# Patient Record
Sex: FEMALE | Race: WHITE | HISPANIC OR LATINO | Employment: UNEMPLOYED | URBAN - METROPOLITAN AREA
[De-identification: names, ages, dates, MRNs, and addresses within clinical notes are randomized per-mention and may not be internally consistent; named-entity substitution may affect disease eponyms.]

---

## 2017-01-16 ENCOUNTER — HOSPITAL ENCOUNTER (EMERGENCY)
Facility: HOSPITAL | Age: 27
Discharge: HOME | End: 2017-01-16
Attending: EMERGENCY MEDICINE | Admitting: EMERGENCY MEDICINE
Payer: COMMERCIAL

## 2017-01-16 VITALS
DIASTOLIC BLOOD PRESSURE: 59 MMHG | OXYGEN SATURATION: 98 % | SYSTOLIC BLOOD PRESSURE: 119 MMHG | TEMPERATURE: 98.4 F | WEIGHT: 180 LBS | RESPIRATION RATE: 15 BRPM | HEART RATE: 82 BPM

## 2017-01-16 DIAGNOSIS — R51.9 HEADACHE: Primary | ICD-10-CM

## 2017-01-16 LAB — HCG UR QL: NEGATIVE

## 2017-01-16 PROCEDURE — 99283 EMERGENCY DEPT VISIT LOW MDM: CPT

## 2017-01-16 PROCEDURE — 96372 THER/PROPH/DIAG INJ SC/IM: CPT

## 2017-01-16 PROCEDURE — 81025 URINE PREGNANCY TEST: CPT | Performed by: EMERGENCY MEDICINE

## 2017-01-16 RX ORDER — KETOROLAC TROMETHAMINE 30 MG/ML
30 INJECTION, SOLUTION INTRAMUSCULAR; INTRAVENOUS ONCE
Status: COMPLETED | OUTPATIENT
Start: 2017-01-16 | End: 2017-01-16

## 2017-01-16 RX ORDER — NAPROXEN 500 MG/1
500 TABLET ORAL 2 TIMES DAILY WITH MEALS
Qty: 10 TABLET | Refills: 0 | Status: SHIPPED | OUTPATIENT
Start: 2017-01-16 | End: 2017-01-21 | Stop reason: ALTCHOICE

## 2017-01-16 RX ORDER — ONDANSETRON 4 MG/1
4 TABLET, ORALLY DISINTEGRATING ORAL ONCE
Status: COMPLETED | OUTPATIENT
Start: 2017-01-16 | End: 2017-01-16

## 2017-01-16 RX ORDER — ONDANSETRON 4 MG/1
4 TABLET, ORALLY DISINTEGRATING ORAL EVERY 8 HOURS PRN
Qty: 20 TABLET | Refills: 0 | Status: SHIPPED | OUTPATIENT
Start: 2017-01-16 | End: 2018-07-03

## 2017-01-16 RX ADMIN — ONDANSETRON 4 MG: 4 TABLET, ORALLY DISINTEGRATING ORAL at 21:47

## 2017-01-16 RX ADMIN — KETOROLAC TROMETHAMINE 30 MG: 30 INJECTION, SOLUTION INTRAMUSCULAR at 21:47

## 2017-04-07 ENCOUNTER — HOSPITAL ENCOUNTER (EMERGENCY)
Facility: HOSPITAL | Age: 27
Discharge: HOME | End: 2017-04-07
Attending: EMERGENCY MEDICINE | Admitting: EMERGENCY MEDICINE
Payer: COMMERCIAL

## 2017-04-07 VITALS
RESPIRATION RATE: 16 BRPM | TEMPERATURE: 98.2 F | WEIGHT: 206 LBS | OXYGEN SATURATION: 100 % | HEART RATE: 74 BPM | SYSTOLIC BLOOD PRESSURE: 133 MMHG | DIASTOLIC BLOOD PRESSURE: 65 MMHG

## 2017-04-07 DIAGNOSIS — M54.2 NECK PAIN ON LEFT SIDE: Primary | ICD-10-CM

## 2017-04-07 PROCEDURE — 99283 EMERGENCY DEPT VISIT LOW MDM: CPT

## 2017-04-07 RX ORDER — NAPROXEN 250 MG/1
500 TABLET ORAL ONCE
Status: DISCONTINUED | OUTPATIENT
Start: 2017-04-07 | End: 2017-04-07 | Stop reason: HOSPADM

## 2017-04-07 RX ORDER — NAPROXEN 500 MG/1
500 TABLET ORAL 2 TIMES DAILY WITH MEALS
Qty: 14 TABLET | Refills: 0 | Status: SHIPPED | OUTPATIENT
Start: 2017-04-07 | End: 2018-07-03

## 2018-01-13 NOTE — MISCELLANEOUS
Provider Comments  Provider Comments:   PATIENT NO SHOWED FOR APPOINTMENT TODAY, TRIED CALLING PATIENT, PATIENTS PHONE IS OUT OF SERVICE      Signatures   Electronically signed by : Angelica Richardson, ; Sep 27 2016 11:14AM EST                       (Author)

## 2018-01-16 NOTE — MISCELLANEOUS
Provider Comments  Provider Comments:   PT NO SHOW FOR U/S AND VISIT  TIRED TO CALL PT PHONE DISCONNECTED      Signatures   Electronically signed by :  [address] [place] [address], ; Jul 13 2016 11:59AM EST                       (Author)    Electronically signed by : VANDANA Roblero ; Jul 13 2016 12:06PM EST                       (Author)

## 2018-07-03 ENCOUNTER — HOSPITAL ENCOUNTER (EMERGENCY)
Facility: HOSPITAL | Age: 28
Discharge: HOME | End: 2018-07-03
Attending: EMERGENCY MEDICINE
Payer: COMMERCIAL

## 2018-07-03 ENCOUNTER — APPOINTMENT (EMERGENCY)
Dept: RADIOLOGY | Facility: HOSPITAL | Age: 28
End: 2018-07-03
Payer: COMMERCIAL

## 2018-07-03 VITALS
HEART RATE: 72 BPM | BODY MASS INDEX: 29.86 KG/M2 | RESPIRATION RATE: 14 BRPM | DIASTOLIC BLOOD PRESSURE: 73 MMHG | OXYGEN SATURATION: 97 % | WEIGHT: 185 LBS | TEMPERATURE: 96.9 F | SYSTOLIC BLOOD PRESSURE: 137 MMHG

## 2018-07-03 DIAGNOSIS — S93.409A ANKLE SPRAIN: Primary | ICD-10-CM

## 2018-07-03 DIAGNOSIS — S93.609A FOOT SPRAIN: ICD-10-CM

## 2018-07-03 PROCEDURE — 99283 EMERGENCY DEPT VISIT LOW MDM: CPT

## 2018-07-03 PROCEDURE — 73630 X-RAY EXAM OF FOOT: CPT

## 2018-07-03 PROCEDURE — 73610 X-RAY EXAM OF ANKLE: CPT

## 2018-07-03 RX ORDER — ACETAMINOPHEN 325 MG/1
650 TABLET ORAL ONCE
Status: COMPLETED | OUTPATIENT
Start: 2018-07-03 | End: 2018-07-03

## 2018-07-03 RX ORDER — IBUPROFEN 600 MG/1
600 TABLET ORAL EVERY 6 HOURS PRN
Qty: 30 TABLET | Refills: 0 | Status: SHIPPED | OUTPATIENT
Start: 2018-07-03 | End: 2018-12-27

## 2018-07-03 RX ORDER — ACETAMINOPHEN 325 MG/1
TABLET ORAL
Status: DISPENSED
Start: 2018-07-03 | End: 2018-07-03

## 2018-07-03 RX ORDER — FERROUS SULFATE 325(65) MG
325 TABLET ORAL 2 TIMES DAILY WITH MEALS
COMMUNITY
End: 2019-04-03

## 2018-07-03 RX ADMIN — ACETAMINOPHEN 650 MG: 325 TABLET ORAL at 10:25

## 2018-07-03 NOTE — DISCHARGE INSTRUCTIONS
Ankle Sprain   WHAT YOU NEED TO KNOW:   An ankle sprain happens when 1 or more ligaments in your ankle joint stretch or tear  Ligaments are tough tissues that connect bones  Ligaments support your joints and keep your bones in place  DISCHARGE INSTRUCTIONS:   Return to the emergency department if:   · You have severe pain in your ankle  · Your foot or toes are cold or numb  · Your ankle becomes more weak or unstable (wobbly)  · You are unable to put any weight on your ankle or foot  · Your swelling has increased or returned  Contact your healthcare provider if:   · Your pain does not go away, even after treatment  · You have questions or concerns about your condition or care  Medicines: You may need any of the following:  · NSAIDs , such as ibuprofen, help decrease swelling, pain, and fever  This medicine is available with or without a doctor's order  NSAIDs can cause stomach bleeding or kidney problems in certain people  If you take blood thinner medicine, always ask your healthcare provider if NSAIDs are safe for you  Always read the medicine label and follow directions  · Acetaminophen  decreases pain  It is available without a doctor's order  Ask how much to take and how often to take it  Follow directions  Acetaminophen can cause liver damage if not taken correctly  · Prescription pain medicine  may be given  Ask how to take this medicine safely  · Take your medicine as directed  Contact your healthcare provider if you think your medicine is not helping or if you have side effects  Tell him or her if you are allergic to any medicine  Keep a list of the medicines, vitamins, and herbs you take  Include the amounts, and when and why you take them  Bring the list or the pill bottles to follow-up visits  Carry your medicine list with you in case of an emergency    Self care:   · Use support devices,  such as a brace, cast, or splint, may be needed to limit your movement and protect your joint  You may need to use crutches to decrease your pain as you move around  · Go to physical therapy as directed  A physical therapist teaches you exercises to help improve movement and strength, and to decrease pain  · Rest  your ankle so that it can heal  Return to normal activities as directed  · Apply ice on your ankle for 15 to 20 minutes every hour or as directed  Use an ice pack, or put crushed ice in a plastic bag  Cover it with a towel  Ice helps prevent tissue damage and decreases swelling and pain  · Compress  your ankle  Ask if you should wrap an elastic bandage around your injured ligament  An elastic bandage provides support and helps decrease swelling and movement so your joint can heal  Wear as long as directed  · Elevate  your ankle above the level of your heart as often as you can  This will help decrease swelling and pain  Prop your ankle on pillows or blankets to keep it elevated comfortably  Prevent another ankle sprain:   · Let your ankle heal   Find out how long your ligament needs to heal  Do not do any physical activity until your healthcare provider says it is okay  If you start activity too soon, you may develop a more serious injury  · Always warm up and stretch  before you exercise or play sports  · Use the right equipment  Always wear shoes that fit well and are made for the activity that you are doing  You may also need ankle supports, elbow and knee pads, or braces  Follow up with your healthcare provider as directed:  Write down your questions so you remember to ask them during your visits  © 2017 [address] Information is for End User's use only and may not be sold, redistributed or otherwise used for commercial purposes  All illustrations and images included in CareNotes® are the copyrighted property of [employer]  or [employer]  The above information is an  only   It is not intended as medical advice for individual conditions or treatments  Talk to your doctor, nurse or pharmacist before following any medical regimen to see if it is safe and effective for you  Foot Sprain   WHAT YOU NEED TO KNOW:   A foot sprain is caused by a stretched or torn ligament in the foot or toe  Ligaments are tough tissues that connect bones  DISCHARGE INSTRUCTIONS:   Seek care immediately if:   · You have numbness or tingling below the injury, such as in your toes  · The skin on your injured foot is blue or pale  · You have increased pain, even after you take pain medicine  Contact your healthcare provider if:   · You have new weakness in your foot  · You have new or increased swelling in your foot  · You have new or increased stiffness when you move your injured foot  · You have questions or concerns about your condition or care  Medicines:   · NSAIDs , such as ibuprofen, help decrease swelling, pain, and fever  This medicine is available with or without a doctor's order  NSAIDs can cause stomach bleeding or kidney problems in certain people  If you take blood thinner medicine, always ask if NSAIDs are safe for you  Always read the medicine label and follow directions  Do not give these medicines to children under 10months of age without direction from your child's healthcare provider  · Take your medicine as directed  Contact your healthcare provider if you think your medicine is not helping or if you have side effects  Tell him of her if you are allergic to any medicine  Keep a list of the medicines, vitamins, and herbs you take  Include the amounts, and when and why you take them  Bring the list or the pill bottles to follow-up visits  Carry your medicine list with you in case of an emergency  Self-care:   · Rest your foot  Limit movement in your sprained foot for the first 2 to 3 days  You might need crutches to take weight off your injured foot as it heals  Use crutches as directed  · Apply ice  on your foot for 15 to 20 minutes every hour or as directed  Use an ice pack, or put crushed ice in a plastic bag  Cover it with a towel  Ice helps prevent tissue damage and decreases swelling and pain  · Compress your foot  You may need to use tape or an elastic bandage to support your foot if you have a mild sprain  You may need a splint on your foot for support if your sprain is severe  Wear your splint for as many days as directed  · Elevate your foot  above the level of your heart as often as you can  This will help decrease swelling and pain  Prop your foot on pillows or blankets to keep it elevated comfortably  Exercise your foot:  You may be given exercises to improve your strength and to help decrease stiffness  The exercises and physical therapy can help restore strength and increase the range of motion in your foot  Ask your healthcare provider when you can return to your normal activities or play sports  Prevent another foot sprain:   · Warm up and stretch before you exercise  · Do not exercise when you feel pain or are tired  · Wear equipment to protect yourself when you play sports  Follow up with your healthcare provider as directed:  Write down your questions so you remember to ask them during your visits  © 2017 [address] [employer] Information is for End User's use only and may not be sold, redistributed or otherwise used for commercial purposes  All illustrations and images included in CareNotes® are the copyrighted property of [employer]  or [employer] Miguel Scott  The above information is an  only  It is not intended as medical advice for individual conditions or treatments  Talk to your doctor, nurse or pharmacist before following any medical regimen to see if it is safe and effective for you  Splint Care   AMBULATORY CARE:   Splint care  is important to help protect your splint until it comes off   Some splints are made of fiberglass or plaster that will need to dry and harden  Splint care will help the splint dry and harden correctly  Even after your splint hardens, it can be damaged  Seek care immediately if:   · You have increased pain  · Your fingers or toes are numb or tingling  · You feel burning or stinging around your injury  · Your nails, fingers, or toes turn pale, blue, or gray, and feel cold  · You have new or increased trouble moving your fingers or toes  · Your swelling gets worse  · The skin under your splint is bleeding or leaking pus  Contact your healthcare provider if:   · Your hard splint gets wet or is damaged  · You have a fever  · Your splint feels tighter  · You have itchy, dry skin under your splint that is getting worse  · The skin under your splint is red, or you have a new sore  · You notice a bad smell coming from your splint  · You have questions or concerns about your condition or care  How to care for your splint:   · Wait for your hard splint to harden completely  You may have to wait up to 3 days before you can walk on a plaster splint  · Check your splint and the skin around it each day  Check your splint for damage, such as cracks and breaks  Check your skin for redness, increased swelling, and sores  Loosen the elastic bandage around your splint if it feels too tight  · Keep your splint clean and dry  Keep dirt out of your splint  Before you bathe, wrap your hard splint with 2 layers of plastic  Then put a plastic bag over it  Keep the plastic bag tightly sealed  You can also ask your healthcare provider about waterproof shields  Do not put your hard splint in the water , even with a plastic bag over it  A wet splint can make your skin itchy, and may lead to infection  · Do not put powders or deodorants inside your splint  These can dry your skin and increase itching       · Do not try to scratch the skin inside your hard splint with sharp objects  Sharp objects can break off inside your splint or hurt your skin  · Do not pull the padding out of your splint  The padding inside your splint protects your skin  You may develop a sore on your skin if you take out the padding  Follow up with your healthcare provider as directed within 1 to 2 weeks:  Write down your questions so you remember to ask them during your visits  © 2017 [address] [employer] Information is for End User's use only and may not be sold, redistributed or otherwise used for commercial purposes  All illustrations and images included in CareNotes® are the copyrighted property of [employer]  or [employer] Miguel Scott  The above information is an  only  It is not intended as medical advice for individual conditions or treatments  Talk to your doctor, nurse or pharmacist before following any medical regimen to see if it is safe and effective for you  Crutch Instructions   WHAT YOU NEED TO KNOW:   Crutches are tools that provide support and balance when you walk  You may need 1 or 2 crutches to help support your body weight  You may need crutches if you had surgery or an injury that affects your ability to walk  DISCHARGE INSTRUCTIONS:   How to use crutches safely:   · Support your weight with your arms and hands  Do not support your weight with your armpits  This could hurt the nerves that are in your armpits  Keep your elbow bent when the crutch is in place under your arm  · Walk slowly and carefully with crutches  Go up and down stairs and ramps slowly, and stop to rest when you feel tired  Get up slowly to a sitting or standing position  This will help prevent dizziness and fainting  Use your crutches only on firm ground  Use caution when you walk on ice or snow  Wet or waxed floors and smooth cement floors can be slippery  Watch out for small rugs or cords    How to walk with crutches:   · Place both crutches under your arms, and place your hands on the hand  of the crutches  Place your crutches slightly in front of you  · The top of the crutches should be about 2 fingers [id number] (about 1½ inches) below your armpits  Place your weight on your hands  The top of the crutches should not press into your armpits  · If you have one leg that is injured, keep it off the floor by bending your knee  · Lift the crutches and move them a step ahead of you  Put the rubber ends of the crutches firmly on the ground  Move the foot that is not injured between the crutches  Place that heel down first     · If you are using your crutches for balance, move your right foot and left crutch forward  Then move your left foot and right crutch forward  Keep walking this way  How to go up stairs with crutches:   · Face the stairs  Put the crutches close to the first step  · Push onto the crutches and put your uninjured leg on the first step  · Put your weight on your uninjured leg that is on the first step  Bring both crutches and the injured leg onto the step at the same time  · When you hold onto a railing with one arm, put both crutches under the other arm  Use the railing to help you go up stairs  How to go down stairs with crutches:   · Stand with the toes of your uninjured leg close to the edge of the step  · Bend the knee of your uninjured leg  Slowly lower both crutches along with the injured leg onto the next step  · Lean on your crutches  Slowly lower your uninjured leg onto the same step  · Place both crutches under one arm while you hold onto the railing with the other arm  How to sit in a chair with crutches:   · Turn and back up to the chair until you feel the edge of it against the back of your legs  Keep your injured leg forward  · Take your crutches out from under your arms  Sit while bending your uninjured knee  How to get up from a chair with crutches:   · Sit on the edge of your chair       · Push up with your hands using the crutches or arms of the chair  Put your weight on your uninjured foot as you get up  · Keep your injured leg bent at the knee and off the floor  Contact your healthcare provider if:   · Your crutches do not fit  · One crutch is longer than the other  · Your crutches break or get lost     · The rubber tips of your crutches are split or loose  · You get blisters or painful calluses on your hands or armpits  · Your armpit is red, sore, or has bumps or pimples  · Your arm muscles get weaker the longer you use the crutches  · You have questions or concerns about your condition or care  Seek care immediately or call 911 if:   · You have sudden numbness in a hand or arm  · Your fingers feel cold or have cramping pain  © 2017 [address] Information is for End User's use only and may not be sold, redistributed or otherwise used for commercial purposes  All illustrations and images included in CareNotes® are the copyrighted property of [employer]  or [employer] Miguel Scott  The above information is an  only  It is not intended as medical advice for individual conditions or treatments  Talk to your doctor, nurse or pharmacist before following any medical regimen to see if it is safe and effective for you

## 2018-07-03 NOTE — ED PROVIDER NOTES
History  Chief Complaint   Patient presents with    Foot Pain     "I woke up this morning with pressure and swelling to my left foot"  denies injury to same       History provided by:  Patient   used: No    Medical Problem   Location:  Left ankle and foot pain    Quality:  Problems for years since sprained ankle   Severity:  Moderate  Onset quality:  Gradual  Duration:  3 hours  Timing:  Intermittent  Chronicity:  Recurrent  Associated symptoms: no abdominal pain, no chest pain, no congestion, no cough, no diarrhea, no ear pain, no fatigue, no fever, no headaches, no loss of consciousness, no myalgias, no nausea, no rash, no rhinorrhea, no shortness of breath, no sore throat, no vomiting and no wheezing        Prior to Admission Medications   Prescriptions Last Dose Informant Patient Reported? Taking?   ferrous sulfate 325 (65 Fe) mg tablet   Yes Yes   Sig: Take 325 mg by mouth 2 (two) times a day with meals      Facility-Administered Medications: None       Past Medical History:   Diagnosis Date    Disease of thyroid gland        Past Surgical History:   Procedure Laterality Date     SECTION         Family History   Problem Relation Age of Onset    Heart murmur Mother     Hypertension Mother     Lupus Mother      I have reviewed and agree with the history as documented  Social History   Substance Use Topics    Smoking status: Current Some Day Smoker    Smokeless tobacco: Never Used    Alcohol use No        Review of Systems   Constitutional: Negative  Negative for fatigue and fever  HENT: Negative  Negative for congestion, ear pain, rhinorrhea and sore throat  Eyes: Negative  Respiratory: Negative  Negative for cough, shortness of breath and wheezing  Cardiovascular: Negative  Negative for chest pain  Gastrointestinal: Negative  Negative for abdominal pain, diarrhea, nausea and vomiting  Endocrine: Negative  Genitourinary: Negative  Musculoskeletal: Negative  Negative for myalgias  Skin: Negative  Negative for rash  Allergic/Immunologic: Negative  Neurological: Negative  Negative for loss of consciousness and headaches  Hematological: Negative  Psychiatric/Behavioral: Negative  All other systems reviewed and are negative  Physical Exam  Physical Exam   Constitutional: She appears well-developed and well-nourished  HENT:   Head: Normocephalic and atraumatic  Right Ear: External ear normal    Left Ear: External ear normal    Nose: Nose normal    Mouth/Throat: Oropharynx is clear and moist    Eyes: Conjunctivae and EOM are normal  Pupils are equal, round, and reactive to light  Neck: Normal range of motion  Neck supple  Cardiovascular: Normal rate, regular rhythm and normal heart sounds  Pulmonary/Chest: Effort normal and breath sounds normal    Abdominal: Soft  Bowel sounds are normal    Musculoskeletal:   Left heel pain  Left lateral malleolus tender  5th metatarsal tender no swelling ankle and toes from dp pulses strong bilat    Skin: Skin is warm  Psychiatric: She has a normal mood and affect  Her behavior is normal  Judgment and thought content normal    Nursing note and vitals reviewed        Vital Signs  ED Triage Vitals [07/03/18 0954]   Temperature Pulse Respirations Blood Pressure SpO2   (!) 96 9 °F (36 1 °C) 100 16 120/70 99 %      Temp Source Heart Rate Source Patient Position - Orthostatic VS BP Location FiO2 (%)   Temporal Monitor Sitting Left arm --      Pain Score       8           Vitals:    07/03/18 0954 07/03/18 1230   BP: 120/70 137/73   Pulse: 100 72   Patient Position - Orthostatic VS: Sitting Sitting       Visual Acuity      ED Medications  Medications   acetaminophen (TYLENOL) tablet 650 mg (650 mg Oral Given 7/3/18 1025)       Diagnostic Studies  Results Reviewed     None                 XR foot 3+ views LEFT   Final Result by Temo Proctor DO (07/03 1311)      No acute osseous abnormality  Workstation performed: [id number]         XR ankle 3+ views LEFT   Final Result by Emi Mullen DO (07/03 1048)      No acute osseous abnormality  Workstation performed: [id number]                    Procedures  Procedures       Phone Contacts  ED Phone Contact    ED Course                               MDM  CritCare Time    Disposition  Final diagnoses: Ankle sprain   Foot sprain     Time reflects when diagnosis was documented in both MDM as applicable and the Disposition within this note     Time User Action Codes Description Comment    7/3/2018 12:06  [address] [[id number]] Ankle sprain     7/3/2018 12:06 PM Cheng Boswell Add [[id number]] Foot sprain       ED Disposition     ED Disposition Condition Comment    Discharge  Chip Nissen discharge to home/self care  Condition at discharge: Good        Follow-up Information     Follow up With Specialties Details Why [facility] Schedule an appointment as soon as possible for a visit  [address] [address] [phone]          Discharge Medication List as of 7/3/2018 12:08 PM      CONTINUE these medications which have CHANGED    Details   ibuprofen (MOTRIN) 600 mg tablet Take 1 tablet (600 mg total) by mouth every 6 (six) hours as needed (pain), Starting Tue 7/3/2018, Print         CONTINUE these medications which have NOT CHANGED    Details   naproxen (NAPROSYN) 500 mg tablet Take 1 tablet by mouth 2 (two) times a day with meals for 7 days, Starting 4/7/2017, Until Fri 4/14/17, Print      ondansetron (ZOFRAN-ODT) 4 mg disintegrating tablet Take 1 tablet by mouth every 8 (eight) hours as needed for nausea or vomiting for up to 30 days, Starting 1/16/2017, Until Wed 2/15/17, Print           No discharge procedures on file      ED Provider  Electronically Signed by           Jeri Branch PA-C  07/03/18 [address]

## 2018-07-16 ENCOUNTER — TELEPHONE (OUTPATIENT)
Dept: FAMILY MEDICINE CLINIC | Facility: CLINIC | Age: 28
End: 2018-07-16

## 2018-07-16 ENCOUNTER — TRANSCRIBE ORDERS (OUTPATIENT)
Dept: ADMINISTRATIVE | Facility: HOSPITAL | Age: 28
End: 2018-07-16

## 2018-07-16 ENCOUNTER — HOSPITAL ENCOUNTER (EMERGENCY)
Facility: HOSPITAL | Age: 28
Discharge: HOME | End: 2018-07-16
Attending: EMERGENCY MEDICINE | Admitting: EMERGENCY MEDICINE
Payer: COMMERCIAL

## 2018-07-16 VITALS
TEMPERATURE: 97.1 F | DIASTOLIC BLOOD PRESSURE: 64 MMHG | RESPIRATION RATE: 18 BRPM | WEIGHT: 202.38 LBS | HEART RATE: 85 BPM | OXYGEN SATURATION: 100 % | BODY MASS INDEX: 32.53 KG/M2 | HEIGHT: 66 IN | SYSTOLIC BLOOD PRESSURE: 110 MMHG

## 2018-07-16 DIAGNOSIS — R42 DIZZY: Primary | ICD-10-CM

## 2018-07-16 DIAGNOSIS — N93.9 ABNORMAL VAGINAL BLEEDING: ICD-10-CM

## 2018-07-16 LAB
ALBUMIN SERPL BCP-MCNC: 4.2 G/DL (ref 3–5.2)
ALP SERPL-CCNC: 84 U/L (ref 43–122)
ALT SERPL W P-5'-P-CCNC: 32 U/L (ref 9–52)
ANION GAP SERPL CALCULATED.3IONS-SCNC: 12 MMOL/L (ref 5–14)
ANISOCYTOSIS BLD QL SMEAR: PRESENT
AST SERPL W P-5'-P-CCNC: 25 U/L (ref 14–36)
BASOPHILS # BLD AUTO: 0.1 THOUSANDS/ΜL (ref 0–0.1)
BASOPHILS NFR BLD AUTO: 1 % (ref 0–1)
BILIRUB SERPL-MCNC: 0.5 MG/DL
BILIRUB UR QL STRIP: NEGATIVE
BUN SERPL-MCNC: 13 MG/DL (ref 5–25)
CALCIUM SERPL-MCNC: 8.8 MG/DL (ref 8.4–10.2)
CHLORIDE SERPL-SCNC: 107 MMOL/L (ref 97–108)
CLARITY UR: CLEAR
CO2 SERPL-SCNC: 24 MMOL/L (ref 22–30)
COLOR UR: YELLOW
CREAT SERPL-MCNC: 0.71 MG/DL (ref 0.6–1.2)
EOSINOPHIL # BLD AUTO: 0.2 THOUSAND/ΜL (ref 0–0.4)
EOSINOPHIL NFR BLD AUTO: 2 % (ref 0–6)
ERYTHROCYTE [DISTWIDTH] IN BLOOD BY AUTOMATED COUNT: 18 %
EXT PREG TEST URINE: NEGATIVE
GFR SERPL CREATININE-BSD FRML MDRD: 115 ML/MIN/1.73SQ M
GLUCOSE SERPL-MCNC: 102 MG/DL (ref 70–99)
GLUCOSE UR STRIP-MCNC: NEGATIVE MG/DL
HCT VFR BLD AUTO: 35.6 % (ref 36–46)
HGB BLD-MCNC: 11.1 G/DL (ref 12–16)
HGB UR QL STRIP.AUTO: NEGATIVE
KETONES UR STRIP-MCNC: NEGATIVE MG/DL
LEUKOCYTE ESTERASE UR QL STRIP: NEGATIVE
LYMPHOCYTES # BLD AUTO: 3.5 THOUSANDS/ΜL (ref 0.5–4)
LYMPHOCYTES NFR BLD AUTO: 36 % (ref 20–50)
MCH RBC QN AUTO: 24.8 PG (ref 26–34)
MCHC RBC AUTO-ENTMCNC: 31.3 G/DL (ref 31–36)
MCV RBC AUTO: 80 FL (ref 80–100)
MICROCYTES BLD QL AUTO: PRESENT
MONOCYTES # BLD AUTO: 0.6 THOUSAND/ΜL (ref 0.2–0.9)
MONOCYTES NFR BLD AUTO: 6 % (ref 1–10)
NEUTROPHILS # BLD AUTO: 5.5 THOUSANDS/ΜL (ref 1.8–7.8)
NEUTS SEG NFR BLD AUTO: 56 % (ref 45–65)
NITRITE UR QL STRIP: NEGATIVE
PH UR STRIP.AUTO: 6 [PH] (ref 4.5–8)
PLATELET # BLD AUTO: 397 THOUSANDS/UL (ref 150–450)
PLATELET BLD QL SMEAR: ADEQUATE
PMV BLD AUTO: 8.7 FL (ref 8.9–12.7)
POTASSIUM SERPL-SCNC: 4 MMOL/L (ref 3.6–5)
PROT SERPL-MCNC: 8.1 G/DL (ref 5.9–8.4)
PROT UR STRIP-MCNC: NEGATIVE MG/DL
RBC # BLD AUTO: 4.48 MILLION/UL (ref 4–5.2)
RBC MORPH BLD: PRESENT
SODIUM SERPL-SCNC: 143 MMOL/L (ref 137–147)
SP GR UR STRIP.AUTO: 1.02 (ref 1–1.04)
UROBILINOGEN UA: NEGATIVE MG/DL
WBC # BLD AUTO: 9.8 THOUSAND/UL (ref 4.5–11)

## 2018-07-16 PROCEDURE — 99284 EMERGENCY DEPT VISIT MOD MDM: CPT

## 2018-07-16 PROCEDURE — 80053 COMPREHEN METABOLIC PANEL: CPT | Performed by: EMERGENCY MEDICINE

## 2018-07-16 PROCEDURE — 81003 URINALYSIS AUTO W/O SCOPE: CPT | Performed by: EMERGENCY MEDICINE

## 2018-07-16 PROCEDURE — 81025 URINE PREGNANCY TEST: CPT | Performed by: EMERGENCY MEDICINE

## 2018-07-16 PROCEDURE — 36415 COLL VENOUS BLD VENIPUNCTURE: CPT | Performed by: EMERGENCY MEDICINE

## 2018-07-16 PROCEDURE — 85025 COMPLETE CBC W/AUTO DIFF WBC: CPT | Performed by: EMERGENCY MEDICINE

## 2018-07-16 RX ORDER — ONDANSETRON 4 MG/1
4 TABLET, FILM COATED ORAL EVERY 8 HOURS PRN
Qty: 15 TABLET | Refills: 0 | Status: SHIPPED | OUTPATIENT
Start: 2018-07-16 | End: 2019-04-03

## 2018-07-16 RX ORDER — ONDANSETRON 4 MG/1
TABLET, ORALLY DISINTEGRATING ORAL
Status: COMPLETED
Start: 2018-07-16 | End: 2018-07-16

## 2018-07-16 RX ORDER — ONDANSETRON 4 MG/1
4 TABLET, ORALLY DISINTEGRATING ORAL ONCE
Status: COMPLETED | OUTPATIENT
Start: 2018-07-16 | End: 2018-07-16

## 2018-07-16 RX ADMIN — ONDANSETRON 4 MG: 4 TABLET, ORALLY DISINTEGRATING ORAL at 19:42

## 2018-07-16 NOTE — TELEPHONE ENCOUNTER
Would like lab work order entered in system, so she can get labs done this week then make appt with you    Call her once completed

## 2018-07-16 NOTE — ED PROVIDER NOTES
History  Chief Complaint   Patient presents with    Dizziness     "I've been having dizzy spells, I feel like I'm going to vomit but nothing comes out, and I've been feeling weak" x  1 week  30-year-old female comes in for dizziness nausea and dry dry heaving and generalized weakness  Patient states that she took a pregnancy test that was unclear  Patient also states that her period last month was shorter than usual and came late this month  And only lasted for 1 day  History provided by:  Patient   used: No    Dizziness   Quality:  Lightheadedness  Severity:  Moderate  Onset quality:  Sudden  Duration:  1 week  Timing:  Intermittent  Progression:  Waxing and waning  Chronicity:  New  Context: not when bending over, not with inactivity and not when urinating    Ineffective treatments:  None tried  Associated symptoms: nausea and weakness    Associated symptoms: no blood in stool, no chest pain, no diarrhea, no headaches and no shortness of breath    Nausea:     Severity:  Moderate    Onset quality:  Gradual    Duration:  1 week    Timing:  Intermittent    Progression:  Waxing and waning  Weakness:     Severity:  Moderate    Duration:  1 week    Onset quality:  Gradual    Timing:  Intermittent    Chronicity:  New  Risk factors: no anemia, no Meniere's disease and no new medications        Prior to Admission Medications   Prescriptions Last Dose Informant Patient Reported?  Taking?   ferrous sulfate 325 (65 Fe) mg tablet Past Week at Unknown time  Yes Yes   Sig: Take 325 mg by mouth 2 (two) times a day with meals   ibuprofen (MOTRIN) 600 mg tablet Past Month at Unknown time  No Yes   Sig: Take 1 tablet (600 mg total) by mouth every 6 (six) hours as needed (pain)      Facility-Administered Medications: None       Past Medical History:   Diagnosis Date    Disease of thyroid gland        Past Surgical History:   Procedure Laterality Date     SECTION         Family History Problem Relation Age of Onset    Heart murmur Mother     Hypertension Mother     Lupus Mother      I have reviewed and agree with the history as documented  Social History   Substance Use Topics    Smoking status: Never Smoker    Smokeless tobacco: Never Used    Alcohol use No        Review of Systems   Constitutional: Negative for fatigue and fever  HENT: Negative for congestion and ear pain  Eyes: Negative for discharge and redness  Respiratory: Negative for apnea, cough, shortness of breath and wheezing  Cardiovascular: Negative for chest pain  Gastrointestinal: Positive for nausea  Negative for abdominal pain, blood in stool and diarrhea  Endocrine: Negative for cold intolerance and polydipsia  Genitourinary: Negative for difficulty urinating and hematuria  Musculoskeletal: Negative for arthralgias and back pain  Skin: Negative for color change and rash  Allergic/Immunologic: Negative for environmental allergies and immunocompromised state  Neurological: Positive for dizziness and weakness  Negative for numbness and headaches  Hematological: Negative for adenopathy  Does not bruise/bleed easily  Psychiatric/Behavioral: Negative for agitation and behavioral problems  Physical Exam  Physical Exam   Constitutional: She is oriented to person, place, and time  Vital signs are normal  She appears well-developed and well-nourished  Non-toxic appearance  HENT:   Head: Normocephalic and atraumatic  Right Ear: Tympanic membrane and external ear normal    Left Ear: Tympanic membrane and external ear normal    Nose: Nose normal  No rhinorrhea, sinus tenderness or nasal deformity  Mouth/Throat: Uvula is midline and oropharynx is clear and moist  Normal dentition  Eyes: Conjunctivae, EOM and lids are normal  Pupils are equal, round, and reactive to light  Right eye exhibits no discharge  Left eye exhibits no discharge  Neck: Trachea normal and normal range of motion  Neck supple  No JVD present  Carotid bruit is not present  Cardiovascular: Normal rate, regular rhythm, intact distal pulses and normal pulses  No extrasystoles are present  PMI is not displaced  Pulmonary/Chest: Effort normal and breath sounds normal  No accessory muscle usage  No respiratory distress  She has no wheezes  She has no rhonchi  She has no rales  Abdominal: Soft  Normal appearance and bowel sounds are normal  She exhibits no mass  There is no tenderness  There is no rigidity, no rebound and no guarding  Musculoskeletal:        Right shoulder: She exhibits normal range of motion, no bony tenderness, no swelling and no deformity  Cervical back: Normal  She exhibits normal range of motion, no tenderness, no bony tenderness and no deformity  Lymphadenopathy:     She has no cervical adenopathy  She has no axillary adenopathy  Neurological: She is alert and oriented to person, place, and time  She has normal strength and normal reflexes  No cranial nerve deficit or sensory deficit  GCS eye subscore is 4  GCS verbal subscore is 5  GCS motor subscore is 6  Skin: Skin is warm and dry  No rash noted  Psychiatric: She has a normal mood and affect  Her speech is normal and behavior is normal    Nursing note and vitals reviewed        Vital Signs  ED Triage Vitals [07/16/18 1926]   Temperature Pulse Respirations Blood Pressure SpO2   (!) 97 1 °F (36 2 °C) 85 18 110/64 100 %      Temp Source Heart Rate Source Patient Position - Orthostatic VS BP Location FiO2 (%)   Temporal Monitor Sitting Left arm --      Pain Score       --           Vitals:    07/16/18 1926   BP: 110/64   Pulse: 85   Patient Position - Orthostatic VS: Sitting       Visual Acuity      ED Medications  Medications   ondansetron (ZOFRAN-ODT) dispersible tablet 4 mg (4 mg Oral Given 7/16/18 1942)       Diagnostic Studies  Results Reviewed     Procedure Component Value Units Date/Time    Comprehensive metabolic panel [[id number]]  (Abnormal) Collected:  07/16/18 1955    Lab Status:  Final result Specimen:  Blood from Arm, Right Updated:  07/16/18 2022     Sodium 143 mmol/L      Potassium 4 0 mmol/L      Chloride 107 mmol/L      CO2 24 mmol/L      Anion Gap 12 mmol/L      BUN 13 mg/dL      Creatinine 0 71 mg/dL      Glucose 102 (H) mg/dL      Calcium 8 8 mg/dL      AST 25 U/L      ALT 32 U/L      Alkaline Phosphatase 84 U/L      Total Protein 8 1 g/dL      Albumin 4 2 g/dL      Total Bilirubin 0 50 mg/dL      eGFR 115 ml/min/1 73sq m     Narrative:         National Kidney Disease Education Program recommendations are as follows:  GFR calculation is accurate only with a steady state creatinine  Chronic Kidney disease less than 60 ml/min/1 73 sq  meters  Kidney failure less than 15 ml/min/1 73 sq  meters      CBC and differential [[id number]]  (Abnormal) Collected:  07/16/18 1955    Lab Status:  Final result Specimen:  Blood from Arm, Right Updated:  07/16/18 2006     WBC 9 80 Thousand/uL      RBC 4 48 Million/uL      Hemoglobin 11 1 (L) g/dL      Hematocrit 35 6 (L) %      MCV 80 fL      MCH 24 8 (L) pg      MCHC 31 3 g/dL      RDW 18 0 (H) %      MPV 8 7 (L) fL      Platelets 229 Thousands/uL      Neutrophils Relative 56 %      Lymphocytes Relative 36 %      Monocytes Relative 6 %      Eosinophils Relative 2 %      Basophils Relative 1 %      Neutrophils Absolute 5 50 Thousands/µL      Lymphocytes Absolute 3 50 Thousands/µL      Monocytes Absolute 0 60 Thousand/µL      Eosinophils Absolute 0 20 Thousand/µL      Basophils Absolute 0 10 Thousands/µL     UA w Reflex to Microscopic w Reflex to Culture [[id number]]  (Normal) Collected:  07/16/18 1955    Lab Status:  Final result Specimen:  Urine from Urine, Clean Catch Updated:  07/16/18 2004     Color, UA Yellow     Clarity, UA Clear     Specific Gravity, UA 1 025     pH, UA 6 0     Leukocytes, UA Negative     Nitrite, UA Negative     Protein, UA Negative mg/dl      Glucose, UA Negative mg/dl      Ketones, UA Negative mg/dl      Bilirubin, UA Negative     Blood, UA Negative     UROBILINOGEN UA Negative mg/dL     POCT pregnancy, urine [[id number]]  (Normal) Resulted:  07/16/18 2000    Lab Status:  Final result Updated:  07/16/18 2001     EXT PREG TEST UR (Ref: Negative) negative                 No orders to display              Procedures  Procedures       Phone Contacts  ED Phone Contact    ED Course                               MDM  Number of Diagnoses or Management Options  Abnormal vaginal bleeding: new and requires workup  Dizzy: new and requires workup     Amount and/or Complexity of Data Reviewed  Clinical lab tests: ordered and reviewed  Tests in the medicine section of CPT®: ordered and reviewed    Patient Progress  Patient progress: stable    CritCare Time    Disposition  Final diagnoses:   Dizzy   Abnormal vaginal bleeding     Time reflects when diagnosis was documented in both MDM as applicable and the Disposition within this note     Time User Action Codes Description Comment    7/16/2018  8:32 PM Varsha BAIRD Add [R42] Dizzy     7/16/2018  8:32 PM Rosi Jacinto Add [N93 9] Abnormal vaginal bleeding       ED Disposition     ED Disposition Condition Comment    Discharge  Kathy Chaparro discharge to home/self care  Condition at discharge: Good        Follow-up Information     Follow up With Specialties Details Why Contact Info    Nola Damon MD Internal Medicine Schedule an appointment as soon as possible for a visit  Vladislav Medina  [phone]            Patient's Medications   Discharge Prescriptions    ONDANSETRON (ZOFRAN) 4 MG TABLET    Take 1 tablet (4 mg total) by mouth every 8 (eight) hours as needed for nausea or vomiting       Start Date: 7/16/2018 End Date: --       Order Dose: 4 mg       Quantity: 15 tablet    Refills: 0     No discharge procedures on file      ED Provider  Electronically Signed by           Vanessa Carty DO  07/16/18 2035

## 2018-07-17 NOTE — DISCHARGE INSTRUCTIONS
Dizziness, Ambulatory Care   GENERAL INFORMATION:   Dizziness  is a term used to describe a feeling of being off balance or unsteady  Common causes of dizziness are an inner ear fluid imbalance or a lack of oxygen in your blood  Your dizziness may be acute (lasts 3 days or less) or chronic (lasts longer than 3 days)  You may have dizzy spells that last from seconds to a few hours  Common symptoms related to dizziness include the following:   · A feeling that your surroundings are moving even though you are standing still    · Ringing in your ears or hearing loss     · Feeling faint or lightheaded     · Weakness or unsteadiness     · Double vision or eye movements you cannot control    · Nausea or vomiting     · Confusion  Seek immediate care for the following symptoms:   · You have a headache that does not go away with medicine  · You have shaking chills and a fever  · You vomit over and over with no relief  · Your vomit or bowel movements are red or black  · You have pain in your chest, back, or abdomen  · You have numbness, especially in your face, arms, or legs  · You have trouble moving your arms or legs  Treatment for dizziness  depends on the cause of your dizziness  You may need medicines to decrease your dizziness or nausea  You may need to be admitted to the hospital for treatment  Manage your symptoms:  Get up slowly from sitting or lying down  Do not drive or operate machinery when you are dizzy  Follow up with your healthcare provider as directed:  Write down your questions so you remember to ask them during your visits  CARE AGREEMENT:   You have the right to help plan your care  Learn about your health condition and how it may be treated  Discuss treatment options with your caregivers to decide what care you want to receive  You always have the right to refuse treatment  The above information is an  only   It is not intended as medical advice for individual conditions or treatments  Talk to your doctor, nurse or pharmacist before following any medical regimen to see if it is safe and effective for you  © 2014 [address] is for End User's use only and may not be sold, redistributed or otherwise used for commercial purposes  All illustrations and images included in CareNotes® are the copyrighted property of [employer]  or [employer]

## 2018-07-30 ENCOUNTER — APPOINTMENT (OUTPATIENT)
Dept: LAB | Facility: HOSPITAL | Age: 28
End: 2018-07-30
Attending: OBSTETRICS & GYNECOLOGY
Payer: COMMERCIAL

## 2018-07-30 ENCOUNTER — OFFICE VISIT (OUTPATIENT)
Dept: OBGYN CLINIC | Facility: CLINIC | Age: 28
End: 2018-07-30
Payer: COMMERCIAL

## 2018-07-30 VITALS — BODY MASS INDEX: 32.67 KG/M2 | WEIGHT: 202.4 LBS | SYSTOLIC BLOOD PRESSURE: 118 MMHG | DIASTOLIC BLOOD PRESSURE: 80 MMHG

## 2018-07-30 DIAGNOSIS — R10.2 PELVIC PAIN: Primary | ICD-10-CM

## 2018-07-30 DIAGNOSIS — R10.2 PELVIC PAIN: ICD-10-CM

## 2018-07-30 DIAGNOSIS — Z11.3 SCREENING EXAMINATION FOR STD (SEXUALLY TRANSMITTED DISEASE): ICD-10-CM

## 2018-07-30 DIAGNOSIS — R19.8: ICD-10-CM

## 2018-07-30 DIAGNOSIS — O26.899: ICD-10-CM

## 2018-07-30 LAB
B-HCG SERPL-ACNC: <2 MIU/ML
PROLACTIN SERPL-MCNC: 9.3 NG/ML
TSH SERPL DL<=0.05 MIU/L-ACNC: 1.21 UIU/ML (ref 0.36–3.74)

## 2018-07-30 PROCEDURE — 36415 COLL VENOUS BLD VENIPUNCTURE: CPT

## 2018-07-30 PROCEDURE — 99214 OFFICE O/P EST MOD 30 MIN: CPT | Performed by: OBSTETRICS & GYNECOLOGY

## 2018-07-30 PROCEDURE — 84146 ASSAY OF PROLACTIN: CPT

## 2018-07-30 PROCEDURE — 84702 CHORIONIC GONADOTROPIN TEST: CPT

## 2018-07-30 PROCEDURE — 84443 ASSAY THYROID STIM HORMONE: CPT

## 2018-07-30 PROCEDURE — 87491 CHLMYD TRACH DNA AMP PROBE: CPT | Performed by: OBSTETRICS & GYNECOLOGY

## 2018-07-30 PROCEDURE — 87591 N.GONORRHOEAE DNA AMP PROB: CPT | Performed by: OBSTETRICS & GYNECOLOGY

## 2018-07-30 RX ORDER — IBUPROFEN 600 MG/1
1 TABLET ORAL EVERY 6 HOURS PRN
COMMUNITY
Start: 2015-07-10 | End: 2018-12-27

## 2018-07-30 NOTE — PROGRESS NOTES
Assessment/Plan:     Diagnoses and all orders for this visit:    Pelvic pain  -     hCG, quantitative; Future  -     Prolactin; Future  -     TSH, 3rd generation with Free T4 reflex; Future  -     US pelvis complete w transvaginal; Future    Gastrointestinal symptoms related to pregnancy, antepartum  -     hCG, quantitative; Future  -     Prolactin; Future  -     TSH, 3rd generation with Free T4 reflex; Future  -     US pelvis complete w transvaginal; Future    Screening examination for STD (sexually transmitted disease)  -     Chlamydia/GC amplified DNA by PCR    Other orders  -     Ferrous Sulfate (FEROSUL PO); Every 12 hours  -     ibuprofen (MOTRIN) 600 mg tablet; Take 1 tablet by mouth every 6 (six) hours as needed          Patient with pelvic pain and history of ovarian cyst   Pelvic ultrasound was ordered to rule out ovarian cysts  Patient also with pregnancy related symptoms  Laboratory testing was ordered  She will continue to use ibuprofen as needed for pain  She was asked to follow up in 1-2 weeks to discuss results  Subjective   Patient ID: Killian Brooke is a 33 y o  female  Patient is here for an ER follow-up  She is currently not using contraception  She is sexually active with the same partner for several years  She is   She reports regular cycles occurring every 28 days about 3 days long  She reported a short period for 1 day  She also complains of abdominal pain, pelvic cramping, dizziness and nausea and vomiting  Her pregnancy test in the ER was negative  Patient with history of ovarian cyst   She describes pelvic pain  She states the pain is severe in could be a 10/10  She takes ibuprofen which she states does not help  She states that the pain is worse when she is lifting  She does have pain with intercourse  She denies pain with periods    Patient with previous history of laparoscopy before the did not show endometriosis but did show pelvic and abdominal adhesions  OB History      Para Term  AB Living    8 5 5 0 3 4    SAB TAB Ectopic Multiple Live Births    3 0 0 0 4            Review of Systems   Constitutional: Negative  HENT: Negative  Eyes: Negative  Respiratory: Negative  Cardiovascular: Negative  Gastrointestinal: Negative  Endocrine: Negative  Genitourinary:        As noted in HPI   Musculoskeletal: Negative  Skin: Negative  Allergic/Immunologic: Negative  Neurological: Negative  Hematological: Negative  Psychiatric/Behavioral: Negative  Vitals:    18 1359   BP: 118/80         Physical Exam   Constitutional: She is oriented to person, place, and time  She appears well-developed and well-nourished  No distress  Genitourinary: Uterus normal  Pelvic exam was performed with patient supine  There is no rash, tenderness, lesion or injury on the right labia  There is no rash, tenderness, lesion or injury on the left labia  No erythema, tenderness or bleeding in the vagina  No signs of injury around the vagina  No vaginal discharge found  Right adnexum does not display mass, does not display tenderness and does not display fullness  Left adnexum does not display mass, does not display tenderness and does not display fullness  Cervix does not exhibit motion tenderness, lesion, discharge or polyp  Uterus is anteverted  Uterus is not enlarged or tender  Abdominal: Soft  She exhibits no distension  There is no tenderness  Neurological: She is alert and oriented to person, place, and time  Skin: Skin is warm and dry  Psychiatric: She has a normal mood and affect  Her behavior is normal          Menstrual History:  OB History      Para Term  AB Living    8 5 5 0 3 4    SAB TAB Ectopic Multiple Live Births    3 0 0 0 4           Patient's last menstrual period was 2018 (approximate)    Period Cycle (Days): 28  Period Duration (Days): 2-3  Period Pattern: Regular  Menstrual Flow: Moderate    The following portions of the patient's history were reviewed and updated as appropriate: allergies, current medications, past family history, past medical history, past social history, past surgical history and problem list       Counseling / Coordination of Care  Total time spent today25  minutes  Greater than 50% of total time was spent with the patient and / or family counseling and / or coordination of care

## 2018-07-30 NOTE — PATIENT INSTRUCTIONS
Pelvic Pain   WHAT YOU NEED TO KNOW:   Pelvic pain may be caused by a number of conditions, such as irritable bowel syndrome, appendicitis, or constipation  A urinary tract infection, prostate inflammation, menstrual cramps, or kidney stones can also cause pelvic pain  You may have pain on one or both sides of your pelvis  Pelvic pain can develop if you have trauma to your pelvis or if you sit or stand for a long time  DISCHARGE INSTRUCTIONS:   Medicines: You may need any of the following:  · NSAIDs , such as ibuprofen, help decrease swelling, pain, and fever  This medicine is available with or without a doctor's order  NSAIDs can cause stomach bleeding or kidney problems in certain people  If you take blood thinner medicine, always ask your healthcare provider if NSAIDs are safe for you  Always read the medicine label and follow directions  · Prescription pain medicine  may be given  Ask how to take this medicine safely  · Birth control medicines  may help decrease pain in women  · Take your medicine as directed  Contact your healthcare provider if you think your medicine is not helping or if you have side effects  Tell him or her if you are allergic to any medicine  Keep a list of the medicines, vitamins, and herbs you take  Include the amounts, and when and why you take them  Bring the list or the pill bottles to follow-up visits  Carry your medicine list with you in case of an emergency  Call 911 for any of the following:   · You have severe chest pain and sudden trouble breathing  Contact your healthcare provider if:   · You have a fever  · You have nausea, vomiting, or diarrhea  · Your pain does not go away, or it gets worse, even after treatment  · You have numbness in your legs or toes  · You have heavy or unusual vaginal bleeding  · You have questions or concerns about your condition or care  Manage your pelvic pain:   · Keep a pain record    Write down when your pain happens and how severe it is  Include any other symptoms you have with your pain  A record will help you keep track of pain cycles  Bring the record with you to your follow-up visits  It may also help your healthcare provider find out what is causing your pain  · Learn ways to relax  Deep breathing, meditation, and relaxation techniques can help decrease your pain  When you are tense, your pain may increase  · Treat or prevent constipation  Drink liquids as directed  You may need to drink more liquid than usual  Ask your healthcare provider how much liquid to drink each day  Eat high-fiber foods  High-fiber foods include fruit, vegetables, whole-grain breads and cereals, and beans  You may need to change the foods you eat if you have irritable bowel syndrome  Follow up with your healthcare provider as directed: You may need physical therapy  You may need to see an orthopedic specialist  Write down your questions so you remember to ask them during your visits  © 2017 [address]  Information is for End User's use only and may not be sold, redistributed or otherwise used for commercial purposes  All illustrations and images included in CareNotes® are the copyrighted property of [employer]  or [employer]  The above information is an  only  It is not intended as medical advice for individual conditions or treatments  Talk to your doctor, nurse or pharmacist before following any medical regimen to see if it is safe and effective for you

## 2018-08-01 LAB
CHLAMYDIA DNA CVX QL NAA+PROBE: NORMAL
N GONORRHOEA DNA GENITAL QL NAA+PROBE: NORMAL

## 2018-08-02 ENCOUNTER — OFFICE VISIT (OUTPATIENT)
Dept: OBGYN CLINIC | Facility: CLINIC | Age: 28
End: 2018-08-02
Payer: COMMERCIAL

## 2018-08-02 ENCOUNTER — ULTRASOUND (OUTPATIENT)
Dept: OBGYN CLINIC | Facility: CLINIC | Age: 28
End: 2018-08-02
Payer: COMMERCIAL

## 2018-08-02 VITALS — DIASTOLIC BLOOD PRESSURE: 60 MMHG | SYSTOLIC BLOOD PRESSURE: 118 MMHG | BODY MASS INDEX: 33.09 KG/M2 | WEIGHT: 205 LBS

## 2018-08-02 DIAGNOSIS — R10.2 PELVIC PAIN: ICD-10-CM

## 2018-08-02 DIAGNOSIS — R42 DIZZINESS: ICD-10-CM

## 2018-08-02 DIAGNOSIS — R10.2 PELVIC PAIN: Primary | ICD-10-CM

## 2018-08-02 PROCEDURE — 76830 TRANSVAGINAL US NON-OB: CPT

## 2018-08-02 PROCEDURE — 99213 OFFICE O/P EST LOW 20 MIN: CPT | Performed by: OBSTETRICS & GYNECOLOGY

## 2018-08-02 NOTE — PROGRESS NOTES
AMB US Pelvic Non OB  Date/Time: 8/2/2018 8:09 AM  Performed by: Kemi Sage  Authorized by: Alleen Kussmaul     Procedure details:     Technique:  Transvaginal US, Non-OB    Position: lithotomy exam    Uterine findings:     Diameter (mm):  56    Length (mm):  90    Width (mm):  45    Endometrial stripe: identified      Endometrium thickness (mm):  10 9  Left ovary findings:     Left ovary:  Visualized    Diameter (mm):  21 9    Length (mm):  30 5    Width (mm):  26 2  Right ovary findings:     Right ovary:  Visualized    Diameter (mm):  19    Length (mm):  35 4    Width (mm):  26  Other findings:     Free pelvic fluid: not identified      Free peritoneal fluid: not identified    Post-Procedure Details:     Impression:  Anteflexed uterus appears within normal limits  The bilateral ovaries meet the sonographic criteria for PCOS with no additional cysts or masses  No free fluid  Tolerance: Tolerated with difficulty  Additional Procedure Comments:      Today's ultrasound was compared to 6/1/2016  Siemens Acuson X150 [id number] transvaginal transducer Serial # [id number] was used to perform the examination today and subsequently followed with high level disinfection utilizing Trophon EPR procedure

## 2018-08-02 NOTE — PROGRESS NOTES
Assessment/Plan:     Diagnoses and all orders for this visit:    Pelvic pain    Dizziness  -     Ambulatory referral to Cardiology; Future  -     ECG with rhythm strip; Future          Discussed with patient that pregnancy is ruled out  Discussed with pt US results in detail  There are no ovarian cysts noted although there are small polycystic ovaries noted on US exam   I asked patient to follow up with PCP regarding further testing for dizziness and near-syncope  Also recommended possibly cardiology evaluation  Patient declines contraception  Her last Pap was done in May 2016 and she should return in May 2019 for repeat cervical cytology an annual gyn exam     Subjective   Patient ID: Ashish Lopez is a 33 y o  female  Patient is here for a follow-up  Patient was seen several days ago complaining of pelvic pain  She also complained of dizziness and generalized weakness and feeling like she has to pass out  She did have pregnancy like symptoms  HCG is negative and she is not pregnant  Pelvic ultrasound was ordered and there is no dominant ovarian cyst noted  Patient with a history of ovarian cysts in the past   TSH, prolactin and HCG were negative  CBC shows a hemoglobin of 11 1     OB History      Para Term  AB Living    8 5 5 0 3 4    SAB TAB Ectopic Multiple Live Births    3 0 0 0 4        Last PAP 2016      Review of Systems   Constitutional: Negative  HENT: Negative  Eyes: Negative  Respiratory: Negative  Cardiovascular: Negative  Gastrointestinal: Negative  Endocrine: Negative  Genitourinary:        As noted in HPI   Musculoskeletal: Negative  Skin: Negative  Allergic/Immunologic: Negative  Neurological: Negative  Hematological: Negative  Psychiatric/Behavioral: Negative            OBGyn Exam      Awake, alert, oriented and not in acute distress  The remainder of her physical examination was deferred as she was here today for consultation and discussion  Menstrual History:  OB History      Para Term  AB Living    8 5 5 0 3 4    SAB TAB Ectopic Multiple Live Births    3 0 0 0 4           Patient's last menstrual period was 2018 (approximate)  The following portions of the patient's history were reviewed and updated as appropriate: allergies, current medications, past family history, past medical history, past social history, past surgical history and problem list       Counseling / Coordination of Care  Total time spent today15 minutes  minutes  Greater than 50% of total time was spent with the patient and / or family counseling and / or coordination of care

## 2018-08-02 NOTE — LETTER
2018     Cristian Oliveira, [address] [address] [place] [address]    Patient: Ki Morales   YOB: 1990   Date of Visit: 2018       Dear Dr BERNARD [facility]: Thank you for referring Ki Morales to me for evaluation  Below are my notes for this consultation  If you have questions, please do not hesitate to call me  I look forward to following your patient along with you  Sincerely,        Morro Delarosa MD        CC: No Recipients  Morro Delarosa MD  2018  9:20 AM  Sign at close encounter       Assessment/Plan:     Diagnoses and all orders for this visit:    Pelvic pain    Dizziness  -     Ambulatory referral to Cardiology; Future  -     ECG with rhythm strip; Future          Discussed with patient that pregnancy is ruled out  Discussed with pt US results in detail  There are no ovarian cysts noted although there are small polycystic ovaries noted on US exam   I asked patient to follow up with PCP regarding further testing for dizziness and near-syncope  Also recommended possibly cardiology evaluation  Patient declines contraception  Her last Pap was done in May 2016 and she should return in May 2019 for repeat cervical cytology an annual gyn exam     Subjective   Patient ID: Ki Morales is a 33 y o  female  Patient is here for a follow-up  Patient was seen several days ago complaining of pelvic pain  She also complained of dizziness and generalized weakness and feeling like she has to pass out  She did have pregnancy like symptoms  HCG is negative and she is not pregnant  Pelvic ultrasound was ordered and there is no dominant ovarian cyst noted  Patient with a history of ovarian cysts in the past   TSH, prolactin and HCG were negative    CBC shows a hemoglobin of 11 1     OB History      Para Term  AB Living    8 5 5 0 3 4    SAB TAB Ectopic Multiple Live Births    3 0 0 0 4        Last PAP 2016      Review of Systems   Constitutional: Negative  HENT: Negative  Eyes: Negative  Respiratory: Negative  Cardiovascular: Negative  Gastrointestinal: Negative  Endocrine: Negative  Genitourinary:        As noted in HPI   Musculoskeletal: Negative  Skin: Negative  Allergic/Immunologic: Negative  Neurological: Negative  Hematological: Negative  Psychiatric/Behavioral: Negative  OBGyn Exam      Awake, alert, oriented and not in acute distress  The remainder of her physical examination was deferred as she was here today for consultation and discussion  Menstrual History:  OB History      Para Term  AB Living    8 5 5 0 3 4    SAB TAB Ectopic Multiple Live Births    3 0 0 0 4           Patient's last menstrual period was 2018 (approximate)  The following portions of the patient's history were reviewed and updated as appropriate: allergies, current medications, past family history, past medical history, past social history, past surgical history and problem list       Counseling / Coordination of Care  Total time spent today15 minutes  minutes  Greater than 50% of total time was spent with the patient and / or family counseling and / or coordination of care

## 2018-08-17 ENCOUNTER — HOSPITAL ENCOUNTER (EMERGENCY)
Facility: HOSPITAL | Age: 28
Discharge: HOME | End: 2018-08-17
Attending: EMERGENCY MEDICINE | Admitting: EMERGENCY MEDICINE
Payer: COMMERCIAL

## 2018-08-17 VITALS
BODY MASS INDEX: 32.91 KG/M2 | HEART RATE: 68 BPM | WEIGHT: 203.93 LBS | TEMPERATURE: 97.1 F | OXYGEN SATURATION: 100 % | RESPIRATION RATE: 18 BRPM | SYSTOLIC BLOOD PRESSURE: 147 MMHG | DIASTOLIC BLOOD PRESSURE: 82 MMHG

## 2018-08-17 DIAGNOSIS — R11.0 NAUSEA: Primary | ICD-10-CM

## 2018-08-17 DIAGNOSIS — R10.9 ABDOMINAL CRAMPS: ICD-10-CM

## 2018-08-17 LAB
ALBUMIN SERPL BCP-MCNC: 3.8 G/DL (ref 3–5.2)
ALP SERPL-CCNC: 89 U/L (ref 43–122)
ALT SERPL W P-5'-P-CCNC: 23 U/L (ref 9–52)
ANION GAP SERPL CALCULATED.3IONS-SCNC: 7 MMOL/L (ref 5–14)
ANISOCYTOSIS BLD QL SMEAR: PRESENT
AST SERPL W P-5'-P-CCNC: 19 U/L (ref 14–36)
BACTERIA UR QL AUTO: ABNORMAL /HPF
BASOPHILS # BLD AUTO: 0.1 THOUSANDS/ΜL (ref 0–0.1)
BASOPHILS NFR BLD AUTO: 1 % (ref 0–1)
BILIRUB SERPL-MCNC: 0.7 MG/DL
BILIRUB UR QL STRIP: NEGATIVE
BUN SERPL-MCNC: 13 MG/DL (ref 5–25)
CALCIUM SERPL-MCNC: 8.8 MG/DL (ref 8.4–10.2)
CHLORIDE SERPL-SCNC: 106 MMOL/L (ref 97–108)
CLARITY UR: CLEAR
CO2 SERPL-SCNC: 26 MMOL/L (ref 22–30)
COLOR UR: YELLOW
CREAT SERPL-MCNC: 0.66 MG/DL (ref 0.6–1.2)
EOSINOPHIL # BLD AUTO: 0.1 THOUSAND/ΜL (ref 0–0.4)
EOSINOPHIL NFR BLD AUTO: 1 % (ref 0–6)
ERYTHROCYTE [DISTWIDTH] IN BLOOD BY AUTOMATED COUNT: 17.3 %
EXT PREG TEST URINE: NEGATIVE
GFR SERPL CREATININE-BSD FRML MDRD: 120 ML/MIN/1.73SQ M
GLUCOSE SERPL-MCNC: 102 MG/DL (ref 70–99)
GLUCOSE UR STRIP-MCNC: NEGATIVE MG/DL
HCT VFR BLD AUTO: 32.9 % (ref 36–46)
HGB BLD-MCNC: 10.3 G/DL (ref 12–16)
HGB UR QL STRIP.AUTO: 150
KETONES UR STRIP-MCNC: NEGATIVE MG/DL
LEUKOCYTE ESTERASE UR QL STRIP: NEGATIVE
LYMPHOCYTES # BLD AUTO: 3.4 THOUSANDS/ΜL (ref 0.5–4)
LYMPHOCYTES NFR BLD AUTO: 33 % (ref 20–50)
MCH RBC QN AUTO: 24.7 PG (ref 26–34)
MCHC RBC AUTO-ENTMCNC: 31.3 G/DL (ref 31–36)
MCV RBC AUTO: 79 FL (ref 80–100)
MICROCYTES BLD QL AUTO: PRESENT
MONOCYTES # BLD AUTO: 0.6 THOUSAND/ΜL (ref 0.2–0.9)
MONOCYTES NFR BLD AUTO: 6 % (ref 1–10)
NEUTROPHILS # BLD AUTO: 6 THOUSANDS/ΜL (ref 1.8–7.8)
NEUTS SEG NFR BLD AUTO: 59 % (ref 45–65)
NITRITE UR QL STRIP: NEGATIVE
NON-SQ EPI CELLS URNS QL MICRO: ABNORMAL /HPF
PH UR STRIP.AUTO: 6.5 [PH] (ref 4.5–8)
PLATELET # BLD AUTO: 402 THOUSANDS/UL (ref 150–450)
PLATELET BLD QL SMEAR: ABNORMAL
PMV BLD AUTO: 8.5 FL (ref 8.9–12.7)
POIKILOCYTOSIS BLD QL SMEAR: PRESENT
POTASSIUM SERPL-SCNC: 3.8 MMOL/L (ref 3.6–5)
PROT SERPL-MCNC: 7.7 G/DL (ref 5.9–8.4)
PROT UR STRIP-MCNC: NEGATIVE MG/DL
RBC # BLD AUTO: 4.18 MILLION/UL (ref 4–5.2)
RBC #/AREA URNS AUTO: ABNORMAL /HPF
RBC MORPH BLD: ABNORMAL
SODIUM SERPL-SCNC: 139 MMOL/L (ref 137–147)
SP GR UR STRIP.AUTO: 1.01 (ref 1–1.04)
UROBILINOGEN UA: NEGATIVE MG/DL
WBC # BLD AUTO: 10.2 THOUSAND/UL (ref 4.5–11)
WBC #/AREA URNS AUTO: ABNORMAL /HPF

## 2018-08-17 PROCEDURE — 85025 COMPLETE CBC W/AUTO DIFF WBC: CPT | Performed by: EMERGENCY MEDICINE

## 2018-08-17 PROCEDURE — 80053 COMPREHEN METABOLIC PANEL: CPT | Performed by: EMERGENCY MEDICINE

## 2018-08-17 PROCEDURE — 99284 EMERGENCY DEPT VISIT MOD MDM: CPT

## 2018-08-17 PROCEDURE — 36415 COLL VENOUS BLD VENIPUNCTURE: CPT | Performed by: EMERGENCY MEDICINE

## 2018-08-17 PROCEDURE — 81025 URINE PREGNANCY TEST: CPT | Performed by: EMERGENCY MEDICINE

## 2018-08-17 PROCEDURE — 81001 URINALYSIS AUTO W/SCOPE: CPT | Performed by: EMERGENCY MEDICINE

## 2018-08-17 RX ORDER — ONDANSETRON 2 MG/ML
4 INJECTION INTRAMUSCULAR; INTRAVENOUS ONCE
Status: DISCONTINUED | OUTPATIENT
Start: 2018-08-17 | End: 2018-08-17 | Stop reason: HOSPADM

## 2018-08-17 RX ORDER — DICYCLOMINE HYDROCHLORIDE 10 MG/1
20 CAPSULE ORAL ONCE
Status: COMPLETED | OUTPATIENT
Start: 2018-08-17 | End: 2018-08-17

## 2018-08-17 RX ORDER — DICYCLOMINE HYDROCHLORIDE 10 MG/1
CAPSULE ORAL
Status: COMPLETED
Start: 2018-08-17 | End: 2018-08-17

## 2018-08-17 RX ADMIN — DICYCLOMINE HYDROCHLORIDE 20 MG: 10 CAPSULE ORAL at 20:21

## 2018-08-18 NOTE — ED PROVIDER NOTES
History  Chief Complaint   Patient presents with    Abdominal Cramping     Started today     58-year-old female presents with complaint abdominal cramping  She states that she is currently on her menses and that she did not gush like she usually does  She states that she began having epigastric discomfort and nausea throughout the day  She reports that she had this significant amount of cramping with her menses last month and had a full workup including pelvic ultrasound and labs all of which were unremarkable  She denies any vomiting  States that she did have some diarrhea recently but not currently  She denies any fever or other acute issues at this time  Prior to Admission Medications   Prescriptions Last Dose Informant Patient Reported? Taking? Ferrous Sulfate (FEROSUL PO) 2018 at Unknown time  Yes Yes   Sig: Every 12 hours   ferrous sulfate 325 (65 Fe) mg tablet 2018 at Unknown time  Yes Yes   Sig: Take 325 mg by mouth 2 (two) times a day with meals   ibuprofen (MOTRIN) 600 mg tablet Unknown at Unknown time  No No   Sig: Take 1 tablet (600 mg total) by mouth every 6 (six) hours as needed (pain)   ibuprofen (MOTRIN) 600 mg tablet Unknown at Unknown time  Yes No   Sig: Take 1 tablet by mouth every 6 (six) hours as needed   ondansetron (ZOFRAN) 4 mg tablet Unknown at Unknown time  No No   Sig: Take 1 tablet (4 mg total) by mouth every 8 (eight) hours as needed for nausea or vomiting      Facility-Administered Medications: None       Past Medical History:   Diagnosis Date    Disease of thyroid gland        Past Surgical History:   Procedure Laterality Date     SECTION         Family History   Problem Relation Age of Onset    Heart murmur Mother     Hypertension Mother    Chintan Laura Lupus Mother     Pulmonary embolism Mother      I have reviewed and agree with the history as documented      Social History   Substance Use Topics    Smoking status: Never Smoker    Smokeless tobacco: Never Used    Alcohol use No        Review of Systems   Constitutional: Negative for appetite change, chills, fatigue and fever  HENT: Negative for postnasal drip, sinus pain and trouble swallowing  Eyes: Negative for redness and itching  Respiratory: Negative for chest tightness, shortness of breath and wheezing  Cardiovascular: Negative for chest pain and leg swelling  Gastrointestinal: Positive for nausea  Negative for abdominal pain (epigastric), constipation, diarrhea and vomiting  Endocrine: Negative  Genitourinary: Positive for vaginal bleeding  Negative for difficulty urinating, dysuria, flank pain, pelvic pain, vaginal discharge and vaginal pain  Musculoskeletal: Negative for back pain and myalgias  Skin: Negative for rash  Allergic/Immunologic: Negative  Neurological: Negative for dizziness, weakness, numbness and headaches  Hematological: Negative  Does not bruise/bleed easily  Psychiatric/Behavioral: Negative  Physical Exam  Physical Exam   Constitutional: She is oriented to person, place, and time  She appears well-developed and well-nourished  HENT:   Head: Normocephalic and atraumatic  Nose: Nose normal    Mouth/Throat: Oropharynx is clear and moist    Eyes: Conjunctivae and EOM are normal  Pupils are equal, round, and reactive to light  Neck: Normal range of motion  Neck supple  Cardiovascular: Normal rate, regular rhythm and normal heart sounds  Pulmonary/Chest: Effort normal and breath sounds normal  No respiratory distress  She has no wheezes  Abdominal: Soft  Bowel sounds are normal  There is tenderness (mild epigastric)  There is no guarding  Musculoskeletal: She exhibits no edema, tenderness or deformity  Neurological: She is alert and oriented to person, place, and time  Skin: Skin is warm and dry  Capillary refill takes less than 2 seconds  No rash noted  Psychiatric: She has a normal mood and affect   Her behavior is normal  Nursing note and vitals reviewed  Vital Signs  ED Triage Vitals [08/17/18 1951]   Temperature Pulse Respirations Blood Pressure SpO2   (!) 97 1 °F (36 2 °C) 73 18 124/81 100 %      Temp Source Heart Rate Source Patient Position - Orthostatic VS BP Location FiO2 (%)   Temporal Monitor Sitting Left arm --      Pain Score       Worst Possible Pain           Vitals:    08/17/18 1951   BP: 124/81   Pulse: 73   Patient Position - Orthostatic VS: Sitting       Visual Acuity      ED Medications  Medications   sodium chloride 0 9 % bolus 1,000 mL (1,000 mL Intravenous Not Given 8/17/18 2014)   ondansetron (ZOFRAN) injection 4 mg (4 mg Intravenous Not Given 8/17/18 2015)   dicyclomine (BENTYL) capsule 20 mg (20 mg Oral Given 8/17/18 2021)       Diagnostic Studies  Results Reviewed     Procedure Component Value Units Date/Time    Urine Microscopic [[id number]]  (Abnormal) Collected:  08/17/18 2019    Lab Status:  Final result Specimen:  Urine from Urine, Clean Catch Updated:  08/17/18 2045     RBC, UA 10-20 (A) /hpf      WBC, UA 0-1 (A) /hpf      Epithelial Cells Occasional /hpf      Bacteria, UA None Seen /hpf     Comprehensive metabolic panel [[id number]]  (Abnormal) Collected:  08/17/18 2019    Lab Status:  Final result Specimen:  Blood from Arm, Left Updated:  08/17/18 2039     Sodium 139 mmol/L      Potassium 3 8 mmol/L      Chloride 106 mmol/L      CO2 26 mmol/L      Anion Gap 7 mmol/L      BUN 13 mg/dL      Creatinine 0 66 mg/dL      Glucose 102 (H) mg/dL      Calcium 8 8 mg/dL      AST 19 U/L      ALT 23 U/L      Alkaline Phosphatase 89 U/L      Total Protein 7 7 g/dL      Albumin 3 8 g/dL      Total Bilirubin 0 70 mg/dL      eGFR 120 ml/min/1 73sq m     Narrative:         National Kidney Disease Education Program recommendations are as follows:  GFR calculation is accurate only with a steady state creatinine  Chronic Kidney disease less than 60 ml/min/1 73 sq  meters  Kidney failure less than 15 ml/min/1 73 sq  meters  CBC and differential [[id number]]  (Abnormal) Collected:  08/17/18 2019    Lab Status:  Final result Specimen:  Blood from Arm, Left Updated:  08/17/18 2036     WBC 10 20 Thousand/uL      RBC 4 18 Million/uL      Hemoglobin 10 3 (L) g/dL      Hematocrit 32 9 (L) %      MCV 79 (L) fL      MCH 24 7 (L) pg      MCHC 31 3 g/dL      RDW 17 3 (H) %      MPV 8 5 (L) fL      Platelets 970 Thousands/uL      Neutrophils Relative 59 %      Lymphocytes Relative 33 %      Monocytes Relative 6 %      Eosinophils Relative 1 %      Basophils Relative 1 %      Neutrophils Absolute 6 00 Thousands/µL      Lymphocytes Absolute 3 40 Thousands/µL      Monocytes Absolute 0 60 Thousand/µL      Eosinophils Absolute 0 10 Thousand/µL      Basophils Absolute 0 10 Thousands/µL     UA w Reflex to Microscopic [[id number]]  (Abnormal) Collected:  08/17/18 2019    Lab Status:  Final result Specimen:  Urine from Urine, Clean Catch Updated:  08/17/18 2034     Color, UA Yellow     Clarity, UA Clear     Specific Gravity, UA 1 015     pH, UA 6 5     Leukocytes, UA Negative     Nitrite, UA Negative     Protein, UA Negative mg/dl      Glucose, UA Negative mg/dl      Ketones, UA Negative mg/dl      Bilirubin, UA Negative     Blood,  0 (A)     UROBILINOGEN UA Negative mg/dL     POCT pregnancy, urine [[id number]]  (Normal) Resulted:  08/17/18 2014    Lab Status:  Final result Updated:  08/17/18 2014     EXT PREG TEST UR (Ref: Negative) Negative                 No orders to display              Procedures  Procedures       Phone Contacts  ED Phone Contact    ED Course  ED Course as of Aug 17 2138   Fri Aug 17, 2018   2034 Patient initially requesting IV fluids  Soon after I left the room she decided she would rather just have labs checked without any IV being placed  2056 Patient is reporting improvement in her symptoms at this time    She reports that she does have a prescription for Zofran at home still so she will continue taking this as needed  She will follow up with her family physician is aware of reasons to return to the emergency department  MDM  CritCare Time    Disposition  Final diagnoses:   None     ED Disposition     None      Follow-up Information    None         Patient's Medications   Discharge Prescriptions    No medications on file     No discharge procedures on file      ED Provider  Electronically Signed by           Ousmane Bear DO  08/17/18 14535 [facility]  08/17/18 2312

## 2018-08-18 NOTE — DISCHARGE INSTRUCTIONS
Abdominal Pain, Ambulatory Care   GENERAL INFORMATION:   Abdominal pain  can be dull, achy, or sharp  You may have pain in one area of your abdomen, or in your entire abdomen  Your pain may be caused by constipation, food sensitivity or poisoning, infection, or a blockage  Abdominal pain can also be caused by a hernia, appendicitis, or an ulcer  The cause of your abdominal pain may be unknown  Seek immediate care for the following symptoms:   · New chest pain or shortness of breath    · Pulsing pain in your upper abdomen or lower back that suddenly becomes constant    · Pain in the right lower abdominal area that worsens with movement    · Fever over 100 4°F (38°C) or shaking chills    · Vomiting and you cannot keep food or fluids down    · Pain does not improve or gets worse over the next 8 to 12 hours    · Blood in your vomit or bowel movements, or they look black and tarry    · Skin or the whites of your eyes turn yellow    · Large amount of vaginal bleeding that is not your monthly period  Treatment for abdominal pain  may include medicine to calm your stomach, prevent vomiting, or decrease pain  Follow up with your healthcare provider as directed:  Write down your questions so you remember to ask them during your visits  CARE AGREEMENT:   You have the right to help plan your care  Learn about your health condition and how it may be treated  Discuss treatment options with your caregivers to decide what care you want to receive  You always have the right to refuse treatment  The above information is an  only  It is not intended as medical advice for individual conditions or treatments  Talk to your doctor, nurse or pharmacist before following any medical regimen to see if it is safe and effective for you  © 2014 [address] is for End User's use only and may not be sold, redistributed or otherwise used for commercial purposes   All illustrations and images included in [facility] are the copyrighted property of [employer]  or [employer] Scott  Acute Nausea and Vomiting, Ambulatory Care   GENERAL INFORMATION:   Acute nausea and vomiting  starts suddenly, gets worse quickly, and lasts a short time  Nausea and vomiting may be caused by pregnancy, alcohol, infection, or medicines  Common related symptoms include the following:   · Fever    · Abdominal swelling    · Pain, tenderness, or a lump in the abdomen    · Splashing sounds heard in your stomach when you move  Seek immediate care for the following symptoms:   · Blood in your vomit or bowel movements    · Sudden, severe pain in your chest and upper abdomen after hard vomiting    · Dizziness, dry mouth, and thirst    · Urinating very little or not at all    · Muscle weakness, leg cramps, and trouble breathing    · A heart beat that is faster than normal    · Vomiting for more than 48 hours  Treatment for acute nausea and vomiting  may include medicines to calm your stomach and stop the vomiting  You may need IV fluids if you are dehydrated  Manage your nausea and vomiting:   · Drink liquids as directed to prevent dehydration  Ask how much liquid to drink each day and which liquids are best for you  You may need to drink an oral rehydration solution (ORS)  ORS contains water, salts, and sugar that are needed to replace the lost body fluids  Ask what kind of ORS to use, how much to drink, and where to get it  · Eat smaller meals, more often  Eat small amounts of food every 2 to 3 hours, even if you are not hungry  Food in your stomach may help decrease your nausea  · Avoid stress  Find ways to relax and manage your stress  Headaches due to stress may cause nausea and vomiting  Get more rest and sleep  Follow up with your healthcare provider as directed:  Write down your questions so you remember to ask them during your visits  CARE AGREEMENT:   You have the right to help plan your care   Learn about your health condition and how it may be treated  Discuss treatment options with your caregivers to decide what care you want to receive  You always have the right to refuse treatment  The above information is an  only  It is not intended as medical advice for individual conditions or treatments  Talk to your doctor, nurse or pharmacist before following any medical regimen to see if it is safe and effective for you  © 2014 [address] is for End User's use only and may not be sold, redistributed or otherwise used for commercial purposes  All illustrations and images included in [employer]® are the copyrighted property of [employer] M [employer]  or [employer] Miguel Scott

## 2018-08-30 ENCOUNTER — APPOINTMENT (EMERGENCY)
Dept: RADIOLOGY | Facility: HOSPITAL | Age: 28
End: 2018-08-30
Payer: COMMERCIAL

## 2018-08-30 ENCOUNTER — HOSPITAL ENCOUNTER (EMERGENCY)
Facility: HOSPITAL | Age: 28
Discharge: HOME | End: 2018-08-30
Attending: EMERGENCY MEDICINE
Payer: COMMERCIAL

## 2018-08-30 VITALS
RESPIRATION RATE: 16 BRPM | DIASTOLIC BLOOD PRESSURE: 66 MMHG | OXYGEN SATURATION: 100 % | BODY MASS INDEX: 32.7 KG/M2 | SYSTOLIC BLOOD PRESSURE: 105 MMHG | TEMPERATURE: 97.4 F | WEIGHT: 202.6 LBS | HEART RATE: 78 BPM

## 2018-08-30 DIAGNOSIS — S90.32XA CONTUSION OF LEFT FOOT, INITIAL ENCOUNTER: Primary | ICD-10-CM

## 2018-08-30 PROCEDURE — 96372 THER/PROPH/DIAG INJ SC/IM: CPT

## 2018-08-30 PROCEDURE — 99283 EMERGENCY DEPT VISIT LOW MDM: CPT

## 2018-08-30 PROCEDURE — 73630 X-RAY EXAM OF FOOT: CPT

## 2018-08-30 RX ORDER — KETOROLAC TROMETHAMINE 30 MG/ML
INJECTION, SOLUTION INTRAMUSCULAR; INTRAVENOUS
Status: COMPLETED
Start: 2018-08-30 | End: 2018-08-30

## 2018-08-30 RX ORDER — KETOROLAC TROMETHAMINE 30 MG/ML
15 INJECTION, SOLUTION INTRAMUSCULAR; INTRAVENOUS ONCE
Status: COMPLETED | OUTPATIENT
Start: 2018-08-30 | End: 2018-08-30

## 2018-08-30 RX ORDER — NAPROXEN 500 MG/1
500 TABLET ORAL 2 TIMES DAILY WITH MEALS
Qty: 14 TABLET | Refills: 0 | Status: SHIPPED | OUTPATIENT
Start: 2018-08-30 | End: 2018-12-27

## 2018-08-30 RX ADMIN — KETOROLAC TROMETHAMINE 15 MG: 30 INJECTION, SOLUTION INTRAMUSCULAR; INTRAVENOUS at 09:44

## 2018-08-30 NOTE — ED PROVIDER NOTES
History  Chief Complaint   Patient presents with    Foot Pain     left foot pain for about 2 weeks       Leg Pain   Location:  Foot  Time since incident:  2 weeks  Injury: no    Foot location:  Dorsum of L foot  Pain details:     Quality:  Aching    Radiates to:  Does not radiate    Severity:  Mild    Onset quality:  Gradual    Duration:  2 weeks    Timing:  Constant    Progression:  Unchanged  Chronicity:  New  Dislocation: no    Associated symptoms: no fatigue, no fever and no neck pain        Prior to Admission Medications   Prescriptions Last Dose Informant Patient Reported? Taking? Ferrous Sulfate (FEROSUL PO)   Yes No   Sig: Every 12 hours   ferrous sulfate 325 (65 Fe) mg tablet   Yes No   Sig: Take 325 mg by mouth 2 (two) times a day with meals   ibuprofen (MOTRIN) 600 mg tablet   No No   Sig: Take 1 tablet (600 mg total) by mouth every 6 (six) hours as needed (pain)   ibuprofen (MOTRIN) 600 mg tablet   Yes No   Sig: Take 1 tablet by mouth every 6 (six) hours as needed   ondansetron (ZOFRAN) 4 mg tablet   No No   Sig: Take 1 tablet (4 mg total) by mouth every 8 (eight) hours as needed for nausea or vomiting      Facility-Administered Medications: None       Past Medical History:   Diagnosis Date    Disease of thyroid gland        Past Surgical History:   Procedure Laterality Date     SECTION         Family History   Problem Relation Age of Onset    Heart murmur Mother     Hypertension Mother    Chintan Laura Lupus Mother     Pulmonary embolism Mother      I have reviewed and agree with the history as documented  Social History   Substance Use Topics    Smoking status: Never Smoker    Smokeless tobacco: Never Used    Alcohol use No        Review of Systems   Constitutional: Negative for activity change, chills, fatigue and fever  HENT: Negative for congestion, ear pain, mouth sores, sore throat and trouble swallowing  Respiratory: Negative for chest tightness, shortness of breath and wheezing  Cardiovascular: Negative for chest pain and palpitations  Musculoskeletal: Negative for arthralgias, myalgias, neck pain and neck stiffness  Skin: Negative for rash and wound  Neurological: Negative for dizziness, syncope, weakness, light-headedness, numbness and headaches  Hematological: Negative for adenopathy  All other systems reviewed and are negative  Physical Exam  Physical Exam   Constitutional: She is oriented to person, place, and time  She appears well-developed and well-nourished  No distress  HENT:   Head: Normocephalic and atraumatic  Right Ear: External ear normal    Left Ear: External ear normal    Nose: Nose normal    Eyes: Conjunctivae and EOM are normal  Pupils are equal, round, and reactive to light  No scleral icterus  Neck: Normal range of motion  Neck supple  Cardiovascular: Normal rate, regular rhythm and intact distal pulses  Pulmonary/Chest: Effort normal    Abdominal: Soft  Musculoskeletal: Normal range of motion  She exhibits tenderness  She exhibits no edema or deformity  Left knee: Normal         Left ankle: Normal         Left lower leg: Normal         Left foot: There is bony tenderness  There is normal range of motion, no tenderness, no swelling, normal capillary refill, no crepitus, no deformity and no laceration  Feet:    Neurological: She is alert and oriented to person, place, and time  No sensory deficit  Skin: Skin is warm and dry  Capillary refill takes less than 2 seconds  No rash noted  She is not diaphoretic  No erythema  Nursing note and vitals reviewed        Vital Signs  ED Triage Vitals   Temperature Pulse Respirations Blood Pressure SpO2   08/30/18 0917 08/30/18 0917 08/30/18 0917 08/30/18 0917 08/30/18 0917   (!) 97 4 °F (36 3 °C) 78 16 105/66 100 %      Temp Source Heart Rate Source Patient Position - Orthostatic VS BP Location FiO2 (%)   08/30/18 0917 08/30/18 0917 08/30/18 0917 08/30/18 0917 --   Temporal Monitor Sitting Left arm       Pain Score       08/30/18 0944       7           Vitals:    08/30/18 0917   BP: 105/66   Pulse: 78   Patient Position - Orthostatic VS: Sitting       Visual Acuity      ED Medications  Medications   ketorolac (TORADOL) injection 15 mg (15 mg Intramuscular Given 8/30/18 0944)       Diagnostic Studies  Results Reviewed     None                 XR foot 3+ views LEFT   ED Interpretation by Sanjuana Boogie PA-C (08/30 1021)   No acute osseous abnormality      Final Result by Michelle Amezcua MD (08/30 1033)      No acute osseous abnormality  Workstation performed: [id number]                    Procedures  Procedures       Phone Contacts  ED Phone Contact    ED Course  ED Course as of Aug 30 1528   Thu Aug 30, 2018   1039 No fracture seen, likely contusion  Surgical shoe, ACE wrap, NSAIDs, podiatry follow up                                 MDM  Number of Diagnoses or Management Options  Contusion of left foot, initial encounter: new and requires workup  Diagnosis management comments: Patient is a 54-year-old female presents emergency department for evaluation of left foot pain  Patient states she has been having left foot pain for last 2 weeks  States that her pain is worse after she is on her feet all that night shift  She states the pain is mostly in the left lateral foot  She has not tried any pain medication to help with this  She states she is still able to walk but does have pain with ambulation  No numbness or tingling distal   No injury to left ankle or knee  Full range of motion of left ankle knee  On exam patient has no acute deformities noted of left foot  Tenderness to palpation over left fifth metatarsal   No tenderness to ankle or knee palpation  Will get x-ray rule out fracture  Otherwise likely contusion  Symptomatic support with Ace wrap and surgical shoe until Podiatry follow-up         Amount and/or Complexity of Data Reviewed  Tests in the radiology section of CPT®: ordered and reviewed    Risk of Complications, Morbidity, and/or Mortality  Presenting problems: low  Diagnostic procedures: minimal  Management options: low    Patient Progress  Patient progress: stable    CritCare Time    Disposition  Final diagnoses:   Contusion of left foot, initial encounter     Time reflects when diagnosis was documented in both MDM as applicable and the Disposition within this note     Time User Action Codes Description Comment    8/30/2018 11:02 AM Kristie Saldana Add [S90 [id number]] Contusion of left foot, initial encounter       ED Disposition     ED Disposition Condition Comment    Discharge  Radha Paul discharge to home/self care      Condition at discharge: Stable        Follow-up Information     Follow up With Specialties Details Why Contact Info    Javier Page DPM Podiatry Schedule an appointment as soon as possible for a visit follow up foot pain Michele Muniz [address] [facility] Emergency Department Emergency Medicine  If symptoms worsen [address]  [facility]  [address]  [phone]          Discharge Medication List as of 8/30/2018 11:03 AM      START taking these medications    Details   naproxen (NAPROSYN) 500 mg tablet Take 1 tablet (500 mg total) by mouth 2 (two) times a day with meals, Starting Thu 8/30/2018, Print         CONTINUE these medications which have NOT CHANGED    Details   Ferrous Sulfate (FEROSUL PO) Every 12 hours, Starting Thu 1/25/2018, Historical Med      ferrous sulfate 325 (65 Fe) mg tablet Take 325 mg by mouth 2 (two) times a day with meals, Historical Med      !! ibuprofen (MOTRIN) 600 mg tablet Take 1 tablet (600 mg total) by mouth every 6 (six) hours as needed (pain), Starting Tue 7/3/2018, Print      !! ibuprofen (MOTRIN) 600 mg tablet Take 1 tablet by mouth every 6 (six) hours as needed, Starting Fri 7/10/2015, Historical Med      ondansetron (ZOFRAN) 4 mg tablet Take 1 tablet (4 mg total) by mouth every 8 (eight) hours as needed for nausea or vomiting, Starting Mon 7/16/2018, Print       !! - Potential duplicate medications found  Please discuss with provider  No discharge procedures on file      ED Provider  Electronically Signed by           Nilda Griggs PA-C  08/30/18 0062

## 2018-08-30 NOTE — DISCHARGE INSTRUCTIONS
Foot Contusion   WHAT YOU NEED TO KNOW:   A foot contusion is a bruise to the foot  DISCHARGE INSTRUCTIONS:   Medicines:   · NSAIDs:  These medicines decrease swelling and pain  NSAIDs are available without a doctor's order  Ask your healthcare provider which medicine is right for you  Ask how much to take and when to take it  Take as directed  NSAIDs can cause stomach bleeding and kidney problems if not taken correctly  · Take your medicine as directed  Contact your healthcare provider if you think your medicine is not helping or if you have side effects  Tell him of her if you are allergic to any medicine  Keep a list of the medicines, vitamins, and herbs you take  Include the amounts, and when and why you take them  Bring the list or the pill bottles to follow-up visits  Carry your medicine list with you in case of an emergency  Follow up with your healthcare provider as directed:  Write down your questions so you remember to ask them during your visits  Care for your foot: Follow your treatment plan to help decrease your pain and improve your muscle movement  · Rest:  You will need to rest your foot for 1 to 2 days after your injury  This will help decrease the risk of more damage  · Ice:  Ice helps decrease swelling and pain  Ice may also help prevent tissue damage  Use an ice pack, or put crushed ice in a plastic bag  Cover it with a towel and place it on your foot for 15 to 20 minutes every hour or as directed  · Compression:  Compression (tight hold) provides support and helps decrease swelling and movement so your foot can heal  You may be told to keep your foot wrapped with a tight elastic bandage  Follow instructions about how to apply your bandage  Do not massage your foot  You could cause more damage or pain  · Elevation:  Keep your foot raised above the level of your heart while you are sitting or lying down  This will help decrease or limit swelling   Use pillows, blankets, or rolled towels to elevate your foot comfortably  Exercise your foot:  You may be given gentle exercises to improve your foot movement and help decrease stiffness  Ask when you can return to your normal activities or sports  Prevent another injury:   · Wear equipment to protect yourself when you play sports  · Make sure your shoes fit properly  · Always wear shoes on streets or sidewalks  · Clean spills off the floor right away to avoid slipping or hitting your foot  · Make sure your home is well lit when you get up during the night  This will help you avoid hurting your foot in the dark  Contact your healthcare provider if:   · You have increased swelling on your foot  · You have severe foot pain  · You are not able to move your foot  · You have questions or concerns about your injury or treatment  © 2017 [address] [employer] Information is for End User's use only and may not be sold, redistributed or otherwise used for commercial purposes  All illustrations and images included in CareNotes® are the copyrighted property of [employer]  or José Miguel Scott  The above information is an  only  It is not intended as medical advice for individual conditions or treatments  Talk to your doctor, nurse or pharmacist before following any medical regimen to see if it is safe and effective for you

## 2018-10-31 ENCOUNTER — HOSPITAL ENCOUNTER (EMERGENCY)
Facility: HOSPITAL | Age: 28
Discharge: HOME | End: 2018-10-31
Attending: EMERGENCY MEDICINE | Admitting: EMERGENCY MEDICINE
Payer: COMMERCIAL

## 2018-10-31 ENCOUNTER — APPOINTMENT (EMERGENCY)
Dept: RADIOLOGY | Facility: HOSPITAL | Age: 28
End: 2018-10-31
Payer: COMMERCIAL

## 2018-10-31 VITALS
BODY MASS INDEX: 33.3 KG/M2 | WEIGHT: 207.23 LBS | RESPIRATION RATE: 18 BRPM | OXYGEN SATURATION: 100 % | HEIGHT: 66 IN | TEMPERATURE: 97.8 F | SYSTOLIC BLOOD PRESSURE: 121 MMHG | HEART RATE: 80 BPM | DIASTOLIC BLOOD PRESSURE: 72 MMHG

## 2018-10-31 DIAGNOSIS — R07.9 CHEST PAIN: Primary | ICD-10-CM

## 2018-10-31 LAB
BACTERIA UR QL AUTO: ABNORMAL /HPF
BILIRUB UR QL STRIP: NEGATIVE
CLARITY UR: ABNORMAL
COLOR UR: ABNORMAL
EXT PREG TEST URINE: NEGATIVE
GLUCOSE SERPL-MCNC: 105 MG/DL (ref 70–99)
GLUCOSE UR STRIP-MCNC: NEGATIVE MG/DL
HGB UR QL STRIP.AUTO: NEGATIVE
KETONES UR STRIP-MCNC: ABNORMAL MG/DL
LEUKOCYTE ESTERASE UR QL STRIP: 25
NITRITE UR QL STRIP: NEGATIVE
NON-SQ EPI CELLS URNS QL MICRO: ABNORMAL /HPF
PH UR STRIP.AUTO: 5 [PH] (ref 4.5–8)
PROT UR STRIP-MCNC: ABNORMAL MG/DL
RBC #/AREA URNS AUTO: ABNORMAL /HPF
SP GR UR STRIP.AUTO: 1.02 (ref 1–1.04)
TROPONIN I SERPL-MCNC: <0.01 NG/ML (ref 0–0.03)
UROBILINOGEN UA: 1 MG/DL
WBC #/AREA URNS AUTO: ABNORMAL /HPF

## 2018-10-31 PROCEDURE — 99285 EMERGENCY DEPT VISIT HI MDM: CPT

## 2018-10-31 PROCEDURE — 36415 COLL VENOUS BLD VENIPUNCTURE: CPT | Performed by: EMERGENCY MEDICINE

## 2018-10-31 PROCEDURE — 96372 THER/PROPH/DIAG INJ SC/IM: CPT

## 2018-10-31 PROCEDURE — 71046 X-RAY EXAM CHEST 2 VIEWS: CPT

## 2018-10-31 PROCEDURE — 93005 ELECTROCARDIOGRAM TRACING: CPT

## 2018-10-31 PROCEDURE — 81001 URINALYSIS AUTO W/SCOPE: CPT | Performed by: EMERGENCY MEDICINE

## 2018-10-31 PROCEDURE — 82948 REAGENT STRIP/BLOOD GLUCOSE: CPT

## 2018-10-31 PROCEDURE — 84484 ASSAY OF TROPONIN QUANT: CPT | Performed by: EMERGENCY MEDICINE

## 2018-10-31 PROCEDURE — 81025 URINE PREGNANCY TEST: CPT | Performed by: EMERGENCY MEDICINE

## 2018-10-31 RX ORDER — KETOROLAC TROMETHAMINE 30 MG/ML
30 INJECTION, SOLUTION INTRAMUSCULAR; INTRAVENOUS ONCE
Status: COMPLETED | OUTPATIENT
Start: 2018-10-31 | End: 2018-10-31

## 2018-10-31 RX ORDER — KETOROLAC TROMETHAMINE 30 MG/ML
INJECTION, SOLUTION INTRAMUSCULAR; INTRAVENOUS
Status: COMPLETED
Start: 2018-10-31 | End: 2018-10-31

## 2018-10-31 RX ORDER — MAGNESIUM HYDROXIDE/ALUMINUM HYDROXICE/SIMETHICONE 120; 1200; 1200 MG/30ML; MG/30ML; MG/30ML
30 SUSPENSION ORAL ONCE
Status: COMPLETED | OUTPATIENT
Start: 2018-10-31 | End: 2018-10-31

## 2018-10-31 RX ORDER — MAGNESIUM HYDROXIDE/ALUMINUM HYDROXICE/SIMETHICONE 120; 1200; 1200 MG/30ML; MG/30ML; MG/30ML
SUSPENSION ORAL
Status: COMPLETED
Start: 2018-10-31 | End: 2018-10-31

## 2018-10-31 RX ADMIN — ALUMINUM HYDROXIDE, MAGNESIUM HYDROXIDE, AND SIMETHICONE 30 ML: 200; 200; 20 SUSPENSION ORAL at 21:30

## 2018-10-31 RX ADMIN — KETOROLAC TROMETHAMINE 30 MG: 30 INJECTION, SOLUTION INTRAMUSCULAR; INTRAVENOUS at 21:30

## 2018-10-31 RX ADMIN — MAGNESIUM HYDROXIDE/ALUMINUM HYDROXICE/SIMETHICONE 30 ML: 120; 1200; 1200 SUSPENSION ORAL at 21:30

## 2018-11-01 NOTE — DISCHARGE INSTRUCTIONS
Chest Pain   WHAT YOU NEED TO KNOW:   Chest pain can be caused by a range of conditions, from not serious to life-threatening  Chest pain can be a symptom of a digestive problem, such as acid reflux or a stomach ulcer  An anxiety attack or a strong emotion, such as anger, can also cause chest pain  Infection, inflammation, or a fracture in the bones or cartilage in your chest can cause pain or discomfort  Sometimes chest pain or pressure is caused by poor blood flow to your heart (angina)  Chest pain may also be caused by life-threatening conditions such as a heart attack or blood clot in your lungs  DISCHARGE INSTRUCTIONS:   Call 911 if:   · You have any of the following signs of a heart attack:      ¨ Squeezing, pressure, or pain in your chest that lasts longer than 5 minutes or returns    ¨ Discomfort or pain in your back, neck, jaw, stomach, or arm     ¨ Trouble breathing    ¨ Nausea or vomiting    ¨ Lightheadedness or a sudden cold sweat, especially with chest pain or trouble breathing    Seek care immediately if:   · You have chest discomfort that gets worse, even with medicine  · You cough or vomit blood  · Your bowel movements are black or bloody  · You cannot stop vomiting, or it hurts to swallow  Contact your healthcare provider if:   · You have questions or concerns about your condition or care  Medicines:   · Medicines  may be given to treat the cause of your chest pain  Examples include pain medicine, anxiety medicine, or medicines to increase blood flow to your heart  · Do not take certain medicines without asking your healthcare provider first   These include NSAIDs, herbal or vitamin supplements, or hormones (estrogen or progestin)  · Take your medicine as directed  Contact your healthcare provider if you think your medicine is not helping or if you have side effects  Tell him or her if you are allergic to any medicine   Keep a list of the medicines, vitamins, and herbs you take  Include the amounts, and when and why you take them  Bring the list or the pill bottles to follow-up visits  Carry your medicine list with you in case of an emergency  Follow up with your healthcare provider within 72 hours, or as directed: You may need to return for more tests to find the cause of your chest pain  You may be referred to a specialist, such as a cardiologist or gastroenterologist  Write down your questions so you remember to ask them during your visits  Healthy living tips: The following are general healthy guidelines  If your chest pain is caused by a heart problem, your healthcare provider will give you specific guidelines to follow  · Do not smoke  Nicotine and other chemicals in cigarettes and cigars can cause lung and heart damage  Ask your healthcare provider for information if you currently smoke and need help to quit  E-cigarettes or smokeless tobacco still contain nicotine  Talk to your healthcare provider before you use these products  · Eat a variety of healthy, low-fat foods  Healthy foods include fruits, vegetables, whole-grain breads, low-fat dairy products, beans, lean meats, and fish  Ask for more information about a heart healthy diet  · Ask about activity  Your healthcare provider will tell you which activities to limit or avoid  Ask when you can drive, return to work, and have sex  Ask about the best exercise plan for you  · Maintain a healthy weight  Ask your healthcare provider how much you should weigh  Ask him or her to help you create a weight loss plan if you are overweight  © 2017 [address] [employer] Information is for End User's use only and may not be sold, redistributed or otherwise used for commercial purposes  All illustrations and images included in CareNotes® are the copyrighted property of [employer]  or José Miguel Scott  The above information is an  only   It is not intended as medical advice for individual conditions or treatments  Talk to your doctor, nurse or pharmacist before following any medical regimen to see if it is safe and effective for you

## 2018-11-01 NOTE — ED PROVIDER NOTES
History  Chief Complaint   Patient presents with    Chest Pain     left side     Patient is a 39-year-old female with a history of hypothyroidism coming in today complaints of chest pain for the past 4 days  Is intermittent in nature  She states she has been taken Motrin intermittently for this  He has no associated diaphoresis, shortness of breath, dyspnea on exertion, pain into the neck/jaw, or back  She has no cough or hemoptysis, she has no pain into her upper extremity  She has never had this before  She has no trauma to the chest and has had no viral URI  Patient is not on any birth control, no recent surgeries, no travel  No history of clotting disorders and no history of significant cardiac disease  History provided by:  Patient   used: No    Chest Pain   Pain location:  L chest  Pain quality: dull and pressure    Pain radiates to:  Does not radiate  Pain radiates to the back: no    Pain severity:  Mild  Onset quality:  Gradual  Duration:  4 days  Timing:  Intermittent  Progression:  Waxing and waning  Chronicity:  New  Context: at rest    Relieved by:  None tried  Worsened by:  Nothing tried  Ineffective treatments: nsaid    Associated symptoms: no abdominal pain, no AICD problem, no altered mental status, no anorexia, no anxiety, no back pain, no claudication, no cough, no diaphoresis, no dizziness, no dysphagia, no fatigue, no fever, no headache, no heartburn, no lower extremity edema, no nausea, no near-syncope, no numbness, no orthopnea, no palpitations, no PND, no shortness of breath, no syncope, not vomiting and no weakness    Risk factors: no aortic disease, no birth control, no coronary artery disease, no diabetes mellitus, no Julio-Danlos syndrome, no high cholesterol, no hypertension, no immobilization, no Marfan's syndrome, not obese, not pregnant, no prior DVT/PE, no smoking and no surgery        Prior to Admission Medications   Prescriptions Last Dose Informant Patient Reported? Taking? Ferrous Sulfate (FEROSUL PO)   Yes No   Sig: Every 12 hours   ferrous sulfate 325 (65 Fe) mg tablet   Yes No   Sig: Take 325 mg by mouth 2 (two) times a day with meals   ibuprofen (MOTRIN) 600 mg tablet   No No   Sig: Take 1 tablet (600 mg total) by mouth every 6 (six) hours as needed (pain)   ibuprofen (MOTRIN) 600 mg tablet   Yes No   Sig: Take 1 tablet by mouth every 6 (six) hours as needed   naproxen (NAPROSYN) 500 mg tablet   No No   Sig: Take 1 tablet (500 mg total) by mouth 2 (two) times a day with meals   ondansetron (ZOFRAN) 4 mg tablet   No No   Sig: Take 1 tablet (4 mg total) by mouth every 8 (eight) hours as needed for nausea or vomiting      Facility-Administered Medications: None       Past Medical History:   Diagnosis Date    Disease of thyroid gland        Past Surgical History:   Procedure Laterality Date     SECTION         Family History   Problem Relation Age of Onset    Heart murmur Mother     Hypertension Mother    [employer] Lupus Mother     Pulmonary embolism Mother      I have reviewed and agree with the history as documented  Social History   Substance Use Topics    Smoking status: Never Smoker    Smokeless tobacco: Never Used    Alcohol use No        Review of Systems   Constitutional: Negative for diaphoresis, fatigue and fever  HENT: Negative for trouble swallowing  Respiratory: Negative for cough and shortness of breath  Cardiovascular: Positive for chest pain  Negative for palpitations, orthopnea, claudication, syncope, PND and near-syncope  Gastrointestinal: Negative for abdominal pain, anorexia, heartburn, nausea and vomiting  Musculoskeletal: Negative for back pain  Neurological: Negative for dizziness, weakness, numbness and headaches         Physical Exam  Physical Exam    Vital Signs  ED Triage Vitals [10/31/18 2107]   Temperature Pulse Respirations Blood Pressure SpO2   97 8 °F (36 6 °C) 87 16 112/70 99 %      Temp Source Heart Rate Source Patient Position - Orthostatic VS BP Location FiO2 (%)   Tympanic Monitor Sitting Left arm --      Pain Score       4           Vitals:    10/31/18 2107   BP: 112/70   Pulse: 87   Patient Position - Orthostatic VS: Sitting       Visual Acuity      ED Medications  Medications   ketorolac (TORADOL) injection 30 mg (30 mg Intramuscular Given 10/31/18 2130)   aluminum-magnesium hydroxide-simethicone (MYLANTA) 200-200-20 mg/5 mL oral suspension 30 mL (30 mL Oral Given 10/31/18 2130)       Diagnostic Studies  Results Reviewed     Procedure Component Value Units Date/Time    Troponin I [[id number]]  (Normal) Collected:  10/31/18 2127    Lab Status:  Final result Specimen:  Blood from Arm, Right Updated:  10/31/18 2200     Troponin I <0 01 ng/mL     Urine Microscopic [[id number]]  (Abnormal) Collected:  10/31/18 2121    Lab Status:  Final result Specimen:  Urine from Urine, Clean Catch Updated:  10/31/18 2143     RBC, UA 2-4 (A) /hpf      WBC, UA 4-10 (A) /hpf      Epithelial Cells Occasional /hpf      Bacteria, UA None Seen /hpf     UA w Reflex to Microscopic [[id number]]  (Abnormal) Collected:  10/31/18 2121    Lab Status:  Final result Specimen:  Urine from Urine, Clean Catch Updated:  10/31/18 2142     Color, UA Kylee (A)     Clarity, UA Slightly Cloudy (A)     Specific Gravity, UA 1 025     pH, UA 5 0     Leukocytes, UA 25 0 (A)     Nitrite, UA Negative     Protein, UA 15 (Trace) (A) mg/dl      Glucose, UA Negative mg/dl      Ketones, UA 5 (Trace) (A) mg/dl      Bilirubin, UA Negative     Blood, UA Negative     UROBILINOGEN UA 1 0 mg/dL     Fingerstick Glucose (POCT) [[id number]]  (Abnormal) Collected:  10/31/18 2126    Lab Status:  Final result Updated:  10/31/18 2136     POC Glucose 105 (H) mg/dl     POCT pregnancy, urine [[id number]]  (Normal) Resulted:  10/31/18 2130    Lab Status:  Final result Updated:  10/31/18 2130     EXT PREG TEST UR (Ref: Negative) Negative                 XR chest 2 views (Results Pending)              Procedures  Procedures       Phone Contacts  ED Phone Contact    ED Course         HEART Risk Score      Most Recent Value   History  0 Filed at: 10/31/2018 2203   ECG  1 Filed at: 10/31/2018 2203   Age  0 Filed at: 10/31/2018 2203   Risk Factors  0 Filed at: 10/31/2018 2203   Troponin  0 Filed at: 10/31/2018 2203   Heart Score Risk Calculator   History  0 Filed at: 10/31/2018 2203   ECG  1 Filed at: 10/31/2018 2203   Age  0 Filed at: 10/31/2018 2203   Risk Factors  0 Filed at: 10/31/2018 2203   Troponin  0 Filed at: 10/31/2018 2203   HEART Score  1 Filed at: 10/31/2018 2203   HEART Score  1 Filed at: 10/31/2018 2203            PERC Rule for PE      Most Recent Value   PERC Rule for PE   Age >=50  0 Filed at: 10/31/2018 2115   HR >=100  0 Filed at: 10/31/2018 2115   O2 Sat on room air < 95%  0 Filed at: 10/31/2018 2115   History of PE or DVT  0 Filed at: 10/31/2018 2115   Recent trauma or surgery  0 Filed at: 10/31/2018 2115   Hemoptysis  0 Filed at: 10/31/2018 2115   Exogenous estrogen  0 Filed at: 10/31/2018 2115   Unilateral leg swelling  0 Filed at: 10/31/2018 2115   PERC Rule for PE Results  0 Filed at: 10/31/2018 2115                Wells' Criteria for PE      Most Recent Value   Wells' Criteria for PE   Clinical signs and symptoms of DVT  0 Filed at: 10/31/2018 2115   PE is primary diagnosis or equally likely  0 Filed at: 10/31/2018 2115   HR >100  0 Filed at: 10/31/2018 2115   Immobilization at least 3 days or Surgery in the previous 4 weeks  0 Filed at: 10/31/2018 2115   Previous, objectively diagnosed PE or DVT  0 Filed at: 10/31/2018 2115   Hemoptysis  0 Filed at: 10/31/2018 2115   Malignancy with treatment within 6 months or palliative  0 Filed at: 10/31/2018 2115   Beau Foot' Criteria Total  0 Filed at: 10/31/2018 2115            MDM  Number of Diagnoses or Management Options  Chest pain:   Diagnosis management comments: Patient is a 78-year-old female coming in today with chest discomfort that started approximately 4 days ago  On exam she is nontoxic appearing in no apparent distress  Will obtain EKG, chest x-ray, urine as well as glucose  Will give Toradol and Maalox  EKG INTERPRETATION at 9:13 p m  RHYTHM:  Normal sinus rhythm at 89 beats per minute  AXIS:   Normal axis  INTERVALS:   Pr interval measured at 132 milliseconds  QRS COMPLEX:   QRS measured at 82 milliseconds  ST SEGMENT:   Artifact present  Nonspecific ST segment changes  QT INTERVAL:   QTC measured at 452 milliseconds  COMPARED WITH PRIOR   Tom Meyers Interpretation by Angelito Jiménez DO    9:47 PM  Chest x-ray stable  Urine without any bacteria seen  Perc negative and low wells therefore no D-dimer  Pending troponin result  10:06 PM  Patient updated on troponin as well as heart score  Discussed with her to follow up with PCP alternate Tylenol Motrin  Patient resting in bed in no apparent distress      Portions of the record may have been created with voice recognition software  Occasional wrong word or "sound a like" substitutions may have occurred due to the inherent limitations of voice recognition software  Read the chart carefully and recognize, using context, where substitutions have occurred  Amount and/or Complexity of Data Reviewed  Clinical lab tests: ordered and reviewed  Tests in the radiology section of CPT®: ordered and reviewed  Tests in the medicine section of CPT®: ordered and reviewed  Independent visualization of images, tracings, or specimens: yes      CritCare Time    Disposition  Final diagnoses:   Chest pain     Time reflects when diagnosis was documented in both MDM as applicable and the Disposition within this note     Time User Action Codes Description Comment    10/31/2018 10:03 PM Raj Camargo Add [R07 9] Chest pain       ED Disposition     ED Disposition Condition Comment    Discharge  Kitty Granados discharge to home/self care      Condition at discharge: Stable Follow-up Information     Follow up With Specialties Details Why Contact Info    Radha Negro MD Internal Medicine Schedule an appointment as soon as possible for a visit in 2 days  [address] [address] [place] [address] [address]  [phone]            Patient's Medications   Discharge Prescriptions    No medications on file     No discharge procedures on file      ED Provider  Electronically Signed by           Fawad Barragan DO  10/31/18 7941

## 2018-11-02 LAB
ATRIAL RATE: 89 BPM
P AXIS: 49 DEGREES
PR INTERVAL: 132 MS
QRS AXIS: 49 DEGREES
QRSD INTERVAL: 82 MS
QT INTERVAL: 372 MS
QTC INTERVAL: 452 MS
T WAVE AXIS: 55 DEGREES
VENTRICULAR RATE: 89 BPM

## 2018-11-02 PROCEDURE — 93010 ELECTROCARDIOGRAM REPORT: CPT | Performed by: INTERNAL MEDICINE

## 2018-12-27 ENCOUNTER — HOSPITAL ENCOUNTER (EMERGENCY)
Facility: HOSPITAL | Age: 28
Discharge: HOME | End: 2018-12-27
Attending: EMERGENCY MEDICINE | Admitting: EMERGENCY MEDICINE
Payer: COMMERCIAL

## 2018-12-27 VITALS
HEART RATE: 104 BPM | DIASTOLIC BLOOD PRESSURE: 69 MMHG | OXYGEN SATURATION: 98 % | WEIGHT: 199.96 LBS | BODY MASS INDEX: 32.27 KG/M2 | SYSTOLIC BLOOD PRESSURE: 103 MMHG | TEMPERATURE: 98.3 F | RESPIRATION RATE: 18 BRPM

## 2018-12-27 DIAGNOSIS — S39.012A STRAIN OF LUMBAR REGION, INITIAL ENCOUNTER: Primary | ICD-10-CM

## 2018-12-27 PROCEDURE — 96372 THER/PROPH/DIAG INJ SC/IM: CPT

## 2018-12-27 PROCEDURE — 99283 EMERGENCY DEPT VISIT LOW MDM: CPT

## 2018-12-27 RX ORDER — KETOROLAC TROMETHAMINE 30 MG/ML
15 INJECTION, SOLUTION INTRAMUSCULAR; INTRAVENOUS ONCE
Status: COMPLETED | OUTPATIENT
Start: 2018-12-27 | End: 2018-12-27

## 2018-12-27 RX ORDER — DIAZEPAM 5 MG/ML
5 INJECTION, SOLUTION INTRAMUSCULAR; INTRAVENOUS ONCE
Status: COMPLETED | OUTPATIENT
Start: 2018-12-27 | End: 2018-12-27

## 2018-12-27 RX ORDER — BACLOFEN 10 MG/1
10 TABLET ORAL 3 TIMES DAILY
Qty: 12 TABLET | Refills: 0 | Status: SHIPPED | OUTPATIENT
Start: 2018-12-27 | End: 2019-04-03

## 2018-12-27 RX ADMIN — Medication 5 MG: at 13:39

## 2018-12-27 RX ADMIN — KETOROLAC TROMETHAMINE 15 MG: 30 INJECTION, SOLUTION INTRAMUSCULAR at 13:39

## 2018-12-27 NOTE — DISCHARGE INSTRUCTIONS
Low Back Strain   WHAT YOU NEED TO KNOW:   Low back strain is an injury to your lower back muscles or tendons  Tendons are strong tissues that connect muscles to bones  The lower back supports most of your body weight and helps you move, twist, and bend  DISCHARGE INSTRUCTIONS:   Return to the emergency department if:   · You hear or feel a pop in your lower back  · You have increased swelling or pain in your lower back  · You have trouble moving your legs  · Your legs are numb  Contact your healthcare provider if:   · You have a fever  · Your pain does not go away, even after treatment  · You have questions or concerns about your condition or care  Medicines: The following medicines may be ordered by your healthcare provider:  · Acetaminophen decreases pain and fever  It is available without a doctor's order  Ask how much to take and how often to take it  Follow directions  Acetaminophen can cause liver damage if not taken correctly  · NSAIDs , such as ibuprofen, help decrease swelling, pain, and fever  This medicine is available with or without a doctor's order  NSAIDs can cause stomach bleeding or kidney problems in certain people  If you take blood thinner medicine, always ask your healthcare provider if NSAIDs are safe for you  Always read the medicine label and follow directions  · Muscle relaxers  help decrease pain and muscle spasms  · Prescription pain medicine  may be given  Ask how to take this medicine safely  · Take your medicine as directed  Contact your healthcare provider if you think your medicine is not helping or if you have side effects  Tell him or her if you are allergic to any medicine  Keep a list of the medicines, vitamins, and herbs you take  Include the amounts, and when and why you take them  Bring the list or the pill bottles to follow-up visits  Carry your medicine list with you in case of an emergency  Self-care:   · Rest  as directed   You may need to rest in bed for a period of time after your injury  Do not lift heavy objects  · Apply ice  on your back for 15 to 20 minutes every hour or as directed  Use an ice pack, or put crushed ice in a plastic bag  Cover it with a towel  Ice helps prevent tissue damage and decreases swelling and pain  · Apply heat  on your lower back for 20 to 30 minutes every 2 hours for as many days as directed  Heat helps decrease pain and muscle spasms  · Slowly start to increase your activity  as the pain decreases, or as directed  Prevent another low back strain:   · Use correct body movements  ¨ Bend at the hips and knees when you  objects  Do not bend from the waist  Use your leg muscles as you lift the load  Do not use your back  Keep the object close to your chest as you lift it  Try not to twist or lift anything above your waist     ¨ Change your position often when you stand for long periods of time  Rest one foot on a small box or footrest, and then switch to the other foot often  ¨ Try not to sit for long periods of time  When you do, sit in a straight-backed chair with your feet flat on the floor  ¨ Never reach, pull, or push while you are sitting  · Warm up before you exercise  Do exercises that strengthen your back muscles  Ask your healthcare provider about the best exercise plan for you  · Maintain a healthy weight  Ask your healthcare provider how much you should weigh  Ask him to help you create a weight loss plan if you are overweight  © 2017 [address] [employer] Information is for End User's use only and may not be sold, redistributed or otherwise used for commercial purposes  All illustrations and images included in CareNotes® are the copyrighted property of [employer]  or [employer]  The above information is an  only  It is not intended as medical advice for individual conditions or treatments   Talk to your doctor, nurse or pharmacist before following any medical regimen to see if it is safe and effective for you

## 2018-12-27 NOTE — ED PROVIDER NOTES
History  Chief Complaint   Patient presents with    Back Pain     Pt c/o lower back pain after reaching upwards immediately pta  Pt denies any prior injury to back  This is a 30-year-old female patient who was reaching upwards and felt pain in her left low back  It does not radiate no change in bowel bladder no saddle anesthesia no numbness or tingling no weakness of the leg  Hurts to sit or to walk but she feels better when she standing and leaning forward  No fever no chills no IV drug use  No headache no blurred vision double vision no cough congestion sore throat no nausea vomiting diarrhea abdominal pain no urgency frequency or dysuria  Prior to Admission Medications   Prescriptions Last Dose Informant Patient Reported? Taking? Ferrous Sulfate (FEROSUL PO)   Yes No   Sig: Every 12 hours   ferrous sulfate 325 (65 Fe) mg tablet   Yes No   Sig: Take 325 mg by mouth 2 (two) times a day with meals   ibuprofen (MOTRIN) 600 mg tablet   No No   Sig: Take 1 tablet (600 mg total) by mouth every 6 (six) hours as needed (pain)   ibuprofen (MOTRIN) 600 mg tablet   Yes No   Sig: Take 1 tablet by mouth every 6 (six) hours as needed   naproxen (NAPROSYN) 500 mg tablet   No No   Sig: Take 1 tablet (500 mg total) by mouth 2 (two) times a day with meals   ondansetron (ZOFRAN) 4 mg tablet   No No   Sig: Take 1 tablet (4 mg total) by mouth every 8 (eight) hours as needed for nausea or vomiting      Facility-Administered Medications: None       Past Medical History:   Diagnosis Date    Disease of thyroid gland        Past Surgical History:   Procedure Laterality Date     SECTION         Family History   Problem Relation Age of Onset    Heart murmur Mother     Hypertension Mother    Bertin Duque Lupus Mother     Pulmonary embolism Mother      I have reviewed and agree with the history as documented      Social History   Substance Use Topics    Smoking status: Never Smoker    Smokeless tobacco: Never Used   Bertin Duque Alcohol use No        Review of Systems   All other systems reviewed and are negative  Physical Exam  Physical Exam   Constitutional: She appears well-developed and well-nourished  HENT:   Head: Normocephalic and atraumatic  Right Ear: External ear normal    Left Ear: External ear normal    Nose: Nose normal    Mouth/Throat: Oropharynx is clear and moist    Eyes: Pupils are equal, round, and reactive to light  Conjunctivae are normal    Neck: Normal range of motion  Neck supple  Cardiovascular: Normal rate and regular rhythm  Pulmonary/Chest: Effort normal and breath sounds normal    Abdominal: Soft  Bowel sounds are normal  There is no tenderness  Musculoskeletal:        Back:    Neurological: She is alert  Skin: Skin is warm  Psychiatric: She has a normal mood and affect  Her behavior is normal    Nursing note and vitals reviewed        Vital Signs  ED Triage Vitals [12/27/18 1312]   Temperature Pulse Respirations Blood Pressure SpO2   98 3 °F (36 8 °C) 104 18 103/69 98 %      Temp Source Heart Rate Source Patient Position - Orthostatic VS BP Location FiO2 (%)   Tympanic Monitor Standing Left arm --      Pain Score       8           Vitals:    12/27/18 1312   BP: 103/69   Pulse: 104   Patient Position - Orthostatic VS: Standing       Visual Acuity      ED Medications  Medications   ketorolac (TORADOL) injection 15 mg (not administered)   diazepam (VALIUM) injection 5 mg (not administered)       Diagnostic Studies  Results Reviewed     None                 No orders to display              Procedures  Procedures       Phone Contacts  ED Phone Contact    ED Course                               MDM  CritCare Time    Disposition  Final diagnoses:   Strain of lumbar region, initial encounter     Time reflects when diagnosis was documented in both MDM as applicable and the Disposition within this note     Time User Action Codes Description Comment    12/27/2018  1:22 PM [place] [[id number]] Strain of lumbar region, initial encounter       ED Disposition     ED Disposition Condition Comment    Discharge  Neri Riddle discharge to home/self care  Condition at discharge: Good        Follow-up Information     Follow up With Specialties Details Why Contact Info    Giuseppe Jose MD Internal Medicine Schedule an appointment as soon as possible for a visit  [address] [address]  [phone]            Patient's Medications   Discharge Prescriptions    BACLOFEN 10 MG TABLET    Take 1 tablet (10 mg total) by mouth 3 (three) times a day for 4 days       Start Date: 12/27/2018End Date: 12/31/2018       Order Dose: 10 mg       Quantity: 12 tablet    Refills: 0    DICLOFENAC SODIUM (VOLTAREN) 50 MG EC TABLET    Take 1 tablet (50 mg total) by mouth 2 (two) times a day       Start Date: 12/27/2018End Date: --       Order Dose: 50 mg       Quantity: 10 tablet    Refills: 0     No discharge procedures on file      ED Provider  Electronically Signed by           Elzbieta Sandoval PA-C  12/27/18 5526 [facility]  12/27/18 2646

## 2018-12-27 NOTE — ED NOTES
No adverse reaction to injections  Pt awake and alert, ambulated well without assistnace needed, walked to [address] [place] [address] to wait for ride home    Before giving injecitons, pt confirmed she would not be driving home     Jamari Sim RN  12/27/18 0054

## 2019-01-16 ENCOUNTER — OFFICE VISIT (OUTPATIENT)
Dept: OBGYN CLINIC | Facility: CLINIC | Age: 29
End: 2019-01-16
Payer: COMMERCIAL

## 2019-01-16 VITALS — WEIGHT: 201.4 LBS | BODY MASS INDEX: 32.51 KG/M2 | DIASTOLIC BLOOD PRESSURE: 72 MMHG | SYSTOLIC BLOOD PRESSURE: 110 MMHG

## 2019-01-16 DIAGNOSIS — R10.2 PELVIC PAIN: Primary | ICD-10-CM

## 2019-01-16 DIAGNOSIS — N92.6 IRREGULAR MENSES: ICD-10-CM

## 2019-01-16 DIAGNOSIS — N94.10 DYSPAREUNIA IN FEMALE: ICD-10-CM

## 2019-01-16 PROCEDURE — 99214 OFFICE O/P EST MOD 30 MIN: CPT | Performed by: OBSTETRICS & GYNECOLOGY

## 2019-01-16 PROCEDURE — 87591 N.GONORRHOEAE DNA AMP PROB: CPT | Performed by: OBSTETRICS & GYNECOLOGY

## 2019-01-16 PROCEDURE — 87491 CHLMYD TRACH DNA AMP PROBE: CPT | Performed by: OBSTETRICS & GYNECOLOGY

## 2019-01-16 RX ORDER — IBUPROFEN 800 MG/1
800 TABLET ORAL EVERY 8 HOURS PRN
Qty: 40 TABLET | Refills: 3 | Status: SHIPPED | OUTPATIENT
Start: 2019-01-16 | End: 2019-04-03

## 2019-01-16 NOTE — PROGRESS NOTES
Assessment/Plan:     Diagnoses and all orders for this visit:    Pelvic pain  -     US pelvis complete w transvaginal; Future  -     Chlamydia/GC amplified DNA by PCR    Dyspareunia in female  -     US pelvis complete w transvaginal; Future  -     Chlamydia/GC amplified DNA by PCR    Irregular menses  -     hCG, quantitative; Future  -     Prolactin; Future  -     TSH, 3rd generation with Free T4 reflex; Future        Patient had a pelvic ultrasound in August that was normal but did show bilateral polycystic ovaries  Laboratory testing were again ordered today due to complains of irregular periods  STD screening for gonorrhea and Chlamydia was performed  She will have a pelvic ultrasound to rule out ovarian cysts and/or uterine fibroids  Discussed with pt differentials including ovarian cysts, endometriosis/uterine fibroids  Pt has had unprotected intercourse x 2 years without pregnancy  Patient with a history of 4 prior  sections  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  Of note, patient also had 1 prior vaginal delivery of a term stillborn  Patient also with a history of molar pregnancy  Return in 1 week to discuss results  Ibuprofen given for pain  Subjective   Patient ID: Neri Riddle is a 33 y o  female  Patient is here for bilateral pelvic pain that has been occurring for about 1 month  She also has pain associated with intercourse  She is sexually active with the same partner for 7 years  She requested STD testing  She is also complaining of irregular menses  OB History      Para Term  AB Living    8 5 5 0 3 4    SAB TAB Ectopic Multiple Live Births    3 0 0 0 4            Review of Systems   Constitutional: Negative  HENT: Negative  Eyes: Negative  Respiratory: Negative  Cardiovascular: Negative  Gastrointestinal: Negative  Endocrine: Negative  Genitourinary:        As noted in HPI   Musculoskeletal: Negative  Skin: Negative  Allergic/Immunologic: Negative  Neurological: Negative  Hematological: Negative  Psychiatric/Behavioral: Negative  Past Medical History:   Diagnosis Date    Disease of thyroid gland        Past Surgical History:   Procedure Laterality Date     SECTION         Social History     Social History    Marital status: /Civil Union     Spouse name: N/A    Number of children: 4    Years of education: N/A     Occupational History    Not on file  Social History Main Topics    Smoking status: Never Smoker    Smokeless tobacco: Never Used    Alcohol use No    Drug use: No    Sexual activity: Yes     Partners: Male     Birth control/ protection: None     Other Topics Concern    Not on file     Social History Narrative    Always wears seat belts ; caffeine use       Allergies   Allergen Reactions    Penicillins Anaphylaxis     Anaphylaxis    Shrimp (Diagnostic)          Current Outpatient Prescriptions:     baclofen 10 mg tablet, Take 1 tablet (10 mg total) by mouth 3 (three) times a day for 4 days, Disp: 12 tablet, Rfl: 0    diclofenac sodium (VOLTAREN) 50 mg EC tablet, Take 1 tablet (50 mg total) by mouth 2 (two) times a day (Patient not taking: Reported on 2019 ), Disp: 10 tablet, Rfl: 0    Ferrous Sulfate (FEROSUL PO), Every 12 hours, Disp: , Rfl:     ferrous sulfate 325 (65 Fe) mg tablet, Take 325 mg by mouth 2 (two) times a day with meals, Disp: , Rfl:     ondansetron (ZOFRAN) 4 mg tablet, Take 1 tablet (4 mg total) by mouth every 8 (eight) hours as needed for nausea or vomiting (Patient not taking: Reported on 2019 ), Disp: 15 tablet, Rfl: 0      Vitals:    19 1120   BP: 110/72       Body mass index is 32 51 kg/m²  Physical Exam   Constitutional: She is oriented to person, place, and time  She appears well-developed and well-nourished  No distress  Genitourinary: Uterus normal  Pelvic exam was performed with patient supine  There is no rash, tenderness, lesion or injury on the right labia  There is no rash, tenderness, lesion or injury on the left labia  No erythema, tenderness or bleeding in the vagina  No signs of injury around the vagina  No vaginal discharge found  Right adnexum does not display mass, does not display tenderness and does not display fullness  Left adnexum does not display mass, does not display tenderness and does not display fullness  Cervix does not exhibit motion tenderness, lesion, discharge or polyp  Uterus is not enlarged or tender  Abdominal: Soft  She exhibits no distension  There is no tenderness  Neurological: She is alert and oriented to person, place, and time  Skin: Skin is warm and dry  Psychiatric: She has a normal mood and affect  Her behavior is normal          Menstrual History:  OB History      Para Term  AB Living    8 5 5 0 3 4    SAB TAB Ectopic Multiple Live Births    3 0 0 0 4           Patient's last menstrual period was 2018 (approximate)  The following portions of the patient's history were reviewed and updated as appropriate: allergies, current medications, past family history, past medical history, past social history, past surgical history and problem list       Counseling / Coordination of Care  Total time spent today25  minutes  Greater than 50% of total time was spent with the patient and / or family counseling and / or coordination of care

## 2019-01-17 LAB
C TRACH DNA SPEC QL NAA+PROBE: NEGATIVE
N GONORRHOEA DNA SPEC QL NAA+PROBE: NEGATIVE

## 2019-01-18 ENCOUNTER — APPOINTMENT (OUTPATIENT)
Dept: LAB | Facility: HOSPITAL | Age: 29
End: 2019-01-18
Attending: OBSTETRICS & GYNECOLOGY
Payer: COMMERCIAL

## 2019-01-18 ENCOUNTER — HOSPITAL ENCOUNTER (OUTPATIENT)
Dept: ULTRASOUND IMAGING | Facility: HOSPITAL | Age: 29
Discharge: HOME | End: 2019-01-18
Attending: OBSTETRICS & GYNECOLOGY
Payer: COMMERCIAL

## 2019-01-18 DIAGNOSIS — R10.2 PELVIC PAIN: ICD-10-CM

## 2019-01-18 DIAGNOSIS — N92.6 IRREGULAR MENSES: ICD-10-CM

## 2019-01-18 DIAGNOSIS — R19.8: ICD-10-CM

## 2019-01-18 DIAGNOSIS — O26.899: ICD-10-CM

## 2019-01-18 LAB
B-HCG SERPL-ACNC: <3 MIU/ML
PROLACTIN SERPL-MCNC: 10.9 NG/ML
TSH SERPL DL<=0.05 MIU/L-ACNC: 1.44 UIU/ML (ref 0.47–4.68)

## 2019-01-18 PROCEDURE — 84146 ASSAY OF PROLACTIN: CPT

## 2019-01-18 PROCEDURE — 76830 TRANSVAGINAL US NON-OB: CPT

## 2019-01-18 PROCEDURE — 84702 CHORIONIC GONADOTROPIN TEST: CPT

## 2019-01-18 PROCEDURE — 76856 US EXAM PELVIC COMPLETE: CPT

## 2019-01-18 PROCEDURE — 36415 COLL VENOUS BLD VENIPUNCTURE: CPT

## 2019-01-18 PROCEDURE — 84443 ASSAY THYROID STIM HORMONE: CPT

## 2019-01-22 ENCOUNTER — TELEPHONE (OUTPATIENT)
Dept: OBGYN CLINIC | Facility: CLINIC | Age: 29
End: 2019-01-22

## 2019-01-22 NOTE — TELEPHONE ENCOUNTER
Patient has a follow up appointment with you on 1/23 and she has a job interview and would like if you would be able to go over her results by phone

## 2019-01-23 NOTE — TELEPHONE ENCOUNTER
Notify patient that her lab results are normal  US shows no fibroids or cysts in her ovaries  It may suggest a condition called adenomyosis that causes severe painful periods and heavy periods, which we normally treat with NSAIDS like Ibuprofen, and or oral contraceptive pills  Definitive treatment would be a hysterectomy, return to office to discuss OCPs or surgery is she would like, otherwise, take Ibuprofen as needed

## 2019-01-23 NOTE — TELEPHONE ENCOUNTER
Pt informed    For now she will take Ibuprofen and will call back if she needs to set up a consult with Dr Gabriel Lagunas

## 2019-04-03 ENCOUNTER — APPOINTMENT (OUTPATIENT)
Dept: LAB | Facility: HOSPITAL | Age: 29
End: 2019-04-03
Attending: OBSTETRICS & GYNECOLOGY
Payer: COMMERCIAL

## 2019-04-03 ENCOUNTER — OFFICE VISIT (OUTPATIENT)
Dept: OBGYN CLINIC | Facility: CLINIC | Age: 29
End: 2019-04-03
Payer: COMMERCIAL

## 2019-04-03 VITALS
WEIGHT: 211.6 LBS | HEIGHT: 66 IN | DIASTOLIC BLOOD PRESSURE: 76 MMHG | BODY MASS INDEX: 34.01 KG/M2 | SYSTOLIC BLOOD PRESSURE: 112 MMHG

## 2019-04-03 DIAGNOSIS — Z3A.01 5 WEEKS GESTATION OF PREGNANCY: ICD-10-CM

## 2019-04-03 DIAGNOSIS — N91.2 AMENORRHEA: ICD-10-CM

## 2019-04-03 DIAGNOSIS — Z3A.01 5 WEEKS GESTATION OF PREGNANCY: Primary | ICD-10-CM

## 2019-04-03 DIAGNOSIS — O21.9 NAUSEA/VOMITING IN PREGNANCY: ICD-10-CM

## 2019-04-03 LAB
B-HCG SERPL-ACNC: 3168.2 MIU/ML
PROGEST SERPL-MCNC: 8.5 NG/ML
SL AMB POCT URINE HCG: POSITIVE
TSH SERPL DL<=0.05 MIU/L-ACNC: 1.66 UIU/ML (ref 0.47–4.68)

## 2019-04-03 PROCEDURE — 36415 COLL VENOUS BLD VENIPUNCTURE: CPT

## 2019-04-03 PROCEDURE — 84144 ASSAY OF PROGESTERONE: CPT

## 2019-04-03 PROCEDURE — 84443 ASSAY THYROID STIM HORMONE: CPT

## 2019-04-03 PROCEDURE — 84702 CHORIONIC GONADOTROPIN TEST: CPT

## 2019-04-03 PROCEDURE — 99213 OFFICE O/P EST LOW 20 MIN: CPT | Performed by: OBSTETRICS & GYNECOLOGY

## 2019-04-03 PROCEDURE — 81025 URINE PREGNANCY TEST: CPT | Performed by: OBSTETRICS & GYNECOLOGY

## 2019-04-03 RX ORDER — METOCLOPRAMIDE 10 MG/1
10 TABLET ORAL 3 TIMES DAILY PRN
Qty: 30 TABLET | Refills: 2 | Status: ON HOLD | OUTPATIENT
Start: 2019-04-03 | End: 2019-10-19 | Stop reason: SDUPTHER

## 2019-04-23 ENCOUNTER — ULTRASOUND (OUTPATIENT)
Dept: OBGYN CLINIC | Facility: CLINIC | Age: 29
End: 2019-04-23
Payer: COMMERCIAL

## 2019-04-23 DIAGNOSIS — O36.80X1 EXAMINATION TO DETERMINE FETAL VIABILITY OF PREGNANCY, FETUS 1: Primary | ICD-10-CM

## 2019-04-23 PROCEDURE — 76801 OB US < 14 WKS SINGLE FETUS: CPT | Performed by: OBSTETRICS & GYNECOLOGY

## 2019-04-29 ENCOUNTER — APPOINTMENT (OUTPATIENT)
Dept: LAB | Facility: HOSPITAL | Age: 29
End: 2019-04-29
Payer: COMMERCIAL

## 2019-04-29 ENCOUNTER — TRANSCRIBE ORDERS (OUTPATIENT)
Dept: ADMINISTRATIVE | Facility: HOSPITAL | Age: 29
End: 2019-04-29

## 2019-04-29 ENCOUNTER — OFFICE VISIT (OUTPATIENT)
Dept: OBGYN CLINIC | Facility: CLINIC | Age: 29
End: 2019-04-29
Payer: COMMERCIAL

## 2019-04-29 ENCOUNTER — OB ABSTRACT (OUTPATIENT)
Dept: OBGYN CLINIC | Facility: CLINIC | Age: 29
End: 2019-04-29

## 2019-04-29 VITALS
BODY MASS INDEX: 34.3 KG/M2 | DIASTOLIC BLOOD PRESSURE: 74 MMHG | WEIGHT: 213.4 LBS | HEIGHT: 66 IN | SYSTOLIC BLOOD PRESSURE: 120 MMHG

## 2019-04-29 DIAGNOSIS — Z3A.09 9 WEEKS GESTATION OF PREGNANCY: Primary | ICD-10-CM

## 2019-04-29 DIAGNOSIS — O21.9 NAUSEA/VOMITING IN PREGNANCY: ICD-10-CM

## 2019-04-29 LAB
ABO GROUP BLD: NORMAL
ANISOCYTOSIS BLD QL SMEAR: PRESENT
BASOPHILS # BLD AUTO: 0 THOUSANDS/ΜL (ref 0–0.1)
BASOPHILS NFR BLD AUTO: 0 % (ref 0–1)
BILIRUB UR QL STRIP: NEGATIVE
BLD GP AB SCN SERPL QL: NEGATIVE
CLARITY UR: CLEAR
COLOR UR: YELLOW
EOSINOPHIL # BLD AUTO: 0.2 THOUSAND/ΜL (ref 0–0.4)
EOSINOPHIL NFR BLD AUTO: 2 % (ref 0–6)
ERYTHROCYTE [DISTWIDTH] IN BLOOD BY AUTOMATED COUNT: 16.8 %
GLUCOSE UR STRIP-MCNC: NEGATIVE MG/DL
HCT VFR BLD AUTO: 32.4 % (ref 36–46)
HGB BLD-MCNC: 10.5 G/DL (ref 12–16)
HGB UR QL STRIP.AUTO: NEGATIVE
HYPERCHROMIA BLD QL SMEAR: PRESENT
KETONES UR STRIP-MCNC: NEGATIVE MG/DL
LEUKOCYTE ESTERASE UR QL STRIP: NEGATIVE
LYMPHOCYTES # BLD AUTO: 2.8 THOUSANDS/ΜL (ref 0.5–4)
LYMPHOCYTES NFR BLD AUTO: 26 % (ref 25–45)
MCH RBC QN AUTO: 24.9 PG (ref 26–34)
MCHC RBC AUTO-ENTMCNC: 32.4 G/DL (ref 31–36)
MCV RBC AUTO: 77 FL (ref 80–100)
MICROCYTES BLD QL AUTO: PRESENT
MONOCYTES # BLD AUTO: 0.8 THOUSAND/ΜL (ref 0.2–0.9)
MONOCYTES NFR BLD AUTO: 8 % (ref 1–10)
NEUTROPHILS # BLD AUTO: 6.9 THOUSANDS/ΜL (ref 1.8–7.8)
NEUTS SEG NFR BLD AUTO: 64 % (ref 45–65)
NITRITE UR QL STRIP: NEGATIVE
PH UR STRIP.AUTO: 6 [PH]
PLATELET # BLD AUTO: 379 THOUSANDS/UL (ref 150–450)
PLATELET BLD QL SMEAR: ADEQUATE
PMV BLD AUTO: 8.9 FL (ref 8.9–12.7)
PROT UR STRIP-MCNC: NEGATIVE MG/DL
RBC # BLD AUTO: 4.21 MILLION/UL (ref 4–5.2)
RBC MORPH BLD: NORMAL
RH BLD: POSITIVE
RUBV IGG SERPL IA-ACNC: 15.8 IU/ML
SP GR UR STRIP.AUTO: 1.02 (ref 1–1.04)
SPECIMEN EXPIRATION DATE: NORMAL
UROBILINOGEN UA: NEGATIVE MG/DL
WBC # BLD AUTO: 10.7 THOUSAND/UL (ref 4.5–11)

## 2019-04-29 PROCEDURE — 80081 OBSTETRIC PANEL INC HIV TSTG: CPT | Performed by: OBSTETRICS & GYNECOLOGY

## 2019-04-29 PROCEDURE — 99213 OFFICE O/P EST LOW 20 MIN: CPT | Performed by: OBSTETRICS & GYNECOLOGY

## 2019-04-29 PROCEDURE — 87086 URINE CULTURE/COLONY COUNT: CPT | Performed by: OBSTETRICS & GYNECOLOGY

## 2019-04-29 PROCEDURE — 36415 COLL VENOUS BLD VENIPUNCTURE: CPT | Performed by: OBSTETRICS & GYNECOLOGY

## 2019-04-29 RX ORDER — MULTIVITAMIN
1 TABLET ORAL DAILY
Refills: 6 | COMMUNITY
Start: 2019-04-03 | End: 2019-06-13 | Stop reason: SDUPTHER

## 2019-04-30 ENCOUNTER — TELEPHONE (OUTPATIENT)
Dept: OBGYN CLINIC | Facility: CLINIC | Age: 29
End: 2019-04-30

## 2019-04-30 ENCOUNTER — INITIAL PRENATAL (OUTPATIENT)
Dept: OBGYN CLINIC | Facility: CLINIC | Age: 29
End: 2019-04-30
Payer: COMMERCIAL

## 2019-04-30 DIAGNOSIS — O99.019 ANEMIA DURING PREGNANCY: Primary | ICD-10-CM

## 2019-04-30 DIAGNOSIS — Z34.81 PRENATAL CARE, SUBSEQUENT PREGNANCY, FIRST TRIMESTER: Primary | ICD-10-CM

## 2019-04-30 DIAGNOSIS — Z82.49 FAMILY HISTORY OF THROMBOSIS: ICD-10-CM

## 2019-04-30 LAB
BACTERIA UR CULT: NORMAL
EXTERNAL HIV-1 ANTIBODY: NEGATIVE
HBV SURFACE AG SER QL: NORMAL
HIV 1+2 AB+HIV1 P24 AG SERPL QL IA: NORMAL
RPR SER QL: NORMAL

## 2019-04-30 PROCEDURE — T1001 NURSING ASSESSMENT/EVALUATN: HCPCS

## 2019-04-30 RX ORDER — FERROUS SULFATE TAB EC 324 MG (65 MG FE EQUIVALENT) 324 (65 FE) MG
325 TABLET DELAYED RESPONSE ORAL
Qty: 60 TABLET | Refills: 6 | Status: SHIPPED | OUTPATIENT
Start: 2019-04-30 | End: 2019-05-28 | Stop reason: SDUPTHER

## 2019-04-30 RX ORDER — DOCUSATE SODIUM 100 MG/1
100 CAPSULE, LIQUID FILLED ORAL 2 TIMES DAILY
Qty: 10 CAPSULE | Refills: 0 | Status: SHIPPED | OUTPATIENT
Start: 2019-04-30 | End: 2019-05-28 | Stop reason: SDUPTHER

## 2019-05-09 ENCOUNTER — APPOINTMENT (OUTPATIENT)
Dept: LAB | Facility: HOSPITAL | Age: 29
End: 2019-05-09
Payer: COMMERCIAL

## 2019-05-09 DIAGNOSIS — Z34.81 PRENATAL CARE, SUBSEQUENT PREGNANCY, FIRST TRIMESTER: ICD-10-CM

## 2019-05-09 DIAGNOSIS — Z82.49 FAMILY HISTORY OF THROMBOSIS: ICD-10-CM

## 2019-05-09 LAB — DEPRECATED AT III PPP: 85 % OF NORMAL (ref 92–136)

## 2019-05-09 PROCEDURE — 85705 THROMBOPLASTIN INHIBITION: CPT

## 2019-05-09 PROCEDURE — 86147 CARDIOLIPIN ANTIBODY EA IG: CPT

## 2019-05-09 PROCEDURE — 86146 BETA-2 GLYCOPROTEIN ANTIBODY: CPT

## 2019-05-09 PROCEDURE — 36415 COLL VENOUS BLD VENIPUNCTURE: CPT

## 2019-05-09 PROCEDURE — 85613 RUSSELL VIPER VENOM DILUTED: CPT

## 2019-05-09 PROCEDURE — 85303 CLOT INHIBIT PROT C ACTIVITY: CPT

## 2019-05-09 PROCEDURE — 81240 F2 GENE: CPT

## 2019-05-09 PROCEDURE — 85670 THROMBIN TIME PLASMA: CPT

## 2019-05-09 PROCEDURE — 85305 CLOT INHIBIT PROT S TOTAL: CPT

## 2019-05-09 PROCEDURE — 85306 CLOT INHIBIT PROT S FREE: CPT

## 2019-05-09 PROCEDURE — 81241 F5 GENE: CPT

## 2019-05-09 PROCEDURE — 85732 THROMBOPLASTIN TIME PARTIAL: CPT

## 2019-05-09 PROCEDURE — 85300 ANTITHROMBIN III ACTIVITY: CPT

## 2019-05-10 LAB
CARDIOLIPIN IGA SER IA-ACNC: <9 APL U/ML (ref 0–11)
CARDIOLIPIN IGG SER IA-ACNC: <9 GPL U/ML (ref 0–14)
CARDIOLIPIN IGM SER IA-ACNC: 13 MPL U/ML (ref 0–12)

## 2019-05-11 ENCOUNTER — TELEPHONE (OUTPATIENT)
Dept: LABOR AND DELIVERY | Facility: HOSPITAL | Age: 29
End: 2019-05-11

## 2019-05-11 LAB
APTT SCREEN TO CONFIRM RATIO: 1.14 RATIO (ref 0–1.4)
CONFIRM APTT/NORMAL: 37 SEC (ref 0–55)
LA PPP-IMP: NORMAL
PROT S ACT/NOR PPP: 37 % (ref 57–157)
PROT S ACT/NOR PPP: 37 % (ref 63–140)
PROT S PPP-ACNC: 51 % (ref 60–150)
SCREEN APTT: 34.5 SEC (ref 0–51.9)
SCREEN DRVVT: 38.9 SEC (ref 0–47)
THROMBIN TIME: 15.9 SEC (ref 0–23)

## 2019-05-12 LAB
B2 GLYCOPROT1 IGA SER-ACNC: <9 GPI IGA UNITS (ref 0–25)
B2 GLYCOPROT1 IGG SER-ACNC: <9 GPI IGG UNITS (ref 0–20)
B2 GLYCOPROT1 IGM SER-ACNC: <9 GPI IGM UNITS (ref 0–32)

## 2019-05-13 LAB
F5 GENE MUT ANL BLD/T: NORMAL
PROT C AG ACT/NOR PPP IA: 100 % OF NORMAL (ref 60–150)

## 2019-05-14 ENCOUNTER — TELEPHONE (OUTPATIENT)
Dept: OBGYN CLINIC | Facility: CLINIC | Age: 29
End: 2019-05-14

## 2019-05-15 LAB — F2 GENE MUT ANL BLD/T: NORMAL

## 2019-05-28 ENCOUNTER — INITIAL PRENATAL (OUTPATIENT)
Dept: OBGYN CLINIC | Facility: CLINIC | Age: 29
End: 2019-05-28
Payer: COMMERCIAL

## 2019-05-28 ENCOUNTER — ROUTINE PRENATAL (OUTPATIENT)
Dept: PERINATAL CARE | Facility: OTHER | Age: 29
End: 2019-05-28
Payer: COMMERCIAL

## 2019-05-28 VITALS
DIASTOLIC BLOOD PRESSURE: 81 MMHG | BODY MASS INDEX: 34.39 KG/M2 | WEIGHT: 214 LBS | HEIGHT: 66 IN | HEART RATE: 102 BPM | SYSTOLIC BLOOD PRESSURE: 122 MMHG

## 2019-05-28 VITALS
DIASTOLIC BLOOD PRESSURE: 76 MMHG | HEIGHT: 66 IN | WEIGHT: 214 LBS | SYSTOLIC BLOOD PRESSURE: 116 MMHG | BODY MASS INDEX: 34.39 KG/M2

## 2019-05-28 DIAGNOSIS — Z98.891 H/O CESAREAN SECTION: ICD-10-CM

## 2019-05-28 DIAGNOSIS — Z12.4 SCREENING FOR CERVICAL CANCER: ICD-10-CM

## 2019-05-28 DIAGNOSIS — Z3A.13 13 WEEKS GESTATION OF PREGNANCY: Primary | ICD-10-CM

## 2019-05-28 DIAGNOSIS — Z3A.09 9 WEEKS GESTATION OF PREGNANCY: ICD-10-CM

## 2019-05-28 DIAGNOSIS — Z87.59 HISTORY OF IUFD: ICD-10-CM

## 2019-05-28 DIAGNOSIS — O99.019 ANEMIA DURING PREGNANCY: ICD-10-CM

## 2019-05-28 DIAGNOSIS — Z36.82 ENCOUNTER FOR NUCHAL TRANSLUCENCY TESTING: ICD-10-CM

## 2019-05-28 DIAGNOSIS — Z11.51 SCREENING FOR HPV (HUMAN PAPILLOMAVIRUS): ICD-10-CM

## 2019-05-28 DIAGNOSIS — O34.219 MATERNAL CARE FOR SCAR FROM PREVIOUS CESAREAN DELIVERY, UNSPECIFIED PRIOR CESAREAN DELIVERY TYPE: Primary | ICD-10-CM

## 2019-05-28 DIAGNOSIS — O99.210 OBESITY AFFECTING PREGNANCY, ANTEPARTUM: ICD-10-CM

## 2019-05-28 DIAGNOSIS — O09.291 HISTORY OF STILLBIRTH IN CURRENTLY PREGNANT PATIENT, FIRST TRIMESTER: ICD-10-CM

## 2019-05-28 DIAGNOSIS — O21.9 NAUSEA/VOMITING IN PREGNANCY: ICD-10-CM

## 2019-05-28 DIAGNOSIS — Z3A.13 13 WEEKS GESTATION OF PREGNANCY: ICD-10-CM

## 2019-05-28 PROBLEM — IMO0002 OTHER ABNORMAL PAPANICOLAOU SMEAR OF CERVIX AND CERVICAL HPV(795.09): Status: ACTIVE | Noted: 2019-05-28

## 2019-05-28 PROCEDURE — G0145 SCR C/V CYTO,THINLAYER,RESCR: HCPCS | Performed by: OBSTETRICS & GYNECOLOGY

## 2019-05-28 PROCEDURE — 76813 OB US NUCHAL MEAS 1 GEST: CPT | Performed by: OBSTETRICS & GYNECOLOGY

## 2019-05-28 PROCEDURE — 99214 OFFICE O/P EST MOD 30 MIN: CPT | Performed by: OBSTETRICS & GYNECOLOGY

## 2019-05-28 PROCEDURE — 76801 OB US < 14 WKS SINGLE FETUS: CPT | Performed by: OBSTETRICS & GYNECOLOGY

## 2019-05-28 PROCEDURE — 99241 PR OFFICE CONSULTATION NEW/ESTAB PATIENT 15 MIN: CPT | Performed by: OBSTETRICS & GYNECOLOGY

## 2019-05-28 PROCEDURE — 87491 CHLMYD TRACH DNA AMP PROBE: CPT | Performed by: OBSTETRICS & GYNECOLOGY

## 2019-05-28 PROCEDURE — 87591 N.GONORRHOEAE DNA AMP PROB: CPT | Performed by: OBSTETRICS & GYNECOLOGY

## 2019-05-28 RX ORDER — DOCUSATE SODIUM 100 MG/1
100 CAPSULE, LIQUID FILLED ORAL 2 TIMES DAILY
Qty: 10 CAPSULE | Refills: 0 | Status: SHIPPED | OUTPATIENT
Start: 2019-05-28 | End: 2019-11-11 | Stop reason: ALTCHOICE

## 2019-05-28 RX ORDER — FERROUS SULFATE TAB EC 324 MG (65 MG FE EQUIVALENT) 324 (65 FE) MG
325 TABLET DELAYED RESPONSE ORAL
Qty: 60 TABLET | Refills: 6 | Status: SHIPPED | OUTPATIENT
Start: 2019-05-28 | End: 2019-07-17

## 2019-05-29 LAB — EXTERNAL CHLAMYDIA SCREEN: NEGATIVE

## 2019-05-30 LAB
C TRACH DNA SPEC QL NAA+PROBE: NEGATIVE
N GONORRHOEA DNA SPEC QL NAA+PROBE: NEGATIVE

## 2019-06-03 LAB
LAB AP GYN PRIMARY INTERPRETATION: NORMAL
Lab: NORMAL

## 2019-06-12 ENCOUNTER — TELEPHONE (OUTPATIENT)
Dept: PERINATAL CARE | Facility: CLINIC | Age: 29
End: 2019-06-12

## 2019-06-13 DIAGNOSIS — Z34.90 PREGNANCY, UNSPECIFIED GESTATIONAL AGE: Primary | ICD-10-CM

## 2019-06-13 RX ORDER — MULTIVITAMIN
1 TABLET ORAL DAILY
Qty: 30 EACH | Refills: 6 | Status: SHIPPED | OUTPATIENT
Start: 2019-06-13 | End: 2020-09-09

## 2019-06-24 ENCOUNTER — APPOINTMENT (OUTPATIENT)
Dept: LAB | Facility: HOSPITAL | Age: 29
End: 2019-06-24
Payer: COMMERCIAL

## 2019-06-24 ENCOUNTER — TRANSCRIBE ORDERS (OUTPATIENT)
Dept: ADMINISTRATIVE | Facility: HOSPITAL | Age: 29
End: 2019-06-24

## 2019-06-24 DIAGNOSIS — Z33.1 PREGNANT STATE, INCIDENTAL: ICD-10-CM

## 2019-06-24 DIAGNOSIS — Z33.1 PREGNANT STATE, INCIDENTAL: Primary | ICD-10-CM

## 2019-06-24 DIAGNOSIS — Z36.9 UNSPECIFIED ANTENATAL SCREENING: ICD-10-CM

## 2019-06-24 PROCEDURE — 36415 COLL VENOUS BLD VENIPUNCTURE: CPT

## 2019-06-25 LAB — SCAN RESULT: NORMAL

## 2019-06-28 ENCOUNTER — TELEPHONE (OUTPATIENT)
Dept: PERINATAL CARE | Facility: CLINIC | Age: 29
End: 2019-06-28

## 2019-07-17 ENCOUNTER — ROUTINE PRENATAL (OUTPATIENT)
Dept: OBGYN CLINIC | Facility: CLINIC | Age: 29
End: 2019-07-17
Payer: COMMERCIAL

## 2019-07-17 ENCOUNTER — ROUTINE PRENATAL (OUTPATIENT)
Dept: PERINATAL CARE | Facility: OTHER | Age: 29
End: 2019-07-17
Payer: COMMERCIAL

## 2019-07-17 VITALS
HEART RATE: 92 BPM | HEIGHT: 66 IN | DIASTOLIC BLOOD PRESSURE: 70 MMHG | BODY MASS INDEX: 35.2 KG/M2 | SYSTOLIC BLOOD PRESSURE: 105 MMHG | WEIGHT: 219 LBS

## 2019-07-17 VITALS
SYSTOLIC BLOOD PRESSURE: 112 MMHG | WEIGHT: 218.8 LBS | HEIGHT: 66 IN | DIASTOLIC BLOOD PRESSURE: 66 MMHG | HEART RATE: 88 BPM | BODY MASS INDEX: 35.17 KG/M2

## 2019-07-17 DIAGNOSIS — Z3A.20 20 WEEKS GESTATION OF PREGNANCY: Primary | ICD-10-CM

## 2019-07-17 DIAGNOSIS — Z98.891 H/O CESAREAN SECTION: ICD-10-CM

## 2019-07-17 DIAGNOSIS — O09.292 HISTORY OF STILLBIRTH IN PREGNANT PATIENT IN SECOND TRIMESTER, ANTEPARTUM: Primary | ICD-10-CM

## 2019-07-17 DIAGNOSIS — Z36.86 ENCOUNTER FOR ANTENATAL SCREENING FOR CERVICAL LENGTH: ICD-10-CM

## 2019-07-17 DIAGNOSIS — Z3A.20 20 WEEKS GESTATION OF PREGNANCY: ICD-10-CM

## 2019-07-17 DIAGNOSIS — O44.02 PLACENTA PREVIA, SECOND TRIMESTER: ICD-10-CM

## 2019-07-17 DIAGNOSIS — O99.212 MATERNAL OBESITY, ANTEPARTUM, SECOND TRIMESTER: ICD-10-CM

## 2019-07-17 DIAGNOSIS — O99.019 ANEMIA DURING PREGNANCY: ICD-10-CM

## 2019-07-17 PROBLEM — IMO0002 OTHER ABNORMAL PAPANICOLAOU SMEAR OF CERVIX AND CERVICAL HPV(795.09): Status: RESOLVED | Noted: 2019-05-28 | Resolved: 2019-07-17

## 2019-07-17 PROBLEM — R10.2 PELVIC PAIN: Status: RESOLVED | Noted: 2018-07-30 | Resolved: 2019-07-17

## 2019-07-17 PROBLEM — O21.9 NAUSEA/VOMITING IN PREGNANCY: Status: RESOLVED | Noted: 2019-04-03 | Resolved: 2019-07-17

## 2019-07-17 PROCEDURE — 99212 OFFICE O/P EST SF 10 MIN: CPT | Performed by: OBSTETRICS & GYNECOLOGY

## 2019-07-17 PROCEDURE — 76811 OB US DETAILED SNGL FETUS: CPT | Performed by: OBSTETRICS & GYNECOLOGY

## 2019-07-17 PROCEDURE — 76817 TRANSVAGINAL US OBSTETRIC: CPT | Performed by: OBSTETRICS & GYNECOLOGY

## 2019-07-17 PROCEDURE — 99213 OFFICE O/P EST LOW 20 MIN: CPT | Performed by: OBSTETRICS & GYNECOLOGY

## 2019-07-17 NOTE — PATIENT INSTRUCTIONS
Pregnancy at 23 to 22 Weeks   AMBULATORY CARE:   What changes are happening to your body:  Now that you are in your second trimester, you have more energy  You may also be feeling hungrier than usual  You may be gaining about ½ to 1 pound a week, and your pregnancy is beginning to show  You may need to start wearing maternity clothes  As your baby gets larger, you may have other symptoms  These may include body aches or stretch marks on your abdomen, breasts, thighs, or buttocks  Seek care immediately if:   · You develop a severe headache that does not go away  · You have new or increased vision changes, such as blurred or spotted vision  · You have new or increased swelling in your face or hands  · You have vaginal spotting or bleeding  · Your water broke or you feel warm water gushing or trickling from your vagina  Contact your healthcare provider if:   · You have abdominal cramps, pressure, or tightening  · You have a change in vaginal discharge  · You cannot keep food or drinks down, and you are losing weight  · You have chills or a fever  · You have vaginal itching, burning, or pain  · You have yellow, green, white, or foul-smelling vaginal discharge  · You have pain or burning when you urinate, less urine than usual, or pink or bloody urine  · You have questions or concerns about your condition or care  How to care for yourself at this stage of your pregnancy:   · Eat a variety of healthy foods  Healthy foods include fruits, vegetables, whole-grain breads, low-fat dairy foods, beans, lean meats, and fish  Drink liquids as directed  Ask how much liquid to drink each day and which liquids are best for you  Limit caffeine to less than 200 milligrams each day  Limit your intake of fish to 2 servings each week  Choose fish low in mercury such as canned light tuna, shrimp, salmon, cod, or tilapia   Do not  eat fish high in mercury such as swordfish, tilefish, trista mackerel, and shark      · Take prenatal vitamins as directed  Your need for certain vitamins and minerals, such as folic acid, increases during pregnancy  Prenatal vitamins provide some of the extra vitamins and minerals you need  Prenatal vitamins may also help to decrease the risk of certain birth defects  · Talk to your healthcare provider about exercise  Moderate exercise can help you stay fit  Your healthcare provider will help you plan an exercise program that is safe for you during pregnancy  · Do not smoke  If you smoke, it is never too late to quit  Smoking increases your risk of a miscarriage and other health problems during your pregnancy  Smoking can cause your baby to be born too early or weigh less at birth  Ask your healthcare provider for information if you need help quitting  · Do not drink alcohol  Alcohol passes from your body to your baby through the placenta  It can affect your baby's brain development and cause fetal alcohol syndrome (FAS)  FAS is a group of conditions that causes mental, behavior, and growth problems  · Talk to your healthcare provider before you take any medicines  Many medicines may harm your baby if you take them when you are pregnant  Do not take any medicines, vitamins, herbs, or supplements without first talking to your healthcare provider  Never use illegal or street drugs (such as marijuana or cocaine) while you are pregnant  Safety tips during pregnancy:   · Avoid hot tubs and saunas  Do not use a hot tub or sauna while you are pregnant, especially during your first trimester  Hot tubs and saunas may raise your baby's temperature and increase the risk of birth defects  · Avoid toxoplasmosis  This is an infection caused by eating raw meat or being around infected cat feces  It can cause birth defects, miscarriages, and other problems  Wash your hands after you touch raw meat  Make sure any meat is well-cooked before you eat it   Avoid raw eggs and unpasteurized milk  Use gloves or ask someone else to clean your cat's litter box while you are pregnant  Changes that are happening with your baby:  By 22 weeks, your baby is about 8 inches long from the top of the head to the rump (baby's bottom)  Your baby also weighs about 1 pound  Your baby is becoming much more active  You may be able to feel the baby move inside you now  The first movements may not be that noticeable  They may feel like a fluttering sensation  As time goes on, your baby's movements will become stronger and more noticeable  What you need to know about prenatal care:  During the first 28 weeks of your pregnancy, you will see your healthcare provider once a month  Your healthcare provider will check your blood pressure and weight  You may also need the following:  · A urine test  may also be done to check for sugar and protein  These can be signs of gestational diabetes or infection  Protein in your urine may also be a sign of preeclampsia  Preeclampsia is a condition that can develop during week 20 or later of your pregnancy  It causes high blood pressure, and it can cause problems with your kidneys and other organs  · Fundal height  is a measurement of your uterus to check your baby's growth  This number is usually the same as the number of weeks that you have been pregnant  · A fetal ultrasound  shows pictures of your baby inside your uterus  It shows your baby's development  The movement and position of your baby can also be seen  Your healthcare provider may be able to tell you what your baby's gender is during the ultrasound  · Your baby's heart rate  will be checked  © 2017 [address] Information is for End User's use only and may not be sold, redistributed or otherwise used for commercial purposes  All illustrations and images included in CareNotes® are the copyrighted property of A D A M [employer] Inc  or [employer]    The above information is an  only  It is not intended as medical advice for individual conditions or treatments  Talk to your doctor, nurse or pharmacist before following any medical regimen to see if it is safe and effective for you

## 2019-07-17 NOTE — PROGRESS NOTES
OB/GYN  PN Visit  Ally Ang  [id number]  2019  11:40 AM  Dr Arina Butler MD    S: 26 y o  [id number] 20w2d here for PN visit  OB complaints:  Contractions: no  Leakage: no  Bleeding: no  Fetal movement: no      O:  Vitals:    19 1100   BP: 112/66   Pulse: 88       Gen: no acute distress, nonlabored breathing  Fundal Height (cm): 20 cm  Fetal Heart Rate: 140      A/P:    Problem List Items Addressed This Visit        Other    Anemia during pregnancy    H/O  section      Other Visit Diagnoses     20 weeks gestation of pregnancy    -  Primary          Patient is here for routine OB visit  Patient had a level 2 ultrasound at  Center today  Her final report is not yet available  She reports a low lying placenta and she was told to avoid any intercourse  She is currently not having any bleeding  She  Is having some discomfort in her vagina as well as lower pelvic area  She had a history of  section x4 and will be scheduled for repeat  at 39 weeks  She is also requesting permanent sterilization  MA form to be signed at 28 weeks  Patient with history of anemia  He CBC, ferritin was ordered  Early Glucola was ordered  Follow-up in 4 weeks      Growth scan scheduled for September        Arina Butler MD  2019  11:40 AM

## 2019-07-17 NOTE — PROGRESS NOTES
A transvaginal ultrasound was performed  Sonographer note on use of High Level Disinfection Process (Trophon) for transvaginal probe# 2 used, serial [id number]    Keshia Esquivel RDMS

## 2019-07-17 NOTE — PROGRESS NOTES
Please refer to the [facility] ultrasound report in Ob Procedures for additional information regarding the visit to the [facility]  today

## 2019-08-05 ENCOUNTER — TELEPHONE (OUTPATIENT)
Dept: OBGYN CLINIC | Facility: CLINIC | Age: 29
End: 2019-08-05

## 2019-08-05 NOTE — TELEPHONE ENCOUNTER
Patient is having pain, on lower back, right  She is having pelvic pressure  She does report pain when she is standing for prolonged period of time  Advised use of heat, tylenol, heating pad  She was advised to proceed to [facility] if symptoms worsens  Pt advised to come in tomorrow in office for follow-up/ She  Was offered an appt today but she declines

## 2019-08-06 ENCOUNTER — ROUTINE PRENATAL (OUTPATIENT)
Dept: OBGYN CLINIC | Facility: CLINIC | Age: 29
End: 2019-08-06
Payer: COMMERCIAL

## 2019-08-06 VITALS
SYSTOLIC BLOOD PRESSURE: 112 MMHG | HEART RATE: 94 BPM | BODY MASS INDEX: 35.32 KG/M2 | WEIGHT: 219.8 LBS | HEIGHT: 66 IN | DIASTOLIC BLOOD PRESSURE: 72 MMHG

## 2019-08-06 DIAGNOSIS — O44.02 PLACENTA PREVIA IN SECOND TRIMESTER: ICD-10-CM

## 2019-08-06 DIAGNOSIS — O26.892 PELVIC PAIN AFFECTING PREGNANCY IN SECOND TRIMESTER, ANTEPARTUM: ICD-10-CM

## 2019-08-06 DIAGNOSIS — Z87.59 HISTORY OF IUFD: ICD-10-CM

## 2019-08-06 DIAGNOSIS — Z3A.23 23 WEEKS GESTATION OF PREGNANCY: Primary | ICD-10-CM

## 2019-08-06 DIAGNOSIS — Z98.891 H/O CESAREAN SECTION: ICD-10-CM

## 2019-08-06 DIAGNOSIS — R10.2 PELVIC PAIN AFFECTING PREGNANCY IN SECOND TRIMESTER, ANTEPARTUM: ICD-10-CM

## 2019-08-06 DIAGNOSIS — O99.212 OBESITY AFFECTING PREGNANCY, ANTEPARTUM, SECOND TRIMESTER: ICD-10-CM

## 2019-08-06 DIAGNOSIS — R10.2 PELVIC PAIN: ICD-10-CM

## 2019-08-06 PROBLEM — R42 DIZZINESS: Status: RESOLVED | Noted: 2018-08-02 | Resolved: 2019-08-06

## 2019-08-06 PROCEDURE — 76817 TRANSVAGINAL US OBSTETRIC: CPT | Performed by: OBSTETRICS & GYNECOLOGY

## 2019-08-06 PROCEDURE — 99214 OFFICE O/P EST MOD 30 MIN: CPT | Performed by: OBSTETRICS & GYNECOLOGY

## 2019-08-06 PROCEDURE — 87086 URINE CULTURE/COLONY COUNT: CPT | Performed by: OBSTETRICS & GYNECOLOGY

## 2019-08-06 NOTE — PATIENT INSTRUCTIONS
Pregnancy at 23 to 26 [address]:   You are now close to or at the beginning of the third trimester  The third trimester starts at 24 weeks and ends with delivery  As your baby gets larger, you may develop certain symptoms  These may include pain in your back or down the sides of your abdomen  You may also have stretch marks on your abdomen, breasts, thighs, or buttocks  You may also have constipation  DISCHARGE INSTRUCTIONS:   Return to the emergency department if:   · You develop a severe headache that does not go away  · You have new or increased vision changes, such as blurred or spotted vision  · You have new or increased swelling in your face or hands  · You have vaginal spotting or bleeding  · Your water broke or you feel warm water gushing or trickling from your vagina  Contact your healthcare provider if:   · You have abdominal cramps, pressure, or tightening  · You have a change in vaginal discharge  · You have light bleeding  · You have chills or a fever  · You have vaginal itching, burning, or pain  · You have yellow, green, white, or foul-smelling vaginal discharge  · You have pain or burning when you urinate, less urine than usual, or pink or bloody urine  · You have questions or concerns about your condition or care  How to care for yourself at this stage of your pregnancy:   · Eat a variety of healthy foods  Healthy foods include fruits, vegetables, whole-grain breads, low-fat dairy foods, beans, lean meats, and fish  Drink liquids as directed  Ask how much liquid to drink each day and which liquids are best for you  Limit caffeine to less than 200 milligrams each day  Limit your intake of fish to 2 servings each week  Choose fish low in mercury such as canned light tuna, shrimp, salmon, cod, or tilapia  Do not  eat fish high in mercury such as swordfish, tilefish, trista mackerel, and shark  · Manage back pain    Do not stand for long periods of time or lift heavy items  Use good posture while you stand, squat, or bend  Wear low-heeled shoes with good support  Rest may also help to relieve back pain  Ask your healthcare provider about exercises you can do to strengthen your back muscles  · Take prenatal vitamins as directed  Your need for certain vitamins and minerals, such as folic acid, increases during pregnancy  Prenatal vitamins provide some of the extra vitamins and minerals you need  Prenatal vitamins may also help to decrease the risk of certain birth defects  · Talk to your healthcare provider about exercise  Moderate exercise can help you stay fit  Your healthcare provider will help you plan an exercise program that is safe for you during pregnancy  · Do not smoke  If you smoke, it is never too late to quit  Smoking increases your risk of a miscarriage and other health problems during your pregnancy  Smoking can cause your baby to be born too early or weigh less at birth  Ask your healthcare provider for information if you need help quitting  · Do not drink alcohol  Alcohol passes from your body to your baby through the placenta  It can affect your baby's brain development and cause fetal alcohol syndrome (FAS)  FAS is a group of conditions that causes mental, behavior, and growth problems  · Talk to your healthcare provider before you take any medicines  Many medicines may harm your baby if you take them when you are pregnant  Do not take any medicines, vitamins, herbs, or supplements without first talking to your healthcare provider  Never use illegal or street drugs (such as marijuana or cocaine) while you are pregnant  Safety tips:   · Avoid hot tubs and saunas  Do not use a hot tub or sauna while you are pregnant, especially during your first trimester  Hot tubs and saunas may raise your baby's temperature and increase the risk of birth defects  · Avoid toxoplasmosis    This is an infection caused by eating raw meat or being around infected cat feces  It can cause birth defects, miscarriages, and other problems  Wash your hands after you touch raw meat  Make sure any meat is well-cooked before you eat it  Avoid raw eggs and unpasteurized milk  Use gloves or ask someone else to clean your cat's litter box while you are pregnant  Changes that are happening with your baby:  By 26 weeks, your baby will weigh about 2 pounds  Your baby will be about 10 inches long from the top of the head to the rump (baby's bottom)  Your baby's movements are much stronger now  Your baby's eyes are almost completely formed and can partially open  Your baby also sleeps and wakes up  What you need to know about prenatal care: Your healthcare provider will check your blood pressure and weight  You may also need the following:  · A urine test  may also be done to check for sugar and protein  These can be signs of gestational diabetes or infection  Protein in your urine may also be a sign of preeclampsia  Preeclampsia is a condition that can develop during week 20 or later of your pregnancy  It causes high blood pressure, and it can cause problems with your kidneys and other organs  · Fundal height  is a measurement of your uterus to check your baby's growth  This number is usually the same as the number of weeks that you have been pregnant  · Your baby's heart rate  will be checked  © 2017 [address] Information is for End User's use only and may not be sold, redistributed or otherwise used for commercial purposes  All illustrations and images included in CareNotes® are the copyrighted property of A D [employer] M [employer]  or [employer]  The above information is an  only  It is not intended as medical advice for individual conditions or treatments  Talk to your doctor, nurse or pharmacist before following any medical regimen to see if it is safe and effective for you

## 2019-08-06 NOTE — PROGRESS NOTES
OB/GYN  PN Visit  Blade Larios  [id number]  2019  11:24 AM  Dr Sarika Martinez MD    S: 26 y o  [id number] 23w1d here for PN visit  OB complaints:  Contractions: no   Leakage: no  Bleeding: no  Fetal movement: yes      O:  Vitals:    19 1000   BP: 112/72   Pulse: 94       Gen: no acute distress, nonlabored breathing  Fundal Height (cm): 23 cm  Fetal Heart Rate: 154      A/P:    Problem List Items Addressed This Visit        Other    H/O  section      Repeat  at term         Placenta previa in second trimester      Heat, Tylenol, maternity belt         23 weeks gestation of pregnancy - Primary    Relevant Orders    Urine culture    CBC and differential    Glucose, 1H PG    RPR    Pelvic pain    Relevant Orders    Urine culture    History of IUFD      Antepartum fetal surveillance at 32 weeks         Obesity affecting pregnancy, antepartum, second trimester     Early Glucola         Relevant Orders    Glucose, 1H PG             patient is here due to lower back pain as well as pelvic pressure  She states that the pelvic pressure as constant  The back pain is on the lower back on the right and is intermittent  She denies any vaginal bleeding  There is no CVA tenderness  Cervical length was performed and was noted to be 3 63 cm  There was no funneling  There was note of a placenta previa  Patient was advised pelvic rest, she was advised Tylenol, use of heating pad or warm showers or baths as well as maternity belt  Discussed with patient possible round ligament pain  Patient with history of IUFD, we will start antepartum fetal surveillance at 32 weeks  Early Glucola was ordered due to elevated BMI of 35  Urine culture was also ordered due to pelvic pain  Patient with history of 4 prior C-sections and will be scheduled for repeat  at term  Patient also is interested in permanent sterilization and will consent her for bilateral salpingectomy    Follow-up in 2 weeks for routine OB visit            Mariam uGido MD  8/6/2019  11:24 AM

## 2019-08-07 LAB — BACTERIA UR CULT: NORMAL

## 2019-08-09 ENCOUNTER — APPOINTMENT (OUTPATIENT)
Dept: LAB | Facility: HOSPITAL | Age: 29
End: 2019-08-09
Payer: COMMERCIAL

## 2019-08-09 ENCOUNTER — TELEPHONE (OUTPATIENT)
Dept: OBGYN CLINIC | Facility: CLINIC | Age: 29
End: 2019-08-09

## 2019-08-09 DIAGNOSIS — Z3A.20 20 WEEKS GESTATION OF PREGNANCY: ICD-10-CM

## 2019-08-09 DIAGNOSIS — O99.019 ANEMIA DURING PREGNANCY: Primary | ICD-10-CM

## 2019-08-09 DIAGNOSIS — O99.019 ANEMIA DURING PREGNANCY: ICD-10-CM

## 2019-08-09 DIAGNOSIS — Z3A.23 23 WEEKS GESTATION OF PREGNANCY: ICD-10-CM

## 2019-08-09 DIAGNOSIS — O99.810 ABNORMAL GLUCOSE TOLERANCE IN PREGNANCY: ICD-10-CM

## 2019-08-09 DIAGNOSIS — O99.212 OBESITY AFFECTING PREGNANCY, ANTEPARTUM, SECOND TRIMESTER: ICD-10-CM

## 2019-08-09 LAB
ANISOCYTOSIS BLD QL SMEAR: PRESENT
BASOPHILS # BLD AUTO: 0 THOUSANDS/ΜL (ref 0–0.1)
BASOPHILS NFR BLD AUTO: 0 % (ref 0–1)
EOSINOPHIL # BLD AUTO: 0.2 THOUSAND/ΜL (ref 0–0.4)
EOSINOPHIL NFR BLD AUTO: 1 % (ref 0–6)
ERYTHROCYTE [DISTWIDTH] IN BLOOD BY AUTOMATED COUNT: 19.2 %
FERRITIN SERPL-MCNC: 8 NG/ML (ref 8–388)
GLUCOSE 1H P 50 G GLC PO SERPL-MCNC: 143 MG/DL
HCT VFR BLD AUTO: 33.6 % (ref 36–46)
HGB BLD-MCNC: 10.9 G/DL (ref 12–16)
LYMPHOCYTES # BLD AUTO: 2.3 THOUSANDS/ΜL (ref 0.5–4)
LYMPHOCYTES NFR BLD AUTO: 20 % (ref 25–45)
MCH RBC QN AUTO: 25.8 PG (ref 26–34)
MCHC RBC AUTO-ENTMCNC: 32.5 G/DL (ref 31–36)
MCV RBC AUTO: 79 FL (ref 80–100)
MONOCYTES # BLD AUTO: 0.7 THOUSAND/ΜL (ref 0.2–0.9)
MONOCYTES NFR BLD AUTO: 6 % (ref 1–10)
NEUTROPHILS # BLD AUTO: 8.3 THOUSANDS/ΜL (ref 1.8–7.8)
NEUTS SEG NFR BLD AUTO: 72 % (ref 45–65)
OVALOCYTES BLD QL SMEAR: PRESENT
PLATELET # BLD AUTO: 282 THOUSANDS/UL (ref 150–450)
PLATELET BLD QL SMEAR: ADEQUATE
PMV BLD AUTO: 8.7 FL (ref 8.9–12.7)
RBC # BLD AUTO: 4.23 MILLION/UL (ref 4–5.2)
RBC MORPH BLD: NORMAL
WBC # BLD AUTO: 11.6 THOUSAND/UL (ref 4.5–11)

## 2019-08-09 PROCEDURE — 36415 COLL VENOUS BLD VENIPUNCTURE: CPT

## 2019-08-09 PROCEDURE — 85025 COMPLETE CBC W/AUTO DIFF WBC: CPT

## 2019-08-09 PROCEDURE — 82950 GLUCOSE TEST: CPT

## 2019-08-09 PROCEDURE — 86592 SYPHILIS TEST NON-TREP QUAL: CPT

## 2019-08-09 PROCEDURE — 82728 ASSAY OF FERRITIN: CPT

## 2019-08-09 NOTE — TELEPHONE ENCOUNTER
Notified pt, abnormal glucola 143, 3 hour GTT ordered  Hgb 10 9 from 10 5, continue iron twice daily

## 2019-08-11 LAB — RPR SER QL: NORMAL

## 2019-08-14 ENCOUNTER — HOSPITAL ENCOUNTER (OUTPATIENT)
Facility: HOSPITAL | Age: 29
Setting detail: OBSERVATION
End: 2019-08-14
Attending: OBSTETRICS & GYNECOLOGY | Admitting: OBSTETRICS & GYNECOLOGY
Payer: COMMERCIAL

## 2019-08-14 ENCOUNTER — HOSPITAL ENCOUNTER (OUTPATIENT)
Facility: HOSPITAL | Age: 29
Setting detail: OBSERVATION
Discharge: HOME | End: 2019-08-15
Attending: OBSTETRICS & GYNECOLOGY | Admitting: OBSTETRICS & GYNECOLOGY
Payer: COMMERCIAL

## 2019-08-14 VITALS
OXYGEN SATURATION: 95 % | HEART RATE: 123 BPM | TEMPERATURE: 98.5 F | SYSTOLIC BLOOD PRESSURE: 113 MMHG | BODY MASS INDEX: 35.36 KG/M2 | WEIGHT: 220 LBS | RESPIRATION RATE: 22 BRPM | HEIGHT: 66 IN | DIASTOLIC BLOOD PRESSURE: 55 MMHG

## 2019-08-14 DIAGNOSIS — O44.02 PLACENTA PREVIA IN SECOND TRIMESTER: Primary | ICD-10-CM

## 2019-08-14 PROBLEM — Z3A.24 24 WEEKS GESTATION OF PREGNANCY: Status: ACTIVE | Noted: 2019-08-06

## 2019-08-14 LAB
ALBUMIN SERPL BCP-MCNC: 2.7 G/DL (ref 3.5–5)
ALP SERPL-CCNC: 76 U/L (ref 46–116)
ALT SERPL W P-5'-P-CCNC: 14 U/L (ref 12–78)
ANION GAP SERPL CALCULATED.3IONS-SCNC: 11 MMOL/L (ref 4–13)
AST SERPL W P-5'-P-CCNC: 16 U/L (ref 5–45)
BASOPHILS # BLD AUTO: 0.01 THOUSANDS/ΜL (ref 0–0.1)
BASOPHILS NFR BLD AUTO: 0 % (ref 0–1)
BILIRUB SERPL-MCNC: 0.71 MG/DL (ref 0.2–1)
BILIRUB UR QL STRIP: NEGATIVE
BUN SERPL-MCNC: 3 MG/DL (ref 5–25)
CALCIUM SERPL-MCNC: 8.4 MG/DL (ref 8.3–10.1)
CHLORIDE SERPL-SCNC: 107 MMOL/L (ref 100–108)
CLARITY UR: ABNORMAL
CO2 SERPL-SCNC: 23 MMOL/L (ref 21–32)
COLOR UR: YELLOW
CREAT SERPL-MCNC: 0.41 MG/DL (ref 0.6–1.3)
EOSINOPHIL # BLD AUTO: 0.03 THOUSAND/ΜL (ref 0–0.61)
EOSINOPHIL NFR BLD AUTO: 0 % (ref 0–6)
ERYTHROCYTE [DISTWIDTH] IN BLOOD BY AUTOMATED COUNT: 16.9 % (ref 11.6–15.1)
GFR SERPL CREATININE-BSD FRML MDRD: 139 ML/MIN/1.73SQ M
GLUCOSE SERPL-MCNC: 88 MG/DL (ref 65–140)
GLUCOSE UR STRIP-MCNC: NEGATIVE MG/DL
HCT VFR BLD AUTO: 34.8 % (ref 34.8–46.1)
HGB BLD-MCNC: 10.9 G/DL (ref 11.5–15.4)
HGB UR QL STRIP.AUTO: NEGATIVE
IMM GRANULOCYTES # BLD AUTO: 0.07 THOUSAND/UL (ref 0–0.2)
IMM GRANULOCYTES NFR BLD AUTO: 1 % (ref 0–2)
KETONES UR STRIP-MCNC: ABNORMAL MG/DL
LACTATE SERPL-SCNC: 1.3 MMOL/L (ref 0.5–2)
LEUKOCYTE ESTERASE UR QL STRIP: NEGATIVE
LYMPHOCYTES # BLD AUTO: 1.43 THOUSANDS/ΜL (ref 0.6–4.47)
LYMPHOCYTES NFR BLD AUTO: 11 % (ref 14–44)
MCH RBC QN AUTO: 25.8 PG (ref 26.8–34.3)
MCHC RBC AUTO-ENTMCNC: 31.3 G/DL (ref 31.4–37.4)
MCV RBC AUTO: 82 FL (ref 82–98)
MONOCYTES # BLD AUTO: 0.84 THOUSAND/ΜL (ref 0.17–1.22)
MONOCYTES NFR BLD AUTO: 7 % (ref 4–12)
NEUTROPHILS # BLD AUTO: 10.34 THOUSANDS/ΜL (ref 1.85–7.62)
NEUTS SEG NFR BLD AUTO: 81 % (ref 43–75)
NITRITE UR QL STRIP: NEGATIVE
NRBC BLD AUTO-RTO: 0 /100 WBCS
PH UR STRIP.AUTO: 6.5 [PH]
PLATELET # BLD AUTO: 300 THOUSANDS/UL (ref 149–390)
PMV BLD AUTO: 10.7 FL (ref 8.9–12.7)
POTASSIUM SERPL-SCNC: 3.4 MMOL/L (ref 3.5–5.3)
PROT SERPL-MCNC: 6.5 G/DL (ref 6.4–8.2)
PROT UR STRIP-MCNC: NEGATIVE MG/DL
RBC # BLD AUTO: 4.23 MILLION/UL (ref 3.81–5.12)
SODIUM SERPL-SCNC: 141 MMOL/L (ref 136–145)
SP GR UR STRIP.AUTO: 1.02 (ref 1–1.03)
UROBILINOGEN UR QL STRIP.AUTO: 0.2 E.U./DL
WBC # BLD AUTO: 12.72 THOUSAND/UL (ref 4.31–10.16)

## 2019-08-14 PROCEDURE — 99212 OFFICE O/P EST SF 10 MIN: CPT | Performed by: OBSTETRICS & GYNECOLOGY

## 2019-08-14 PROCEDURE — 86900 BLOOD TYPING SEROLOGIC ABO: CPT | Performed by: STUDENT IN AN ORGANIZED HEALTH CARE EDUCATION/TRAINING PROGRAM

## 2019-08-14 PROCEDURE — 99213 OFFICE O/P EST LOW 20 MIN: CPT

## 2019-08-14 PROCEDURE — 87086 URINE CULTURE/COLONY COUNT: CPT | Performed by: STUDENT IN AN ORGANIZED HEALTH CARE EDUCATION/TRAINING PROGRAM

## 2019-08-14 PROCEDURE — 87040 BLOOD CULTURE FOR BACTERIA: CPT | Performed by: STUDENT IN AN ORGANIZED HEALTH CARE EDUCATION/TRAINING PROGRAM

## 2019-08-14 PROCEDURE — 86592 SYPHILIS TEST NON-TREP QUAL: CPT | Performed by: STUDENT IN AN ORGANIZED HEALTH CARE EDUCATION/TRAINING PROGRAM

## 2019-08-14 PROCEDURE — 85025 COMPLETE CBC W/AUTO DIFF WBC: CPT | Performed by: STUDENT IN AN ORGANIZED HEALTH CARE EDUCATION/TRAINING PROGRAM

## 2019-08-14 PROCEDURE — 83605 ASSAY OF LACTIC ACID: CPT | Performed by: STUDENT IN AN ORGANIZED HEALTH CARE EDUCATION/TRAINING PROGRAM

## 2019-08-14 PROCEDURE — 80053 COMPREHEN METABOLIC PANEL: CPT | Performed by: STUDENT IN AN ORGANIZED HEALTH CARE EDUCATION/TRAINING PROGRAM

## 2019-08-14 PROCEDURE — 86850 RBC ANTIBODY SCREEN: CPT | Performed by: STUDENT IN AN ORGANIZED HEALTH CARE EDUCATION/TRAINING PROGRAM

## 2019-08-14 PROCEDURE — 86901 BLOOD TYPING SEROLOGIC RH(D): CPT | Performed by: STUDENT IN AN ORGANIZED HEALTH CARE EDUCATION/TRAINING PROGRAM

## 2019-08-14 PROCEDURE — 81003 URINALYSIS AUTO W/O SCOPE: CPT | Performed by: STUDENT IN AN ORGANIZED HEALTH CARE EDUCATION/TRAINING PROGRAM

## 2019-08-14 RX ORDER — CYCLOBENZAPRINE HCL 10 MG
10 TABLET ORAL 3 TIMES DAILY PRN
Status: DISCONTINUED | OUTPATIENT
Start: 2019-08-14 | End: 2019-08-15 | Stop reason: HOSPADM

## 2019-08-14 RX ORDER — SODIUM CHLORIDE, SODIUM LACTATE, POTASSIUM CHLORIDE, CALCIUM CHLORIDE 600; 310; 30; 20 MG/100ML; MG/100ML; MG/100ML; MG/100ML
125 INJECTION, SOLUTION INTRAVENOUS CONTINUOUS
Status: DISCONTINUED | OUTPATIENT
Start: 2019-08-14 | End: 2019-08-15

## 2019-08-14 RX ORDER — ACETAMINOPHEN 325 MG/1
650 TABLET ORAL EVERY 4 HOURS PRN
Status: CANCELLED | OUTPATIENT
Start: 2019-08-14

## 2019-08-14 RX ORDER — ONDANSETRON 2 MG/ML
4 INJECTION INTRAMUSCULAR; INTRAVENOUS EVERY 8 HOURS PRN
Status: DISCONTINUED | OUTPATIENT
Start: 2019-08-14 | End: 2019-08-15 | Stop reason: HOSPADM

## 2019-08-14 RX ORDER — CALCIUM CARBONATE 200(500)MG
1000 TABLET,CHEWABLE ORAL DAILY PRN
Status: DISCONTINUED | OUTPATIENT
Start: 2019-08-14 | End: 2019-08-14 | Stop reason: HOSPADM

## 2019-08-14 RX ORDER — DOCUSATE SODIUM 100 MG/1
100 CAPSULE, LIQUID FILLED ORAL 2 TIMES DAILY
Status: DISCONTINUED | OUTPATIENT
Start: 2019-08-14 | End: 2019-08-14 | Stop reason: HOSPADM

## 2019-08-14 RX ORDER — MAGNESIUM SULFATE HEPTAHYDRATE 40 MG/ML
4 INJECTION, SOLUTION INTRAVENOUS ONCE
Status: COMPLETED | OUTPATIENT
Start: 2019-08-14 | End: 2019-08-14

## 2019-08-14 RX ORDER — SODIUM CHLORIDE, SODIUM LACTATE, POTASSIUM CHLORIDE, CALCIUM CHLORIDE 600; 310; 30; 20 MG/100ML; MG/100ML; MG/100ML; MG/100ML
125 INJECTION, SOLUTION INTRAVENOUS CONTINUOUS
Status: DISCONTINUED | OUTPATIENT
Start: 2019-08-14 | End: 2019-08-14 | Stop reason: HOSPADM

## 2019-08-14 RX ORDER — MAGNESIUM SULFATE HEPTAHYDRATE 40 MG/ML
2 INJECTION, SOLUTION INTRAVENOUS CONTINUOUS
Status: DISCONTINUED | OUTPATIENT
Start: 2019-08-14 | End: 2019-08-15

## 2019-08-14 RX ORDER — BETAMETHASONE SODIUM PHOSPHATE AND BETAMETHASONE ACETATE 3; 3 MG/ML; MG/ML
12 INJECTION, SUSPENSION INTRA-ARTICULAR; INTRALESIONAL; INTRAMUSCULAR; SOFT TISSUE EVERY 24 HOURS
Status: DISCONTINUED | OUTPATIENT
Start: 2019-08-14 | End: 2019-08-15 | Stop reason: HOSPADM

## 2019-08-14 RX ORDER — ACETAMINOPHEN 325 MG/1
650 TABLET ORAL EVERY 4 HOURS PRN
Status: DISCONTINUED | OUTPATIENT
Start: 2019-08-14 | End: 2019-08-15 | Stop reason: HOSPADM

## 2019-08-14 RX ORDER — HYDROMORPHONE HCL/PF 1 MG/ML
0.5 SYRINGE (ML) INJECTION ONCE
Status: DISCONTINUED | OUTPATIENT
Start: 2019-08-14 | End: 2019-08-15 | Stop reason: HOSPADM

## 2019-08-14 RX ORDER — SODIUM CHLORIDE 9 MG/ML
125 INJECTION, SOLUTION INTRAVENOUS CONTINUOUS
Status: DISCONTINUED | OUTPATIENT
Start: 2019-08-14 | End: 2019-08-14

## 2019-08-14 RX ORDER — MAGNESIUM SULFATE HEPTAHYDRATE 40 MG/ML
2 INJECTION, SOLUTION INTRAVENOUS ONCE
Status: COMPLETED | OUTPATIENT
Start: 2019-08-14 | End: 2019-08-14

## 2019-08-14 RX ORDER — CALCIUM CARBONATE 200(500)MG
1000 TABLET,CHEWABLE ORAL DAILY PRN
Status: CANCELLED | OUTPATIENT
Start: 2019-08-14

## 2019-08-14 RX ORDER — ACETAMINOPHEN 325 MG/1
650 TABLET ORAL EVERY 4 HOURS PRN
Status: DISCONTINUED | OUTPATIENT
Start: 2019-08-14 | End: 2019-08-14 | Stop reason: HOSPADM

## 2019-08-14 RX ORDER — DOCUSATE SODIUM 100 MG/1
100 CAPSULE, LIQUID FILLED ORAL 2 TIMES DAILY
Status: CANCELLED | OUTPATIENT
Start: 2019-08-14

## 2019-08-14 RX ORDER — SODIUM CHLORIDE, SODIUM LACTATE, POTASSIUM CHLORIDE, CALCIUM CHLORIDE 600; 310; 30; 20 MG/100ML; MG/100ML; MG/100ML; MG/100ML
125 INJECTION, SOLUTION INTRAVENOUS CONTINUOUS
Status: CANCELLED | OUTPATIENT
Start: 2019-08-14

## 2019-08-14 RX ADMIN — MAGNESIUM SULFATE HEPTAHYDRATE 2 G: 40 INJECTION, SOLUTION INTRAVENOUS at 22:08

## 2019-08-14 RX ADMIN — MAGNESIUM SULFATE HEPTAHYDRATE 4 G: 40 INJECTION, SOLUTION INTRAVENOUS at 21:52

## 2019-08-14 RX ADMIN — BETAMETHASONE SODIUM PHOSPHATE AND BETAMETHASONE ACETATE 12 MG: 3; 3 INJECTION, SUSPENSION INTRA-ARTICULAR; INTRALESIONAL; INTRAMUSCULAR at 21:50

## 2019-08-14 RX ADMIN — SODIUM CHLORIDE, SODIUM LACTATE, POTASSIUM CHLORIDE, AND CALCIUM CHLORIDE 1000 ML: .6; .31; .03; .02 INJECTION, SOLUTION INTRAVENOUS at 21:20

## 2019-08-14 RX ADMIN — SODIUM CHLORIDE, SODIUM LACTATE, POTASSIUM CHLORIDE, AND CALCIUM CHLORIDE 125 ML/HR: .6; .31; .03; .02 INJECTION, SOLUTION INTRAVENOUS at 22:28

## 2019-08-14 RX ADMIN — SODIUM CHLORIDE, SODIUM LACTATE, POTASSIUM CHLORIDE, AND CALCIUM CHLORIDE 1000 ML: .6; .31; .03; .02 INJECTION, SOLUTION INTRAVENOUS at 19:45

## 2019-08-14 RX ADMIN — MAGNESIUM SULFATE HEPTAHYDRATE 2 G/HR: 40 INJECTION, SOLUTION INTRAVENOUS at 22:21

## 2019-08-14 NOTE — EMTALA/ACUTE CARE TRANSFER
[address] [place] [address] LABOR AND DELIVERY  [address] [address]  Dept: [phone]      EMTALA TRANSFER CONSENT    NAME Negar Méndez                                         1990                              MRN [id number]    I have been informed of my rights regarding examination, treatment, and transfer   by Dr Avi Hall MD  Benefits:      Risks:        Transfer Request   I acknowledge that my medical condition has been evaluated and explained to me by the emergency department physician or other qualified medical person and/or my attending physician who has recommended and offered to me further medical examination and treatment  I understand the Hospital's obligation with respect to the treatment and stabilization of my emergency medical condition  I nevertheless request to be transferred  I release the Hospital, the doctor, and any other persons caring for me from all responsibility or liability for any injury or ill effects that may result from my transfer and agree to accept all responsibility for the consequences of my choice to transfer, rather than receive stabilizing treatment at the Hospital  I understand that because the transfer is my request, my insurance may not provide reimbursement for the services  The Hospital will assist and direct me and my family in how to make arrangements for transfer, but the hospital is not liable for any fees charged by the transport service  In spite of this understanding, I refuse to consent to further medical examination and treatment which has been offered to me, and request transfer to    I authorize the performance of emergency medical procedures and treatments upon me in both transit and upon arrival at the receiving facility  Additionally, I authorize the release of any and all medical records to the receiving facility and request they be transported with me, if possible      I authorize the performance of emergency medical procedures and treatments upon me in both transit and upon arrival at the receiving facility  Additionally, I authorize the release of any and all medical records to the receiving facility and request they be transported with me, if possible  I understand that the safest mode of transportation during a medical emergency is an ambulance and that the Hospital advocates the use of this mode of transport  Risks of traveling to the receiving facility by car, including absence of medical control, life sustaining equipment, such as oxygen, and medical personnel has been explained to me and I fully understand them  (VIKAS CORRECT BOX BELOW)  [  ]  I consent to the stated transfer and to be transported by ambulance/helicopter  [  ]  I consent to the stated transfer, but refuse transportation by ambulance and accept full responsibility for my transportation by car  I understand the risks of non-ambulance transfers and I exonerate the Hospital and its staff from any deterioration in my condition that results from this refusal     X___________________________________________    DATE  19  TIME________  Signature of patient or legally responsible individual signing on patient behalf           RELATIONSHIP TO PATIENT_________________________          Provider Certification    NAME Sintia Calderon                                        ANNAMARIE 1990                              MRN [id number]    A medical screening exam was performed on the above named patient    Based on the examination:    Condition Necessitating Transfer fetal tachycardia    Patient Condition:   stable    Reason for Transfer:   higher level of  care    Transfer Requirements: Facility     · Space available and qualified personnel available for treatment as acknowledged by    · Agreed to accept transfer and to provide appropriate medical treatment as acknowledged by          · Appropriate medical records of the examination and treatment of the patient are provided at the time of transfer   [facility], [address] _______  · Transfer will be performed by qualified personnel from    and appropriate transfer equipment as required, including the use of necessary and appropriate life support measures  Provider Certification: I have examined the patient and explained the following risks and benefits of being transferred/refusing transfer to the patient/family:         Based on these reasonable risks and benefits to the patient and/or the unborn child(audrey), and based upon the information available at the time of the patients examination, I certify that the medical benefits reasonably to be expected from the provision of appropriate medical treatments at another medical facility outweigh the increasing risks, if any, to the individuals medical condition, and in the case of labor to the unborn child, from effecting the transfer      X____________________________________________ DATE 08/14/19        TIME_______      ORIGINAL - SEND TO MEDICAL RECORDS   COPY - SEND WITH PATIENT DURING TRANSFER

## 2019-08-14 NOTE — PROGRESS NOTES
Triage Note - OB  Servando Gross 26 y o  female MRN: [id number]  Unit/Bed#: L&D 329-01 Encounter: [id number]    Chief Complaint: back pain  MINE: Estimated Date of Delivery: 12/2/19    HPI: Patient is a [id number] at 24w2d that came here today due to symptoms of back pain  Symptoms have been present since about a week, was evaluated by her gynecologist but she describes that pain has been more constant throughout the week  Described as low back pain that radiates towards her front, 8/10 in intensity, 'achy' in quality associated with pressure and tightness around her abdominal area  Patient also indicates to have been feeling warm this morning when she woke up, No quantified fever at home  She Denies headaches, nausea, vomiting, chest pain, shortness of breath, diarrhea, vaginal discharge or bleeding, dysuria, contractions or any other additional symptoms  Vitals:   /55 (BP Location: Right arm)   Pulse (!) 123   Temp 98 5 °F (36 9 °C) (Oral)   Resp 22   Ht 5' 6" (1 676 m)   Wt 99 8 kg (220 lb)   LMP 03/23/2019 (Within Days)   SpO2 95%   BMI 35 51 kg/m²   Body mass index is 35 51 kg/m²  Review of Systems   Constitutional:        Warmth   Respiratory: Negative for chest tightness and shortness of breath  Cardiovascular: Negative for chest pain  Gastrointestinal: Negative for diarrhea, nausea and vomiting  Abdominal pressure and tightness   Genitourinary: Negative for dysuria, flank pain, vaginal bleeding and vaginal discharge  Musculoskeletal: Positive for back pain  Skin: Negative for color change, pallor and rash  Neurological: Negative for dizziness and headaches  Physical Exam   Constitutional: She appears well-developed and well-nourished  She appears distressed  Eyes: EOM are normal    Cardiovascular: Normal heart sounds  Pulmonary/Chest: Effort normal and breath sounds normal  No respiratory distress  Abdominal: Bowel sounds are normal  There is no tenderness  There is no guarding  pregnant   Genitourinary: No erythema or bleeding in the vagina  No foreign body in the vagina  Vaginal discharge (Small amount of white thin discharge, physiologic) found  Genitourinary Comments: KOH: negative  Wet mount: negative  Dry: no ferning   Musculoskeletal:   No CVA tenderness   Neurological: She is alert  Skin: Skin is warm  No rash noted  She is not diaphoretic  No erythema  Psychiatric: She has a normal mood and affect  SVE:  Deferred secondary to placenta previa    FHT: 170/moderate variability/10x10 accelerations     TOCO:    None    TVUS: Visually long, 3 6cm on 8/6/19  Notable vascularity of placental portion boarding posterior bladder wall  No known placenta accreta previously diagnosed      Labs:   Recent Results (from the past 24 hour(s))   CBC and differential    Collection Time: 08/14/19  7:39 PM   Result Value Ref Range    WBC 12 72 (H) 4 31 - 10 16 Thousand/uL    RBC 4 23 3 81 - 5 12 Million/uL    Hemoglobin 10 9 (L) 11 5 - 15 4 g/dL    Hematocrit 34 8 34 8 - 46 1 %    MCV 82 82 - 98 fL    MCH 25 8 (L) 26 8 - 34 3 pg    MCHC 31 3 (L) 31 4 - 37 4 g/dL    RDW 16 9 (H) 11 6 - 15 1 %    MPV 10 7 8 9 - 12 7 fL    Platelets 046 145 - 343 Thousands/uL    nRBC 0 /100 WBCs    Neutrophils Relative 81 (H) 43 - 75 %    Immat GRANS % 1 0 - 2 %    Lymphocytes Relative 11 (L) 14 - 44 %    Monocytes Relative 7 4 - 12 %    Eosinophils Relative 0 0 - 6 %    Basophils Relative 0 0 - 1 %    Neutrophils Absolute 10 34 (H) 1 85 - 7 62 Thousands/µL    Immature Grans Absolute 0 07 0 00 - 0 20 Thousand/uL    Lymphocytes Absolute 1 43 0 60 - 4 47 Thousands/µL    Monocytes Absolute 0 84 0 17 - 1 22 Thousand/µL    Eosinophils Absolute 0 03 0 00 - 0 61 Thousand/µL    Basophils Absolute 0 01 0 00 - 0 10 Thousands/µL   UA w Reflex to Microscopic w Reflex to Culture    Collection Time: 08/14/19  7:39 PM   Result Value Ref Range    Color, UA Yellow     Clarity, UA Slightly Cloudy Specific Agency, UA 1 020 1 003 - 1 030    pH, UA 6 5 4 5, 5 0, 5 5, 6 0, 6 5, 7 0, 7 5, 8 0    Leukocytes, UA Negative Negative    Nitrite, UA Negative Negative    Protein, UA Negative Negative mg/dl    Glucose, UA Negative Negative mg/dl    Ketones, UA >=80 (3+) (A) Negative mg/dl    Urobilinogen, UA 0 2 0 2, 1 0 E U /dl E U /dl    Bilirubin, UA Negative Negative    Blood, UA Negative Negative    URINE COMMENT         Imaging:     Lab, Imaging and other studies:    A/P:   26year old [id number] at 24w2d h/o CS delivery x4 and complete placenta previa with back pain and abdominal pressure with incidental finding of fetal tachycardia   labor workup negative      Blood cultures x2 pending  UA/UC pending  IV fluid bolus  CBC with diff  Transfer to [place] for higher level of care    Abnormal 1 hour GTT: needs 3 hour GTT  Bipolar disorder: no medical management  H/o IFUD at term:  surveillance starting at 32 weeks per [facility]      Amy Jacques MD  2019  8:30 PM     Vinita Davila MD  OBGYN PGY-2  2019 8:49 PM

## 2019-08-14 NOTE — ED NOTES
Per Kennedy Blackman, pt to be seen in [facility] without ED provider evaluation        Ashish Mendez RN  08/14/19 1275

## 2019-08-14 NOTE — PROGRESS NOTES
SOD attending    Pt presents to labor and delivery with a few day history of back pain and ab tightness  She states that was checked and sonogram on Monday not in  labor  Took tylenol and still feel terrible     She reports that in the am she had fever and chills no fever now x 2    Fetal heart tracing is tachycardia into the 160-170 with as high as 190  Maternal pulse is 123    Sonogram for Perterm labor workup - cervix is long there is a large blood vessel that appear to be in very close proximity to the posterior bladder wall  IV hydration - heart rate still in the 160-170  Maternal pulse remains in the 120    Blood and urine cultures sent     No uterine tenderness on exam   No cva tenderness on exam     Unsure of the cause of fetal and maternal tachycardia    Do to the gestational age of 23 weeks the previa with 4 c section and suspected percreta by my bedside scan I believe best interest for her to go to [place] for level of care needed  There pt will be get an MFM consult and re scaned tomorrow to confirm or rule out my finding on bedside sonogram   Spoke with Attending Dr Gerhard Lopez she excepts the patient   Spoke with her private doctor who I cam covering and she also agrees for transfer to the SOD service there  Pt was explained the reason for transfer and she is agreement with that plan of care

## 2019-08-14 NOTE — DISCHARGE INSTRUCTIONS
Pregnancy at 23 to 26 [address] [place] [address] Avenue:   You are now close to or at the beginning of the third trimester  The third trimester starts at 24 weeks and ends with delivery  As your baby gets larger, you may develop certain symptoms  These may include pain in your back or down the sides of your abdomen  You may also have stretch marks on your abdomen, breasts, thighs, or buttocks  You may also have constipation  DISCHARGE INSTRUCTIONS:   Seek care immediately if:   · You develop a severe headache that does not go away  · You have new or increased vision changes, such as blurred or spotted vision  · You have new or increased swelling in your face or hands  · You have vaginal spotting or bleeding  · Your water broke or you feel warm water gushing or trickling from your vagina  Contact your healthcare provider if:   · You have abdominal cramps, pressure, or tightening  · You have a change in vaginal discharge  · You have light bleeding  · You have chills or a fever  · You have vaginal itching, burning, or pain  · You have yellow, green, white, or foul-smelling vaginal discharge  · You have pain or burning when you urinate, less urine than usual, or pink or bloody urine  · You have questions or concerns about your condition or care  How to care for yourself at this stage of your pregnancy:   · Eat a variety of healthy foods  Healthy foods include fruits, vegetables, whole-grain breads, low-fat dairy foods, beans, lean meats, and fish  Drink liquids as directed  Ask how much liquid to drink each day and which liquids are best for you  Limit caffeine to less than 200 milligrams each day  Limit your intake of fish to 2 servings each week  Choose fish low in mercury such as canned light tuna, shrimp, salmon, cod, or tilapia  Do not  eat fish high in mercury such as swordfish, tilefish, trista mackerel, and shark  · Manage back pain    Do not stand for long periods of time or lift heavy items  Use good posture while you stand, squat, or bend  Wear low-heeled shoes with good support  Rest may also help to relieve back pain  Ask your healthcare provider about exercises you can do to strengthen your back muscles  · Take prenatal vitamins as directed  Your need for certain vitamins and minerals, such as folic acid, increases during pregnancy  Prenatal vitamins provide some of the extra vitamins and minerals you need  Prenatal vitamins may also help to decrease the risk of certain birth defects  · Talk to your healthcare provider about exercise  Moderate exercise can help you stay fit  Your healthcare provider will help you plan an exercise program that is safe for you during pregnancy  · Do not smoke  If you smoke, it is never too late to quit  Smoking increases your risk of a miscarriage and other health problems during your pregnancy  Smoking can cause your baby to be born too early or weigh less at birth  Ask your healthcare provider for information if you need help quitting  · Do not drink alcohol  Alcohol passes from your body to your baby through the placenta  It can affect your baby's brain development and cause fetal alcohol syndrome (FAS)  FAS is a group of conditions that causes mental, behavior, and growth problems  · Talk to your healthcare provider before you take any medicines  Many medicines may harm your baby if you take them when you are pregnant  Do not take any medicines, vitamins, herbs, or supplements without first talking to your healthcare provider  Never use illegal or street drugs (such as marijuana or cocaine) while you are pregnant  Safety tips:   · Avoid hot tubs and saunas  Do not use a hot tub or sauna while you are pregnant, especially during your first trimester  Hot tubs and saunas may raise your baby's temperature and increase the risk of birth defects  · Avoid toxoplasmosis    This is an infection caused by eating raw meat or being around infected cat feces  It can cause birth defects, miscarriages, and other problems  Wash your hands after you touch raw meat  Make sure any meat is well-cooked before you eat it  Avoid raw eggs and unpasteurized milk  Use gloves or ask someone else to clean your cat's litter box while you are pregnant  Changes that are happening with your baby:  By 26 weeks, your baby will weigh about 2 pounds  Your baby will be about 10 inches long from the top of the head to the rump (baby's bottom)  Your baby's movements are much stronger now  Your baby's eyes are almost completely formed and can partially open  Your baby also sleeps and wakes up  What you need to know about prenatal care: Your healthcare provider will check your blood pressure and weight  You may also need the following:  · A urine test  may also be done to check for sugar and protein  These can be signs of gestational diabetes or infection  Protein in your urine may also be a sign of preeclampsia  Preeclampsia is a condition that can develop during week 20 or later of your pregnancy  It causes high blood pressure, and it can cause problems with your kidneys and other organs  · Fundal height  is a measurement of your uterus to check your baby's growth  This number is usually the same as the number of weeks that you have been pregnant  · Your baby's heart rate  will be checked  © 2017 [address] Information is for End User's use only and may not be sold, redistributed or otherwise used for commercial purposes  All illustrations and images included in CareNotes® are the copyrighted property of A D A M , Inc  or José Miguel Scott  The above information is an  only  It is not intended as medical advice for individual conditions or treatments  Talk to your doctor, nurse or pharmacist before following any medical regimen to see if it is safe and effective for you

## 2019-08-15 VITALS
DIASTOLIC BLOOD PRESSURE: 61 MMHG | SYSTOLIC BLOOD PRESSURE: 119 MMHG | WEIGHT: 200 LBS | OXYGEN SATURATION: 100 % | RESPIRATION RATE: 18 BRPM | HEIGHT: 65 IN | BODY MASS INDEX: 33.32 KG/M2 | HEART RATE: 101 BPM | TEMPERATURE: 97.5 F

## 2019-08-15 LAB
ABO GROUP BLD: NORMAL
AMPHETAMINES SERPL QL SCN: NEGATIVE
BACTERIA UR CULT: NORMAL
BARBITURATES UR QL: NEGATIVE
BENZODIAZ UR QL: NEGATIVE
BLD GP AB SCN SERPL QL: NEGATIVE
COCAINE UR QL: NEGATIVE
METHADONE UR QL: NEGATIVE
OPIATES UR QL SCN: NEGATIVE
PCP UR QL: NEGATIVE
RH BLD: POSITIVE
RPR SER QL: NORMAL
SPECIMEN EXPIRATION DATE: NORMAL
THC UR QL: NEGATIVE

## 2019-08-15 PROCEDURE — 76816 OB US FOLLOW-UP PER FETUS: CPT | Performed by: OBSTETRICS & GYNECOLOGY

## 2019-08-15 PROCEDURE — 99219 PR INITIAL OBSERVATION CARE/DAY 50 MINUTES: CPT | Performed by: OBSTETRICS & GYNECOLOGY

## 2019-08-15 PROCEDURE — 76817 TRANSVAGINAL US OBSTETRIC: CPT | Performed by: OBSTETRICS & GYNECOLOGY

## 2019-08-15 PROCEDURE — 96372 THER/PROPH/DIAG INJ SC/IM: CPT

## 2019-08-15 PROCEDURE — 80307 DRUG TEST PRSMV CHEM ANLYZR: CPT | Performed by: STUDENT IN AN ORGANIZED HEALTH CARE EDUCATION/TRAINING PROGRAM

## 2019-08-15 RX ORDER — BETAMETHASONE SODIUM PHOSPHATE AND BETAMETHASONE ACETATE 3; 3 MG/ML; MG/ML
12 INJECTION, SUSPENSION INTRA-ARTICULAR; INTRALESIONAL; INTRAMUSCULAR; SOFT TISSUE EVERY 24 HOURS
Status: DISCONTINUED | OUTPATIENT
Start: 2019-08-15 | End: 2019-08-15 | Stop reason: HOSPADM

## 2019-08-15 RX ORDER — BETAMETHASONE SODIUM PHOSPHATE AND BETAMETHASONE ACETATE 3; 3 MG/ML; MG/ML
12 INJECTION, SUSPENSION INTRA-ARTICULAR; INTRALESIONAL; INTRAMUSCULAR; SOFT TISSUE ONCE
Status: DISCONTINUED | OUTPATIENT
Start: 2019-08-15 | End: 2019-08-15 | Stop reason: SDUPTHER

## 2019-08-15 RX ADMIN — SODIUM CHLORIDE, SODIUM LACTATE, POTASSIUM CHLORIDE, AND CALCIUM CHLORIDE 125 ML/HR: .6; .31; .03; .02 INJECTION, SOLUTION INTRAVENOUS at 05:06

## 2019-08-15 RX ADMIN — BETAMETHASONE SODIUM PHOSPHATE AND BETAMETHASONE ACETATE 12 MG: 3; 3 INJECTION, SUSPENSION INTRA-ARTICULAR; INTRALESIONAL; INTRAMUSCULAR at 21:11

## 2019-08-15 NOTE — CONSULTS
Consultation - [facility]  Vilma Yap 26 y o  female MRN: [id number]  Unit/Bed#:  302-01 Encounter: [id number]      Inpatient consult to Perinatology  Consult performed by: Osiel Mak MD  Consult ordered by: Osiel Mak MD        Physician Requesting Consult: Pauline Villegas MD  Reason for Consult / Principal Problem: Fetal tachycardia and concern for possible placenta accreta  Subspeciality: Perinatology    HPI:  Vilma Yap is a 26 y o  [id number] female with an MINE of 2019, by Ultrasound at 24w2d gestation who is being evaluated for maternal and fetal tachycardia and back pain in the setting of pregnancy complicated by h/o 4 prior  deliveries with current placenta previa  Patient was transferred from [address] [address] for fetal and maternal tachycardia that did not respond to initial IV fluid hydration and additional back pain/ contractions in the setting placenta previa for higher level care NICU in the event that delivery should be necessary  Please see H&P for full history    PREGNANCY COMPLICATIONS:   Placenta previa  History of  section x4- 1st  section for breech presentation  H/o IUFD  H/o molar pregnancy  Obesity  Elevated 1 hour GTT    OB History    Para Term  AB Living   9 5 5 0 3 4   SAB TAB Ectopic Multiple Live Births   3 0 0 0 4      # Outcome Date GA Lbr Miles/2nd Weight Sex Delivery Anes PTL Lv   9 Current            8 2014              Complications:  Molar pregnancy   7 Term  39w0d  3175 g (7 lb) M CS-LTranv   NOREEN   6 Term    4082 g (9 lb) F Vag-Spont None  FD      Complications: Chorioamnionitis, Shoulder Dystocia, IUFD at 20 weeks or more of gestation   5 Term  40w0d  4536 g (10 lb) Elinor Kil   NOREEN   4 Term  39w0d  3629 g (8 lb) F CS-LTranv   NOREEN   3 Term  40w0d  3629 g (8 lb) M CS-LTranv   NOREEN      Complications: Breech presentation   2 2006           1 2004               Baby complications/comments:       Historical Information   Past Medical History:   Diagnosis Date    Disease of thyroid gland     Pap smear abnormality of cervix with LGSIL 2016    Varicella     Had as a child     Past Surgical History:   Procedure Laterality Date     SECTION      DILATION AND EVACUATION      Molar    LAPAROSCOPY       Social History   Social History     Substance and Sexual Activity   Alcohol Use Not Currently     Social History     Substance and Sexual Activity   Drug Use No     Social History     Tobacco Use   Smoking Status Never Smoker   Smokeless Tobacco Never Used     Family History:   Family History   Problem Relation Age of Onset    Hypertension Mother     Lupus Mother     Pulmonary embolism Mother        Meds/Allergies      Medications Prior to Admission   Medication    docusate sodium (COLACE) 100 mg capsule    Ferrous Sulfate (FEROSUL PO)    metoclopramide (REGLAN) 10 mg tablet    Prenatal Vit-Fe Fumarate-FA ([facility] PRENATAL) 28-0 8 MG TABS        Allergies   Allergen Reactions    Penicillins Anaphylaxis     Anaphylaxis    Shrimp (Diagnostic)      Review of Systems   Constitutional: Positive for chills  Negative for fever  Eyes: Negative  Negative for visual disturbance  Respiratory: Negative for chest tightness, shortness of breath and wheezing  Cardiovascular: Negative for chest pain, palpitations and leg swelling  Gastrointestinal: Positive for nausea  Negative for abdominal pain, constipation, diarrhea and vomiting  Endocrine: Negative  Genitourinary: Positive for pelvic pain  Negative for dysuria, flank pain, frequency, hematuria, urgency, vaginal bleeding and vaginal discharge  Musculoskeletal: Positive for arthralgias, back pain and myalgias  Skin: Negative  Allergic/Immunologic: Negative  Neurological: Negative for dizziness, weakness, light-headedness and headaches  Hematological: Negative  Psychiatric/Behavioral: Negative  Temp 98 2 °F (36 8 °C) (Oral)   LMP 2019 (Within Days)     Physical Exam   Constitutional: She is oriented to person, place, and time  She appears well-developed and well-nourished  No distress  HENT:   Head: Normocephalic and atraumatic  Neck: Normal range of motion  Neck supple  Cardiovascular: Normal rate, regular rhythm and normal heart sounds  Exam reveals no gallop and no friction rub  No murmur heard  Pulmonary/Chest: Effort normal and breath sounds normal  No respiratory distress  She has no wheezes  She has no rales  Abdominal: Soft  There is no tenderness  There is no rebound and no guarding  Genitourinary: Vagina normal    Neurological: She is alert and oriented to person, place, and time  Skin: Skin is warm and dry  She is not diaphoretic  Psychiatric: She has a normal mood and affect  Her behavior is normal    Vitals reviewed  Please refer to  labor [facility] on 2019  Fetal heart rate:  Currently fetal heart rate responding to IV fluid resuscitation with baseline in 150s, moderate variability with 10 x 10 accelerations and no decelerations noted  [place]:  No contractions seen on monitor    A/P: 26 y o  [id number]  with IUP at 24w2d  gestation in the setting of known placenta previa  1) Fetal tachycardia:  Much improved following IV fluid hydration with fetal baseline now noted to be in the 160s   - patient is status post betamethasone x1 for fetal immaturity and magnesium has been started for fetal neuro protection in case delivery was needed   - no current signs of infection, UA notable for +3 ketones possible dehydration   - lactic acid within normal limits at 1 3,  follow blood in urine cultures  2) IUP at 24 weeks gestation in setting of placenta previa with concerns of  contraction:  Pain at this time is improved at bedside, patient declining pain medication     - concern for possible placenta accreta on ultrasound in [facility] with notable prominent vessel noted near the posterior bladder wall   - For repeat ultrasound by perinatology in the morning  3) FEN: NPO,  cc/hr  4) DVT PPx: SCDs    Roni Osorio MD   OB/Gyn PGY-3  8/14/2019 11:25 PM

## 2019-08-15 NOTE — QUICK NOTE
Spoke with Dr Josh Torres, Pt okay for discharge with follow-up tonight at [facility] for second dose of steroids  She is scheduled to receive her next dose at 2100   Nurse notified    Lexi Magana MD  OBGYN, PGY-3  8/15/2019 10:12 AM

## 2019-08-15 NOTE — PROGRESS NOTES
Ultrasound Probe Disinfection    A transvaginal ultrasound was performed  Prior to use, disinfection was performed with High Level Disinfection Process (Trophon)  Probe serial number [id number] was used   High level disinfection was performed after use as well    Izabel Alvarado MD  08/15/19  8:26 AM

## 2019-08-15 NOTE — EMTALA/ACUTE CARE TRANSFER
[address] [place] [address] LABOR AND DELIVERY  [facility] [address]  Dept: [phone]      EMTALA TRANSFER CONSENT    NAME Shalini DE LUNA 1990                              MRN [id number]    I have been informed of my rights regarding examination, treatment, and transfer   by Dr Jo Ann Iniguez MD    Benefits: Continuity of care    Risks: Potential for delay in receiving treatment, Potential deterioration of medical condition, Loss of IV, Increased discomfort during transfer, Possible worsening of condition or death during transfer      Consent for Transfer:  I acknowledge that my medical condition has been evaluated and explained to me by the emergency department physician or other qualified medical person and/or my attending physician, who has recommended that I be transferred to the service of  Accepting Physician: Rosie Espinal MD at [address] [place] [address] [facility] [address] [facility], Labor and Delivery   The above potential benefits of such transfer, the potential risks associated with such transfer, and the probable risks of not being transferred have been explained to me, and I fully understand them  The doctor has explained that, in my case, the benefits of transfer outweigh the risks  I agree to be transferred  I authorize the performance of emergency medical procedures and treatments upon me in both transit and upon arrival at the receiving facility  Additionally, I authorize the release of any and all medical records to the receiving facility and request they be transported with me, if possible  I understand that the safest mode of transportation during a medical emergency is an ambulance and that the Hospital advocates the use of this mode of transport   Risks of traveling to the receiving facility by car, including absence of medical control, life sustaining equipment, such as oxygen, and medical personnel has been explained to me and I fully understand them  (VIKAS CORRECT BOX BELOW)  [  ]  I consent to the stated transfer and to be transported by ambulance/helicopter  [  ]  I consent to the stated transfer, but refuse transportation by ambulance and accept full responsibility for my transportation by car  I understand the risks of non-ambulance transfers and I exonerate the Hospital and its staff from any deterioration in my condition that results from this refusal     X___________________________________________    DATE  19  TIME________  Signature of patient or legally responsible individual signing on patient behalf           RELATIONSHIP TO PATIENT_________________________          Provider Certification    NAME Vilma Yap                                         1990                              MRN [id number]    A medical screening exam was performed on the above named patient    Based on the examination:    Condition Necessitating Transfer Pregnancy at 24 weeks 2 days     Patient Condition: The patient has been stabilized such that within reasonable medical probability, no material deterioration of the patient condition or the condition of the unborn child(audrey) is likely to result from the transfer    Reason for Transfer: Level of Care needed not available at this facility    Transfer Requirements: [address], Labor and Delivery    · Space available and qualified personnel available for treatment as acknowledged by Germania Cassidy, [phone]  · Agreed to accept transfer and to provide appropriate medical treatment as acknowledged by       Pauline Villegas MD  · Appropriate medical records of the examination and treatment of the patient are provided at the time of transfer   [address] _______  · Transfer will be performed by qualified personnel from [facility]   and appropriate transfer equipment as required, including the use of necessary and appropriate life support measures  Provider Certification: I have examined the patient and explained the following risks and benefits of being transferred/refusing transfer to the patient/family:  General risk, such as traffic hazards, adverse weather conditions, rough terrain or turbulence, possible failure of equipment (including vehicle or aircraft), or consequences of actions of persons outside the control of the transport personnel, Risk of worsening condition, Unanticipated needs of medical equipment and personnel during transport, The possibility of a transport vehicle being unavailable, Consent was not obtained as patient is committed to psychiatric facility and transfer is mandated, The patient is stable for psychiatric transfer because they are medically stable, and is protected from harming him/herself or others during transport      Based on these reasonable risks and benefits to the patient and/or the unborn child(audrey), and based upon the information available at the time of the patients examination, I certify that the medical benefits reasonably to be expected from the provision of appropriate medical treatments at another medical facility outweigh the increasing risks, if any, to the individuals medical condition, and in the case of labor to the unborn child, from effecting the transfer      X____________________________________________ DATE 08/14/19        TIME_______      ORIGINAL - SEND TO MEDICAL RECORDS   COPY - SEND WITH PATIENT DURING TRANSFER

## 2019-08-15 NOTE — H&P
H&P Exam - Obstetrics   Ki Morales 26 y o  female MRN: [id number]  Unit/Bed#: -01 Encounter: [id number]      History of Present Illness     Chief Complaint: I have back pain    HPI:  Ki Morales is a 26 y o  [id number] female with an MINE of 2019, by Ultrasound at 24w2d weeks gestation with history of 4 prior  section and known placenta previa who was a transfer from [address] [place] [address] for maternal and fetal tachycardia despite IV hydration  Patient presented to [facility] following 2 day history of back pain and abdominal tightening  She was ruled out for  labor at the time and transferred to [facility] for higher level NICU care in the event delivery should need to be completed  Patient states she began having right sided back pain about a week ago and was seen in office by Dr Kia Gar on 19 where she was ruled out for  labor with a CL of 3 6 cm  She reports ion the past 2 days her back pain became more intense with pain that is intermittent in her lower back with abdominal tightening that feels like labor contractions to her  She denies any loss of fluid or vaginal bleeding  She reports having chills this morning with body aches  She denies any vaginal discharge, foul vaginal odor, dysuria, hematuria, history of nephrolithiasis  Reports some nausea but denies episodes of emesis  Denies diarrhea or constipation with last  BM yesterday  She denies recent intercourse or anything in the vagina  Deena Martins is a patient of Dr Kia Gar  Patient reports she would like a tubal ligation for contraception      PREGNANCY COMPLICATIONS:   Placenta previa  History of  section x4- 1st  section for breech presentation  H/o IUFD  H/o molar pregnancy  Obesity  Elevated 1 hour GTT    OB History    Para Term  AB Living   9 5 5 0 3 4   SAB TAB Ectopic Multiple Live Births   3 0 0 0 4      # Outcome Date GA Lbr Miles/2nd Weight Sex Delivery Anes PTL Lv   9 Current            8 2014 Complications: Molar pregnancy   7 Term  39w0d  3175 g (7 lb) M CS-LTranv   NOREEN   6 Term    4082 g (9 lb) F Vag-Spont None  FD      Complications: Chorioamnionitis, Shoulder Dystocia, IUFD at 20 weeks or more of gestation   5 Term  40w0d  4536 g (10 lb) Yaritza Nian   NOREEN   4 Term  39w0d  3629 g (8 lb) F CS-LTranv   NOREEN   3 Term  40w0d  3629 g (8 lb) M CS-LTranv   NOREEN      Complications: Breech presentation   2 2006           1 2004               Baby complications/comments:       Review of Systems   Constitutional: Positive for chills  Negative for fever  HENT: Negative  Eyes: Negative for visual disturbance  Respiratory: Negative for chest tightness, shortness of breath and wheezing  Cardiovascular: Negative for chest pain, palpitations and leg swelling  Gastrointestinal: Negative for abdominal pain, constipation, diarrhea, nausea and vomiting  Endocrine: Negative  Genitourinary: Negative for dysuria, flank pain, frequency, hematuria, urgency, vaginal bleeding and vaginal discharge  Pelvic pressure   Musculoskeletal: Positive for arthralgias, back pain and myalgias  Skin: Negative  Allergic/Immunologic: Negative  Neurological: Negative for dizziness, syncope, weakness, light-headedness, numbness and headaches  Psychiatric/Behavioral: Negative for behavioral problems         Historical Information   Past Medical History:   Diagnosis Date    Disease of thyroid gland     Pap smear abnormality of cervix with LGSIL 2016    Varicella     Had as a child     Past Surgical History:   Procedure Laterality Date     SECTION      DILATION AND EVACUATION      Molar    LAPAROSCOPY       Social History   Social History     Substance and Sexual Activity   Alcohol Use Not Currently     Social History     Substance and Sexual Activity   Drug Use No     Social History     Tobacco Use   Smoking Status Never Smoker   Smokeless Tobacco Never Used     Family History:   Family History   Problem Relation Age of Onset    Hypertension Mother    [place] Ravel Lupus Mother     Pulmonary embolism Mother        Meds/Allergies    {  Medications Prior to Admission   Medication    docusate sodium (COLACE) 100 mg capsule    Ferrous Sulfate (FEROSUL PO)    metoclopramide (REGLAN) 10 mg tablet    Prenatal Vit-Fe Fumarate-FA ([employer] PRENATAL) 28-0 8 MG TABS        Allergies   Allergen Reactions    Penicillins Anaphylaxis     Anaphylaxis    Shrimp (Diagnostic)        OBJECTIVE:    Vitals:   Temp 98 2 °F (36 8 °C) (Oral)   LMP 2019 (Within Days)   There is no height or weight on file to calculate BMI  Physical Exam   Constitutional: She is oriented to person, place, and time  She appears well-developed and well-nourished  Patient appears visibly uncomfortable from pain   HENT:   Head: Normocephalic and atraumatic  Neck: Normal range of motion  Neck supple  Cardiovascular: Normal rate, regular rhythm and normal heart sounds  Exam reveals no gallop and no friction rub  No murmur heard  Pulmonary/Chest: Effort normal and breath sounds normal  No respiratory distress  She has no wheezes  She has no rales  Abdominal: Soft  There is no tenderness  There is no rebound and no guarding  Gravid uterus, palpable mild contractions   Genitourinary: Vagina normal    Musculoskeletal:   No CVA tenderness   Neurological: She is alert and oriented to person, place, and time  Skin: Skin is warm and dry  Psychiatric: She has a normal mood and affect  Her behavior is normal    Vitals reviewed      Please refer to Dr Jose Angel Park  labor work up completed in [facility] 19 at [address]:  Baseline 160, moderate variability, 10x10 accelerations, no decelerations    TOCO:   Contraction Frequency (minutes): none    Prenatal Labs:   Blood type: O Positive  Antibody Screen: Negative  Rubella: immune  HIV: Negatibe  RPR: NR  Hep B: Negative  Diabetes Screen: 143  GBS: Unknown    Assessment/Plan     ASSESSMENT:  27 yo [id number] at 24w2d weeks gestation with fetal and maternal tachycardia transferred from [facility] for higher level care NICU in the event delivery is necessary to be admitted for observation  PLAN:  1) Fetal tachycardia in setting of maternal tachycardia at 24 weeks gestation despite IVF resuscitation in [facility]   - No maternal fever documented   - WBC count: 12 72   - UA with +3 ketones, otherwise unremarkable   - Type and screen, lactic acid, CMP    - F/u Urine and blood cultures   - Will begin Betamethasone for fetal lung maturity    - Will begin magnesium for fetal neuroprotection in the event there is a need to proceed with delivery   - Continuous eFHM and toco at this time    2)  back pain/ contractions in the setting of placenta previa:  contractions vs musculoskeletal pain  Infection less likely given physical exam, vitals and labs  -  labor ruled out in [facility]   -Will begin betamethasone and magnesium in the event that delivery is necessary   - Continue to monitor closely   - 2 IV placed   - Anesthesia, NICU and charge nurse notified   - Consultation to perinatology as there was concern for possible placenta accreta on ultrasound in [place] with a notable prominent vessel noted near the posterior bladder wall  - Patient counseled on tubal ligation and that it is an irreversible and permanent procedure  She understands at time of  section that tubes are not always visible if there is a lot of scar tissue in abdomen  3) FEN: NPO, 1L IVF bolus followed by  cc/hr    This patient will be an INPATIENT  and I certify the anticipated length of stay is >2 Midnights        Rebecca Hoffman MD  2019  11:20 PM

## 2019-08-15 NOTE — PROGRESS NOTES
MFM Progress note   Servando Gross 26 y o  female MRN: [id number]  Unit/Bed#: -01 Encounter: [id number]    Assessment:  Servando Gross is a [id number] at 24w3d with known placenta previa presenting with fetal tachycardia and  contractions, transferred from [facility]  Stable     Plan:   1) Fetal Tachycardia: Resolved after IV hydration, now 130s-150s  - Initial labs and vitals WNL  Afebrile  No current signs of infection  UA notable for 3+ ketones, suggestive of dehydration  Lactic acid normal at 1 3  F/u Blood and Urine cultures  - cEFM: FHTs 130s-150s with moderate variability and accels  - S/p BTM x1 at 2150  Due for second dose tonight  - Magnesium intiated for neuroprotection at 215  Will discontinue after 12hrs    2)  contractions in setting of placenta previa  - At SLA, concern for PTL  Work-up negative with CL 3 6cm  KOH/wetmount negative  GC/CT culture collected  - Known placenta previa in setting of 4 prior c/s, concern for accreta/percreta on US at [facility]  Plan for formal US today  - Started on magnesium and BTM    3) IUP at 24w3d  - cEFM  -Continue prenatal vitamin  - F/u Formal US today  - Elevated 1hr glucose, will need 3hr GTT after delay from steroid administration    4) F/E/N: Regular diet, discontinue IVFs  5) DVT ppx: SCDs, ambulation    Servando Gross has no current complaints  Denies nausea or vomitting  Patient denies fever, chills, chest pain, shortness of breath, or calf tenderness  Pt denies vaginal bleeding/discharge, dysuria, loss of fluid, cramping or abdominal pain  BP 94/51   Pulse 100   Temp (!) 97 4 °F (36 3 °C) (Oral)   Resp 18   Ht 5' 5" (1 651 m)   Wt 90 7 kg (200 lb)   LMP 2019 (Within Days)   SpO2 98%   BMI 33 28 kg/m²     No intake/output data recorded    I/O this shift:  In: 1150 [IV Piggyback:1150]  Out: 400 [Urine:400]    Lab Results   Component Value Date    WBC 12 72 (H) 2019    HGB 10 9 (L) 2019    HCT 34 8 2019    MCV 82 08/14/2019     08/14/2019       Lab Results   Component Value Date    GLUCOSE 101 10/05/2016    CALCIUM 8 4 08/14/2019    K 3 4 (L) 08/14/2019    CO2 23 08/14/2019     08/14/2019    BUN 3 (L) 08/14/2019    CREATININE 0 41 (L) 08/14/2019       Lab Results   Component Value Date/Time    POCGLU 105 (H) 10/31/2018 09:26 PM       Physical Exam   Constitutional: She is oriented to person, place, and time  She appears well-developed and well-nourished  No distress  HENT:   Head: Normocephalic and atraumatic  Pulmonary/Chest: Effort normal  No respiratory distress  Abdominal: Soft  There is no tenderness  There is no rebound and no guarding  Musculoskeletal: Normal range of motion  She exhibits no edema, tenderness or deformity  Neurological: She is alert and oriented to person, place, and time  Skin: She is not diaphoretic  Psychiatric: She has a normal mood and affect  Her behavior is normal  Judgment and thought content normal    Vitals reviewed  Fetal Assessment:    FHT: 140s-150s, moderate variability, positive accels, no significant decels, reactive     [place]: None      Lorene Ayers MD  MF, PGY-3  8/15/2019 6:52 AM

## 2019-08-15 NOTE — UTILIZATION REVIEW
Initial Clinical Review    Admission: Date/Time/Statement: N/A @ N/A     Orders Placed This Encounter   Procedures    Place in Observation     Standing Status:   Standing     Number of Occurrences:   1     Order Specific Question:   Admitting Physician     Answer:   Gabriel Martinez [[id number]]     Order Specific Question:   Level of Care     Answer:   Med Surg [16]     Assessment/Plan:     Chief Complaint: I have back pain     HPI:  Ally Ang is a 26 y o  [id number] female with an MINE of 2019, by Ultrasound at 24w2d weeks gestation with history of 4 prior  section and known placenta previa who was a transfer from [address] [place] [address] for maternal and fetal tachycardia despite IV hydration  Patient presented to [facility] following 2 day history of back pain and abdominal tightening  She was ruled out for  labor at the time and transferred to [address] [place] [address] for higher level NICU care in the event delivery should need to be completed      Patient states she began having right sided back pain about a week ago and was seen in office by Dr Mirna Chery on 19 where she was ruled out for  labor with a CL of 3 6 cm  She reports ion the past 2 days her back pain became more intense with pain that is intermittent in her lower back with abdominal tightening that feels like labor contractions to her  She denies any loss of fluid or vaginal bleeding  She reports having chills this morning with body aches  She denies any vaginal discharge, foul vaginal odor, dysuria, hematuria, history of nephrolithiasis  Reports some nausea but denies episodes of emesis  Denies diarrhea or constipation with last  BM yesterday   She denies recent intercourse or anything in the vagina    OB Triage Vitals   Temperature Pulse Respirations Blood Pressure SpO2   19 2200   98 2 °F (36 8 °C) (!) 111 22 152/70 100 %      Temp Source Heart Rate Source Patient Position - Orthostatic VS BP Location FiO2 (%)   08/14/19 2119 08/14/19 2119 08/14/19 2119 08/14/19 2119 --   Oral Monitor Lying Right arm       Pain Score       08/14/19 2327       1        Wt Readings from Last 1 Encounters:   08/14/19 90 7 kg (200 lb)     Additional Vital Signs:   Pertinent Labs/Diagnostic Test Results:   Results from last 7 days   Lab Units 08/14/19  1939 08/09/19  1033   WBC Thousand/uL 12 72* 11 60*   HEMOGLOBIN g/dL 10 9* 10 9*   HEMATOCRIT % 34 8 33 6*   PLATELETS Thousands/uL 300 282   NEUTROS ABS Thousands/µL 10 34* 8 30*         Results from last 7 days   Lab Units 08/14/19  2234   SODIUM mmol/L 141   POTASSIUM mmol/L 3 4*   CHLORIDE mmol/L 107   CO2 mmol/L 23   ANION GAP mmol/L 11   BUN mg/dL 3*   CREATININE mg/dL 0 41*   EGFR ml/min/1 73sq m 139   CALCIUM mg/dL 8 4     Results from last 7 days   Lab Units 08/14/19  2234   AST U/L 16   ALT U/L 14   ALK PHOS U/L 76   TOTAL PROTEIN g/dL 6 5   ALBUMIN g/dL 2 7*   TOTAL BILIRUBIN mg/dL 0 71     Results from last 7 days   Lab Units 08/14/19  2234   GLUCOSE RANDOM mg/dL 88     Results from last 7 days   Lab Units 08/14/19  2319   LACTIC ACID mmol/L 1 3     Results from last 7 days   Lab Units 08/09/19  1033   FERRITIN ng/mL 8     Results from last 7 days   Lab Units 08/14/19  1939   CLARITY UA  Slightly Cloudy   COLOR UA  Yellow   SPEC GRAV UA  1 020   PH UA  6 5   GLUCOSE UA mg/dl Negative   KETONES UA mg/dl >=80 (3+)*   BLOOD UA  Negative   PROTEIN UA mg/dl Negative   NITRITE UA  Negative   BILIRUBIN UA  Negative   UROBILINOGEN UA E U /dl 0 2   LEUKOCYTES UA  Negative         Results from last 7 days   Lab Units 08/15/19  0756   AMPH/METH  Negative   BARBITURATE UR  Negative   BENZODIAZEPINE UR  Negative   COCAINE UR  Negative   METHADONE URINE  Negative   OPIATE UR  Negative   PCP UR  Negative   THC UR  Negative       ED Treatment:   Medication Administration - No Administrations Displayed (No Start Event Found)     None        Past Medical History:   Diagnosis Date    Disease of thyroid gland     Pap smear abnormality of cervix with LGSIL 5/20/2016    Varicella     Had as a child     Present on Admission:   Pelvic pain   Pelvic pain affecting pregnancy in second trimester, antepartum      Admitting Diagnosis: 24 weeks gestation of pregnancy [Z3A 24]  Age/Sex: 26 y o  female  Admission Orders:    Current Facility-Administered Medications:  acetaminophen 650 mg Oral Q4H PRN   betamethasone acetate-betamethasone sodium phosphate 12 mg Intramuscular Q24H   cyclobenzaprine 10 mg Oral TID PRN   HYDROmorphone 0 5 mg Intravenous Once   ondansetron 4 mg Intravenous Q8H PRN   prenatal multivitamin 1 tablet Oral Daily       IP CONSULT TO PERINATOLOGY    Network Utilization Review Department  Phone: [phone]; Fax [phone]  [email or web address]  ATTENTION: Please call with any questions or concerns to [phone]  and carefully listen to the prompts so that you are directed to the right person  Send all requests for admission clinical reviews, approved or denied determinations and any other requests to fax [phone]   All voicemails are confidential   ,ED

## 2019-08-15 NOTE — PLAN OF CARE
Problem: PAIN - ADULT  Goal: Verbalizes/displays adequate comfort level or baseline comfort level  Description  Interventions:  - Encourage patient to monitor pain and request assistance  - Assess pain using appropriate pain scale  - Administer analgesics based on type and severity of pain and evaluate response  - Implement non-pharmacological measures as appropriate and evaluate response  - Consider cultural and social influences on pain and pain management  - Notify physician/advanced practitioner if interventions unsuccessful or patient reports new pain  Outcome: Progressing     Problem: INFECTION - ADULT  Goal: Absence or prevention of progression during hospitalization  Description  INTERVENTIONS:  - Assess and monitor for signs and symptoms of infection  - Monitor lab/diagnostic results  - Monitor all insertion sites, i e  indwelling lines, tubes, and drains  - Monitor endotracheal if appropriate and nasal secretions for changes in amount and color  - [place] appropriate cooling/warming therapies per order  - Administer medications as ordered  - Instruct and encourage patient and family to use good hand hygiene technique  - Identify and instruct in appropriate isolation precautions for identified infection/condition  Outcome: Progressing  Goal: Absence of fever/infection during neutropenic period  Description  INTERVENTIONS:  - Monitor WBC    Outcome: Progressing     Problem: SAFETY ADULT  Goal: Patient will remain free of falls  Description  INTERVENTIONS:  - Assess patient frequently for physical needs  -  Identify cognitive and physical deficits and behaviors that affect risk of falls    -  [place] fall precautions as indicated by assessment   - Educate patient/family on patient safety including physical limitations  - Instruct patient to call for assistance with activity based on assessment  - Modify environment to reduce risk of injury  - Consider OT/PT consult to assist with strengthening/mobility  Outcome: Progressing  Goal: Maintain or return to baseline ADL function  Description  INTERVENTIONS:  -  Assess patient's ability to carry out ADLs; assess patient's baseline for ADL function and identify physical deficits which impact ability to perform ADLs (bathing, care of mouth/teeth, toileting, grooming, dressing, etc )  - Assess/evaluate cause of self-care deficits   - Assess range of motion  - Assess patient's mobility; develop plan if impaired  - Assess patient's need for assistive devices and provide as appropriate  - Encourage maximum independence but intervene and supervise when necessary  - Involve family in performance of ADLs  - Assess for home care needs following discharge   - Consider OT consult to assist with ADL evaluation and planning for discharge  - Provide patient education as appropriate  Outcome: Progressing  Goal: Maintain or return mobility status to optimal level  Description  INTERVENTIONS:  - Assess patient's baseline mobility status (ambulation, transfers, stairs, etc )    - Identify cognitive and physical deficits and behaviors that affect mobility  - Identify mobility aids required to assist with transfers and/or ambulation (gait belt, sit-to-stand, lift, walker, cane, etc )  - [place] fall precautions as indicated by assessment  - Record patient progress and toleration of activity level on Mobility SBAR; progress patient to next Phase/Stage  - Instruct patient to call for assistance with activity based on assessment  - Consider rehabilitation consult to assist with strengthening/weightbearing, etc   Outcome: Progressing     Problem: Knowledge Deficit  Goal: Patient/family/caregiver demonstrates understanding of disease process, treatment plan, medications, and discharge instructions  Description  Complete learning assessment and assess knowledge base    Interventions:  - Provide teaching at level of understanding  - Provide teaching via preferred learning methods  Outcome: Progressing     Problem: DISCHARGE PLANNING  Goal: Discharge to home or other facility with appropriate resources  Description  INTERVENTIONS:  - Identify barriers to discharge w/patient and caregiver  - Arrange for needed discharge resources and transportation as appropriate  - Identify discharge learning needs (meds, wound care, etc )  - Arrange for interpretive services to assist at discharge as needed  - Refer to Case Management Department for coordinating discharge planning if the patient needs post-hospital services based on physician/advanced practitioner order or complex needs related to functional status, cognitive ability, or social support system  Outcome: Progressing     Problem: ANTEPARTUM  Goal: Maintain pregnancy as long as maternal and/or fetal condition is stable  Description  INTERVENTIONS:  - Maternal surveillance  - Fetal surveillance  - Monitor uterine activity  - Medications as ordered  - Bedrest  Outcome: Progressing

## 2019-08-15 NOTE — PLAN OF CARE
Problem: PAIN - ADULT  Goal: Verbalizes/displays adequate comfort level or baseline comfort level  Description  Interventions:  - Encourage patient to monitor pain and request assistance  - Assess pain using appropriate pain scale  - Administer analgesics based on type and severity of pain and evaluate response  - Implement non-pharmacological measures as appropriate and evaluate response  - Consider cultural and social influences on pain and pain management  - Notify physician/advanced practitioner if interventions unsuccessful or patient reports new pain  Outcome: Progressing     Problem: INFECTION - ADULT  Goal: Absence or prevention of progression during hospitalization  Description  INTERVENTIONS:  - Assess and monitor for signs and symptoms of infection  - Monitor lab/diagnostic results  - Monitor all insertion sites, i e  indwelling lines, tubes, and drains  - Monitor endotracheal if appropriate and nasal secretions for changes in amount and color  - [place] appropriate cooling/warming therapies per order  - Administer medications as ordered  - Instruct and encourage patient and family to use good hand hygiene technique  - Identify and instruct in appropriate isolation precautions for identified infection/condition  Outcome: Progressing  Goal: Absence of fever/infection during neutropenic period  Description  INTERVENTIONS:  - Monitor WBC    Outcome: Progressing     Problem: SAFETY ADULT  Goal: Patient will remain free of falls  Description  INTERVENTIONS:  - Assess patient frequently for physical needs  -  Identify cognitive and physical deficits and behaviors that affect risk of falls    -  [place] fall precautions as indicated by assessment   - Educate patient/family on patient safety including physical limitations  - Instruct patient to call for assistance with activity based on assessment  - Modify environment to reduce risk of injury  - Consider OT/PT consult to assist with strengthening/mobility  Outcome: Progressing  Goal: Maintain or return to baseline ADL function  Description  INTERVENTIONS:  -  Assess patient's ability to carry out ADLs; assess patient's baseline for ADL function and identify physical deficits which impact ability to perform ADLs (bathing, care of mouth/teeth, toileting, grooming, dressing, etc )  - Assess/evaluate cause of self-care deficits   - Assess range of motion  - Assess patient's mobility; develop plan if impaired  - Assess patient's need for assistive devices and provide as appropriate  - Encourage maximum independence but intervene and supervise when necessary  - Involve family in performance of ADLs  - Assess for home care needs following discharge   - Consider OT consult to assist with ADL evaluation and planning for discharge  - Provide patient education as appropriate  Outcome: Progressing  Goal: Maintain or return mobility status to optimal level  Description  INTERVENTIONS:  - Assess patient's baseline mobility status (ambulation, transfers, stairs, etc )    - Identify cognitive and physical deficits and behaviors that affect mobility  - Identify mobility aids required to assist with transfers and/or ambulation (gait belt, sit-to-stand, lift, walker, cane, etc )  - [place] fall precautions as indicated by assessment  - Record patient progress and toleration of activity level on Mobility SBAR; progress patient to next Phase/Stage  - Instruct patient to call for assistance with activity based on assessment  - Consider rehabilitation consult to assist with strengthening/weightbearing, etc   Outcome: Progressing     Problem: Knowledge Deficit  Goal: Patient/family/caregiver demonstrates understanding of disease process, treatment plan, medications, and discharge instructions  Description  Complete learning assessment and assess knowledge base    Interventions:  - Provide teaching at level of understanding  - Provide teaching via preferred learning methods  Outcome: Progressing     Problem: DISCHARGE PLANNING  Goal: Discharge to home or other facility with appropriate resources  Description  INTERVENTIONS:  - Identify barriers to discharge w/patient and caregiver  - Arrange for needed discharge resources and transportation as appropriate  - Identify discharge learning needs (meds, wound care, etc )  - Arrange for interpretive services to assist at discharge as needed  - Refer to Case Management Department for coordinating discharge planning if the patient needs post-hospital services based on physician/advanced practitioner order or complex needs related to functional status, cognitive ability, or social support system  Outcome: Progressing     Problem: ANTEPARTUM  Goal: Maintain pregnancy as long as maternal and/or fetal condition is stable  Description  INTERVENTIONS:  - Maternal surveillance  - Fetal surveillance  - Monitor uterine activity  - Medications as ordered  - Bedrest  Outcome: Progressing

## 2019-08-15 NOTE — QUICK NOTE
Per nursing patient sleeping and resting comfortably in room with no complaints of pain  FHT is 150-160, 10x10 accelerations, no decelerations  No contractions on monitor  Will continue to monitor closely      Augustine Esparza MD  08/15/19

## 2019-08-19 LAB
BACTERIA BLD CULT: NORMAL
BACTERIA BLD CULT: NORMAL

## 2019-08-20 ENCOUNTER — ROUTINE PRENATAL (OUTPATIENT)
Dept: OBGYN CLINIC | Facility: CLINIC | Age: 29
End: 2019-08-20
Payer: COMMERCIAL

## 2019-08-20 VITALS
DIASTOLIC BLOOD PRESSURE: 62 MMHG | BODY MASS INDEX: 36.58 KG/M2 | SYSTOLIC BLOOD PRESSURE: 116 MMHG | WEIGHT: 219.8 LBS | HEART RATE: 88 BPM

## 2019-08-20 DIAGNOSIS — O44.02 PLACENTA PREVIA IN SECOND TRIMESTER: ICD-10-CM

## 2019-08-20 DIAGNOSIS — O99.019 ANEMIA DURING PREGNANCY: ICD-10-CM

## 2019-08-20 DIAGNOSIS — O99.212 OBESITY AFFECTING PREGNANCY, ANTEPARTUM, SECOND TRIMESTER: ICD-10-CM

## 2019-08-20 DIAGNOSIS — Z98.891 H/O CESAREAN SECTION: ICD-10-CM

## 2019-08-20 DIAGNOSIS — Z3A.25 25 WEEKS GESTATION OF PREGNANCY: Primary | ICD-10-CM

## 2019-08-20 PROBLEM — O26.892 PELVIC PAIN AFFECTING PREGNANCY IN SECOND TRIMESTER, ANTEPARTUM: Status: RESOLVED | Noted: 2019-08-06 | Resolved: 2019-08-20

## 2019-08-20 PROBLEM — R10.2 PELVIC PAIN AFFECTING PREGNANCY IN SECOND TRIMESTER, ANTEPARTUM: Status: RESOLVED | Noted: 2019-08-06 | Resolved: 2019-08-20

## 2019-08-20 PROCEDURE — 99213 OFFICE O/P EST LOW 20 MIN: CPT | Performed by: OBSTETRICS & GYNECOLOGY

## 2019-08-20 NOTE — PATIENT INSTRUCTIONS
Pregnancy at 23 to 26 [address]:   You are now close to or at the beginning of the third trimester  The third trimester starts at 24 weeks and ends with delivery  As your baby gets larger, you may develop certain symptoms  These may include pain in your back or down the sides of your abdomen  You may also have stretch marks on your abdomen, breasts, thighs, or buttocks  You may also have constipation  DISCHARGE INSTRUCTIONS:   Return to the emergency department if:   · You develop a severe headache that does not go away  · You have new or increased vision changes, such as blurred or spotted vision  · You have new or increased swelling in your face or hands  · You have vaginal spotting or bleeding  · Your water broke or you feel warm water gushing or trickling from your vagina  Contact your healthcare provider if:   · You have abdominal cramps, pressure, or tightening  · You have a change in vaginal discharge  · You have light bleeding  · You have chills or a fever  · You have vaginal itching, burning, or pain  · You have yellow, green, white, or foul-smelling vaginal discharge  · You have pain or burning when you urinate, less urine than usual, or pink or bloody urine  · You have questions or concerns about your condition or care  How to care for yourself at this stage of your pregnancy:   · Eat a variety of healthy foods  Healthy foods include fruits, vegetables, whole-grain breads, low-fat dairy foods, beans, lean meats, and fish  Drink liquids as directed  Ask how much liquid to drink each day and which liquids are best for you  Limit caffeine to less than 200 milligrams each day  Limit your intake of fish to 2 servings each week  Choose fish low in mercury such as canned light tuna, shrimp, salmon, cod, or tilapia  Do not  eat fish high in mercury such as swordfish, tilefish, trista mackerel, and shark  · Manage back pain    Do not stand for long periods of time or lift heavy items  Use good posture while you stand, squat, or bend  Wear low-heeled shoes with good support  Rest may also help to relieve back pain  Ask your healthcare provider about exercises you can do to strengthen your back muscles  · Take prenatal vitamins as directed  Your need for certain vitamins and minerals, such as folic acid, increases during pregnancy  Prenatal vitamins provide some of the extra vitamins and minerals you need  Prenatal vitamins may also help to decrease the risk of certain birth defects  · Talk to your healthcare provider about exercise  Moderate exercise can help you stay fit  Your healthcare provider will help you plan an exercise program that is safe for you during pregnancy  · Do not smoke  If you smoke, it is never too late to quit  Smoking increases your risk of a miscarriage and other health problems during your pregnancy  Smoking can cause your baby to be born too early or weigh less at birth  Ask your healthcare provider for information if you need help quitting  · Do not drink alcohol  Alcohol passes from your body to your baby through the placenta  It can affect your baby's brain development and cause fetal alcohol syndrome (FAS)  FAS is a group of conditions that causes mental, behavior, and growth problems  · Talk to your healthcare provider before you take any medicines  Many medicines may harm your baby if you take them when you are pregnant  Do not take any medicines, vitamins, herbs, or supplements without first talking to your healthcare provider  Never use illegal or street drugs (such as marijuana or cocaine) while you are pregnant  Safety tips:   · Avoid hot tubs and saunas  Do not use a hot tub or sauna while you are pregnant, especially during your first trimester  Hot tubs and saunas may raise your baby's temperature and increase the risk of birth defects  · Avoid toxoplasmosis    This is an infection caused by eating raw meat or being around infected cat feces  It can cause birth defects, miscarriages, and other problems  Wash your hands after you touch raw meat  Make sure any meat is well-cooked before you eat it  Avoid raw eggs and unpasteurized milk  Use gloves or ask someone else to clean your cat's litter box while you are pregnant  Changes that are happening with your baby:  By 26 weeks, your baby will weigh about 2 pounds  Your baby will be about 10 inches long from the top of the head to the rump (baby's bottom)  Your baby's movements are much stronger now  Your baby's eyes are almost completely formed and can partially open  Your baby also sleeps and wakes up  What you need to know about prenatal care: Your healthcare provider will check your blood pressure and weight  You may also need the following:  · A urine test  may also be done to check for sugar and protein  These can be signs of gestational diabetes or infection  Protein in your urine may also be a sign of preeclampsia  Preeclampsia is a condition that can develop during week 20 or later of your pregnancy  It causes high blood pressure, and it can cause problems with your kidneys and other organs  · Fundal height  is a measurement of your uterus to check your baby's growth  This number is usually the same as the number of weeks that you have been pregnant  · Your baby's heart rate  will be checked  © 2017 [address] Information is for End User's use only and may not be sold, redistributed or otherwise used for commercial purposes  All illustrations and images included in CareNotes® are the copyrighted property of A D A M [employer] Inc  or [employer]  The above information is an  only  It is not intended as medical advice for individual conditions or treatments  Talk to your doctor, nurse or pharmacist before following any medical regimen to see if it is safe and effective for you

## 2019-08-20 NOTE — PROGRESS NOTES
OB/GYN  PN Visit  Killian Brooke  [id number]  2019  11:19 AM  Dr Micheal Tracy MD    S: 26 y o  [id number] 25w1d here for PN visit  OB complaints:  Contractions: no  Leakage: no  Bleeding: no  Fetal movement: yes      O:  Vitals:    19 1047   BP: 116/62   Pulse: 88       Gen: no acute distress, nonlabored breathing  Fundal Height (cm): 28 cm  Fetal Heart Rate: 154      A/P:    Problem List Items Addressed This Visit        Other    Anemia during pregnancy    H/O  section    Placenta previa in second trimester    25 weeks gestation of pregnancy - Primary    Obesity affecting pregnancy, antepartum, second trimester        Patient with history of anemia  Her most recent hemoglobin is 10 9  She is on iron supplementation twice a day and she will continue to take this  We will repeat a CBC in several weeks  Patient with history of  section x4  Patient also with history of placenta previa  Patient was recently admitted at the hospital for fetal tachycardia that resolved  Patient with an area in the placenta that is questionable for placenta accreta  I spoke with maternal fetal medicine and the diagnosis is uncertain at this point  She is scheduled for a repeat ultrasound on  at the  center  I did discussed with patient potential implications if she is diagnosed with a placenta accreta that we will have to meet with multi  disciplinary team and arrange for her to deliver at [facility]  She did receive betamethasone for fetal lung maturity last week on Thursday and Friday  Patient with history of abnormal 1 hour Glucola  She has not yet done the 3 hour Glucola  She is  Expected to complete this test on Saturday  Follow-up in 3 weeks for routine OB visit            Micheal Tracy MD  2019  11:19 AM

## 2019-09-06 ENCOUNTER — APPOINTMENT (OUTPATIENT)
Dept: LAB | Facility: HOSPITAL | Age: 29
End: 2019-09-06
Payer: COMMERCIAL

## 2019-09-06 ENCOUNTER — TELEPHONE (OUTPATIENT)
Dept: OBGYN CLINIC | Facility: CLINIC | Age: 29
End: 2019-09-06

## 2019-09-06 DIAGNOSIS — O24.419 GESTATIONAL DIABETES MELLITUS (GDM) IN SECOND TRIMESTER, GESTATIONAL DIABETES METHOD OF CONTROL UNSPECIFIED: Primary | ICD-10-CM

## 2019-09-06 DIAGNOSIS — O99.810 ABNORMAL GLUCOSE TOLERANCE IN PREGNANCY: ICD-10-CM

## 2019-09-06 LAB — GLUCOSE P FAST SERPL-MCNC: 98 MG/DL (ref 70–99)

## 2019-09-06 PROCEDURE — 82951 GLUCOSE TOLERANCE TEST (GTT): CPT

## 2019-09-06 PROCEDURE — 36415 COLL VENOUS BLD VENIPUNCTURE: CPT

## 2019-09-06 NOTE — TELEPHONE ENCOUNTER
Advised use of Tylenol, heating pad or exercise ball to help alleviate vaginal pain or discomfort  Patient also called with regards to questions about her 3 hour GTT  Her fasting blood sugar was 98  Discussed with patient diagnosis of gestational diabetes and need for referral to diabetes education

## 2019-09-06 NOTE — TELEPHONE ENCOUNTER
Patient called, she states she has been having vaginal pain for the past few days  Patient describes it as an "inner bruise" and would like to know if there is anything we can do? Patient does have an appointment scheduled for this coming Tuesday

## 2019-09-09 ENCOUNTER — OFFICE VISIT (OUTPATIENT)
Dept: PERINATAL CARE | Facility: OTHER | Age: 29
End: 2019-09-09
Payer: COMMERCIAL

## 2019-09-09 DIAGNOSIS — O99.810 ABNORMAL GLUCOSE TOLERANCE IN PREGNANCY: ICD-10-CM

## 2019-09-09 DIAGNOSIS — Z3A.28 28 WEEKS GESTATION OF PREGNANCY: ICD-10-CM

## 2019-09-09 DIAGNOSIS — O24.419 GESTATIONAL DIABETES MELLITUS (GDM) IN THIRD TRIMESTER, GESTATIONAL DIABETES METHOD OF CONTROL UNSPECIFIED: Primary | ICD-10-CM

## 2019-09-09 PROCEDURE — G0108 DIAB MANAGE TRN  PER INDIV: HCPCS | Performed by: DIETITIAN, REGISTERED

## 2019-09-09 RX ORDER — LANCETS
EACH MISCELLANEOUS 4 TIMES DAILY
Qty: 102 EACH | Refills: 3 | Status: SHIPPED | OUTPATIENT
Start: 2019-09-09 | End: 2020-09-09

## 2019-09-09 RX ORDER — BLOOD SUGAR DIAGNOSTIC
STRIP MISCELLANEOUS
Qty: 100 EACH | Refills: 3 | Status: SHIPPED | OUTPATIENT
Start: 2019-09-09 | End: 2020-09-09

## 2019-09-09 NOTE — PATIENT INSTRUCTIONS
1  Continue Meal Plan consisting of 3 meals and 3 snacks daily, including protein at each  2  Try to eat every 2-3 5 hours while awake  3  Do not exceed 8-10 hours fasting overnight  4  Continue self-monitoring blood glucose 4 times per day: fasting and 2 hours after the start of each meal (record in log book)  5  Submit blood sugar values weekly via email: [email or web address] or My Chart  6  If no restriction from physician, recommend up to 30 minutes walking daily  7  Please bring blood sugar log book and 3 day food diary with to next apt on 9/16  8  Please  test strips and needles at [facility] pharmacy

## 2019-09-09 NOTE — PROGRESS NOTES
Thank you for referring your patient to [facility] Maternal Fetal Medicine Diabetes Education Program  Meredith Ventura is a  26 y o  female who presents today for GDM Initial visit  (Class 1)  Patient is at 28w0d gestation, Estimated Date of Delivery: 19  Reviewed and updated the following from patients medical record: PMH, Problem List, Allergies, and Current Medications  Diagnosis:  Medical Diagnosis/ICD 10: GDM third trimester- method of control unspecified    Discussed with patient what GDM is, pathophysiology of GDM, untreated GDM and maternal fetal complications including fetal macrosomia,  hypoglycemia, polyhydramnios, increased incidence of  section,  labor, and in severe cases fetal demise and still birth   Discussed importance of blood glucose monitoring, nutrition, and medication if necessary in achieving BG goals  Discussed resumption of non-diabetic state post delivery, but reviewed increased risk of Type 2 DM later in life and ways to reduce risk by maintaining a healthy weight and eating habits      PMH/PSH:  Past Medical History:   Diagnosis Date    Disease of thyroid gland     Pap smear abnormality of cervix with LGSIL 2016    Varicella     Had as a child     Past Surgical History:   Procedure Laterality Date     SECTION      DILATION AND EVACUATION      Molar    LAPAROSCOPY         Labs:  No components found for: HGA1C  No results found for: EAG  Lab Results   Component Value Date    [id number] 143 (H) 2019     9/6: fasting glucose - 98 mg/dL    Medications:  Current Outpatient Medications on File Prior to Visit   Medication Sig Dispense Refill    docusate sodium (COLACE) 100 mg capsule Take 1 capsule (100 mg total) by mouth 2 (two) times a day 10 capsule 0    Ferrous Sulfate (FEROSUL PO) Every 12 hours      metoclopramide (REGLAN) 10 mg tablet Take 1 tablet (10 mg total) by mouth 3 (three) times a day as needed (nausea) 30 tablet 2  Prenatal Vit-Fe Fumarate-FA ([facility] PRENATAL) 28-0 8 MG TABS Take 1 tablet by mouth daily 30 each 6     No current facility-administered medications on file prior to visit  Recent Ultrasound for Growth Shows:   DATE:7/17  Fetal Growth: Normal  KATHIA: Normal    Anthropometrics:  Ht Readings from Last 3 Encounters:   08/14/19 5' 5" (1 651 m)   08/14/19 5' 6" (1 676 m)   08/06/19 5' 6" (1 676 m)     Wt Readings from Last 3 Encounters:   08/20/19 99 7 kg (219 lb 12 8 oz)   08/14/19 90 7 kg (200 lb)   08/14/19 99 8 kg (220 lb)       Pre-gravid weight: 95 7 kg (211 lb)  Pre-gravid BMI: 35 1  Weight Change: 8 lb weight gain  Weight gain recommendations: BMI (> 30) 11-20 lbs      Meal Plan (daily calorie and protein needs):  2200 calories (BTL:[id number]) (PRO: 3-2-3/4-2-3/4-2)  Protein:110 gm/day      Diet Instruction:  1  Patient was provided with a GDM meal plan including 3 meals and 3 snacks  2  Discussed appropriate amounts of CHO, PRO, and Fat at each meal and snack  3  Reviewed CHO exchange list, and portion sizes for both CHO and PRO via food models  4  Instruction on how to read a food label  5  Provided suggested meal/snack options to increase nutrition and maintain consistent /snack intakes  6  Instructed on how to keep a 3-day food diary to be brought to next appointment  7  Encouraged  patient to eat every 2 5-3 hours while awake  8  Encouraged patient to go no longer than 8-10 hours fasting overnight until first meal of the day  Blood Glucose Monitoring/Meter Teaching:   Type of blood sugar meter provided: Accu-chek Guide  Blood sugar reading during demo:83 mg/dL (2 hr PP)  Pt instructed on testing blood sugars: 4 x per day (Fasting, 2 hour after start of each meal)    Gave instruction on site selection, skin preparation, loading strips and lancet device, meter activation, obtaining blood sample, test strip and lancet disposal and storage, and recording log book entries   Patient has good understanding of material covered and was able to test their own blood sugar in office today  Instruction for reporting blood sugar results weekly or sooner or more frequent as indicated Via:   Phone: [phone]   Fax: [phone]   E-mail: [email or web address]   My Chart    Blood Sugar Goal  Ranges:    Fastin-90 mg/dL   1 hour after the start of each meal: 135 mg/dL or <   2 hours after start of each meal: 120 mg/dL or <       Physical Activity Assessment:  Exercise: no  Type: N/A  Frequency: N/A    Discussed benefits of physical activity to optimize blood glucose control, encouraged activity at patient is physically able  Always consult a physician prior to starting an exercise program  Recommend 20-30 minutes daily  Nutrition Diagnosis Statement:  Nutrition Diagnosis: Altered nutrition related lab values (glucose)  Related to: GDM   As evidenced by: 1 hr  mg/dL, Fasting 98 mg/dL    Goals:  1  Fasting BG 60-90 mg/dL  2  2 hr PP Blood Glucose <120 mg/dL (1 hr PP <135)  3  GDM Diet   4  Patient stated goal: "I will check my blood sugar 4 times each day, as directed by diabetes and pregnancy team" and "I will eat 3 meals and 3 snacks each day, including protein at each"    Monitoring and Evaluation  1  Adherence to GDM diet  2  Blood Glucose Monitoring   3  Weight/ Body Composition    Nutrition Assessment Questionnaire:   Personal History of diabetes? no  Family History of diabetes? no  Previous diabetes education?: no  Occupation (including hours worked): homemaker  Do you follow any special diet presently?:no, however since diagnosis has stopped drinking juice and increased water intake  Do you have any ethnic dietary preferences? no  Do you have any food allergies or intolerances? yes shrimp allergy  Do you have any food aversions with this pregnancy?  yes not big meat eater (eats small amount)  Food/Beverage Likes/Dislikes: likes milk drinks a couple glasses per day  How many meals/snacks do you eat daily? 1-2 meals with no snacks  Not eating as frequently as she normally would due to increased stress recently  Breakfast: cereal with milk or eggs with rondon  Lunch skips   Dinner - rice beans and baked steak or chicken (7:30-8:00)  Snacks on watermelon throughout the day  Times of Meals/Snacks: B:7:00 S:10:00 L:12:00-1:00 S:3:00-4:00 D:7:30-8:00 S:10:00      Patient Response to Instruction:  Learner/s Present:Learners Present: Patient  and friend/family member  Educational Materials Provided: Diabetes in Pregnancy Booklet, Meal Plan 2200 calories and 3-day Food Log  Teaching methods used: Listened to Instruction, Visualized Demonstration, Patient taught- back information , Label Reading, Food Models and Meter Teaching/Demo  Patients Response to Education Information Provided: Voices Understanding and Was able to teach back information provided  Readiness to Change: Preparation (Preparing to make changes)      Begin Time: 1:10 pm  End Time: 2:20 pm    Class 2 (date): 9/16    Referring Provider: Lee Ann Bravo MD    It was a pleasure working with them today  Please feel free to call with any questions or concerns      Tiffanie Byrne RD, LDN  [facility]  Diabetes in Pregnancy Program  [address] [place] [address],[address]  [address]

## 2019-09-10 ENCOUNTER — ROUTINE PRENATAL (OUTPATIENT)
Dept: OBGYN CLINIC | Facility: CLINIC | Age: 29
End: 2019-09-10
Payer: COMMERCIAL

## 2019-09-10 ENCOUNTER — ULTRASOUND (OUTPATIENT)
Dept: PERINATAL CARE | Facility: OTHER | Age: 29
End: 2019-09-10
Payer: COMMERCIAL

## 2019-09-10 VITALS
HEART RATE: 101 BPM | WEIGHT: 213.8 LBS | SYSTOLIC BLOOD PRESSURE: 118 MMHG | BODY MASS INDEX: 35.58 KG/M2 | DIASTOLIC BLOOD PRESSURE: 62 MMHG

## 2019-09-10 VITALS
WEIGHT: 212.6 LBS | HEIGHT: 65 IN | DIASTOLIC BLOOD PRESSURE: 69 MMHG | HEART RATE: 94 BPM | SYSTOLIC BLOOD PRESSURE: 109 MMHG | BODY MASS INDEX: 35.42 KG/M2

## 2019-09-10 DIAGNOSIS — O24.410 DIET CONTROLLED GESTATIONAL DIABETES MELLITUS (GDM) IN THIRD TRIMESTER: ICD-10-CM

## 2019-09-10 DIAGNOSIS — O44.03 PLACENTA PREVIA IN THIRD TRIMESTER: ICD-10-CM

## 2019-09-10 DIAGNOSIS — Z3A.28 28 WEEKS GESTATION OF PREGNANCY: Primary | ICD-10-CM

## 2019-09-10 DIAGNOSIS — Z28.21 INFLUENZA VACCINE REFUSED: ICD-10-CM

## 2019-09-10 DIAGNOSIS — Z87.59 HISTORY OF IUFD: ICD-10-CM

## 2019-09-10 DIAGNOSIS — Z3A.28 28 WEEKS GESTATION OF PREGNANCY: ICD-10-CM

## 2019-09-10 DIAGNOSIS — O34.219 H/O CESAREAN SECTION COMPLICATING PREGNANCY: ICD-10-CM

## 2019-09-10 DIAGNOSIS — Z30.2 REQUEST FOR STERILIZATION: ICD-10-CM

## 2019-09-10 DIAGNOSIS — O43.223 PLACENTA INCRETA DURING PREGNANCY IN THIRD TRIMESTER, ANTEPARTUM: Primary | ICD-10-CM

## 2019-09-10 DIAGNOSIS — Z98.891 H/O CESAREAN SECTION: ICD-10-CM

## 2019-09-10 DIAGNOSIS — Z23 NEED FOR TDAP VACCINATION: ICD-10-CM

## 2019-09-10 DIAGNOSIS — O99.213 OBESITY AFFECTING PREGNANCY IN THIRD TRIMESTER: ICD-10-CM

## 2019-09-10 DIAGNOSIS — O99.019 ANEMIA DURING PREGNANCY: ICD-10-CM

## 2019-09-10 PROBLEM — R10.2 PELVIC PAIN: Status: RESOLVED | Noted: 2019-08-06 | Resolved: 2019-09-10

## 2019-09-10 PROBLEM — O99.810 ABNORMAL GLUCOSE TOLERANCE IN PREGNANCY: Status: RESOLVED | Noted: 2019-08-09 | Resolved: 2019-09-10

## 2019-09-10 LAB
ERYTHROCYTE [DISTWIDTH] IN BLOOD BY AUTOMATED COUNT: 16.4 % (ref 11.6–15.1)
HCT VFR BLD AUTO: 32.5 % (ref 34.8–46.1)
HGB BLD-MCNC: 9.9 G/DL (ref 11.5–15.4)
MCH RBC QN AUTO: 25.8 PG (ref 26.8–34.3)
MCHC RBC AUTO-ENTMCNC: 30.5 G/DL (ref 31.4–37.4)
MCV RBC AUTO: 85 FL (ref 82–98)
PLATELET # BLD AUTO: 319 THOUSANDS/UL (ref 149–390)
PMV BLD AUTO: 11 FL (ref 8.9–12.7)
RBC # BLD AUTO: 3.84 MILLION/UL (ref 3.81–5.12)
WBC # BLD AUTO: 9.63 THOUSAND/UL (ref 4.31–10.16)

## 2019-09-10 PROCEDURE — 99213 OFFICE O/P EST LOW 20 MIN: CPT | Performed by: OBSTETRICS & GYNECOLOGY

## 2019-09-10 PROCEDURE — 36415 COLL VENOUS BLD VENIPUNCTURE: CPT | Performed by: OBSTETRICS & GYNECOLOGY

## 2019-09-10 PROCEDURE — 90715 TDAP VACCINE 7 YRS/> IM: CPT | Performed by: OBSTETRICS & GYNECOLOGY

## 2019-09-10 PROCEDURE — 76817 TRANSVAGINAL US OBSTETRIC: CPT | Performed by: OBSTETRICS & GYNECOLOGY

## 2019-09-10 PROCEDURE — 76816 OB US FOLLOW-UP PER FETUS: CPT | Performed by: OBSTETRICS & GYNECOLOGY

## 2019-09-10 PROCEDURE — 99214 OFFICE O/P EST MOD 30 MIN: CPT | Performed by: OBSTETRICS & GYNECOLOGY

## 2019-09-10 PROCEDURE — 85027 COMPLETE CBC AUTOMATED: CPT | Performed by: OBSTETRICS & GYNECOLOGY

## 2019-09-10 PROCEDURE — 90471 IMMUNIZATION ADMIN: CPT | Performed by: OBSTETRICS & GYNECOLOGY

## 2019-09-10 NOTE — PATIENT INSTRUCTIONS
Pregnancy at 27 to [address]:   You may notice new symptoms such as shortness of breath, heartburn, or swelling of your ankles and feet  You may also have trouble sleeping or contractions  DISCHARGE INSTRUCTIONS:   Return to the emergency department if:   · You develop a severe headache that does not go away  · You have new or increased vision changes, such as blurred or spotted vision  · You have new or increased swelling in your face or hands  · You have vaginal spotting or bleeding  · Your water broke or you feel warm water gushing or trickling from your vagina  Contact your healthcare provider if:   · You have more than 5 contractions in 1 hour  · You notice any changes in your baby's movements  · You have abdominal cramps, pressure, or tightening  · You have a change in vaginal discharge  · You have chills or a fever  · You have vaginal itching, burning, or pain  · You have yellow, green, white, or foul-smelling vaginal discharge  · You have pain or burning when you urinate, less urine than usual, or pink or bloody urine  · You have questions or concerns about your condition or care  How to care for yourself at this stage of your pregnancy:   · Eat a variety of healthy foods  Healthy foods include fruits, vegetables, whole-grain breads, low-fat dairy foods, beans, lean meats, and fish  Drink liquids as directed  Ask how much liquid to drink each day and which liquids are best for you  Limit caffeine to less than 200 milligrams each day  Limit your intake of fish to 2 servings each week  Choose fish low in mercury such as canned light tuna, shrimp, salmon, cod, or tilapia  Do not  eat fish high in mercury such as swordfish, tilefish, trista mackerel, and shark  · Manage heartburn  by eating 4 or 5 small meals each day instead of large meals  Avoid spicy food  · Manage swelling  by lying down and putting your feet up       · Take prenatal vitamins as directed  Your need for certain vitamins and minerals, such as folic acid, increases during pregnancy  Prenatal vitamins provide some of the extra vitamins and minerals you need  Prenatal vitamins may also help to decrease the risk of certain birth defects  · Talk to your healthcare provider about exercise  Moderate exercise can help you stay fit  Your healthcare provider will help you plan an exercise program that is safe for you during pregnancy  · Do not smoke  If you smoke, it is never too late to quit  Smoking increases your risk of a miscarriage and other health problems during your pregnancy  Smoking can cause your baby to be born too early or weigh less at birth  Ask your healthcare provider for information if you need help quitting  · Do not drink alcohol  Alcohol passes from your body to your baby through the placenta  It can affect your baby's brain development and cause fetal alcohol syndrome (FAS)  FAS is a group of conditions that causes mental, behavior, and growth problems  · Talk to your healthcare provider before you take any medicines  Many medicines may harm your baby if you take them when you are pregnant  Do not take any medicines, vitamins, herbs, or supplements without first talking to your healthcare provider  Never use illegal or street drugs (such as marijuana or cocaine) while you are pregnant  Safety tips during pregnancy:   · Avoid hot tubs and saunas  Do not use a hot tub or sauna while you are pregnant, especially during your first trimester  Hot tubs and saunas may raise your baby's temperature and increase the risk of birth defects  · Avoid toxoplasmosis  This is an infection caused by eating raw meat or being around infected cat feces  It can cause birth defects, miscarriages, and other problems  Wash your hands after you touch raw meat  Make sure any meat is well-cooked before you eat it  Avoid raw eggs and unpasteurized milk   Use gloves or ask someone else to clean your cat's litter box while you are pregnant  Changes that are happening with your baby:  By 30 weeks, your baby may weigh more than 3 pounds  Your baby may be about 11 inches long from the top of the head to the rump (baby's bottom)  Your baby's eyes open and close now  Your baby's kicks and movements are more forceful at this time  What you need to know about prenatal care: Your healthcare provider will check your blood pressure and weight  You may also need the following:  · Blood tests  may be done to check for anemia or blood type  · A urine test  may also be done to check for sugar and protein  These can be signs of gestational diabetes or infection  Protein in your urine may also be a sign of preeclampsia  Preeclampsia is a condition that can develop during week 20 or later of your pregnancy  It causes high blood pressure, and it can cause problems with your kidneys and other organs  · A Tdap vaccine and flu vaccine  may be recommended by your healthcare provider  · A gestational diabetes screen  will be done using an oral glucose tolerance test (OGTT)  An OGTT starts with a blood sugar level check after you have not eaten for 8 hours  You are then given a glucose drink  Your blood sugar level is checked after 1 hour, 2 hours, and sometimes 3 hours  Healthcare providers look at how much your blood sugar level increases from the first check  · Fundal height  is a measurement of your uterus to check your baby's growth  This number is usually the same as the number of weeks that you have been pregnant  Your healthcare provider may also check your baby's position  · Your baby's heart rate  will be checked  © 2017 [address] [facility] Information is for End User's use only and may not be sold, redistributed or otherwise used for commercial purposes   All illustrations and images included in CareNotes® are the copyrighted property of [employer]  or [facility] [employer]  The above information is an  only  It is not intended as medical advice for individual conditions or treatments  Talk to your doctor, nurse or pharmacist before following any medical regimen to see if it is safe and effective for you  Diphtheria/Acellular Pertussis/Tetanus Booster Vaccine (Tdap) (By injection)   Pertussis Vaccine, Acellular (per-TUS-iss VAX-een, u-QSAB-swz-lar), Reduced Diphtheria Toxoid (ree-DOOST dif-THEER-ee-a TOX-oyd), Tetanus Toxoid (TET-a-nus TOX-oyd)  Protects against infections caused by tetanus (lockjaw), diphtheria, or pertussis (whooping cough)  This is a booster vaccine  Brand Name(s): Adacel, Boostrix   There may be other brand names for this medicine  When This Medicine Should Not Be Used: You should not receive this vaccine if you have had an allergic reaction to the separate or combined tetanus, diphtheria, or pertussis vaccine  You should not receive this vaccine if you have had seizures, mental changes, or any other serious reaction within 7 days after you received a pertussis vaccine  How to Use This Medicine:   Injectable  · A nurse or other health provider will give you this medicine  · Your doctor will prescribe your exact dose and tell you how often it should be given  This medicine is given as a shot into one of your muscles  · You may receive other vaccines at the same time as this one, but in a different body area  You should receive patient instructions for all of the vaccines  Talk to your doctor or nurse if you have questions  · Read and follow the patient instructions that come with this medicine  Talk to your doctor or pharmacist if you have any questions  Drugs and Foods to Avoid:   Ask your doctor or pharmacist before using any other medicine, including over-the-counter medicines, vitamins, and herbal products  · Make sure the doctor knows if you are receiving a treatment or medicine that weakens your immune system   This includes radiation treatment, steroid medicines (such as dexamethasone, hydrocortisone, methylprednisolone, prednisolone, prednisone, Medrol®), or cancer medicines  Warnings While Using This Medicine:   · Make sure your doctor knows if you are pregnant or breastfeeding or have epilepsy, a weak immune system, or a history of a stroke  Tell your doctor if you are sick or have a fever  · Tell your doctor about any reaction you had after you received a vaccine  This includes fainting, seizures, a fever over 105 degrees F, or severe redness or swelling where the shot was given  Tell your doctor if you have a history of Guillain-Barré syndrome after you received a vaccine with tetanus  · Call your doctor right away if you faint or have vision changes, numbness or tingling in your arms, hands, or feet, or a seizure after you receive this vaccine  · Tell your doctor if you have an allergy to latex  The syringes may contain dry natural latex rubber  · This vaccine will not treat an active infection  If you have a diphtheria, tetanus, or pertussis infection, you will need medicine to treat the infection    Possible Side Effects While Using This Medicine:   Call your doctor right away if you notice any of these side effects:  · Allergic reaction: Itching or hives, swelling in your face or hands, swelling or tingling in your mouth or throat, chest tightness, trouble breathing  · Changes in vision  · Fever over 105 degrees F  · Lightheadedness or fainting  · Numbness, tingling, or burning pain in your hands, arms, legs, or feet  · Seizures  · Sudden numbness or weakness in your arms or legs  · Severe pain, redness, or swelling where the shot was given  If you notice these less serious side effects, talk with your doctor:   · Headache  · Mild pain, redness, or swelling where the shot was given  · Nausea, vomiting, diarrhea, or stomach pain  · Tiredness  If you notice other side effects that you think are caused by this medicine, tell your doctor  Call your doctor for medical advice about side effects  You may report side effects to FDA at 1-523-FDA-8225  © 2017 [address] Information is for End User's use only and may not be sold, redistributed or otherwise used for commercial purposes  The above information is an  only  It is not intended as medical advice for individual conditions or treatments  Talk to your doctor, nurse or pharmacist before following any medical regimen to see if it is safe and effective for you

## 2019-09-10 NOTE — PROGRESS NOTES
OB/GYN  PN Visit  Killian Brooke  [id number]  9/10/2019  12:44 PM  Dr Micheal Tracy MD    S: 26 y o  [id number] 28w1d here for PN visit  OB complaints:  Contractions: no  Leakage: no  Bleeding: no  Fetal movement: yes      O:  Vitals:    09/10/19 1230   BP: 118/62   Pulse: 101       Gen: no acute distress, nonlabored breathing  Fundal Height (cm): 30 cm  Fetal Heart Rate: 150      A/P:    Problem List Items Addressed This Visit        Endocrine    GDM (gestational diabetes mellitus)     No results found for: HGBA1C    No results for input(s): POCGLU in the last 72 hours  Blood Sugar Average: Last 72 hrs:    Diabetes education            Other    Anemia during pregnancy     Continue iron supplementation and recheck CBC  Last hemoglobin 10 9         Relevant Orders    CBC    H/O  section     History of 4 prior  sections x 4, will schedule for repeat          Placenta previa in third trimester     Growth ultrasound, repeat assessment placenta to rule out placenta accreta         28 weeks gestation of pregnancy - Primary     Fetal kick counts         Relevant Orders    TDAP Vaccine greater than or equal to 8yo    History of IUFD     Antepartum fetal surveillance at 32 weeks  Prior history of IUFD at term with shoulder dystocia         Obesity affecting pregnancy in third trimester     Early Glucola was normal, most recent 3 hour GTT shows gestational diabetes         Influenza vaccine refused    Need for Tdap vaccination    Relevant Orders    TDAP Vaccine greater than or equal to 8yo    Request for sterilization            Patient declines flu vaccine but agrees to TDAP,  US performed this morning, report pending  Follow-up in 2 weeks  Patient interested in tubal ligation, MA form signed  Micheal Tracy MD  9/10/2019  12:44 PM        Addendum:   ultrasound was done reviewed  Patient with suspicion of placenta increta    Patient was called therefore and spoke with her about recommendation about delivery around 34-35 weeks at [facility]  I discussed with patient recommendation for  hysterectomy  We will try to coordinate multidisciplinary meeting with gynecologic oncology around 32 weeks  She was advised if with any episodes of vaginal bleeding, questionable  labor that she should proceed to [address] and delivery for evaluation and possible delivery  CBC was checked today and we discussed possibility of starting IV iron infusion if she continues to be anemic        Lee Ann Bravo MD  4:51 PM

## 2019-09-10 NOTE — LETTER
September 10, 2019     MD Vladislav Bonds [id number] [facility]    Patient: Jim Grimes   YOB: 1990   Date of Visit: 9/10/2019       Dear Dr Sindhu Pierce: Thank you for referring Jim Grimes to me for evaluation  Below are my notes for this consultation  If you have questions, please do not hesitate to call me  I look forward to following your patient along with you  Sincerely,        Betzy Motley MD        CC: No Recipients  Betzy Motley MD  9/10/2019  1:11 PM  Sign at close encounter  Ultrasound suggestive of an increta  Please refer to the [facility] ultrasound report in Ob Procedures for additional information regarding [facility]    Betzy Motley MD

## 2019-09-10 NOTE — ASSESSMENT & PLAN NOTE
No results found for: HGBA1C    No results for input(s): POCGLU in the last 72 hours      Blood Sugar Average: Last 72 hrs:    Diabetes education

## 2019-09-10 NOTE — PROGRESS NOTES
Ultrasound suggestive of an increta  Please refer to the [facility] ultrasound report in Ob Procedures for additional information regarding the visit to the [facility]  today    Butch Miller MD

## 2019-09-11 ENCOUNTER — HOSPITAL ENCOUNTER (OUTPATIENT)
Facility: HOSPITAL | Age: 29
Discharge: HOME | End: 2019-09-11
Attending: EMERGENCY MEDICINE | Admitting: OBSTETRICS & GYNECOLOGY
Payer: COMMERCIAL

## 2019-09-11 ENCOUNTER — TELEPHONE (OUTPATIENT)
Dept: OBGYN CLINIC | Facility: CLINIC | Age: 29
End: 2019-09-11

## 2019-09-11 VITALS
HEART RATE: 100 BPM | SYSTOLIC BLOOD PRESSURE: 118 MMHG | DIASTOLIC BLOOD PRESSURE: 67 MMHG | RESPIRATION RATE: 16 BRPM | HEIGHT: 65 IN | TEMPERATURE: 97.7 F | OXYGEN SATURATION: 97 % | WEIGHT: 213.8 LBS | BODY MASS INDEX: 35.62 KG/M2

## 2019-09-11 DIAGNOSIS — O99.019 ANEMIA DURING PREGNANCY: Primary | ICD-10-CM

## 2019-09-11 PROCEDURE — 99212 OFFICE O/P EST SF 10 MIN: CPT

## 2019-09-11 PROCEDURE — NC001 PR NO CHARGE: Performed by: FAMILY MEDICINE

## 2019-09-11 NOTE — PROGRESS NOTES
DR Johnny Topete,  PT SIGN OUT AMA  EXPRESSED CONCERNS OF  LABOR  PT STILL WOULD LIKE TO SIGN OUT AMA

## 2019-09-11 NOTE — PROGRESS NOTES
Triage Note - OB  Yadi Mike 26 y o  female MRN: [id number]  Unit/Bed#: L&D 329-01 Encounter: [id number]    Chief Complaint: Pain on right lower abdomen   MINE: Estimated Date of Delivery: 19    HPI: Patient is a [id number] at 28w2d here for right lower abdominal pressure pain  The pain started yesterday, only occurring when she is walking and is a 7/10  It is the first time that this pain is occurring  Patient otherwise denies any other symptoms  Denies leakage of fluid, vaginal bleeding, chest pain, shortness of breath or any other complains  Patient had a routine prenatal visit with Dr Gary Montero yesterday, she didn't have any complaints during the visit, the abdominal pressure started after the visit  Patient was contacted by Dr Gary Montero today, she was notified of her recent hemoglobulin of 9 9 down from 10 9, she wanted her to start her on IV iron 200mg 2x/week x 5 weeks     Pregnancy is complicated by anemia during pregnancy, 4 previous  and placenta previa in third sem, she will undergoes growth U/S to repeat assessment of placenta to rule out placenta accreta at a later date  Vitals:   /67 (BP Location: Right arm)   Pulse 100   Temp 97 7 °F (36 5 °C) (Oral)   Resp 16   Ht 5' 5" (1 651 m)   Wt 97 kg (213 lb 12 8 oz)   LMP 2019 (Within Days)   SpO2 97%   BMI 35 58 kg/m²   Body mass index is 35 58 kg/m²  Physical Exam  GEN: Laying comfortably in no apparent distress     ABD: Soft, nontender, gravid uterus       Labs:   Recent Results (from the past 24 hour(s))   CBC    Collection Time: 09/10/19  1:01 PM   Result Value Ref Range    WBC 9 63 4 31 - 10 16 Thousand/uL    RBC 3 84 3 81 - 5 12 Million/uL    Hemoglobin 9 9 (L) 11 5 - 15 4 g/dL    Hematocrit 32 5 (L) 34 8 - 46 1 %    MCV 85 82 - 98 fL    MCH 25 8 (L) 26 8 - 34 3 pg    MCHC 30 5 (L) 31 4 - 37 4 g/dL    RDW 16 4 (H) 11 6 - 15 1 %    Platelets 185 009 - 823 Thousands/uL    MPV 11 0 8 9 - 12 7 fL           Lab, Imaging and other studies: I have personally reviewed pertinent reports  A/P: Ki Morales is a 26 y o  female who presented to triage due to right lower quadrant abdominal pain  Likely a round ligament pain  1) [place] monitoring   2) U/S to check cervical length     3) Patient left AMA as she has an appointment that she needs to go to  Patient was counseled on risk of leaving AMA including  labor  AMA form was signed  Patient was instructed that she could always return to triage if she experiences more symptoms       4) D/W Dr Clemente Dunn MD  2019  12:34 PM

## 2019-09-11 NOTE — TELEPHONE ENCOUNTER
Most recent CBC shows Hgb of 9 9 from 10 9  Patient will need IV iron 200 mg 2x/week x 5 weeks  Left message for pt to call office

## 2019-09-12 DIAGNOSIS — O99.019 ANEMIA DURING PREGNANCY: Primary | ICD-10-CM

## 2019-09-12 RX ORDER — SODIUM CHLORIDE 9 MG/ML
20 INJECTION, SOLUTION INTRAVENOUS ONCE
Status: CANCELLED | OUTPATIENT
Start: 2019-09-13

## 2019-09-12 NOTE — PROGRESS NOTES
Call form Dr Ursula Waldron to arrange venofer infusions for patient, contacted patient , appt made for 9/13/19, patient will schedule remaining appts tomorrow

## 2019-09-12 NOTE — PROGRESS NOTES
I ordered a new Therapy Plan  I called the infusion center at [facility]: [phone] to confirm the order was received  I specified the location of the infusion in the Treatment Department field  For twice weekly infusions, I specified a frequency of WEEKLY, ANY DAY, [2] times per week, with a [2] day separation  I will instruct the patient to call and schedule her first infusion  Will call patient with below instructions  Your treatment includes one or more IV infusions by appointment only  You must call the [facility] to set up your appointment  Please call the number listed below to set up your first appointment  Thank you!   Avril Lo  Heart: [phone]

## 2019-09-13 ENCOUNTER — TELEPHONE (OUTPATIENT)
Dept: OBGYN CLINIC | Facility: CLINIC | Age: 29
End: 2019-09-13

## 2019-09-13 ENCOUNTER — HOSPITAL ENCOUNTER (OUTPATIENT)
Dept: INFUSION CENTER | Facility: HOSPITAL | Age: 29
Discharge: HOME | End: 2019-09-13
Payer: COMMERCIAL

## 2019-09-13 DIAGNOSIS — O26.899 PAIN OF ROUND LIGAMENT DURING PREGNANCY: Primary | ICD-10-CM

## 2019-09-13 DIAGNOSIS — R10.2 PAIN OF ROUND LIGAMENT DURING PREGNANCY: Primary | ICD-10-CM

## 2019-09-13 DIAGNOSIS — O99.019 ANEMIA DURING PREGNANCY: Primary | ICD-10-CM

## 2019-09-13 PROCEDURE — 96365 THER/PROPH/DIAG IV INF INIT: CPT

## 2019-09-13 RX ORDER — SODIUM CHLORIDE 9 MG/ML
20 INJECTION, SOLUTION INTRAVENOUS ONCE
Status: COMPLETED | OUTPATIENT
Start: 2019-09-13 | End: 2019-09-13

## 2019-09-13 RX ORDER — SODIUM CHLORIDE 9 MG/ML
20 INJECTION, SOLUTION INTRAVENOUS ONCE
Status: CANCELLED | OUTPATIENT
Start: 2019-09-15

## 2019-09-13 RX ADMIN — IRON SUCROSE 200 MG: 20 INJECTION, SOLUTION INTRAVENOUS at 12:16

## 2019-09-13 RX ADMIN — SODIUM CHLORIDE 20 ML/HR: 0.9 INJECTION, SOLUTION INTRAVENOUS at 12:05

## 2019-09-13 NOTE — PLAN OF CARE
Problem: Potential for Falls  Goal: Patient will remain free of falls  Description  INTERVENTIONS:  - Assess patient frequently for physical needs  -  Identify cognitive and physical deficits and behaviors that affect risk of falls    -  Hermann fall precautions as indicated by assessment   - Educate patient/family on patient safety including physical limitations  - Instruct patient to call for assistance with activity based on assessment  - Modify environment to reduce risk of injury  - Consider OT/PT consult to assist with strengthening/mobility  Outcome: Progressing

## 2019-09-13 NOTE — TELEPHONE ENCOUNTER
Patient called, she needs to know what her planned date is for her  so she can inform her family members that will be taking care of her children

## 2019-09-13 NOTE — TELEPHONE ENCOUNTER
Perinatology is recommending delivery at 34-35 6/7 weeks  A multi discipline area meeting has to be planned for every 1 to be involved and present during her delivery  We will let her know as soon as we have a date set for her

## 2019-09-15 ENCOUNTER — HOSPITAL ENCOUNTER (EMERGENCY)
Facility: HOSPITAL | Age: 29
Discharge: HOME | End: 2019-09-15
Attending: EMERGENCY MEDICINE | Admitting: OBSTETRICS & GYNECOLOGY
Payer: COMMERCIAL

## 2019-09-15 VITALS
DIASTOLIC BLOOD PRESSURE: 67 MMHG | HEART RATE: 89 BPM | BODY MASS INDEX: 35.37 KG/M2 | OXYGEN SATURATION: 98 % | RESPIRATION RATE: 16 BRPM | TEMPERATURE: 98.6 F | SYSTOLIC BLOOD PRESSURE: 110 MMHG | WEIGHT: 212.52 LBS

## 2019-09-15 DIAGNOSIS — S40.021A CONTUSION OF RIGHT UPPER EXTREMITY, INITIAL ENCOUNTER: Primary | ICD-10-CM

## 2019-09-15 DIAGNOSIS — M54.9 BACK PAIN: ICD-10-CM

## 2019-09-15 PROBLEM — Z91.81 STATUS POST FALL: Status: ACTIVE | Noted: 2019-09-15

## 2019-09-15 LAB
BACTERIA UR QL AUTO: ABNORMAL /HPF
BILIRUB UR QL STRIP: NEGATIVE
CLARITY UR: CLEAR
CLARITY, POC: CLEAR
COLOR UR: YELLOW
COLOR, POC: YELLOW
GLUCOSE UR STRIP-MCNC: NEGATIVE MG/DL
HGB UR QL STRIP.AUTO: NEGATIVE
KETONES UR STRIP-MCNC: ABNORMAL MG/DL
LEUKOCYTE ESTERASE UR QL STRIP: NEGATIVE
NITRITE UR QL STRIP: NEGATIVE
NON-SQ EPI CELLS URNS QL MICRO: ABNORMAL /HPF
PH UR STRIP.AUTO: 7 [PH] (ref 4.5–8)
PROT UR STRIP-MCNC: ABNORMAL MG/DL
RBC #/AREA URNS AUTO: ABNORMAL /HPF
SP GR UR STRIP.AUTO: 1.02 (ref 1–1.03)
UROBILINOGEN UR QL STRIP.AUTO: 0.2 E.U./DL
WBC #/AREA URNS AUTO: ABNORMAL /HPF

## 2019-09-15 PROCEDURE — 99284 EMERGENCY DEPT VISIT MOD MDM: CPT

## 2019-09-15 PROCEDURE — 87086 URINE CULTURE/COLONY COUNT: CPT

## 2019-09-15 PROCEDURE — 99212 OFFICE O/P EST SF 10 MIN: CPT

## 2019-09-15 PROCEDURE — NC001 PR NO CHARGE: Performed by: OBSTETRICS & GYNECOLOGY

## 2019-09-15 PROCEDURE — 99284 EMERGENCY DEPT VISIT MOD MDM: CPT | Performed by: EMERGENCY MEDICINE

## 2019-09-15 PROCEDURE — 81001 URINALYSIS AUTO W/SCOPE: CPT

## 2019-09-15 NOTE — ED PROVIDER NOTES
History  Chief Complaint   Patient presents with    Back Pain     Patient reports walking down steps yesterday and on fourth of fifth step slipped and fell landing on buttocks  patient reports lower back pain without radiation  no loss of bowel or bladder function  did not strike head  27 weeks pregnant  patient denies hitting abdomen during fall  no abdomainl pain or cramping  denies vaginal bleeding or abnormal discharge  reports fetal movement  79-year-old female with a history of bipolar depression, currently 27 weeks pregnant presents to the emergency department after a slip and fall down steps yesterday  Patient states that she fell down approximately 5 steps on her back  She did not strike her head and there was no loss of consciousness  She denies any problems with the pregnancy such as vaginal bleeding or leakage of fluid  She states she has felt the baby move  She does have a history of placenta previa  Patient has a due date of December 2nd but states that she may be induced before then  History provided by:  Patient   used: No    Back Pain   Location:  Lumbar spine and thoracic spine  Quality:  Aching  Pain severity:  Mild  Timing:  Constant  Progression:  Unchanged  Chronicity:  New  Context: falling    Relieved by:  None tried  Worsened by: Movement  Ineffective treatments:  None tried  Associated symptoms: no abdominal pain, no chest pain, no fever, no headaches, no numbness, no paresthesias and no weakness    Risk factors: pregnancy        Prior to Admission Medications   Prescriptions Last Dose Informant Patient Reported? Taking? ACCU-CHEK FASTCLIX LANCETS MISC  Self No No   Sig: by Does not apply route 4 (four) times a day Test blood sugar 4 times per day, fasting and 2 hours after the start of each meal   ACCU-CHEK GUIDE test strip  Self No No   Sig: Test 4 times a day and as instructed     Ferrous Sulfate (FEROSUL PO)  Self Yes Yes   Sig: Every 12 hours Prenatal Vit-Fe Fumarate-FA ([facility] PRENATAL) 28-0 8 MG TABS  Self No Yes   Sig: Take 1 tablet by mouth daily   docusate sodium (COLACE) 100 mg capsule  Self No No   Sig: Take 1 capsule (100 mg total) by mouth 2 (two) times a day   metoclopramide (REGLAN) 10 mg tablet  Self No No   Sig: Take 1 tablet (10 mg total) by mouth 3 (three) times a day as needed (nausea)      Facility-Administered Medications: None       Past Medical History:   Diagnosis Date    Disease of thyroid gland     Pap smear abnormality of cervix with LGSIL 2016    Varicella     Had as a child       Past Surgical History:   Procedure Laterality Date     SECTION      DILATION AND EVACUATION      Molar    LAPAROSCOPY         Family History   Problem Relation Age of Onset    Hypertension Mother    Macel Sebastian Lupus Mother     Pulmonary embolism Mother      I have reviewed and agree with the history as documented  Social History     Tobacco Use    Smoking status: Never Smoker    Smokeless tobacco: Never Used   Substance Use Topics    Alcohol use: Not Currently    Drug use: No        Review of Systems   Constitutional: Negative  Negative for chills, diaphoresis, fatigue and fever  HENT: Negative  Negative for congestion, rhinorrhea and sore throat  Eyes: Negative  Negative for discharge, redness and itching  Respiratory: Negative  Negative for apnea, cough, chest tightness, shortness of breath and wheezing  Cardiovascular: Negative for chest pain, palpitations and leg swelling  Gastrointestinal: Negative  Negative for abdominal pain  Endocrine: Negative  Genitourinary: Negative  Negative for flank pain, frequency, urgency, vaginal bleeding and vaginal discharge  Musculoskeletal: Positive for back pain  Skin: Negative  Allergic/Immunologic: Negative  Neurological: Negative  Negative for dizziness, syncope, weakness, light-headedness, numbness, headaches and paresthesias  Hematological: Negative      All other systems reviewed and are negative  Physical Exam  Physical Exam   Constitutional: She is oriented to person, place, and time  She appears well-developed and well-nourished  Non-toxic appearance  She does not have a sickly appearance  She does not appear ill  No distress  On phone in no distress   HENT:   Head: Normocephalic and atraumatic  Right Ear: External ear normal    Left Ear: External ear normal    Mouth/Throat: Oropharynx is clear and moist    Eyes: Pupils are equal, round, and reactive to light  Conjunctivae are normal  Right eye exhibits no discharge  Left eye exhibits no discharge  No scleral icterus  Neck: No spinous process tenderness present  Cardiovascular: Normal rate, regular rhythm and normal heart sounds  Exam reveals no gallop and no friction rub  No murmur heard  Pulmonary/Chest: Effort normal and breath sounds normal  No stridor  No respiratory distress  She has no wheezes  She has no rales  She exhibits no tenderness  Abdominal: Soft  Bowel sounds are normal  She exhibits no distension and no mass  There is no tenderness  No hernia    gravid   Musculoskeletal: Normal range of motion  She exhibits no edema or deformity  Thoracic back: She exhibits tenderness and pain  She exhibits normal range of motion, no bony tenderness, no swelling, no edema, no deformity, no laceration and no spasm  Back:         Right upper arm: She exhibits tenderness  She exhibits no bony tenderness, no swelling, no edema and no deformity  Arms:  Neurological: She is alert and oriented to person, place, and time  She has normal reflexes  She displays normal reflexes  She exhibits normal muscle tone  Skin: Skin is warm and dry  No rash noted  She is not diaphoretic  No erythema  No pallor  Psychiatric: She has a normal mood and affect  Nursing note and vitals reviewed        Vital Signs  ED Triage Vitals [09/15/19 0944]   Temperature Pulse Respirations Blood Pressure SpO2   98 4 °F (36 9 °C) 94 18 131/65 98 %      Temp Source Heart Rate Source Patient Position - Orthostatic VS BP Location FiO2 (%)   Temporal Monitor Sitting Right arm --      Pain Score       6           Vitals:    09/15/19 0944   BP: 131/65   Pulse: 94   Patient Position - Orthostatic VS: Sitting         Visual Acuity      ED Medications  Medications - No data to display    Diagnostic Studies  Results Reviewed     None                 No orders to display              Procedures  Procedures       ED Course                               MDM  Number of Diagnoses or Management Options  Diagnosis management comments: 40-year-old female presents to the emergency department for evaluation of fall that occurred last evening  Patient states that she slipped and fell down 5 steps on her back  She is 27 weeks pregnant  She has had no complaints related to the pregnancy  She does complain of diffuse back pain  On exam she is in no acute distress  Fetal heart tones here 150  She does have a contusion to the right upper arm without bony tenderness or deformity  She is tender along her entire back without definite bony tenderness  No abrasions or contusions noted to the back  She is neurologically intact  Will dip urine to rule out hematuria  Spoke to OBGYN resident to would advise patient be monitored with NST upstairs  Amount and/or Complexity of Data Reviewed  Discuss the patient with other providers: yes  Independent visualization of images, tracings, or specimens: yes        Disposition  Final diagnoses:   None     ED Disposition     None      Follow-up Information    None         Patient's Medications   Discharge Prescriptions    No medications on file     No discharge procedures on file      ED Provider  Electronically Signed by           Jossue Alan DO  09/15/19 4300

## 2019-09-15 NOTE — DISCHARGE INSTRUCTIONS
Contusion in Adults   WHAT YOU NEED TO KNOW:   A contusion is a bruise that appears on your skin after an injury  A bruise happens when small blood vessels tear but skin does not  When blood vessels tear, blood leaks into nearby tissue, such as soft tissue or muscle  DISCHARGE INSTRUCTIONS:   Return to the emergency department if:   · You have new trouble moving the injured area  · You have tingling or numbness in or near the injured area  · Your hand or foot below the bruise gets cold or turns pale  Contact your healthcare provider if:   · You find a new lump in the injured area  · Your symptoms do not improve with treatment after 4 to 5 days  · You have questions or concerns about your condition or care  Medicines: You may need any of the following:  · NSAIDs  help decrease swelling and pain or fever  This medicine is available with or without a doctor's order  NSAIDs can cause stomach bleeding or kidney problems in certain people  If you take blood thinner medicine, always ask your healthcare provider if NSAIDs are safe for you  Always read the medicine label and follow directions  · Prescription pain medicine  may be given  Do not wait until the pain is severe before you take your medicine  · Take your medicine as directed  Contact your healthcare provider if you think your medicine is not helping or if you have side effects  Tell him of her if you are allergic to any medicine  Keep a list of the medicines, vitamins, and herbs you take  Include the amounts, and when and why you take them  Bring the list or the pill bottles to follow-up visits  Carry your medicine list with you in case of an emergency  Follow up with your healthcare provider as directed: You may need to return within a week to check your injury again  Write down your questions so you remember to ask them during your visits    Help a contusion heal:   · Rest the injured area  or use it less than usual  If you bruised your leg or foot, you may need crutches or a cane to help you walk  This will help you keep weight off your injured body part  · Apply ice  to decrease swelling and pain  Ice may also help prevent tissue damage  Use an ice pack, or put crushed ice in a plastic bag  Cover it with a towel and place it on your bruise for 15 to 20 minutes every hour or as directed  · Use compression  to support the area and decrease swelling  Wrap an elastic bandage around the area over the bruised muscle  Make sure the bandage is not too tight  You should be able to fit 1 finger between the bandage and your skin  · Elevate (raise) your injured body part  above the level of your heart to help decrease pain and swelling  Use pillows, blankets, or rolled towels to elevate the area as often as you can  · Do not drink alcohol  as directed  Alcohol may slow healing  · Do not stretch injured muscles  right after your injury  Ask your healthcare provider when and how you may safely stretch after your injury  Gentle stretches can help increase your flexibility  · Do not massage the area or put heating pads  on the bruise right after your injury  Heat and massage may slow healing  Your healthcare provider may tell you to apply heat after several days  At that time, heat will start to help the injury heal   Prevent another contusion:   · Stretch and warm up before you play sports or exercise  · Wear protective gear when you play sports  Examples are shin guards and padding  · If you begin a new physical activity, start slowly to give your body a chance to adjust   © 2017 [address] Information is for End User's use only and may not be sold, redistributed or otherwise used for commercial purposes  All illustrations and images included in CareNotes® are the copyrighted property of [employer]  or [employer]  The above information is an  only   It is not intended as medical advice for individual conditions or treatments  Talk to your doctor, nurse or pharmacist before following any medical regimen to see if it is safe and effective for you  Pregnancy at 27 to 30 [address]:   You may notice new symptoms such as shortness of breath, heartburn, or swelling of your ankles and feet  You may also have trouble sleeping or contractions  DISCHARGE INSTRUCTIONS:   Seek care immediately if:   · You develop a severe headache that does not go away  · You have new or increased vision changes, such as blurred or spotted vision  · You have new or increased swelling in your face or hands  · You have vaginal spotting or bleeding  · Your water broke or you feel warm water gushing or trickling from your vagina  Contact your healthcare provider if:   · You have more than 5 contractions in 1 hour  · You notice any changes in your baby's movements  · You have abdominal cramps, pressure, or tightening  · You have a change in vaginal discharge  · You have chills or a fever  · You have vaginal itching, burning, or pain  · You have yellow, green, white, or foul-smelling vaginal discharge  · You have pain or burning when you urinate, less urine than usual, or pink or bloody urine  · You have questions or concerns about your condition or care  How to care for yourself at this stage of your pregnancy:   · Eat a variety of healthy foods  Healthy foods include fruits, vegetables, whole-grain breads, low-fat dairy foods, beans, lean meats, and fish  Drink liquids as directed  Ask how much liquid to drink each day and which liquids are best for you  Limit caffeine to less than 200 milligrams each day  Limit your intake of fish to 2 servings each week  Choose fish low in mercury such as canned light tuna, shrimp, salmon, cod, or tilapia  Do not  eat fish high in mercury such as swordfish, tilefish, trista mackerel, and shark       · Manage heartburn  by eating 4 or 5 small meals each day instead of large meals  Avoid spicy food  · Manage swelling  by lying down and putting your feet up  · Take prenatal vitamins as directed  Your need for certain vitamins and minerals, such as folic acid, increases during pregnancy  Prenatal vitamins provide some of the extra vitamins and minerals you need  Prenatal vitamins may also help to decrease the risk of certain birth defects  · Talk to your healthcare provider about exercise  Moderate exercise can help you stay fit  Your healthcare provider will help you plan an exercise program that is safe for you during pregnancy  · Do not smoke  If you smoke, it is never too late to quit  Smoking increases your risk of a miscarriage and other health problems during your pregnancy  Smoking can cause your baby to be born too early or weigh less at birth  Ask your healthcare provider for information if you need help quitting  · Do not drink alcohol  Alcohol passes from your body to your baby through the placenta  It can affect your baby's brain development and cause fetal alcohol syndrome (FAS)  FAS is a group of conditions that causes mental, behavior, and growth problems  · Talk to your healthcare provider before you take any medicines  Many medicines may harm your baby if you take them when you are pregnant  Do not take any medicines, vitamins, herbs, or supplements without first talking to your healthcare provider  Never use illegal or street drugs (such as marijuana or cocaine) while you are pregnant  Safety tips during pregnancy:   · Avoid hot tubs and saunas  Do not use a hot tub or sauna while you are pregnant, especially during your first trimester  Hot tubs and saunas may raise your baby's temperature and increase the risk of birth defects  · Avoid toxoplasmosis  This is an infection caused by eating raw meat or being around infected cat feces   It can cause birth defects, miscarriages, and other problems  Wash your hands after you touch raw meat  Make sure any meat is well-cooked before you eat it  Avoid raw eggs and unpasteurized milk  Use gloves or ask someone else to clean your cat's litter box while you are pregnant  Changes that are happening with your baby:  By 30 weeks, your baby may weigh more than 3 pounds  Your baby may be about 11 inches long from the top of the head to the rump (baby's bottom)  Your baby's eyes open and close now  Your baby's kicks and movements are more forceful at this time  What you need to know about prenatal care: Your healthcare provider will check your blood pressure and weight  You may also need the following:  · Blood tests  may be done to check for anemia or blood type  · A urine test  may also be done to check for sugar and protein  These can be signs of gestational diabetes or infection  Protein in your urine may also be a sign of preeclampsia  Preeclampsia is a condition that can develop during week 20 or later of your pregnancy  It causes high blood pressure, and it can cause problems with your kidneys and other organs  · A Tdap vaccine and flu vaccine  may be recommended by your healthcare provider  · A gestational diabetes screen  will be done using an oral glucose tolerance test (OGTT)  An OGTT starts with a blood sugar level check after you have not eaten for 8 hours  You are then given a glucose drink  Your blood sugar level is checked after 1 hour, 2 hours, and sometimes 3 hours  Healthcare providers look at how much your blood sugar level increases from the first check  · Fundal height  is a measurement of your uterus to check your baby's growth  This number is usually the same as the number of weeks that you have been pregnant  Your healthcare provider may also check your baby's position  · Your baby's heart rate  will be checked    © 2017 [address] Information is for End User's use only and may not be sold, redistributed or otherwise used for commercial purposes  All illustrations and images included in CareNotes® are the copyrighted property of [employer]  or [employer]  The above information is an  only  It is not intended as medical advice for individual conditions or treatments  Talk to your doctor, nurse or pharmacist before following any medical regimen to see if it is safe and effective for you

## 2019-09-15 NOTE — PROGRESS NOTES
Triage Note - OB  Abbie Palacios 26 y o  female MRN: [id number]  Unit/Bed#: L&D 329-02 Encounter: [id number]    Chief Complaint:   Chief Complaint   Patient presents with    Back Pain     Patient reports walking down steps yesterday and on fourth of fifth step slipped and fell landing on buttocks  patient reports lower back pain without radiation  no loss of bowel or bladder function  did not strike head  27 weeks pregnant  patient denies hitting abdomen during fall  no abdomainl pain or cramping  denies vaginal bleeding or abnormal discharge  reports fetal movement  MINE: Estimated Date of Delivery: 19    HPI: 26 y o  female [id number] at 28w6d presents to [facility] Triage from the ED after fall at home yesterday  The patient reports walking down the stairs yesterday evening at 2100, slipped and fell landing on her buttocks, slid down 5 steps  She denies abdominal trauma  She was cleared in the ED and sent to L&D for fetal monitoring  She reports tenderness in her upper and middle back where she fell, no additional complaints  She denies any obstetrics complaints including contractions, abdominal pain, leakage of fluid or vaginal bleeding  She reports good fetal movement  Her history is significant for:   Prior  section x 4, prior  x 1 (Term, IUFD)  Placenta previa with increta  A1GDM    FHR:  140 / moderate variability / 10x10 accelerations / no decelerations  [place]    Vitals: Blood pressure 131/65, pulse 94, temperature 98 4 °F (36 9 °C), temperature source Temporal, resp  rate 18, weight 96 4 kg (212 lb 8 4 oz), last menstrual period 2019, SpO2 98 %  ,Body mass index is 35 37 kg/m²  Physical Exam  Physical Exam   Constitutional: She is oriented to person, place, and time  She appears well-developed and well-nourished  No distress  Cardiovascular: Normal rate  Pulmonary/Chest: Effort normal  No respiratory distress  Abdominal: Soft  There is no tenderness     Musculoskeletal: Normal range of motion  She exhibits no edema or tenderness  Small bruise on right upper lateral arm    Upper and mid paraspinal tenderness, no midline tenderness    Neurological: She is alert and oriented to person, place, and time  Skin: Skin is warm and dry  She is not diaphoretic  Psychiatric: She has a normal mood and affect  Her behavior is normal        Labs:   Admission on 09/15/2019   Component Date Value    Color, UA 09/15/2019 yellow     Clarity, UA 09/15/2019 clear     Color, UA 09/15/2019 Yellow     Clarity, UA 09/15/2019 Clear     pH, UA 09/15/2019 7 0     Leukocytes, UA 09/15/2019 Negative     Nitrite, UA 09/15/2019 Negative     Protein, UA 09/15/2019 Trace*    Glucose, UA 09/15/2019 Negative     Ketones, UA 09/15/2019 >=160 (4+)*    Urobilinogen, UA 09/15/2019 0 2     Bilirubin, UA 09/15/2019 Negative     Blood, UA 09/15/2019 Negative     Specific Gravity, UA 09/15/2019 1 020        Lab, Imaging and other studies: I have personally reviewed pertinent reports  A/P: Jim Grimes is a 26year old [id number] @ 38w6d who fell down the stairs yesterday at 2100 onto buttocks/back, cleared by ED, no obstetric complaints    Rh positive    Reassuring FHT    Discussed case and reviewed FHT with Dr Sandi Cha, [facility] for discharge  Encourage tylenol and heating pads PRN for relief  Strict 2nd trimester precautions reviewed    Dominic Carlton MD  9/15/2019  KAY Hargrove

## 2019-09-16 LAB — BACTERIA UR CULT: NORMAL

## 2019-09-17 ENCOUNTER — HOSPITAL ENCOUNTER (OUTPATIENT)
Dept: INFUSION CENTER | Facility: HOSPITAL | Age: 29
Discharge: HOME | End: 2019-09-17
Payer: COMMERCIAL

## 2019-09-17 VITALS — RESPIRATION RATE: 18 BRPM | TEMPERATURE: 97.9 F | DIASTOLIC BLOOD PRESSURE: 59 MMHG | SYSTOLIC BLOOD PRESSURE: 129 MMHG

## 2019-09-17 DIAGNOSIS — O99.019 ANEMIA DURING PREGNANCY: Primary | ICD-10-CM

## 2019-09-17 PROCEDURE — 96365 THER/PROPH/DIAG IV INF INIT: CPT

## 2019-09-17 RX ORDER — SODIUM CHLORIDE 9 MG/ML
20 INJECTION, SOLUTION INTRAVENOUS ONCE
Status: COMPLETED | OUTPATIENT
Start: 2019-09-17 | End: 2019-09-17

## 2019-09-17 RX ORDER — SODIUM CHLORIDE 9 MG/ML
20 INJECTION, SOLUTION INTRAVENOUS ONCE
Status: CANCELLED | OUTPATIENT
Start: 2019-09-19

## 2019-09-17 RX ADMIN — SODIUM CHLORIDE 20 ML/HR: 0.9 INJECTION, SOLUTION INTRAVENOUS at 12:07

## 2019-09-17 RX ADMIN — IRON SUCROSE 200 MG: 20 INJECTION, SOLUTION INTRAVENOUS at 12:11

## 2019-09-19 ENCOUNTER — TELEPHONE (OUTPATIENT)
Dept: OBGYN CLINIC | Facility: CLINIC | Age: 29
End: 2019-09-19

## 2019-09-19 ENCOUNTER — HOSPITAL ENCOUNTER (OUTPATIENT)
Dept: INFUSION CENTER | Facility: HOSPITAL | Age: 29
Discharge: HOME | End: 2019-09-19

## 2019-09-19 VITALS
RESPIRATION RATE: 16 BRPM | HEART RATE: 105 BPM | DIASTOLIC BLOOD PRESSURE: 61 MMHG | TEMPERATURE: 98.3 F | SYSTOLIC BLOOD PRESSURE: 112 MMHG

## 2019-09-19 DIAGNOSIS — O99.019 ANEMIA DURING PREGNANCY: ICD-10-CM

## 2019-09-19 RX ORDER — DIPHENHYDRAMINE HYDROCHLORIDE 50 MG/ML
25 INJECTION INTRAMUSCULAR; INTRAVENOUS ONCE
Status: CANCELLED
Start: 2019-09-20

## 2019-09-19 NOTE — PROGRESS NOTES
Spoke with Dr Hilton oMlina, telephone order taken for IV benadryl prior to each venofer dose, will call patient to reschedule today's infusion

## 2019-09-19 NOTE — PROGRESS NOTES
Patient was unable to wait for MD call back  Instructed patient to go to ED if symptoms worsen  Patient will call IC tomorrow  Will await call from Dr Heavenly Parkinson for direction

## 2019-09-19 NOTE — TELEPHONE ENCOUNTER
Zulema Matson from [address] [place] [address] called regarding patient, states she has been getting Venofer treatments and developed itching across her chest "like a sunburn" Please call, aware that you are not in the office today or tomorrow   Hillary's number is [phone]

## 2019-09-19 NOTE — TELEPHONE ENCOUNTER
Spoke with Kassidy Gonzalez and apparently she has some redness request her chest   She has no hives or shortness of breath  Patient to be given 25 mg IV of Benadryl prior to her infusion  They were asked to monitor patient 30 minutes after infusion and to call if with any reactions

## 2019-09-19 NOTE — PROGRESS NOTES
Patient here for IV venofer,  "I started itching after my infusion on Tuesday " Patient with reddened skin across upper chest and lower neck , no rash or hives, continues to c/o pruritis  Called office of Dr Heavenly Parkinson ([phone])  No MD in office, staff will leave send message to Dr Heavenly Parkinson, patient aware

## 2019-09-20 ENCOUNTER — TELEPHONE (OUTPATIENT)
Dept: OBGYN CLINIC | Facility: CLINIC | Age: 29
End: 2019-09-20

## 2019-09-20 ENCOUNTER — HOSPITAL ENCOUNTER (OUTPATIENT)
Dept: INFUSION CENTER | Facility: HOSPITAL | Age: 29
Discharge: HOME | End: 2019-09-20
Payer: COMMERCIAL

## 2019-09-20 VITALS
HEART RATE: 89 BPM | SYSTOLIC BLOOD PRESSURE: 117 MMHG | RESPIRATION RATE: 16 BRPM | DIASTOLIC BLOOD PRESSURE: 60 MMHG | TEMPERATURE: 98.4 F

## 2019-09-20 DIAGNOSIS — O99.019 ANEMIA DURING PREGNANCY: Primary | ICD-10-CM

## 2019-09-20 PROCEDURE — 96367 TX/PROPH/DG ADDL SEQ IV INF: CPT

## 2019-09-20 PROCEDURE — 96365 THER/PROPH/DIAG IV INF INIT: CPT

## 2019-09-20 PROCEDURE — 96375 TX/PRO/DX INJ NEW DRUG ADDON: CPT

## 2019-09-20 PROCEDURE — 96361 HYDRATE IV INFUSION ADD-ON: CPT

## 2019-09-20 RX ORDER — DIPHENHYDRAMINE HYDROCHLORIDE 50 MG/ML
25 INJECTION INTRAMUSCULAR; INTRAVENOUS ONCE
Status: CANCELLED
Start: 2019-09-20

## 2019-09-20 RX ORDER — DIPHENHYDRAMINE HYDROCHLORIDE 50 MG/ML
25 INJECTION INTRAMUSCULAR; INTRAVENOUS ONCE
Status: DISCONTINUED | OUTPATIENT
Start: 2019-09-20 | End: 2019-09-20 | Stop reason: SDUPTHER

## 2019-09-20 RX ORDER — SODIUM CHLORIDE 9 MG/ML
20 INJECTION, SOLUTION INTRAVENOUS ONCE
Status: CANCELLED | OUTPATIENT
Start: 2019-09-24

## 2019-09-20 RX ORDER — SODIUM CHLORIDE 9 MG/ML
20 INJECTION, SOLUTION INTRAVENOUS ONCE
Status: COMPLETED | OUTPATIENT
Start: 2019-09-20 | End: 2019-09-20

## 2019-09-20 RX ORDER — RANITIDINE 25 MG/ML
50 INJECTION, SOLUTION INTRAMUSCULAR; INTRAVENOUS ONCE
Status: COMPLETED | OUTPATIENT
Start: 2019-09-20 | End: 2019-09-20

## 2019-09-20 RX ADMIN — HYDROCORTISONE SODIUM SUCCINATE 50 MG: 100 INJECTION, POWDER, FOR SOLUTION INTRAMUSCULAR; INTRAVENOUS at 12:33

## 2019-09-20 RX ADMIN — IRON SUCROSE 200 MG: 20 INJECTION, SOLUTION INTRAVENOUS at 10:56

## 2019-09-20 RX ADMIN — RANITIDINE HYDROCHLORIDE 50 MG: 25 INJECTION INTRAMUSCULAR; INTRAVENOUS at 12:31

## 2019-09-20 RX ADMIN — SODIUM CHLORIDE 20 ML/HR: 0.9 INJECTION, SOLUTION INTRAVENOUS at 10:10

## 2019-09-20 RX ADMIN — DIPHENHYDRAMINE HYDROCHLORIDE 25 MG: 50 INJECTION INTRAMUSCULAR; INTRAVENOUS at 10:22

## 2019-09-20 RX ADMIN — SODIUM CHLORIDE 500 ML: 0.9 INJECTION, SOLUTION INTRAVENOUS at 12:30

## 2019-09-20 NOTE — PROGRESS NOTES
Patient's itching resolved and patient states she is feeling better  Patient advised to seek ER if symptoms come back  Patient discharged ambulatory and walks home

## 2019-09-20 NOTE — PLAN OF CARE
Problem: Potential for Falls  Goal: Patient will remain free of falls  Description  INTERVENTIONS:  - Assess patient frequently for physical needs  -  Identify cognitive and physical deficits and behaviors that affect risk of falls    -  [place] fall precautions as indicated by assessment   - Educate patient/family on patient safety including physical limitations  - Instruct patient to call for assistance with activity based on assessment  - Modify environment to reduce risk of injury  - Consider OT/PT consult to assist with strengthening/mobility  Outcome: Progressing

## 2019-09-20 NOTE — TELEPHONE ENCOUNTER
Patient called again, she would like to know if we have an update in regards to her procedure date? Patient also state she is feeling very uncomfortable and having pain in her lower area

## 2019-09-20 NOTE — TELEPHONE ENCOUNTER
Patient with hypersensitivity reaction to VENOFER despite premedication  Called by infusion center and patient had a rash 15 mins after her infusion  Will discontinue all other infusions and refer patient to hematology for further management  She has anemia, placenta increta and hemoglobin has to be optimized before  hysterectomy    Ranitidine and Hydrocortisone were ordered

## 2019-09-20 NOTE — TELEPHONE ENCOUNTER
Received call from [facility]  Patient is currently there for her Venofer infusion, she was given Benadryl prior to start of infusion  Patient is currently 15 minutes into her infusion and she is currently becoming very itchy and beginning to have a rash  [place] text sent to Dr Amber Glass as well

## 2019-09-20 NOTE — PROGRESS NOTES
At 12:10, 15 minutes into patient's observation, patient started to itch in neck where rash developed previously and has a new rash under her chin  Called Dr Anthony Bagley office  Spoke with Dr Leyla Lai and ok to give patient solumedrol 50mg and ranitidine 50mg IV  Dr Leyla Lai is requesting patient to go to ER if no better  Dr Leyla Lai is going to be in touch with patient and is going to discontinue all further venofer infusions

## 2019-09-20 NOTE — TELEPHONE ENCOUNTER
Spoke with pt re: multidisciplinary meeting requirement at 32 week and plan is to deliver between 34-36 weeks  She states that Tylenol does not help with pelvic pressure    She was advised to call PT Piero Peter

## 2019-09-20 NOTE — TELEPHONE ENCOUNTER
All information relayed to patient  She will get labs done next week  I provided patient with Dr Roxana Bradshaw phone number, patient will call her office to schedule an appointment

## 2019-09-24 ENCOUNTER — ROUTINE PRENATAL (OUTPATIENT)
Dept: OBGYN CLINIC | Facility: CLINIC | Age: 29
End: 2019-09-24
Payer: COMMERCIAL

## 2019-09-24 ENCOUNTER — DOCUMENTATION (OUTPATIENT)
Dept: HEMATOLOGY ONCOLOGY | Facility: CLINIC | Age: 29
End: 2019-09-24

## 2019-09-24 ENCOUNTER — TELEPHONE (OUTPATIENT)
Dept: HEMATOLOGY ONCOLOGY | Facility: CLINIC | Age: 29
End: 2019-09-24

## 2019-09-24 ENCOUNTER — HOSPITAL ENCOUNTER (OUTPATIENT)
Dept: INFUSION CENTER | Facility: HOSPITAL | Age: 29
Discharge: HOME | End: 2019-09-24

## 2019-09-24 VITALS
DIASTOLIC BLOOD PRESSURE: 78 MMHG | HEIGHT: 66 IN | BODY MASS INDEX: 34.76 KG/M2 | SYSTOLIC BLOOD PRESSURE: 118 MMHG | WEIGHT: 216.3 LBS | HEART RATE: 100 BPM

## 2019-09-24 DIAGNOSIS — Z3A.30 30 WEEKS GESTATION OF PREGNANCY: Primary | ICD-10-CM

## 2019-09-24 DIAGNOSIS — O99.019 ANEMIA DURING PREGNANCY: ICD-10-CM

## 2019-09-24 DIAGNOSIS — O99.213 OBESITY AFFECTING PREGNANCY IN THIRD TRIMESTER: ICD-10-CM

## 2019-09-24 DIAGNOSIS — O43.223 PLACENTA INCRETA DURING PREGNANCY IN THIRD TRIMESTER, ANTEPARTUM: ICD-10-CM

## 2019-09-24 DIAGNOSIS — O24.419 GESTATIONAL DIABETES MELLITUS (GDM) IN THIRD TRIMESTER, GESTATIONAL DIABETES METHOD OF CONTROL UNSPECIFIED: ICD-10-CM

## 2019-09-24 DIAGNOSIS — Z87.59 HISTORY OF IUFD: ICD-10-CM

## 2019-09-24 DIAGNOSIS — O44.03 PLACENTA PREVIA IN THIRD TRIMESTER: ICD-10-CM

## 2019-09-24 DIAGNOSIS — O34.219 H/O CESAREAN SECTION COMPLICATING PREGNANCY: ICD-10-CM

## 2019-09-24 PROCEDURE — 99213 OFFICE O/P EST LOW 20 MIN: CPT | Performed by: OBSTETRICS & GYNECOLOGY

## 2019-09-24 NOTE — PATIENT INSTRUCTIONS
Pregnancy at 27 to 30 [address]:   You may notice new symptoms such as shortness of breath, heartburn, or swelling of your ankles and feet  You may also have trouble sleeping or contractions  DISCHARGE INSTRUCTIONS:   Return to the emergency department if:   · You develop a severe headache that does not go away  · You have new or increased vision changes, such as blurred or spotted vision  · You have new or increased swelling in your face or hands  · You have vaginal spotting or bleeding  · Your water broke or you feel warm water gushing or trickling from your vagina  Contact your healthcare provider if:   · You have more than 5 contractions in 1 hour  · You notice any changes in your baby's movements  · You have abdominal cramps, pressure, or tightening  · You have a change in vaginal discharge  · You have chills or a fever  · You have vaginal itching, burning, or pain  · You have yellow, green, white, or foul-smelling vaginal discharge  · You have pain or burning when you urinate, less urine than usual, or pink or bloody urine  · You have questions or concerns about your condition or care  How to care for yourself at this stage of your pregnancy:   · Eat a variety of healthy foods  Healthy foods include fruits, vegetables, whole-grain breads, low-fat dairy foods, beans, lean meats, and fish  Drink liquids as directed  Ask how much liquid to drink each day and which liquids are best for you  Limit caffeine to less than 200 milligrams each day  Limit your intake of fish to 2 servings each week  Choose fish low in mercury such as canned light tuna, shrimp, salmon, cod, or tilapia  Do not  eat fish high in mercury such as swordfish, tilefish, trista mackerel, and shark  · Manage heartburn  by eating 4 or 5 small meals each day instead of large meals  Avoid spicy food  · Manage swelling  by lying down and putting your feet up       · Take prenatal vitamins as directed  Your need for certain vitamins and minerals, such as folic acid, increases during pregnancy  Prenatal vitamins provide some of the extra vitamins and minerals you need  Prenatal vitamins may also help to decrease the risk of certain birth defects  · Talk to your healthcare provider about exercise  Moderate exercise can help you stay fit  Your healthcare provider will help you plan an exercise program that is safe for you during pregnancy  · Do not smoke  If you smoke, it is never too late to quit  Smoking increases your risk of a miscarriage and other health problems during your pregnancy  Smoking can cause your baby to be born too early or weigh less at birth  Ask your healthcare provider for information if you need help quitting  · Do not drink alcohol  Alcohol passes from your body to your baby through the placenta  It can affect your baby's brain development and cause fetal alcohol syndrome (FAS)  FAS is a group of conditions that causes mental, behavior, and growth problems  · Talk to your healthcare provider before you take any medicines  Many medicines may harm your baby if you take them when you are pregnant  Do not take any medicines, vitamins, herbs, or supplements without first talking to your healthcare provider  Never use illegal or street drugs (such as marijuana or cocaine) while you are pregnant  Safety tips during pregnancy:   · Avoid hot tubs and saunas  Do not use a hot tub or sauna while you are pregnant, especially during your first trimester  Hot tubs and saunas may raise your baby's temperature and increase the risk of birth defects  · Avoid toxoplasmosis  This is an infection caused by eating raw meat or being around infected cat feces  It can cause birth defects, miscarriages, and other problems  Wash your hands after you touch raw meat  Make sure any meat is well-cooked before you eat it  Avoid raw eggs and unpasteurized milk   Use gloves or ask someone else to clean your cat's litter box while you are pregnant  Changes that are happening with your baby:  By 30 weeks, your baby may weigh more than 3 pounds  Your baby may be about 11 inches long from the top of the head to the rump (baby's bottom)  Your baby's eyes open and close now  Your baby's kicks and movements are more forceful at this time  What you need to know about prenatal care: Your healthcare provider will check your blood pressure and weight  You may also need the following:  · Blood tests  may be done to check for anemia or blood type  · A urine test  may also be done to check for sugar and protein  These can be signs of gestational diabetes or infection  Protein in your urine may also be a sign of preeclampsia  Preeclampsia is a condition that can develop during week 20 or later of your pregnancy  It causes high blood pressure, and it can cause problems with your kidneys and other organs  · A Tdap vaccine and flu vaccine  may be recommended by your healthcare provider  · A gestational diabetes screen  will be done using an oral glucose tolerance test (OGTT)  An OGTT starts with a blood sugar level check after you have not eaten for 8 hours  You are then given a glucose drink  Your blood sugar level is checked after 1 hour, 2 hours, and sometimes 3 hours  Healthcare providers look at how much your blood sugar level increases from the first check  · Fundal height  is a measurement of your uterus to check your baby's growth  This number is usually the same as the number of weeks that you have been pregnant  Your healthcare provider may also check your baby's position  · Your baby's heart rate  will be checked  © 2017 [address] [facility] Information is for End User's use only and may not be sold, redistributed or otherwise used for commercial purposes   All illustrations and images included in CareNotes® are the copyrighted property of A D A M , Inc  or [employer]  The above information is an  only  It is not intended as medical advice for individual conditions or treatments  Talk to your doctor, nurse or pharmacist before following any medical regimen to see if it is safe and effective for you

## 2019-09-24 NOTE — PROGRESS NOTES
OB/GYN  PN Visit  Carlos Rangel  [id number]  2019  1:15 PM  Dr Marshal Mcclain MD    S: 26 y o  [id number] 30w1d here for PN visit  OB complaints:  Contractions: no  Leakage: yes  Bleeding: yes  Fetal movement: yes      O:  Vitals:    19 1200   BP: 118/78   Pulse: 100       Gen: no acute distress, nonlabored breathing  Fundal Height (cm): 155 cm  Fetal Heart Rate: 30      She does continue to complain of lower abdominal discomfort,    A/P:    Problem List Items Addressed This Visit        Endocrine    GDM (gestational diabetes mellitus)       Other    Anemia during pregnancy    H/O  section complicating pregnancy    Placenta previa in third trimester    30 weeks gestation of pregnancy - Primary    History of IUFD    Obesity affecting pregnancy in third trimester    Placenta increta during pregnancy in third trimester, antepartum              Advised Tylenol for pain  She has not been checking her sugars, encouraged to do so, advised to check blood sugars  She is scheduled for class 2 of GDM  Patient had a reaction to Venofer with a rash all over her body  She had 3 doses  Her last hemoglobin 2 weeks ago was 9 9  Referred to Hematology for further management  Called [facility] line and they will call pt to set appt ASAP    Patient with a placenta increta multidisciplinary meeting to be held at 32 weeks  To plan her delivery between 34-36 weeks  Patient received betamethasone around 24 weeks gestation and we will repeat this again prior to delivery  Patient with history of IUFD at term  We will start antepartum fetal surveillance at 32 weeks  She declines the flu vaccine  Follow-up in 2 weeks for PNV< she has an US and KATHIA scheduled at [facility][1] that day          Marshal Mcclain MD  2019  1:15 PM

## 2019-09-24 NOTE — TELEPHONE ENCOUNTER
New Patient Encounter    New Patient Intake Form   Patient Details:  Claudia Smith  1990  [id number]    Background Information:  [address] starts by opening a telephone encounter and gathering the following information   Who is calling to schedule? If not self, relationship to patient? self   Referring Provider Kamryn Smith   What is the diagnosis? fe def anemia 30 wks pregnant   When was the diagnosis? 8/2019   Is patient aware of diagnosis? Yes   Reason for visit? NP DX   Have you had any testing done? If so: when, where? Yes   Are records in [facility]? yes   Was the patient told to bring a disk? no   Scheduling Information:   Preferred [place]:  [facility] Specific Provider? Sean Rutherford   Are there any dates/time the patient cannot be seen? Must be before 2pm   Counseling Pre-Screen:  If the patient answers YES to any of the below questions, please route to the appropriate location specific counselor    Have you felt anxious or worried about cancer and the treatment you are receiving? No   Has your diagnosis caused physical, emotional, or financial hardship for you? No   Note: Do not ask the patient about transportation issues/needs  Please notate if the patient brings it up and the counselor will schedule accordingly  Miscellaneous: Per Dr Kamryn Keith needs to be ASAP, pt to be delivered by 35 weeks  Offered pt 9/25, pt declined  Will call her if we have a cancellation   After completing the above information, please route to Financial Counselor and the appropriate Nurse Navigator for review

## 2019-09-25 ENCOUNTER — DOCUMENTATION (OUTPATIENT)
Dept: HEMATOLOGY ONCOLOGY | Facility: CLINIC | Age: 29
End: 2019-09-25

## 2019-09-25 ENCOUNTER — APPOINTMENT (OUTPATIENT)
Dept: LAB | Facility: HOSPITAL | Age: 29
End: 2019-09-25
Payer: COMMERCIAL

## 2019-09-25 DIAGNOSIS — O99.019 ANEMIA DURING PREGNANCY: ICD-10-CM

## 2019-09-25 LAB
BASOPHILS # BLD AUTO: 0 THOUSANDS/ΜL (ref 0–0.1)
BASOPHILS NFR BLD AUTO: 1 % (ref 0–1)
EOSINOPHIL # BLD AUTO: 0.1 THOUSAND/ΜL (ref 0–0.4)
EOSINOPHIL NFR BLD AUTO: 1 % (ref 0–6)
ERYTHROCYTE [DISTWIDTH] IN BLOOD BY AUTOMATED COUNT: 18.9 %
FERRITIN SERPL-MCNC: 80 NG/ML (ref 8–388)
HCT VFR BLD AUTO: 31.4 % (ref 36–46)
HGB BLD-MCNC: 10.3 G/DL (ref 12–16)
LYMPHOCYTES # BLD AUTO: 2.1 THOUSANDS/ΜL (ref 0.5–4)
LYMPHOCYTES NFR BLD AUTO: 20 % (ref 25–45)
MCH RBC QN AUTO: 27.2 PG (ref 26–34)
MCHC RBC AUTO-ENTMCNC: 32.7 G/DL (ref 31–36)
MCV RBC AUTO: 83 FL (ref 80–100)
MONOCYTES # BLD AUTO: 0.9 THOUSAND/ΜL (ref 0.2–0.9)
MONOCYTES NFR BLD AUTO: 8 % (ref 1–10)
NEUTROPHILS # BLD AUTO: 7.4 THOUSANDS/ΜL (ref 1.8–7.8)
NEUTS SEG NFR BLD AUTO: 70 % (ref 45–65)
PLATELET # BLD AUTO: 248 THOUSANDS/UL (ref 150–450)
PMV BLD AUTO: 8.4 FL (ref 8.9–12.7)
RBC # BLD AUTO: 3.78 MILLION/UL (ref 4–5.2)
TIBC SERPL-MCNC: 345 UG/DL (ref 250–450)
WBC # BLD AUTO: 10.5 THOUSAND/UL (ref 4.5–11)

## 2019-09-25 PROCEDURE — 82728 ASSAY OF FERRITIN: CPT

## 2019-09-25 PROCEDURE — 83550 IRON BINDING TEST: CPT

## 2019-09-25 PROCEDURE — 85025 COMPLETE CBC W/AUTO DIFF WBC: CPT

## 2019-09-25 PROCEDURE — 36415 COLL VENOUS BLD VENIPUNCTURE: CPT

## 2019-09-26 ENCOUNTER — HOSPITAL ENCOUNTER (OUTPATIENT)
Dept: INFUSION CENTER | Facility: HOSPITAL | Age: 29
End: 2019-09-26

## 2019-09-26 ENCOUNTER — CONSULT (OUTPATIENT)
Dept: HEMATOLOGY ONCOLOGY | Facility: CLINIC | Age: 29
End: 2019-09-26
Payer: COMMERCIAL

## 2019-09-26 VITALS
WEIGHT: 215.7 LBS | BODY MASS INDEX: 34.67 KG/M2 | DIASTOLIC BLOOD PRESSURE: 64 MMHG | HEIGHT: 66 IN | RESPIRATION RATE: 18 BRPM | SYSTOLIC BLOOD PRESSURE: 134 MMHG | TEMPERATURE: 98.7 F | OXYGEN SATURATION: 98 % | HEART RATE: 115 BPM

## 2019-09-26 DIAGNOSIS — R76.0 POSITIVE CARDIOLIPIN ANTIBODIES: ICD-10-CM

## 2019-09-26 DIAGNOSIS — R60.0 EDEMA OF LEFT LOWER EXTREMITY: ICD-10-CM

## 2019-09-26 DIAGNOSIS — O99.019 ANEMIA DURING PREGNANCY: Primary | ICD-10-CM

## 2019-09-26 PROCEDURE — 99245 OFF/OP CONSLTJ NEW/EST HI 55: CPT | Performed by: NURSE PRACTITIONER

## 2019-09-26 NOTE — PROGRESS NOTES
Hematology/Oncology Outpatient   Ki Morales 26 y o  female 1990 [id number]    Date:  9/26/2019      Assessment and Plan:  1  Anemia during pregnancy  Patient continues to have anemia with hemoglobin 10 3 status post 3 doses of IV Venofer, the size of her red cells has increased with normal MCV 83  Her most recent ferritin level from yesterday is 80  We will send patient to the lab this week to have extensive anemia workup done to rule out other etiologies of her normocytic anemia; it is possible that she may also have B12 deficiency which may be contributing to her anemia as she did in the past; this can be corrected with B12 injections if confirmed  We will also recheck a complete iron panel to ensure that she is no longer iron deficient; if she continues to be iron deficiency we may need to resume Venofer with better premedication regimen versus trying another IV iron product  We will call patient with the plan/laboratory results once available  Otherwise we will see her back in the office in about 3 months after delivery of the baby with repeat laboratory studies prior     - CBC and differential; Future  - Comprehensive metabolic panel; Future  - C-reactive protein; Future  - IgG, IgA, IgM; Future  - Hemolysis Smear; Future  - Protein electrophoresis, serum; Future  - Reticulocytes; Future  - Sedimentation rate, automated; Future  - Vitamin B12; Future  - Iron Panel (Includes Ferritin, Iron Sat%, Iron, and TIBC); Future  - Ferritin; Future  - Folate; Future  - Erythropoietin; Future  - Haptoglobin; Future  - Hemoglobin Electrophoresis; Future  - LD,Blood; Future  - Immunoglobulin free LT chains blood; Future  - Direct antiglobulin test; Future  - CBC and differential; Future  - Comprehensive metabolic panel; Future  - Iron Panel (Includes Ferritin, Iron Sat%, Iron, and TIBC); Future  - Ferritin; Future  - Vitamin B12; Future    2   Positive cardiolipin antibodies  Patient's IgM anticardiolipin level was elevated at 13 which is in the indeterminate range  She also has history of multiple miscarriages along with 1 stillbirth and a molar pregnancy  The patient denies any history of thromboembolic events in the past  Her antiphospholipid antibody syndrome workup will need to be repeated in a future to confirm the diagnosis  We did discuss signs and symptoms of clotting including leg pain and swelling, chest pain, sudden onset of significant dyspnea, etc -she is aware that should she develop any of the symptoms she needs to be evaluated in the emergency department immediately  The mild decrease in her protein S and antithrombin 3 levels are likely due to be checked in pregnancy which is an expected finding  It is not advised to check these tests without evidence of thromboembolic events, during acute events, pregnancy or while taking oral contraceptives  The tests will be repeated prior to her next follow-up when she is no longer pregnant     - Beta-2 glycoprotein antibodies; Future  - Lupus anticoagulant; Future  - Cardiolipin antibody; Future  - APTT; Future  - Protime-INR; Future  - ANISA Screen w/ Reflex to Titer/Pattern; Future  - RF Screen w/ Reflex to Titer; Future  - Protein S activity; Future  - Protein S Antigen, Total; Future  - Antithrombin III Activity; Future    3  Edema of left lower extremity  Patient's left lower extremity is definitely larger in comparison to the right  She reports left ankle pain and discomfort  She states that she has always had more swelling and problems with her left lower extremity and has had workup/Doppler study in the past without evidence of DVT  We will order another Doppler study to be done for completeness sake to rule out DVT as she is at higher risk for clotting with her pregnancy and possiblly undiagnosed APS  - VAS lower limb venous duplex study, unilateral/limited;  Future      HPI:  Patient is a pleasant 39-year-old  female who presents for further evaluation and treatment for iron deficiency anemia in pregnancy; she is currently 30 weeks pregnant  She has a past medical history of bipolar disorder and reports that she has been anemic essentially all her life; she states that needed vitamin B12 injections in the past with one of her prior pregnancies  The patient reports that she has had multiple miscarriages (2004, 2006, 2014) all prior to 10 weeks of pregnancy, she had a stillbirth in 2011 and a molar pregnancy in 2013  The patient states that she had four other pregnancies that resulted in live births, the 3rd one she reports was higher risk and she developed hemorrhaging postpartum  She denies any history of thromboembolic events personally but states that her mother has had 2 episodes of clotting including PE; her mother has an established diagnosis of lupus  The patient states that she was on a course of oral iron recently that was not effective in correcting her iron deficiency therefore her OBGYN started her on iron infusion with Venofer  She states that she tolerated the 1st dose fine without any issues, developed rash and itching to her anterior neck with the 2nd treatment which resolved  Her most recent infusion #3 administered on 09/20/2019 she again developed itching and rash to her anterior neck, chin and skin surrounding her IV site during the last few minutes despite being premedicated with IV Benadryl; her symptoms improved after she was given a dose of IV steroids and IV Zantac  She reports that she does note some improvement in her fatigue since receiving the 3 treatments  She admits that she does have headaches which she attributes to her chronic migraines with mild dizziness  She denies easy bruising or bleeding from any site, denies PICA  Her most recent laboratory studies from yesterday showed WBC 10 5, normocytic anemia H&H 10 3/31 4, MCV 83 normal platelet count 109   Her ferritin level has improved significantly to 80 (was 8 prior to iron infusions), TIBC 345-complete iron panel was not performed  Review of past laboratory studies reveal a patient has had mild anemia with a hemoglobin ranging around 10-11 at least since 2015  An extensive hypercoagulable workup was done in May 2019 likely due to her pregnancy history which revealed normal lupus anticoagulant and beta 2 glycoprotein antibody test however her anticardiolipin IgM level was in the indetermine range at 13  Total protein and anti gin this decreased to 51% free protein S antigen also decreased 37%, antithrombin 3 activity was also decreased at 85%  Remaining workup was negative  ROS: Review of Systems   Constitutional: Positive for activity change (sometimes), appetite change and fatigue  Negative for chills, fever and unexpected weight change  HENT: Negative for congestion, mouth sores, nosebleeds, sore throat and trouble swallowing  Eyes: Negative  Respiratory: Negative for cough, chest tightness and shortness of breath  Cardiovascular: Negative for chest pain, palpitations and leg swelling  Gastrointestinal: Positive for nausea  Negative for abdominal distention, abdominal pain, blood in stool, constipation, diarrhea and vomiting  Genitourinary: Negative for difficulty urinating, dysuria, frequency, hematuria and urgency  Musculoskeletal: Negative for arthralgias, back pain, gait problem, joint swelling and myalgias  Skin: Positive for rash  Negative for color change and pallor  Neurological: Positive for dizziness and headaches  Negative for weakness, light-headedness and numbness  Hematological: Negative for adenopathy  Does not bruise/bleed easily  Psychiatric/Behavioral: Positive for dysphoric mood (mild) and sleep disturbance  The patient is not nervous/anxious          Past Medical History:   Diagnosis Date    Disease of thyroid gland     Pap smear abnormality of cervix with LGSIL 5/20/2016    Varicella     Had as a child       Past Surgical History:   Procedure Laterality Date     SECTION      DILATION AND EVACUATION      [place]    LAPAROSCOPY         Social History     Socioeconomic History    Marital status: /Civil Union     Spouse name: Not on file    Number of children: 4    Years of education: Not on file    Highest education level: Not on file   Occupational History    Not on file   Social Needs    Financial resource strain: Not on file    Food insecurity:     Worry: Not on file     Inability: Not on file    Transportation needs:     Medical: Not on file     Non-medical: Not on file   Tobacco Use    Smoking status: Never Smoker    Smokeless tobacco: Never Used   Substance and Sexual Activity    Alcohol use: Not Currently    Drug use: No    Sexual activity: Yes     Partners: Male     Birth control/protection: None   Lifestyle    Physical activity:     Days per week: Not on file     Minutes per session: Not on file    Stress: Not on file   Relationships    Social connections:     Talks on phone: Not on file     Gets together: Not on file     Attends Jainism service: Not on file     Active member of club or organization: Not on file     Attends meetings of clubs or organizations: Not on file     Relationship status: Not on file    Intimate partner violence:     Fear of current or ex partner: Not on file     Emotionally abused: Not on file     Physically abused: Not on file     Forced sexual activity: Not on file   Other Topics Concern    Not on file   Social History Narrative    Always wears seat belts ; caffeine use       Family History   Problem Relation Age of Onset    Hypertension Mother     Lupus Mother     Pulmonary embolism Mother        Allergies   Allergen Reactions    Penicillins Anaphylaxis     Anaphylaxis    Shrimp (Diagnostic)          Current Outpatient Medications:     ACCU-CHEK FASTCLIX LANCETS MISC, by Does not apply route 4 (four) times a day Test blood sugar 4 times per day, fasting and 2 hours after the start of each meal, Disp: 102 each, Rfl: 3    ACCU-CHEK GUIDE test strip, Test 4 times a day and as instructed , Disp: 100 each, Rfl: 3    docusate sodium (COLACE) 100 mg capsule, Take 1 capsule (100 mg total) by mouth 2 (two) times a day, Disp: 10 capsule, Rfl: 0    Ferrous Sulfate (FEROSUL PO), Every 12 hours, Disp: , Rfl:     Prenatal Vit-Fe Fumarate-FA ([employer] PRENATAL) 28-0 8 MG TABS, Take 1 tablet by mouth daily, Disp: 30 each, Rfl: 6    metoclopramide (REGLAN) 10 mg tablet, Take 1 tablet (10 mg total) by mouth 3 (three) times a day as needed (nausea) (Patient not taking: Reported on 9/26/2019), Disp: 30 tablet, Rfl: 2      Physical Exam:  /64 (BP Location: Left arm)   Pulse (!) 115   Temp 98 7 °F (37 1 °C) (Tympanic Core)   Resp 18   Ht 5' 6" (1 676 m)   Wt 97 8 kg (215 lb 11 2 oz)   LMP 03/23/2019 (Within Days)   SpO2 98%   BMI 34 81 kg/m²     Physical Exam   Constitutional: She is oriented to person, place, and time  She appears well-developed and well-nourished  No distress  HENT:   Head: Normocephalic and atraumatic  Mouth/Throat: Oropharynx is clear and moist  No oropharyngeal exudate  Eyes: Pupils are equal, round, and reactive to light  Conjunctivae are normal  No scleral icterus  Neck: Normal range of motion  Neck supple  No thyromegaly present  Cardiovascular: Normal rate, regular rhythm, normal heart sounds and intact distal pulses  No murmur heard  Pulmonary/Chest: Effort normal and breath sounds normal  No respiratory distress  Abdominal: Soft  Bowel sounds are normal  She exhibits distension (Due to pregnancy)  There is no hepatosplenomegaly  There is no tenderness  Musculoskeletal: Normal range of motion  She exhibits edema (mild LLE- LLE is larger in comparison to the right)  Lymphadenopathy:     She has no cervical adenopathy  She has no axillary adenopathy     Neurological: She is alert and oriented to person, place, and time  Skin: Skin is warm and dry  No rash noted  She is not diaphoretic  No erythema  No pallor  Psychiatric: She has a normal mood and affect  Her behavior is normal  Judgment and thought content normal    Vitals reviewed  Labs:  Lab Results   Component Value Date    WBC 10 50 09/25/2019    HGB 10 3 (L) 09/25/2019    HCT 31 4 (L) 09/25/2019    MCV 83 09/25/2019     09/25/2019     Lab Results   Component Value Date    K 3 4 (L) 08/14/2019     08/14/2019    CO2 23 08/14/2019    BUN 3 (L) 08/14/2019    CREATININE 0 41 (L) 08/14/2019    GLUCOSE 101 10/05/2016    GLUF 98 09/06/2019    CALCIUM 8 4 08/14/2019    AST 16 08/14/2019    ALT 14 08/14/2019    ALKPHOS 76 08/14/2019    EGFR 139 08/14/2019       Patient voiced understanding and agreement in the above discussion  Aware to contact our office with questions/symptoms in the interim

## 2019-09-30 ENCOUNTER — APPOINTMENT (OUTPATIENT)
Dept: LAB | Facility: HOSPITAL | Age: 29
End: 2019-09-30
Payer: COMMERCIAL

## 2019-09-30 DIAGNOSIS — R76.0 POSITIVE CARDIOLIPIN ANTIBODIES: ICD-10-CM

## 2019-09-30 DIAGNOSIS — O99.019 ANEMIA DURING PREGNANCY: ICD-10-CM

## 2019-09-30 LAB
ALBUMIN SERPL BCP-MCNC: 3.4 G/DL (ref 3–5.2)
ALP SERPL-CCNC: 94 U/L (ref 43–122)
ALT SERPL W P-5'-P-CCNC: 12 U/L (ref 9–52)
ANION GAP SERPL CALCULATED.3IONS-SCNC: 7 MMOL/L (ref 5–14)
AST SERPL W P-5'-P-CCNC: 21 U/L (ref 14–36)
BASOPHILS # BLD AUTO: 0 THOUSANDS/ΜL (ref 0–0.1)
BASOPHILS NFR BLD AUTO: 0 % (ref 0–1)
BILIRUB SERPL-MCNC: 0.7 MG/DL
BLD SMEAR INTERP: NORMAL
BUN SERPL-MCNC: 5 MG/DL (ref 5–25)
CALCIUM SERPL-MCNC: 8.7 MG/DL (ref 8.4–10.2)
CHLORIDE SERPL-SCNC: 105 MMOL/L (ref 97–108)
CO2 SERPL-SCNC: 24 MMOL/L (ref 22–30)
CREAT SERPL-MCNC: 0.44 MG/DL (ref 0.6–1.2)
CRP SERPL QL: 18.5 MG/L
DAT POLY-SP REAG RBC QL: NEGATIVE
EOSINOPHIL # BLD AUTO: 0.1 THOUSAND/ΜL (ref 0–0.4)
EOSINOPHIL NFR BLD AUTO: 1 % (ref 0–6)
ERYTHROCYTE [DISTWIDTH] IN BLOOD BY AUTOMATED COUNT: 19 %
ERYTHROCYTE [SEDIMENTATION RATE] IN BLOOD: 32 MM/HOUR (ref 1–20)
FERRITIN SERPL-MCNC: 55 NG/ML (ref 8–388)
FOLATE SERPL-MCNC: 9.3 NG/ML (ref 3.1–17.5)
GFR SERPL CREATININE-BSD FRML MDRD: 136 ML/MIN/1.73SQ M
GLUCOSE SERPL-MCNC: 90 MG/DL (ref 70–99)
HCT VFR BLD AUTO: 32.8 % (ref 36–46)
HGB BLD-MCNC: 10.7 G/DL (ref 12–16)
IGA SERPL-MCNC: 184 MG/DL (ref 70–400)
IGG SERPL-MCNC: 916 MG/DL (ref 700–1600)
IGM SERPL-MCNC: 145 MG/DL (ref 40–230)
IRON SATN MFR SERPL: 13 %
IRON SERPL-MCNC: 49 UG/DL (ref 50–170)
LDH SERPL-CCNC: 469 U/L (ref 313–618)
LYMPHOCYTES # BLD AUTO: 2.2 THOUSANDS/ΜL (ref 0.5–4)
LYMPHOCYTES NFR BLD AUTO: 23 % (ref 25–45)
MCH RBC QN AUTO: 26.9 PG (ref 26–34)
MCHC RBC AUTO-ENTMCNC: 32.7 G/DL (ref 31–36)
MCV RBC AUTO: 82 FL (ref 80–100)
MONOCYTES # BLD AUTO: 0.7 THOUSAND/ΜL (ref 0.2–0.9)
MONOCYTES NFR BLD AUTO: 7 % (ref 1–10)
NEUTROPHILS # BLD AUTO: 6.8 THOUSANDS/ΜL (ref 1.8–7.8)
NEUTS SEG NFR BLD AUTO: 69 % (ref 45–65)
PLATELET # BLD AUTO: 265 THOUSANDS/UL (ref 150–450)
PMV BLD AUTO: 9 FL (ref 8.9–12.7)
POTASSIUM SERPL-SCNC: 3.8 MMOL/L (ref 3.6–5)
PROT SERPL-MCNC: 6.7 G/DL (ref 5.9–8.4)
RBC # BLD AUTO: 3.99 MILLION/UL (ref 4–5.2)
RETICS # CALC: 2.54 % (ref 0.87–2.63)
SODIUM SERPL-SCNC: 136 MMOL/L (ref 137–147)
TIBC SERPL-MCNC: 365 UG/DL (ref 250–450)
VIT B12 SERPL-MCNC: 151 PG/ML (ref 100–900)
WBC # BLD AUTO: 9.8 THOUSAND/UL (ref 4.5–11)

## 2019-09-30 PROCEDURE — 86880 COOMBS TEST DIRECT: CPT

## 2019-09-30 PROCEDURE — 83883 ASSAY NEPHELOMETRY NOT SPEC: CPT

## 2019-09-30 PROCEDURE — 36415 COLL VENOUS BLD VENIPUNCTURE: CPT

## 2019-09-30 PROCEDURE — 85652 RBC SED RATE AUTOMATED: CPT

## 2019-09-30 PROCEDURE — 85025 COMPLETE CBC W/AUTO DIFF WBC: CPT

## 2019-09-30 PROCEDURE — 82746 ASSAY OF FOLIC ACID SERUM: CPT

## 2019-09-30 PROCEDURE — 84165 PROTEIN E-PHORESIS SERUM: CPT

## 2019-09-30 PROCEDURE — 82668 ASSAY OF ERYTHROPOIETIN: CPT

## 2019-09-30 PROCEDURE — 83550 IRON BINDING TEST: CPT

## 2019-09-30 PROCEDURE — 82784 ASSAY IGA/IGD/IGG/IGM EACH: CPT

## 2019-09-30 PROCEDURE — 84165 PROTEIN E-PHORESIS SERUM: CPT | Performed by: PATHOLOGY

## 2019-09-30 PROCEDURE — 83020 HEMOGLOBIN ELECTROPHORESIS: CPT

## 2019-09-30 PROCEDURE — 85045 AUTOMATED RETICULOCYTE COUNT: CPT

## 2019-09-30 PROCEDURE — 82728 ASSAY OF FERRITIN: CPT

## 2019-09-30 PROCEDURE — 83010 ASSAY OF HAPTOGLOBIN QUANT: CPT

## 2019-09-30 PROCEDURE — 80053 COMPREHEN METABOLIC PANEL: CPT

## 2019-09-30 PROCEDURE — 82607 VITAMIN B-12: CPT

## 2019-09-30 PROCEDURE — 83615 LACTATE (LD) (LDH) ENZYME: CPT

## 2019-09-30 PROCEDURE — 83540 ASSAY OF IRON: CPT

## 2019-09-30 PROCEDURE — 86140 C-REACTIVE PROTEIN: CPT

## 2019-10-01 LAB
ALBUMIN SERPL ELPH-MCNC: 3.11 G/DL (ref 3.5–5)
ALBUMIN SERPL ELPH-MCNC: 48.6 % (ref 52–65)
ALPHA1 GLOB SERPL ELPH-MCNC: 0.4 G/DL (ref 0.1–0.4)
ALPHA1 GLOB SERPL ELPH-MCNC: 6.2 % (ref 2.5–5)
ALPHA2 GLOB SERPL ELPH-MCNC: 0.99 G/DL (ref 0.4–1.2)
ALPHA2 GLOB SERPL ELPH-MCNC: 15.5 % (ref 7–13)
BETA GLOB ABNORMAL SERPL ELPH-MCNC: 0.51 G/DL (ref 0.4–0.8)
BETA1 GLOB SERPL ELPH-MCNC: 7.9 % (ref 5–13)
BETA2 GLOB SERPL ELPH-MCNC: 6.1 % (ref 2–8)
BETA2+GAMMA GLOB SERPL ELPH-MCNC: 0.39 G/DL (ref 0.2–0.5)
EPO SERPL-ACNC: 31.3 MIU/ML (ref 2.6–18.5)
GAMMA GLOB ABNORMAL SERPL ELPH-MCNC: 1 G/DL (ref 0.5–1.6)
GAMMA GLOB SERPL ELPH-MCNC: 15.7 % (ref 12–22)
HAPTOGLOB SERPL-MCNC: 119 MG/DL (ref 34–200)
IGG/ALB SER: 0.95 {RATIO} (ref 1.1–1.8)
KAPPA LC FREE SER-MCNC: 14.4 MG/L (ref 3.3–19.4)
KAPPA LC FREE/LAMBDA FREE SER: 0.75 {RATIO} (ref 0.26–1.65)
LAMBDA LC FREE SERPL-MCNC: 19.1 MG/L (ref 5.7–26.3)
PROT PATTERN SERPL ELPH-IMP: ABNORMAL
PROT SERPL-MCNC: 6.4 G/DL (ref 6.4–8.2)

## 2019-10-02 ENCOUNTER — TELEPHONE (OUTPATIENT)
Dept: PERINATAL CARE | Facility: CLINIC | Age: 29
End: 2019-10-02

## 2019-10-02 ENCOUNTER — TELEPHONE (OUTPATIENT)
Dept: HEMATOLOGY ONCOLOGY | Facility: CLINIC | Age: 29
End: 2019-10-02

## 2019-10-02 DIAGNOSIS — O99.019 ANEMIA DURING PREGNANCY: Primary | ICD-10-CM

## 2019-10-02 PROBLEM — E53.8 B12 DEFICIENCY: Status: ACTIVE | Noted: 2019-10-02

## 2019-10-02 PROBLEM — D50.8 OTHER IRON DEFICIENCY ANEMIAS: Status: ACTIVE | Noted: 2019-10-02

## 2019-10-02 LAB
DEPRECATED HGB OTHER BLD-IMP: 0 %
HGB A MFR BLD: 2.3 % (ref 1.8–3.2)
HGB A MFR BLD: 96.5 % (ref 96.4–98.8)
HGB C MFR BLD: 0 %
HGB F MFR BLD: 1.2 % (ref 0–2)
HGB FRACT BLD-IMP: NORMAL
HGB S BLD QL SOLY: NEGATIVE
HGB S MFR BLD: 0 %

## 2019-10-02 RX ORDER — SODIUM CHLORIDE 9 MG/ML
20 INJECTION, SOLUTION INTRAVENOUS ONCE
Status: CANCELLED | OUTPATIENT
Start: 2019-10-10

## 2019-10-02 RX ORDER — CYANOCOBALAMIN 1000 UG/ML
1000 INJECTION INTRAMUSCULAR; SUBCUTANEOUS ONCE
Status: CANCELLED | OUTPATIENT
Start: 2019-10-10

## 2019-10-02 RX ORDER — CYANOCOBALAMIN 1000 UG/ML
1000 INJECTION INTRAMUSCULAR; SUBCUTANEOUS ONCE
Status: CANCELLED | OUTPATIENT
Start: 2019-11-18

## 2019-10-02 NOTE — TELEPHONE ENCOUNTER
Patient called me back I informed her that her vitamin B12 was very low 151 and we would like to start her on vitamin B12 injections twice a week for 6 treatments and monthly thereafter-patient agrees to this  I also explained to her that her iron stores have improved after her 3 treatments with Venofer but her iron saturation and serum iron are still somewhat low she would benefit from at least another 2 sessions of iron  I explained to her that we would attempt to try the Venofer 1 more time with premedications including a steroid, H2 blocker and Benadryl prior in hopes that this will prevent recurrence of the rash and itching that she got in the past   Patient is agreeable to re-attempt the Venofer with premedications  We will premedicate with a Zantac, Benadryl and dexamethasone-I did check with Dr Sherron Fisher her OBGYN who is fine with us using these premeds  We will recheck her CBC and reticulocyte after completion of the iron/ six B12 treatments  Our office staff will arrange for the infusion appointments and call patient later today with the dates and times

## 2019-10-02 NOTE — TELEPHONE ENCOUNTER
----- Message from Sol Sparks sent at 9/30/2019  5:31 PM EDT -----  Leonie Pro,   This patient no showed again  I am unsure of how many times this is for her I think it is atleast 2 times  She was last seen by me for class 1 at [facility] on 9/9 and next had class 2  I did not see that she has been reporting blood sugars weekly either  Are you able to try and re-schedule her preferably in conjunction with another appoinment incase transportation is the issue  Thanks      Catherine

## 2019-10-07 PROBLEM — Z23 NEED FOR TDAP VACCINATION: Status: RESOLVED | Noted: 2019-09-10 | Resolved: 2019-10-07

## 2019-10-07 NOTE — PATIENT INSTRUCTIONS
Thank you for choosing [facility] for your  care today  If you have any questions about your ultrasound or care, please do not hesitate to contact us or your primary obstetrician  Nonstress Test for Pregnancy   WHAT YOU NEED TO KNOW:   What do I need to know about a nonstress test?  A nonstress test measures your baby's heart rate and movements  Nonstress means that no stress will be placed on your baby during the test    How do I prepare for a nonstress test?  Your healthcare provider will talk to you about how to prepare for this test  He may tell you to eat and drink plenty of fluids before your test  If you smoke, you may be asked not to smoke within 2 hours before the test  He will also tell you what medicines to take or not take on the day of your test    What will happen during a nonstress test?  You may be asked to lie down or recline back for the test on a bed  One or two belts with sensors will be placed around your abdomen  Your baby's heart rate will be recorded with a machine  If your baby does not move, your baby may be asleep  Your healthcare provider may make a noise near your abdomen to try to wake your baby  The test usually takes about 20 minutes, but can take longer if your baby needs to be awakened  What do I need to know about the test results? Your baby will be expected to move at least twice for a certain amount of time  Your baby's heart rate will be expected to go up by a certain number of beats per minute during movement  If your baby does not move as expected, the test may need to be repeated or you may need other tests  CARE AGREEMENT:   You have the right to help plan your care  Learn about your health condition and how it may be treated  Discuss treatment options with your caregivers to decide what care you want to receive  You always have the right to refuse treatment  The above information is an  only   It is not intended as medical advice for individual conditions or treatments  Talk to your doctor, nurse or pharmacist before following any medical regimen to see if it is safe and effective for you  © 2017 [address] Information is for End User's use only and may not be sold, redistributed or otherwise used for commercial purposes  All illustrations and images included in CareNotes® are the copyrighted property of A [employer] A M [employer]  or [employer] Scott

## 2019-10-08 ENCOUNTER — ROUTINE PRENATAL (OUTPATIENT)
Dept: PERINATAL CARE | Facility: OTHER | Age: 29
DRG: 540 | End: 2019-10-08
Payer: COMMERCIAL

## 2019-10-08 ENCOUNTER — ULTRASOUND (OUTPATIENT)
Dept: PERINATAL CARE | Facility: OTHER | Age: 29
DRG: 540 | End: 2019-10-08
Payer: COMMERCIAL

## 2019-10-08 ENCOUNTER — ROUTINE PRENATAL (OUTPATIENT)
Dept: OBGYN CLINIC | Facility: CLINIC | Age: 29
End: 2019-10-08
Payer: COMMERCIAL

## 2019-10-08 VITALS
DIASTOLIC BLOOD PRESSURE: 68 MMHG | BODY MASS INDEX: 34.36 KG/M2 | SYSTOLIC BLOOD PRESSURE: 118 MMHG | HEART RATE: 81 BPM | HEIGHT: 66 IN | WEIGHT: 213.8 LBS

## 2019-10-08 VITALS
SYSTOLIC BLOOD PRESSURE: 112 MMHG | HEIGHT: 66 IN | WEIGHT: 214.2 LBS | BODY MASS INDEX: 34.42 KG/M2 | DIASTOLIC BLOOD PRESSURE: 73 MMHG | HEART RATE: 96 BPM

## 2019-10-08 DIAGNOSIS — O44.03 PLACENTA PREVIA IN THIRD TRIMESTER: ICD-10-CM

## 2019-10-08 DIAGNOSIS — Z36.89 ENCOUNTER FOR ULTRASOUND TO CHECK FETAL GROWTH: ICD-10-CM

## 2019-10-08 DIAGNOSIS — Z23 INFLUENZA VACCINE NEEDED: ICD-10-CM

## 2019-10-08 DIAGNOSIS — O43.223 PLACENTA INCRETA DURING PREGNANCY IN THIRD TRIMESTER, ANTEPARTUM: ICD-10-CM

## 2019-10-08 DIAGNOSIS — O99.213 OBESITY AFFECTING PREGNANCY IN THIRD TRIMESTER: ICD-10-CM

## 2019-10-08 DIAGNOSIS — E53.8 B12 DEFICIENCY: ICD-10-CM

## 2019-10-08 DIAGNOSIS — O34.219 H/O CESAREAN SECTION COMPLICATING PREGNANCY: ICD-10-CM

## 2019-10-08 DIAGNOSIS — Z3A.32 32 WEEKS GESTATION OF PREGNANCY: Primary | ICD-10-CM

## 2019-10-08 DIAGNOSIS — Z87.59 HISTORY OF IUFD: ICD-10-CM

## 2019-10-08 DIAGNOSIS — O24.419 GESTATIONAL DIABETES MELLITUS (GDM) IN THIRD TRIMESTER, GESTATIONAL DIABETES METHOD OF CONTROL UNSPECIFIED: ICD-10-CM

## 2019-10-08 DIAGNOSIS — O43.233 PLACENTA PERCRETA IN THIRD TRIMESTER: Primary | ICD-10-CM

## 2019-10-08 DIAGNOSIS — Z3A.32 32 WEEKS GESTATION OF PREGNANCY: ICD-10-CM

## 2019-10-08 DIAGNOSIS — O99.019 ANEMIA DURING PREGNANCY: ICD-10-CM

## 2019-10-08 PROBLEM — Z91.81 STATUS POST FALL: Status: RESOLVED | Noted: 2019-09-15 | Resolved: 2019-10-08

## 2019-10-08 PROCEDURE — 76817 TRANSVAGINAL US OBSTETRIC: CPT | Performed by: OBSTETRICS & GYNECOLOGY

## 2019-10-08 PROCEDURE — 90686 IIV4 VACC NO PRSV 0.5 ML IM: CPT | Performed by: OBSTETRICS & GYNECOLOGY

## 2019-10-08 PROCEDURE — 76816 OB US FOLLOW-UP PER FETUS: CPT | Performed by: OBSTETRICS & GYNECOLOGY

## 2019-10-08 PROCEDURE — 90471 IMMUNIZATION ADMIN: CPT | Performed by: OBSTETRICS & GYNECOLOGY

## 2019-10-08 PROCEDURE — 99213 OFFICE O/P EST LOW 20 MIN: CPT | Performed by: OBSTETRICS & GYNECOLOGY

## 2019-10-08 PROCEDURE — 96372 THER/PROPH/DIAG INJ SC/IM: CPT | Performed by: OBSTETRICS & GYNECOLOGY

## 2019-10-08 RX ORDER — BETAMETHASONE SODIUM PHOSPHATE AND BETAMETHASONE ACETATE 3; 3 MG/ML; MG/ML
12 INJECTION, SUSPENSION INTRA-ARTICULAR; INTRALESIONAL; INTRAMUSCULAR; SOFT TISSUE EVERY 24 HOURS
Status: COMPLETED | OUTPATIENT
Start: 2019-10-08 | End: 2019-10-09

## 2019-10-08 RX ADMIN — BETAMETHASONE SODIUM PHOSPHATE AND BETAMETHASONE ACETATE 12 MG: 3; 3 INJECTION, SUSPENSION INTRA-ARTICULAR; INTRALESIONAL; INTRAMUSCULAR; SOFT TISSUE at 13:36

## 2019-10-08 NOTE — PATIENT INSTRUCTIONS
Pregnancy at 31 to 34 [address]:   You may continue to have symptoms such as shortness of breath, heartburn, contractions, or swelling of your ankles and feet  You may be gaining about 1 pound a week now  DISCHARGE INSTRUCTIONS:   Return to the emergency department if:   · You develop a severe headache that does not go away  · You have new or increased vision changes, such as blurred or spotted vision  · You have new or increased swelling in your face or hands  · You have vaginal spotting or bleeding  · Your water broke or you feel warm water gushing or trickling from your vagina  Contact your healthcare provider if:   · You have more than 5 contractions in 1 hour  · You notice any changes in your baby's movements  · You have abdominal cramps, pressure, or tightening  · You have a change in vaginal discharge  · You have chills or a fever  · You have vaginal itching, burning, or pain  · You have yellow, green, white, or foul-smelling vaginal discharge  · You have pain or burning when you urinate, less urine than usual, or pink or bloody urine  · You have questions or concerns about your condition or care  How to care for yourself at this stage of your pregnancy:   · Eat a variety of healthy foods  Healthy foods include fruits, vegetables, whole-grain breads, low-fat dairy foods, beans, lean meats, and fish  Drink liquids as directed  Ask how much liquid to drink each day and which liquids are best for you  Limit caffeine to less than 200 milligrams each day  Limit your intake of fish to 2 servings each week  Choose fish low in mercury such as canned light tuna, shrimp, salmon, cod, or tilapia  Do not  eat fish high in mercury such as swordfish, tilefish, trista mackerel, and shark  · Manage heartburn  by eating 4 or 5 small meals each day instead of large meals  Avoid spicy food  · Manage swelling  by lying down and putting your feet up       · Take prenatal vitamins as directed  Your need for certain vitamins and minerals, such as folic acid, increases during pregnancy  Prenatal vitamins provide some of the extra vitamins and minerals you need  Prenatal vitamins may also help to decrease the risk of certain birth defects  · Talk to your healthcare provider about exercise  Moderate exercise can help you stay fit  Your healthcare provider will help you plan an exercise program that is safe for you during pregnancy  · Do not smoke  If you smoke, it is never too late to quit  Smoking increases your risk of a miscarriage and other health problems during your pregnancy  Smoking can cause your baby to be born too early or weigh less at birth  Ask your healthcare provider for information if you need help quitting  · Do not drink alcohol  Alcohol passes from your body to your baby through the placenta  It can affect your baby's brain development and cause fetal alcohol syndrome (FAS)  FAS is a group of conditions that causes mental, behavior, and growth problems  · Talk to your healthcare provider before you take any medicines  Many medicines may harm your baby if you take them when you are pregnant  Do not take any medicines, vitamins, herbs, or supplements without first talking to your healthcare provider  Never use illegal or street drugs (such as marijuana or cocaine) while you are pregnant  Safety tips during pregnancy:   · Avoid hot tubs and saunas  Do not use a hot tub or sauna while you are pregnant, especially during your first trimester  Hot tubs and saunas may raise your baby's temperature and increase the risk of birth defects  · Avoid toxoplasmosis  This is an infection caused by eating raw meat or being around infected cat feces  It can cause birth defects, miscarriages, and other problems  Wash your hands after you touch raw meat  Make sure any meat is well-cooked before you eat it  Avoid raw eggs and unpasteurized milk   Use gloves or ask someone else to clean your cat's litter box while you are pregnant  Changes that are happening with your baby:  By 34 weeks, your baby may weigh more than 5 pounds  Your baby will be about 12 ½ inches long from the top of the head to the rump (baby's bottom)  Your baby is gaining about ½ pound a week  Your baby's eyes open and close now  Your baby's kicks and movements are more forceful at this time  What you need to know about prenatal care: Your healthcare provider will check your blood pressure and weight  You may also need the following:  · A urine test  may also be done to check for sugar and protein  These can be signs of gestational diabetes or infection  Protein in your urine may also be a sign of preeclampsia  Preeclampsia is a condition that can develop during week 20 or later of your pregnancy  It causes high blood pressure, and it can cause problems with your kidneys and other organs  · A Tdap vaccine  may be recommended by your healthcare provider  · Fundal height  is a measurement of your uterus to check your baby's growth  This number is usually the same as the number of weeks that you have been pregnant  Your healthcare provider may also check your baby's position  · Your baby's heart rate  will be checked  © 2017 [address]  Information is for End User's use only and may not be sold, redistributed or otherwise used for commercial purposes  All illustrations and images included in CareNotes® are the copyrighted property of [employer]  or José Miguel Scott  The above information is an  only  It is not intended as medical advice for individual conditions or treatments  Talk to your doctor, nurse or pharmacist before following any medical regimen to see if it is safe and effective for you

## 2019-10-08 NOTE — PROGRESS NOTES
[address]: Ms Virgie Cooper was seen today at 32w1d for followup placental location ultrasound with fetal growth measurement  PERCRETA is suspected  NST was reactive with no contractions  Please don't hesitate to contact our office with any concerns or questions    Daniel Edwards MD

## 2019-10-08 NOTE — PROGRESS NOTES
OB/GYN  PN Visit  Pan Rod  [id number]  10/8/2019  2:11 PM  Dr Thania Thrasher MD    S: 26 y o  [id number] 32w1d here for PN visit  OB complaints:  Contractions: no  Leakage: no  Bleeding: no  Fetal movement: yes      O:  Vitals:    10/08/19 1300   BP: 118/68   Pulse: 81       Gen: no acute distress, nonlabored breathing  Fundal Height (cm): 32 cm  Fetal Heart Rate: 150      A/P:    Problem List Items Addressed This Visit        Endocrine    GDM (gestational diabetes mellitus)       Other    Anemia during pregnancy    H/O  section complicating pregnancy    32 weeks gestation of pregnancy - Primary    History of IUFD    Obesity affecting pregnancy in third trimester    Placenta increta during pregnancy in third trimester, antepartum      Other Visit Diagnoses     Influenza vaccine needed        Relevant Orders    influenza vaccine, 9600-6046, quadrivalent, 0 5 mL, preservative-free, for adult and pediatric patients 6 mos+ (AFLURIA, FLUARIX, FLULAVAL, FLUZONE)          Patient with gestational diabetes  She has been checking her blood sugars but is not call the min  She was instructed to call diabetes Education with this regard  Patient with history of anemia with last hemoglobin of 10 7  She has been seen by Hematology and also found to have vitamin B12 deficiency  She had a reaction to Venofer infusion and the plan was to restart this with premedication  Patient with history of IUFD  NST was performed by  Center today including growth scan  Patient with possible placenta percreta involving the bladder  Discussion with Maternal Fetal Medicine Dr Mari Myers  Today regarding logistics and timing of delivery  Multidisciplinary meeting is planned for Thursday October 10, 2019 at 6:45 a m  Karla Gerardo She was given  steroids  For fetal lung maturity  Her 1st course was given around 24 weeks  Patient continues to complain of pelvic pain especially with walking    She also complains of some abdominal discomfort  Consideration was given to in-patient admission until delivery later this week  She was advised to proceed to [facility] with any  labor signs or symptoms  She was counseled on flu vaccine and she will receive this today  No further prenatal visits was scheduled for patient if she will be admitted to [facility] later this week          Landry Rasheed MD  10/8/2019  2:11 PM

## 2019-10-09 ENCOUNTER — OFFICE VISIT (OUTPATIENT)
Dept: PERINATAL CARE | Facility: OTHER | Age: 29
DRG: 540 | End: 2019-10-09
Payer: COMMERCIAL

## 2019-10-09 VITALS
WEIGHT: 213 LBS | HEART RATE: 88 BPM | BODY MASS INDEX: 34.23 KG/M2 | DIASTOLIC BLOOD PRESSURE: 76 MMHG | SYSTOLIC BLOOD PRESSURE: 117 MMHG | HEIGHT: 66 IN

## 2019-10-09 DIAGNOSIS — Z3A.32 32 WEEKS GESTATION OF PREGNANCY: ICD-10-CM

## 2019-10-09 DIAGNOSIS — O34.219 H/O CESAREAN SECTION COMPLICATING PREGNANCY: ICD-10-CM

## 2019-10-09 DIAGNOSIS — O43.223 PLACENTA INCRETA DURING PREGNANCY IN THIRD TRIMESTER, ANTEPARTUM: ICD-10-CM

## 2019-10-09 DIAGNOSIS — O24.419 GESTATIONAL DIABETES MELLITUS (GDM) IN THIRD TRIMESTER, GESTATIONAL DIABETES METHOD OF CONTROL UNSPECIFIED: Primary | ICD-10-CM

## 2019-10-09 DIAGNOSIS — Z87.59 HISTORY OF IUFD: ICD-10-CM

## 2019-10-09 DIAGNOSIS — O99.213 OBESITY AFFECTING PREGNANCY IN THIRD TRIMESTER: ICD-10-CM

## 2019-10-09 DIAGNOSIS — E53.8 B12 DEFICIENCY: ICD-10-CM

## 2019-10-09 PROCEDURE — 96372 THER/PROPH/DIAG INJ SC/IM: CPT

## 2019-10-09 RX ADMIN — BETAMETHASONE SODIUM PHOSPHATE AND BETAMETHASONE ACETATE 12 MG: 3; 3 INJECTION, SUSPENSION INTRA-ARTICULAR; INTRALESIONAL; INTRAMUSCULAR; SOFT TISSUE at 14:12

## 2019-10-10 ENCOUNTER — TELEPHONE (OUTPATIENT)
Dept: OBGYN CLINIC | Facility: CLINIC | Age: 29
End: 2019-10-10

## 2019-10-10 ENCOUNTER — HOSPITAL ENCOUNTER (OUTPATIENT)
Dept: INFUSION CENTER | Facility: HOSPITAL | Age: 29
Discharge: HOME | End: 2019-10-10
Attending: INTERNAL MEDICINE

## 2019-10-10 DIAGNOSIS — Z3A.34 34 WEEKS GESTATION OF PREGNANCY: Primary | ICD-10-CM

## 2019-10-10 DIAGNOSIS — O43.233 PLACENTA PERCRETA IN THIRD TRIMESTER: Primary | ICD-10-CM

## 2019-10-10 NOTE — PROGRESS NOTES
Spoke with patient today regarding 2 missed appts   Pt states 'I am going to [place] for them to monitor me and I am having my baby on 10/21" all remaining appts for iron cx

## 2019-10-10 NOTE — TELEPHONE ENCOUNTER
Multidisciplinary meeting was held earlier this morning  Recommended admission inpatient until delivery around 34 weeks  Patient has arranged for  for her children and she is able to come in tomorrow around 11:00 a m  At [facility]

## 2019-10-11 ENCOUNTER — HOSPITAL ENCOUNTER (INPATIENT)
Facility: HOSPITAL | Age: 29
LOS: 8 days | Discharge: HOME | DRG: 540 | End: 2019-10-19
Attending: OBSTETRICS & GYNECOLOGY | Admitting: OBSTETRICS & GYNECOLOGY
Payer: COMMERCIAL

## 2019-10-11 ENCOUNTER — TELEPHONE (OUTPATIENT)
Dept: PERINATAL CARE | Facility: CLINIC | Age: 29
End: 2019-10-11

## 2019-10-11 DIAGNOSIS — O43.233: ICD-10-CM

## 2019-10-11 DIAGNOSIS — Z3A.01 5 WEEKS GESTATION OF PREGNANCY: ICD-10-CM

## 2019-10-11 DIAGNOSIS — O99.019 ANEMIA DURING PREGNANCY: ICD-10-CM

## 2019-10-11 DIAGNOSIS — Z3A.32 32 WEEKS GESTATION OF PREGNANCY: ICD-10-CM

## 2019-10-11 DIAGNOSIS — O21.9 NAUSEA/VOMITING IN PREGNANCY: ICD-10-CM

## 2019-10-11 LAB
ABO GROUP BLD: NORMAL
BLD GP AB SCN SERPL QL: NEGATIVE
ERYTHROCYTE [DISTWIDTH] IN BLOOD BY AUTOMATED COUNT: 16.8 % (ref 11.6–15.1)
GLUCOSE SERPL-MCNC: 96 MG/DL (ref 65–140)
HCT VFR BLD AUTO: 33 % (ref 34.8–46.1)
HGB BLD-MCNC: 10.3 G/DL (ref 11.5–15.4)
MCH RBC QN AUTO: 26.5 PG (ref 26.8–34.3)
MCHC RBC AUTO-ENTMCNC: 31.2 G/DL (ref 31.4–37.4)
MCV RBC AUTO: 85 FL (ref 82–98)
PLATELET # BLD AUTO: 262 THOUSANDS/UL (ref 149–390)
PMV BLD AUTO: 10.3 FL (ref 8.9–12.7)
RBC # BLD AUTO: 3.88 MILLION/UL (ref 3.81–5.12)
RH BLD: POSITIVE
SPECIMEN EXPIRATION DATE: NORMAL
WBC # BLD AUTO: 12.93 THOUSAND/UL (ref 4.31–10.16)

## 2019-10-11 PROCEDURE — 99253 IP/OBS CNSLTJ NEW/EST LOW 45: CPT | Performed by: OBSTETRICS & GYNECOLOGY

## 2019-10-11 PROCEDURE — 86592 SYPHILIS TEST NON-TREP QUAL: CPT | Performed by: OBSTETRICS & GYNECOLOGY

## 2019-10-11 PROCEDURE — 86901 BLOOD TYPING SEROLOGIC RH(D): CPT | Performed by: OBSTETRICS & GYNECOLOGY

## 2019-10-11 PROCEDURE — 85027 COMPLETE CBC AUTOMATED: CPT | Performed by: OBSTETRICS & GYNECOLOGY

## 2019-10-11 PROCEDURE — NC001 PR NO CHARGE: Performed by: OBSTETRICS & GYNECOLOGY

## 2019-10-11 PROCEDURE — 4A1HXCZ MONITORING OF PRODUCTS OF CONCEPTION, CARDIAC RATE, EXTERNAL APPROACH: ICD-10-PCS | Performed by: OBSTETRICS & GYNECOLOGY

## 2019-10-11 PROCEDURE — 59025 FETAL NON-STRESS TEST: CPT | Performed by: OBSTETRICS & GYNECOLOGY

## 2019-10-11 PROCEDURE — 86850 RBC ANTIBODY SCREEN: CPT | Performed by: OBSTETRICS & GYNECOLOGY

## 2019-10-11 PROCEDURE — 86900 BLOOD TYPING SEROLOGIC ABO: CPT | Performed by: OBSTETRICS & GYNECOLOGY

## 2019-10-11 PROCEDURE — 82948 REAGENT STRIP/BLOOD GLUCOSE: CPT

## 2019-10-11 PROCEDURE — 86923 COMPATIBILITY TEST ELECTRIC: CPT

## 2019-10-11 RX ORDER — ACETAMINOPHEN 325 MG/1
650 TABLET ORAL EVERY 4 HOURS PRN
Status: DISCONTINUED | OUTPATIENT
Start: 2019-10-11 | End: 2019-10-14

## 2019-10-11 RX ORDER — CALCIUM CARBONATE 200(500)MG
1000 TABLET,CHEWABLE ORAL DAILY PRN
Status: DISCONTINUED | OUTPATIENT
Start: 2019-10-11 | End: 2019-10-14

## 2019-10-11 RX ORDER — FERROUS SULFATE 325(65) MG
325 TABLET ORAL
Status: DISCONTINUED | OUTPATIENT
Start: 2019-10-12 | End: 2019-10-19 | Stop reason: HOSPADM

## 2019-10-11 RX ORDER — DOCUSATE SODIUM 100 MG/1
100 CAPSULE, LIQUID FILLED ORAL 2 TIMES DAILY
Status: DISCONTINUED | OUTPATIENT
Start: 2019-10-11 | End: 2019-10-16

## 2019-10-11 RX ORDER — DOCUSATE SODIUM 100 MG/1
100 CAPSULE, LIQUID FILLED ORAL 2 TIMES DAILY
Status: DISCONTINUED | OUTPATIENT
Start: 2019-10-11 | End: 2019-10-11

## 2019-10-11 RX ORDER — HEPARIN SODIUM 5000 [USP'U]/ML
10000 INJECTION, SOLUTION INTRAVENOUS; SUBCUTANEOUS EVERY 12 HOURS SCHEDULED
Status: DISCONTINUED | OUTPATIENT
Start: 2019-10-11 | End: 2019-10-14

## 2019-10-11 RX ADMIN — HEPARIN SODIUM 10000 UNITS: 5000 INJECTION INTRAVENOUS; SUBCUTANEOUS at 21:07

## 2019-10-11 NOTE — CONSULTS
Consultation - [facility]  Jason Foster 26 y o  female MRN: [id number]  Unit/Bed#: -01 Encounter: [id number]      [address]      Physician Requesting Consult: Vadim Silverman MD  Reason for Consult / Principal Problem: Placenta Percreta  Subspeciality: Perinatology    HPI:  Jason Foster is a 26 y o  [id number] female with an MINE of 2019, by Ultrasound at 32w4d gestation who is being evaluated for Placenta Percreta  26year old [id number] at 32w4d who presents for planned admission in the setting of recently diagnosed placenta percreta  Please see most recent ultrasound for details  Plan for  hysterectomy planned for Monday, 10/21/2019 at 0800  Without current complaints  Pregnancy complicated by anemia, gestational diabetes  Received 2 courses of betamethasone, most recently receiving doses 10/8 to 10/9  Denies contractions, vaginal bleeding, leakage of fluid      Because her anemia, reports a rash reaction following most recent administration of IV Venofer  Diagnosis gestational diabetic, but reports that she has not monitored her blood sugar regularly  OB History    Para Term  AB Living   9 5 5 0 3 4   SAB TAB Ectopic Multiple Live Births   3 0 0 0 4      # Outcome Date GA Lbr Miles/2nd Weight Sex Delivery Anes PTL Lv   9 Current            8 2014              Complications:  Molar pregnancy   7 Term  39w0d  3175 g (7 lb) M CS-LTranv   NOREEN   6 Term    4082 g (9 lb) F Vag-Spont None  FD      Complications: Chorioamnionitis, Shoulder Dystocia, IUFD at 20 weeks or more of gestation   5 Term  40w0d  4536 g (10 lb) Tracy Fco   NOREEN   4 Term  39w0d  3629 g (8 lb) F CS-LTranv   NOREEN   3 Term  40w0d  3629 g (8 lb) M CS-LTranv   NOREEN      Complications: Breech presentation   2 2006           1 2004               Historical Information   Past Medical History:   Diagnosis Date    Disease of thyroid gland     Pap smear abnormality of cervix with LGSIL 2016  Varicella     Had as a child     Past Surgical History:   Procedure Laterality Date     SECTION      DILATION AND EVACUATION      Molar    LAPAROSCOPY       Social History   Social History     Substance and Sexual Activity   Alcohol Use Not Currently     Social History     Substance and Sexual Activity   Drug Use No     Social History     Tobacco Use   Smoking Status Never Smoker   Smokeless Tobacco Never Used     Family History   Problem Relation Age of Onset    Hypertension Mother     Lupus Mother     Pulmonary embolism Mother      Meds/Allergies      Medications Prior to Admission   Medication    ACCU-CHEK FASTCLIX LANCETS MISC    ACCU-CHEK GUIDE test strip    docusate sodium (COLACE) 100 mg capsule    Ferrous Sulfate (FEROSUL PO)    metoclopramide (REGLAN) 10 mg tablet    Prenatal Vit-Fe Fumarate-FA ([facility] PRENATAL) 28-0 8 MG TABS        Allergies   Allergen Reactions    Penicillins Anaphylaxis     Anaphylaxis    Shrimp (Diagnostic)        Review of Systems   Constitutional: Negative for chills, diaphoresis and fatigue  Respiratory: Negative for cough, choking and chest tightness  Cardiovascular: Negative for chest pain, palpitations and leg swelling  Gastrointestinal: Negative for abdominal pain, constipation, diarrhea, nausea and vomiting  Genitourinary: Negative for pelvic pain, vaginal bleeding, vaginal discharge and vaginal pain  Musculoskeletal: Negative for arthralgias, back pain, myalgias and neck pain  Neurological: Negative for dizziness, weakness, light-headedness and headaches  Psychiatric/Behavioral: Negative for self-injury, sleep disturbance and suicidal ideas  The patient is not nervous/anxious          LMP 2019 (Within Days)     Physical Exam    Cervical exam deferred    Fetal heart rate:  Not recorded at time of documentation    Prenatal Labs: see h&p    MFM U/S Findings Summary: Scan from 10/8/2019     OCT 8 2019         RE: Prema Silva To: Wille Galeazzi   [facility]    MR#: [id number]                                   [address]   : [address] [place] [address], [address] [place] [address]   ENC: [id number]                             Fax: [phone]   (Exam #: [id number])                           Phone: [phone]      The LMP of this 26year old,  G9, P5-0-3-4 patient was MAR 23 2019, her   working MINE is DEC 2 2019 and the current gestational age is 26 weeks 1   day by  [address] [place] [address]  A sonographic examination was performed on OCT   8 2019 using real time equipment  The ultrasound examination was performed   using abdominal technique  The patient has a BMI of 34 5  Her blood   pressure today was 112/73  Earliest US on record: 2019   8w1d   MINE 2019      Cardiac motion was observed at 148 bpm       INDICATIONS      placenta previa   obesity   previous stillbirth   previous  (s)   increta      Exam Types      NST   Level I   Transvaginal Ultrasound      RESULTS      Fetus # 1 of 1   Vertex presentation   Placenta Location = Posterior   Placenta previa   Placenta Grade = II      MEASUREMENTS (* Included In Average GA)      AC              27 9 cm        32 weeks 0 days* (46%)   BPD              6 9 cm        27 weeks 6 days* (<5%)   HC              28 0 cm        30 weeks 2 days* (<5%)   Femur            6 2 cm        32 weeks 0 days* (49%)      Cerebellum       3 9 cm        31 weeks 5 days      HC/AC           1 00   FL/AC           0 22   FL/BPD          0 89   EFW (Ac/Fl/Hc)  1831 grams - 4 lbs 1 oz                 (37%)      THE AVERAGE GESTATIONAL AGE is 30 weeks 4 days +/- 21 days  AMNIOTIC FLUID      Q1: 2 4      Q2: 2 2      Q3: 4 5      Q4: 2 8   KATHIA Total = 11 9 cm   Amniotic Fluid: Normal      ANATOMY DETAILS      Abnormal:   PLACENTA      ANATOMY COMMENTS      Fetal anatomy has been previously assessed and documented in our Center      No fetal structural abnormality is identified on the Level I survey today  Follow up evaluation of intracranial anatomy (calvarium, cavum septum   pellucidum, lateral ventricles, and cerebellum), cardiac anatomy was very   limited due to unfavorable fetal position, diaphragm, stomach, kidneys,   and bladder appear normal       IMPRESSION      Ferreira IUP   30 weeks and 4 days by this ultrasound  (MINE=DEC 13 2019)   32 weeks and 1 day by 1st Tri Sono  (MINE=DEC 2 2019)   Vertex presentation   Regular fetal heart rate of 148 bpm   Placenta Previa   Posterior placenta   Placenta previa      GENERAL COMMENT      Ms Rosina Parsons is here for growth and delivery planning in the setting of   suspected placenta increta  She has a history of 4 prior    deliveries  She has a family history of venous thromboembolism and had a   negative thrombophilia workup  She has a history of a term stillbirth  She has anemia that is being treated with parenteral iron  She has   gestational diabetes and is not doing monitoring  There is a single live intrauterine pregnancy with growth at the 37th   percentile  No gross anomalies were identified on limited views  Amniotic fluid is within normal limits  PLACENTA PERCRETA suspected today based on both transvaginal and   transabdominal imaging  The placenta is posterior, predominantly on the   patient's right side, and it completely covers the internal os consistent   with PLACENTA PREVIA  There is an abnormal bulge into the bladder, with   blurring of the vesicouterine space containing placental tissue,   suspicious for bladder invasion  Inferior to the bladder at the   uteroplacental interface (which appears obliterated) there is markedly   increased vasculature  Cervical length was challenging to measure but   grossly it appears normal       Evaluation and Management:   The patient was counseled regarding the above findings     The approximate   face-to-face time was 15 minutes  The majority of time (>50%) was spent   counseling and/or coordinating care with the patient and/or family   members  The limitations of ultrasound were reviewed  We spoke at length today about delivery planning  She has several   children at home and family in [place] and was planning on driving her   children to [place] to secure their care  Both myself and the patient's   obstetrician, Dr Mary Rangel, recommended explicitly to the patient that she   should NOT drive to [place]  While general delivery time frame for placenta accreta is 34 weeks 0 days   at 35 weeks 6 days, in this situation I would recommend delivery be   planned for sometime between 33 weeks 0 days to 34 weeks 6 days  She reports that she is having discomfort while walking  It does not   sound that they are consistent with contractions  Nonstress test today   demonstrated no contractions  She denies hematuria and vaginal bleeding  Given her complex psychosocial situation I offered admission now until   delivery which she declines, but I recommend that she, at least by Friday   of this week, which is in 3 days, present for admission until delivery  She was dispositioned to her obstetrician appointment with Dr Mary Rangel who   will continue counseling further but I reiterated to her the availability   of labor and delivery  should she change her mind about admission  Betamethasone was administered to her today  Second dose will be   administered tomorrow  I have also notified the inpatient   Maternal-Fetal Medicine team       At the conclusion of today's encounter, all questions were answered to her   satisfaction  Thank you very much for this kind referral and please do   not hesitate to contact me with any further questions or concerns  A/P: 26 y o  [id number]  with IUP at 32w4d  gestation with placenta percreta    1  Admit to L&D- hysterectomy scheduled in the main OR for Monday 10/21/2019  2  CBC, RPR, type and screen every 3 days, IV access   3  Consults:  Perinatology, gynecology Oncology, anesthesiology, neonatology, hematology   4  Anemia:  Start Venofer infusion  6  A1GDM:  Monitor blood glucose, anticipate hyperglycemia in the setting of recent betamethasone administration  7  FEN: regular  8  PPX: SCDs, Heparin 31923 units BID  9   IUP: NST TID     Willard Rivas DO  OB/Gyn PGY-3  10/11/2019 5:12 PM

## 2019-10-11 NOTE — PROGRESS NOTES
ACCRETA CHECKLIST-Interdisciplinary meeting 2019  Patient name                                      Davon Machado record no                               [id number]  Current gestational age                      29w1d  Preoperative diagnosis of accreta      percreta                        Placenta location:                               posterior                           Relevant ultrasound findings              See findings from 10/08/2019  Relevant MRI findings                        None  Obstetric history                                 [id number]  No  of prior  deliveries          4  Other prior uterine surgery                 Dilation and Evacuation for moral pregnancy      Labs  Blood type                                          O+  Antibody screen                                  Antibody screen negative  Date and value of most recent hematocrit/hemoglobin        Lab Results   Component Value Date     WBC 9 80 2019     HGB 10 7 (L) 2019     HCT 32 8 (L) 2019     MCV 82 2019      2019                                                                   Date and value of most recent creatinine        Lab Results   Component Value Date     CREATININE 0 44 (L) 2019      Iron supplementation: Most recent hemoglobin 10 3, s/p 3 doses of IV Venofer        Date of planned surgery: 10/21/2019 at  0800  Planned surgery location and contact no: [facility]  Gestational age at planned delivery: 34 weeks 0 days   glucocorticoids yes               if yes, give dates: first course 24 weeks, 10/8-10/9  Primary obstetrician; list name and contact no  Amita Vazquez MD         Preoperative consultations and notifications: if yes, list name and contact no   (* = mandatory)  *Obstetric anesthesiologist/anesthesiologist:       Terry Alvarez MD   *Maternal fetal medicine specialist:                      María Worrell MD  *Neonatologist/pediatrician: Aleida DO  *Gynecologic oncologist/pelvic surgeon:             Fritz Jose MD  Have all mandatory consults been completed?   no but inpatient admission is pending and scheduled for Friday, 10/11/2019  Blood bank specialist/hematologist:   Consider consultation at time of admission, will be typed and crossed for 6 units PRBCs, 6 units FFP, 6 pack platelets        Plan for midline incision, fundal classical hysterotomy, and subsequent  hysterectomy  Cystoscopy and likely placement of bilateral ureteral catheters will be placed preop  Plan for 2 IVs at admission, A line placement at time of surgery  Appreciate anesthesiology recommendations    Will place formal gyn onc, perinatology, anesthesiology, and  Neonatology consultations at time of inpatient admission      Upcoming appointments:         Future Appointments   Date Time Provider Alaina Banerjee   10/15/2019 11:00 AM [address] [place] [address] [address] [place] [address]   10/17/2019 11:00 AM [address] [place] [address] [address] [place] [address]   2019 10:20 AM Elian Dueñas MD [facility]         DATE LAST UPDATED: 10/10/19     Last updated by:  Lynda Pinon DO

## 2019-10-11 NOTE — H&P
History & Physical - OB/GYN   Kathy Morgan 26 y o  female MRN: [id number]  Unit/Bed#: -01 Encounter: [id number]    26 y o  [id number] at 32w4d by first-trimester ultrasound  She is a patient of Dr Juanjo Kyle    Chief complaint:  I am here for admission prior to  section    HPI:  26year old [id number] at 32w4d who presents for planned admission in the setting of recently diagnosed placenta percreta  Please see most recent ultrasound for details  Plan for  hysterectomy planned for Monday, 10/21/2019 at 0800  Without current complaints  Pregnancy complicated by anemia, gestational diabetes  Received 2 courses of betamethasone, most recently receiving doses 10/8 to 10/9  Denies contractions, vaginal bleeding, leakage of fluid  Because her anemia, reports a rash reaction following most recent administration of IV Venofer    Diagnosis gestational diabetic, but reports that she has not monitored her blood sugar regularly      Pregnancy Complications:  Patient Active Problem List   Diagnosis    Anemia during pregnancy    H/O  section complicating pregnancy    Bipolar disorder ([facility] [address] )    32 weeks gestation of pregnancy    History of IUFD    Obesity affecting pregnancy in third trimester    GDM (gestational diabetes mellitus)    Influenza vaccine refused    Request for sterilization    Placenta percreta during pregnancy in third trimester, antepartum    Back pain    B12 deficiency    Other iron deficiency anemias    34 weeks gestation of pregnancy       PMH:  Past Medical History:   Diagnosis Date    Disease of thyroid gland     Pap smear abnormality of cervix with LGSIL 2016    Varicella     Had as a child       PSH:  Past Surgical History:   Procedure Laterality Date     SECTION      DILATION AND EVACUATION      Molar    LAPAROSCOPY         Social Hx:  Denies x3    OB Hx:  OB History    Para Term  AB Living   9 5 5 0 3 4   SAB TAB Ectopic Multiple Live Births   3 0 0 0 4      # Outcome Date GA Lbr Miles/2nd Weight Sex Delivery Anes PTL Lv   9 Current            8 SAB 2014              Complications: Molar pregnancy   7 Term 2013 39w0d  3175 g (7 lb) M CS-LTranv   NOREEN   6 Term 2011   4082 g (9 lb) F Vag-Spont None  FD      Complications: Chorioamnionitis, Shoulder Dystocia, IUFD at 20 weeks or more of gestation   5 Term 2010 40w0d  4536 g (10 lb) Lisa Bolognese   NOREEN   4 Term 2009 39w0d  3629 g (8 lb) F CS-LTranv   NOREEN   3 Term 2008 40w0d  3629 g (8 lb) M CS-LTranv   NOREEN      Complications: Breech presentation   2 SAB 2006           1 SAB 2004               Meds:  No current facility-administered medications on file prior to encounter  Current Outpatient Medications on File Prior to Encounter   Medication Sig Dispense Refill    ACCU-CHEK FASTCLIX LANCETS MISC by Does not apply route 4 (four) times a day Test blood sugar 4 times per day, fasting and 2 hours after the start of each meal 102 each 3    ACCU-CHEK GUIDE test strip Test 4 times a day and as instructed  100 each 3    docusate sodium (COLACE) 100 mg capsule Take 1 capsule (100 mg total) by mouth 2 (two) times a day 10 capsule 0    Ferrous Sulfate (FEROSUL PO) Every 12 hours      metoclopramide (REGLAN) 10 mg tablet Take 1 tablet (10 mg total) by mouth 3 (three) times a day as needed (nausea) 30 tablet 2    Prenatal Vit-Fe Fumarate-FA ([employer] PRENATAL) 28-0 8 MG TABS Take 1 tablet by mouth daily 30 each 6       Allergies: Allergies   Allergen Reactions    Penicillins Anaphylaxis     Anaphylaxis    Shrimp (Diagnostic)        Labs:  Blood type: O+  Antibody: negative  Group B strep: unknown  HIV: negative  Hepatitis B: negative  RPR: NR  Rubella: Immune    Physical Exam:  LMP 03/23/2019 (Within Days)     Physical Exam   Constitutional: She is oriented to person, place, and time  She appears well-developed and well-nourished  No distress     Cardiovascular: Normal rate, regular rhythm, normal heart sounds and intact distal pulses  Pulmonary/Chest: Effort normal and breath sounds normal  No stridor  No respiratory distress  Abdominal: Soft  Bowel sounds are normal  She exhibits no distension  There is no tenderness  Neurological: She is alert and oriented to person, place, and time  Skin: Skin is warm and dry  She is not diaphoretic  Psychiatric: She has a normal mood and affect  Her behavior is normal      Estimated Fetal Weight: 4 lbs 1 oz  Presentation: Vertex     SVE: deferred  FHT:  Not checked at time of documentation  [place]: quiet     Membranes: intact    Assessment:   26 y o  [id number] at 32w4d admitted for observation the setting of placenta percreta    Plan:   1  Admit to L&D- hysterectomy scheduled in the main OR for Monday 10/21/2019  2  CBC, RPR, type and screen every 3 days, IV access   3  Consults:  Perinatology, gynecology Oncology, anesthesiology, neonatology, hematology   4  Anemia:  Start Venofer infusion  6  A1GDM:  Monitor blood glucose, anticipate hyperglycemia in the setting of recent betamethasone administration  7  FEN: regular  8  PPX: SCDs, Heparin 36674 units BID  9   IUP: NST TID     Discussed with Dr Thuy Arrington DO

## 2019-10-12 LAB
GLUCOSE SERPL-MCNC: 101 MG/DL (ref 65–140)
GLUCOSE SERPL-MCNC: 110 MG/DL (ref 65–140)
GLUCOSE SERPL-MCNC: 89 MG/DL (ref 65–140)
GLUCOSE SERPL-MCNC: 91 MG/DL (ref 65–140)
RPR SER QL: NORMAL

## 2019-10-12 PROCEDURE — 59025 FETAL NON-STRESS TEST: CPT | Performed by: OBSTETRICS & GYNECOLOGY

## 2019-10-12 PROCEDURE — 82948 REAGENT STRIP/BLOOD GLUCOSE: CPT

## 2019-10-12 PROCEDURE — 99254 IP/OBS CNSLTJ NEW/EST MOD 60: CPT | Performed by: INTERNAL MEDICINE

## 2019-10-12 PROCEDURE — 99232 SBSQ HOSP IP/OBS MODERATE 35: CPT | Performed by: OBSTETRICS & GYNECOLOGY

## 2019-10-12 RX ADMIN — HEPARIN SODIUM 10000 UNITS: 5000 INJECTION INTRAVENOUS; SUBCUTANEOUS at 08:37

## 2019-10-12 RX ADMIN — DOCUSATE SODIUM 100 MG: 100 CAPSULE, LIQUID FILLED ORAL at 18:22

## 2019-10-12 RX ADMIN — PRENATAL VIT W/ FE FUMARATE-FA TAB 27-0.8 MG 1 TABLET: 27-0.8 TAB at 08:37

## 2019-10-12 RX ADMIN — DOCUSATE SODIUM 100 MG: 100 CAPSULE, LIQUID FILLED ORAL at 08:37

## 2019-10-12 RX ADMIN — HEPARIN SODIUM 10000 UNITS: 5000 INJECTION INTRAVENOUS; SUBCUTANEOUS at 22:07

## 2019-10-12 RX ADMIN — FERROUS SULFATE TAB 325 MG (65 MG ELEMENTAL FE) 325 MG: 325 (65 FE) TAB at 08:20

## 2019-10-12 NOTE — CONSULTS
Oncology Consult Note  Pan Rod 26 y o  female MRN: [id number]  Unit/Bed#: -01 Encounter: [id number]      Presenting Complaint:  Possible reaction to IV iron    History of Presenting Illness: The patient is a pleasant 27-year-old female was admitted to the hospital at 32 weeks of gestation because of placenta percreta  We were consulted because there is a question of her having a reaction as an outpatient to an iron infusion  From what the patient described she had to IV infusions of iron where she had itching and a rash afterwards  With her 3rd infusion she states she had a reaction around the IV site and felt her throat closing up  She did not have any further reaction but based on this we were consulted because of the consideration of IV iron while she is here  As far symptoms are concerned she states she is at baseline  Feels a little tired  Denies any nausea denies any vomiting denies any diarrhea  The rest of her 14 point review of systems today was negative  Review of Systems - As stated in the HPI otherwise the fourteen point review of systems was negative      Past Medical History:   Diagnosis Date    Disease of thyroid gland     Pap smear abnormality of cervix with LGSIL 5/20/2016    Varicella     Had as a child       Social History     Socioeconomic History    Marital status: /Civil Union     Spouse name: None    Number of children: 4    Years of education: None    Highest education level: None   Occupational History    None   Social Needs    Financial resource strain: None    Food insecurity:     Worry: None     Inability: None    Transportation needs:     Medical: None     Non-medical: None   Tobacco Use    Smoking status: Never Smoker    Smokeless tobacco: Never Used   Substance and Sexual Activity    Alcohol use: Not Currently    Drug use: No    Sexual activity: Yes     Partners: Male     Birth control/protection: None   Lifestyle    Physical activity: Days per week: None     Minutes per session: None    Stress: None   Relationships    Social connections:     Talks on phone: None     Gets together: None     Attends [employer] service: None     Active member of club or organization: None     Attends meetings of clubs or organizations: None     Relationship status: None    Intimate partner violence:     Fear of current or ex partner: None     Emotionally abused: None     Physically abused: None     Forced sexual activity: None   Other Topics Concern    None   Social History Narrative    Always wears seat belts ; caffeine use       Family History   Problem Relation Age of Onset    Hypertension Mother    [place] Lupus Mother     Pulmonary embolism Mother     No Known Problems Father     Asthma Sister        Allergies   Allergen Reactions    Penicillins Anaphylaxis     Anaphylaxis    Shrimp (Diagnostic)     Venofer [Iron Sucrose] Itching         Current Facility-Administered Medications:     acetaminophen (TYLENOL) tablet 650 mg, 650 mg, Oral, Q4H PRN, Yan F Zabo, DO    calcium carbonate (TUMS) chewable tablet 1,000 mg, 1,000 mg, Oral, Daily PRN, Yan F Zabo, DO    docusate sodium (COLACE) capsule 100 mg, 100 mg, Oral, BID, Norbert GARCIA Lilly, DO, 100 mg at 10/12/19 2414    ferrous sulfate tablet 325 mg, 325 mg, Oral, Daily With Breakfast, Norbert RADHA Jessbo, DO, 325 mg at 10/12/19 0820    heparin (porcine) subcutaneous injection 10,000 Units, 10,000 Units, Subcutaneous, Q12H [address], Michael Star, DO, 10,000 Units at 10/12/19 8840    prenatal multivitamin tablet 1 tablet, 1 tablet, Oral, Daily, Yan F Zabo, DO, 1 tablet at 10/12/19 0837      /57 (BP Location: Right arm)   Pulse 86   Temp 98 8 °F (37 1 °C) (Oral)   Resp 18   Ht 5' 6" (1 676 m)   Wt 96 2 kg (212 lb)   LMP 03/23/2019 (Within Days)   SpO2 99%   Breastfeeding?  No   BMI 34 22 kg/m²     General Appearance:    Alert, oriented        Eyes:    PERRL   Ears:    Normal external ear canals, both ears   Nose:   Nares normal, septum midline   Throat:   Mucosa moist  Pharynx without injection  Neck:   Supple       Lungs:     Clear to auscultation bilaterally   Chest Wall:    No tenderness or deformity    Heart:    Regular rate and rhythm       Abdomen:     Soft, non-tender, bowel sounds +, no organomegaly, gravid abdomen           Extremities:   Extremities no cyanosis or edema       Skin:   no rash or icterus  Lymph nodes:   Cervical, supraclavicular, and axillary nodes normal   Neurologic:   CNII-XII intact, normal strength, sensation and reflexes     Throughout               Recent Results (from the past 48 hour(s))   Prepare fresh frozen plasma:Has consent been obtained?  Yes, 6 Units    Collection Time: 10/11/19  6:01 PM   Result Value Ref Range    Unit Product Code C7087H12     Unit Number [id number]     Unit ABO O     Unit [facility]     Unit Dispense Status Crossmatched     Unit Product Code N9710O97     Unit Number [id number]     Unit ABO O     Unit [facility]     Unit Dispense Status Crossmatched     Unit Product Code M9229Y94     Unit Number [id number]     Unit ABO O     Unit [facility]     Unit Dispense Status Crossmatched     Unit Product Code Q2663T60     Unit Number [id number]     Unit ABO O     Unit [facility]     Unit Dispense Status Crossmatched     Unit Product Code Q6474K44     Unit Number [id number]     Unit ABO O     Unit [facility]     Unit Dispense Status Crossmatched     Unit Product Code L2019I65     Unit Number [id number]     Unit ABO O     Unit [facility]     Unit Dispense Status Crossmatched    Type and Screen    Collection Time: 10/11/19  8:01 PM   Result Value Ref Range    ABO Grouping O     Rh Factor Positive     Antibody Screen Negative     Specimen Expiration Date [id number]    CBC    Collection Time: 10/11/19  8:01 PM   Result Value Ref Range    WBC 12 93 (H) 4 31 - 10 16 Thousand/uL    RBC 3 88 3 81 - 5 12 Million/uL    Hemoglobin 10 3 (L) 11 5 - 15 4 g/dL Hematocrit 33 0 (L) 34 8 - 46 1 %    MCV 85 82 - 98 fL    MCH 26 5 (L) 26 8 - 34 3 pg    MCHC 31 2 (L) 31 4 - 37 4 g/dL    RDW 16 8 (H) 11 6 - 15 1 %    Platelets 777 972 - 707 Thousands/uL    MPV 10 3 8 9 - 12 7 fL   RPR    Collection Time: 10/11/19  8:01 PM   Result Value Ref Range    RPR Non-Reactive Non-Reactive   Fingerstick Glucose (POCT)    Collection Time: 10/11/19  9:09 PM   Result Value Ref Range    POC Glucose 96 65 - 140 mg/dl   Prepare platelet pheresis:Has consent been obtained? Yes, 1 Units    Collection Time: 10/12/19 12:59 AM   Result Value Ref Range    Unit Product Code R7152H55     Unit Number [id number]     Unit ABO O     Unit [facility]     Unit Dispense Status Crossmatched    Fingerstick Glucose (POCT)    Collection Time: 10/12/19  8:24 AM   Result Value Ref Range    POC Glucose 89 65 - 140 mg/dl   Prepare RBC:Has consent been obtained?  Yes, 6 Units    Collection Time: 10/12/19  8:31 AM   Result Value Ref Range    Unit Product Code I7193Y23     Unit Number [id number]     Unit ABO O     Unit [facility]     Crossmatch Compatible     Unit Dispense Status Crossmatched     Unit Product Code V3037E14     Unit Number [id number]     Unit ABO O     Unit [facility]     Crossmatch Compatible     Unit Dispense Status Crossmatched     Unit Product Code O4582A57     Unit Number [id number]     Unit ABO O     Unit [facility]     Crossmatch Compatible     Unit Dispense Status Crossmatched     Unit Product Code R4577Y63     Unit Number [id number]     Unit ABO O     Unit [facility]     Crossmatch Compatible     Unit Dispense Status Crossmatched     Unit Product Code F2080H93     Unit Number [id number]     Unit ABO O     Unit [facility]     Crossmatch Compatible     Unit Dispense Status Crossmatched     Unit Product Code I2208G61     Unit Number [id number]     Unit ABO O     Unit [facility] POS     Crossmatch Compatible     Unit Dispense Status Crossmatched    Fingerstick Glucose (POCT)    Collection Time: 10/12/19 10:34 AM   Result Value Ref Range    POC Glucose 91 65 - 140 mg/dl   Fingerstick Glucose (POCT)    Collection Time: 10/12/19  2:42 PM   Result Value Ref Range    POC Glucose 110 65 - 140 mg/dl         No results found  Assessment and Plan:  The patient is a pleasant 76-year-old female currently admitted in preparation for  hysterectomy  She was receiving iron as an outpatient and from what she describes she had a mild reaction with 1 or 2 infusions and with her 3rd infusion actually had a reaction around the IV site and felt her throat tightening up  Based on what she is describing my recommendation would be not to give her any more Venofer as there is no emergency to replace her iron immediately  Giving her iron in the hospital will not increase her hemoglobin over the next week so she can be transfused as needed  In the acute setting she can get blood as needed  Oral iron can be considered  I would not risk an anaphylactic or allergic reaction with her pregnancy at this time at that would require giving her steroids and possibly complicate her pregnancy further  The patient is in agreement with this plan  She is in agreement with getting oral iron  She is also in agreement with getting blood as needed  Please call with any questions

## 2019-10-12 NOTE — PROGRESS NOTES
Progress Note - Maternal-Fetal Medicine  Ru Alejandro 26 y o  female MRN: [id number]  Unit/Bed#: -01 Encounter: [id number]    Assessment:  29yo [id number] at 32w5d admitted with suspected placenta percreta    Plan:  Suspected placenta percreta:  - Cont close observation for signs or symptoms of labor, ROM, etc   - Scheduled for main OR on 10/21/19  - Awaiting inpatient consultations  - Plan for stent placement with vertical midline abdominal incision; awaiting formal consults from other specialties  - Crossed for 6u PRBC, 6u FFP, 1 6pack PLT  - Admission Hb 10 3, cont Venofer infusions  - Pt to complete BTM course today  - Inpatient until delivery    FEN:  - Regular diet  - Heplocked  - Consider PICC line    DVT Ppx:  - SCDs bilaterally  - Encourage ambulation  - Cont heparin; hold if any changes in clinical status    Subjective/Objective   Chief Complaint: I'm doing okay    Subjective: Pt reports feeling well today  She has no concerns or complaints  Ambulating  Tolerating PO  Voiding without difficulty  Denies fevers, chills, night sweats  No nausea, emesis, diarrhea, constipation  +FM  Denies LOF, VB, cramping, contractions  Did report she felt some pelvic pressure overnight but did not tell providers  Objective:   Vitals: Blood pressure 121/54, pulse 88, temperature 98 °F (36 7 °C), temperature source Oral, resp  rate 18, height 5' 6" (1 676 m), weight 96 2 kg (212 lb), last menstrual period 03/23/2019, SpO2 99 %, not currently breastfeeding  ,Body mass index is 34 22 kg/m²  No intake or output data in the 24 hours ending 10/12/19 0813    Invasive Devices     Peripheral Intravenous Line            Peripheral IV 10/11/19 Right Forearm less than 1 day                Physical Exam:   Gen: AaOx3, NAD, pleasant, cooperative  Card: RRR, no murmurs, rubs, or gallops  Pulm: CTAB, no wheezes, rales, or rhonchi  Abd: Gravid but otherwise soft, nontender, nondistended   Palpation mildly increases her sensation of pelvic pressure  Extremities: No edema, nontender, Negative Cassandra's bilaterally  Wearing SCDs    FHT: 150bpm, moderate variability, +15x15 accels, no decels    Lab, Imaging and other studies: I have personally reviewed pertinent reports              Guero Dowling DO  OB/GYN, PGY4  10/12/2019, 8:41 AM

## 2019-10-13 LAB
ABO GROUP BLD BPU: NORMAL
BPU ID: NORMAL
GLUCOSE SERPL-MCNC: 102 MG/DL (ref 65–140)
GLUCOSE SERPL-MCNC: 109 MG/DL (ref 65–140)
GLUCOSE SERPL-MCNC: 80 MG/DL (ref 65–140)
UNIT DISPENSE STATUS: NORMAL
UNIT PRODUCT CODE: NORMAL
UNIT RH: NORMAL

## 2019-10-13 PROCEDURE — 82948 REAGENT STRIP/BLOOD GLUCOSE: CPT

## 2019-10-13 PROCEDURE — 59025 FETAL NON-STRESS TEST: CPT | Performed by: OBSTETRICS & GYNECOLOGY

## 2019-10-13 PROCEDURE — 99232 SBSQ HOSP IP/OBS MODERATE 35: CPT | Performed by: OBSTETRICS & GYNECOLOGY

## 2019-10-13 RX ADMIN — DOCUSATE SODIUM 100 MG: 100 CAPSULE, LIQUID FILLED ORAL at 09:23

## 2019-10-13 RX ADMIN — FERROUS SULFATE TAB 325 MG (65 MG ELEMENTAL FE) 325 MG: 325 (65 FE) TAB at 08:02

## 2019-10-13 RX ADMIN — PRENATAL VIT W/ FE FUMARATE-FA TAB 27-0.8 MG 1 TABLET: 27-0.8 TAB at 09:23

## 2019-10-13 RX ADMIN — HEPARIN SODIUM 10000 UNITS: 5000 INJECTION INTRAVENOUS; SUBCUTANEOUS at 09:23

## 2019-10-13 RX ADMIN — HEPARIN SODIUM 10000 UNITS: 5000 INJECTION INTRAVENOUS; SUBCUTANEOUS at 22:10

## 2019-10-13 NOTE — PLAN OF CARE
Problem: ANTEPARTUM  Goal: Maintain pregnancy as long as maternal and/or fetal condition is stable  Description  INTERVENTIONS:  - Maternal surveillance  - Fetal surveillance  - Monitor uterine activity  - Medications as ordered  - Bedrest  Outcome: Progressing     Problem: PAIN - ADULT  Goal: Verbalizes/displays adequate comfort level or baseline comfort level  Description  Interventions:  - Encourage patient to monitor pain and request assistance  - Assess pain using appropriate pain scale  - Administer analgesics based on type and severity of pain and evaluate response  - Implement non-pharmacological measures as appropriate and evaluate response  - Consider cultural and social influences on pain and pain management  - Notify physician/advanced practitioner if interventions unsuccessful or patient reports new pain  Outcome: Progressing     Problem: INFECTION - ADULT  Goal: Absence or prevention of progression during hospitalization  Description  INTERVENTIONS:  - Assess and monitor for signs and symptoms of infection  - Monitor lab/diagnostic results  - Monitor all insertion sites, i e  indwelling lines, tubes, and drains  - Monitor endotracheal if appropriate and nasal secretions for changes in amount and color  - [place] appropriate cooling/warming therapies per order  - Administer medications as ordered  - Instruct and encourage patient and family to use good hand hygiene technique  - Identify and instruct in appropriate isolation precautions for identified infection/condition  Outcome: Progressing  Goal: Absence of fever/infection during neutropenic period  Description  INTERVENTIONS:  - Monitor WBC    Outcome: Progressing     Problem: SAFETY ADULT  Goal: Patient will remain free of falls  Description  INTERVENTIONS:  - Assess patient frequently for physical needs  -  Identify cognitive and physical deficits and behaviors that affect risk of falls    -  [place] fall precautions as indicated by assessment   - Educate patient/family on patient safety including physical limitations  - Instruct patient to call for assistance with activity based on assessment  - Modify environment to reduce risk of injury  - Consider OT/PT consult to assist with strengthening/mobility  Outcome: Progressing  Goal: Maintain or return to baseline ADL function  Description  INTERVENTIONS:  -  Assess patient's ability to carry out ADLs; assess patient's baseline for ADL function and identify physical deficits which impact ability to perform ADLs (bathing, care of mouth/teeth, toileting, grooming, dressing, etc )  - Assess/evaluate cause of self-care deficits   - Assess range of motion  - Assess patient's mobility; develop plan if impaired  - Assess patient's need for assistive devices and provide as appropriate  - Encourage maximum independence but intervene and supervise when necessary  - Involve family in performance of ADLs  - Assess for home care needs following discharge   - Consider OT consult to assist with ADL evaluation and planning for discharge  - Provide patient education as appropriate  Outcome: Progressing  Goal: Maintain or return mobility status to optimal level  Description  INTERVENTIONS:  - Assess patient's baseline mobility status (ambulation, transfers, stairs, etc )    - Identify cognitive and physical deficits and behaviors that affect mobility  - Identify mobility aids required to assist with transfers and/or ambulation (gait belt, sit-to-stand, lift, walker, cane, etc )  - [place] fall precautions as indicated by assessment  - Record patient progress and toleration of activity level on Mobility SBAR; progress patient to next Phase/Stage  - Instruct patient to call for assistance with activity based on assessment  - Consider rehabilitation consult to assist with strengthening/weightbearing, etc   Outcome: Progressing     Problem: DISCHARGE PLANNING  Goal: Discharge to home or other facility with appropriate resources  Description  INTERVENTIONS:  - Identify barriers to discharge w/patient and caregiver  - Arrange for needed discharge resources and transportation as appropriate  - Identify discharge learning needs (meds, wound care, etc )  - Arrange for interpretive services to assist at discharge as needed  - Refer to Case Management Department for coordinating discharge planning if the patient needs post-hospital services based on physician/advanced practitioner order or complex needs related to functional status, cognitive ability, or social support system  Outcome: Progressing     Problem: Knowledge Deficit  Goal: Patient/family/caregiver demonstrates understanding of disease process, treatment plan, medications, and discharge instructions  Description  Complete learning assessment and assess knowledge base    Interventions:  - Provide teaching at level of understanding  - Provide teaching via preferred learning methods  Outcome: Progressing

## 2019-10-13 NOTE — PROGRESS NOTES
Approximately 15 minutes after I left the patient's room with signed [place] paperwork, the patient's partner spoke with nursing staff and reported that he would be caring for the children tonight so that the patient could stay in the hospital  He also reported that the patient's sister would be able to come into town to take care of the children starting tomorrow  AMA paperwork is still signed and located in the patient's chart  Will continue with originally planned inpatient observation until planned  hysterectomy on 10/21 at this time  Dr Dimas Schreiber and Dr Lavonne Fragoso,   OB/GYN, PGY4  10/13/2019, 6:47 PM

## 2019-10-13 NOTE — PROGRESS NOTES
Called by nursing staff regarding pt desire to leave against medical advice  I immediately presented to the patient's room to discuss  Pt reported difficulty obtaining  services for her children and needed to leave to care for them  I discussed with the patient the rationale for staying in the hospital  I discussed that she is in the patient room closest to the OR in case there was any sign or symptom of bleeding or compromise for either herself or her child  I discussed that, should she leave, she is distancing herself from potentially life-saving interventions  I discussed the risk of bleeding, hemorrhage, and death for herself, the fetus, or both  The patient and her partner both demonstrated understanding  I asked if either of them had any questions, and they did not  Patient signed "Against Medical Advice" paperwork without hesitation  I discussed followup in the outpatient setting  The present plan is for outpatient follow up on Tuesday and Friday of this week, with planned readmission on Sunday evening, 10/20, prior to her surgery  She demonstrated understanding and agreement with this plan  I discussed warning signs and precautions to return, including any evidence of bleeding from either urethra or vagina, cramping, contractions, decreased fetal movement, or leaking of fluid  I also encouraged her to return to care should her childcare situation change  Pt demonstrated understanding  I again expressed my desire for her to stay for the above reasons, and she declined for a second time  Dr Camille Mazariegos and Dr Tobias Felt are aware of the above conversation        Tamra Morelos DO  OB/GYN, PGY4  10/13/2019, 6:23 PM

## 2019-10-13 NOTE — PROGRESS NOTES
Progress Note - Maternal-Fetal Medicine   Samina Carson 26 y o  female MRN: [id number]  Unit/Bed#: -01 Encounter: [id number]    Assessment:  31yo [id number] at [address] [place] [address] admitted with suspected placenta percreta    Plan:  Suspected placenta percreta:  - Currently asymptomatic aside from occasional pelvic pressure  - Scheduled in main OR  - Crossed for 6u PRBC, 6u FFP, 1 6 pack of PLT  - Appreciate Heme-Onc recommendations; awaiting remaining consults  - s/p betamethasone  - Continued inpatient until delivery    FEN:  - Regular diet  - Heplocked    DVT Ppx:  - SCDs bilaterally  - Encourage ambulation  - Cont heparin; hold if any changes in clinical status    Subjective/Objective   Chief Complaint: I have a lot of pressure    Subjective:   Pt reports feeling well today aside from pelvic pressure  She feels like she "needs to pee really slowly" otherwise she feels a lot of pressure  Ambulating  Tolerating PO  +FM  Denies LOF, VB, cramping, contractions  Reported a small amount of abdominal discomfort 1-2 hours ago that has since completely resolved  Objective:   Vitals: Blood pressure 135/69, pulse 89, temperature 98 3 °F (36 8 °C), temperature source Oral, resp  rate 18, height 5' 6" (1 676 m), weight 96 2 kg (212 lb), last menstrual period 03/23/2019, SpO2 97 %, not currently breastfeeding  ,Body mass index is 34 22 kg/m²  No intake or output data in the 24 hours ending 10/13/19 0656    Invasive Devices     Peripheral Intravenous Line            Peripheral IV 10/11/19 Right Forearm 1 day    Peripheral IV 10/12/19 Left Hand less than 1 day                Physical Exam:   Gen: AaOx3, NAD, pleasant, cooperative  Abd: Gravid but otherwise soft, nontender, nondistended  Extremities: No edema, nontender, Negative Cassandra's bilaterally    FHT: [id number] and again [id number]: 150bpm, moderate variability, +accels, -decels   No contractions noted on monitor    Lab, Imaging and other studies: I have personally reviewed pertinent reports            Nimco Toure DO  OB/GYN, PGY4  10/13/2019, 3:37 PM

## 2019-10-14 ENCOUNTER — ANESTHESIA EVENT (INPATIENT)
Dept: PERIOP | Facility: HOSPITAL | Age: 29
DRG: 540 | End: 2019-10-14
Payer: COMMERCIAL

## 2019-10-14 ENCOUNTER — ANESTHESIA (INPATIENT)
Dept: PERIOP | Facility: HOSPITAL | Age: 29
DRG: 540 | End: 2019-10-14
Payer: COMMERCIAL

## 2019-10-14 PROBLEM — Z3A.33 33 WEEKS GESTATION OF PREGNANCY: Status: ACTIVE | Noted: 2019-08-06

## 2019-10-14 LAB
ABO GROUP BLD BPU: NORMAL
ABO GROUP BLD: NORMAL
ALBUMIN SERPL BCP-MCNC: 2.5 G/DL (ref 3.5–5)
ALP SERPL-CCNC: 76 U/L (ref 46–116)
ALT SERPL W P-5'-P-CCNC: 11 U/L (ref 12–78)
ANION GAP SERPL CALCULATED.3IONS-SCNC: 9 MMOL/L (ref 4–13)
APTT PPP: 27 SECONDS (ref 23–37)
AST SERPL W P-5'-P-CCNC: 11 U/L (ref 5–45)
BASE EXCESS BLDA CALC-SCNC: -4 MMOL/L (ref -2–3)
BASE EXCESS BLDCOA CALC-SCNC: -0.6 MMOL/L (ref 3–11)
BASE EXCESS BLDCOV CALC-SCNC: -1.6 MMOL/L (ref 1–9)
BASOPHILS # BLD AUTO: 0.03 THOUSANDS/ΜL (ref 0–0.1)
BASOPHILS NFR BLD AUTO: 0 % (ref 0–1)
BILIRUB SERPL-MCNC: 0.75 MG/DL (ref 0.2–1)
BLD GP AB SCN SERPL QL: NEGATIVE
BPU ID: NORMAL
BUN SERPL-MCNC: 6 MG/DL (ref 5–25)
CA-I BLD-SCNC: 1.07 MMOL/L (ref 1.12–1.32)
CALCIUM SERPL-MCNC: 7.8 MG/DL (ref 8.3–10.1)
CHLORIDE SERPL-SCNC: 111 MMOL/L (ref 100–108)
CO2 SERPL-SCNC: 20 MMOL/L (ref 21–32)
CREAT SERPL-MCNC: 0.57 MG/DL (ref 0.6–1.3)
CROSSMATCH: NORMAL
EOSINOPHIL # BLD AUTO: 0.01 THOUSAND/ΜL (ref 0–0.61)
EOSINOPHIL NFR BLD AUTO: 0 % (ref 0–6)
ERYTHROCYTE [DISTWIDTH] IN BLOOD BY AUTOMATED COUNT: 16.2 % (ref 11.6–15.1)
FIBRINOGEN PPP-MCNC: 325 MG/DL (ref 227–495)
GFR SERPL CREATININE-BSD FRML MDRD: 125 ML/MIN/1.73SQ M
GLUCOSE SERPL-MCNC: 134 MG/DL (ref 65–140)
GLUCOSE SERPL-MCNC: 141 MG/DL (ref 65–140)
GLUCOSE SERPL-MCNC: 159 MG/DL (ref 65–140)
GLUCOSE SERPL-MCNC: 96 MG/DL (ref 65–140)
GLUCOSE SERPL-MCNC: 97 MG/DL (ref 65–140)
HCO3 BLDA-SCNC: 21 MMOL/L (ref 22–28)
HCO3 BLDCOA-SCNC: 26.5 MMOL/L (ref 17.3–27.3)
HCO3 BLDCOV-SCNC: 22.7 MMOL/L (ref 12.2–28.6)
HCT VFR BLD AUTO: 32.8 % (ref 34.8–46.1)
HCT VFR BLD CALC: 26 % (ref 34.8–46.1)
HGB BLD-MCNC: 10.4 G/DL (ref 11.5–15.4)
HGB BLDA-MCNC: 8.8 G/DL (ref 11.5–15.4)
IMM GRANULOCYTES # BLD AUTO: 0.31 THOUSAND/UL (ref 0–0.2)
IMM GRANULOCYTES NFR BLD AUTO: 2 % (ref 0–2)
INR PPP: 1.14 (ref 0.84–1.19)
LYMPHOCYTES # BLD AUTO: 2.16 THOUSANDS/ΜL (ref 0.6–4.47)
LYMPHOCYTES NFR BLD AUTO: 10 % (ref 14–44)
MCH RBC QN AUTO: 27.6 PG (ref 26.8–34.3)
MCHC RBC AUTO-ENTMCNC: 31.7 G/DL (ref 31.4–37.4)
MCV RBC AUTO: 87 FL (ref 82–98)
MONOCYTES # BLD AUTO: 0.71 THOUSAND/ΜL (ref 0.17–1.22)
MONOCYTES NFR BLD AUTO: 3 % (ref 4–12)
NEUTROPHILS # BLD AUTO: 18.06 THOUSANDS/ΜL (ref 1.85–7.62)
NEUTS SEG NFR BLD AUTO: 85 % (ref 43–75)
NRBC BLD AUTO-RTO: 0 /100 WBCS
O2 CT VFR BLDCOA CALC: 10.8 ML/DL
OXYHGB MFR BLDCOA: 54.1 %
OXYHGB MFR BLDCOV: 94.6 %
PCO2 BLD: 22 MMOL/L (ref 21–32)
PCO2 BLD: 38.1 MM HG (ref 36–44)
PCO2 BLDCOA: 53.5 MM[HG] (ref 30–60)
PCO2 BLDCOV: 37.3 MM HG (ref 27–43)
PH BLD: 7.35 [PH] (ref 7.35–7.45)
PH BLDCOA: 7.31 [PH] (ref 7.23–7.43)
PH BLDCOV: 7.4 [PH] (ref 7.19–7.49)
PLATELET # BLD AUTO: 246 THOUSANDS/UL (ref 149–390)
PMV BLD AUTO: 9.8 FL (ref 8.9–12.7)
PO2 BLD: 157 MM HG (ref 75–129)
PO2 BLDCOA: 23.6 MM HG (ref 5–25)
PO2 BLDCOV: 61 MM HG (ref 15–45)
POTASSIUM BLD-SCNC: 3.9 MMOL/L (ref 3.5–5.3)
POTASSIUM SERPL-SCNC: 3.9 MMOL/L (ref 3.5–5.3)
PROT SERPL-MCNC: 5.4 G/DL (ref 6.4–8.2)
PROTHROMBIN TIME: 14.2 SECONDS (ref 11.6–14.5)
RBC # BLD AUTO: 3.77 MILLION/UL (ref 3.81–5.12)
RH BLD: POSITIVE
SAO2 % BLD FROM PO2: 99 % (ref 60–85)
SAO2 % BLDCOV: 18.9 ML/DL
SODIUM BLD-SCNC: 138 MMOL/L (ref 136–145)
SODIUM SERPL-SCNC: 140 MMOL/L (ref 136–145)
SPECIMEN EXPIRATION DATE: NORMAL
SPECIMEN SOURCE: ABNORMAL
UNIT DISPENSE STATUS: NORMAL
UNIT PRODUCT CODE: NORMAL
UNIT RH: NORMAL
WBC # BLD AUTO: 21.28 THOUSAND/UL (ref 4.31–10.16)

## 2019-10-14 PROCEDURE — 80053 COMPREHEN METABOLIC PANEL: CPT | Performed by: OBSTETRICS & GYNECOLOGY

## 2019-10-14 PROCEDURE — 85025 COMPLETE CBC W/AUTO DIFF WBC: CPT | Performed by: OBSTETRICS & GYNECOLOGY

## 2019-10-14 PROCEDURE — 85610 PROTHROMBIN TIME: CPT | Performed by: OBSTETRICS & GYNECOLOGY

## 2019-10-14 PROCEDURE — 59025 FETAL NON-STRESS TEST: CPT | Performed by: OBSTETRICS & GYNECOLOGY

## 2019-10-14 PROCEDURE — 86900 BLOOD TYPING SEROLOGIC ABO: CPT | Performed by: OBSTETRICS & GYNECOLOGY

## 2019-10-14 PROCEDURE — 94762 N-INVAS EAR/PLS OXIMTRY CONT: CPT

## 2019-10-14 PROCEDURE — 30233N1 TRANSFUSION OF NONAUTOLOGOUS RED BLOOD CELLS INTO PERIPHERAL VEIN, PERCUTANEOUS APPROACH: ICD-10-PCS | Performed by: OBSTETRICS & GYNECOLOGY

## 2019-10-14 PROCEDURE — 0UT90ZZ RESECTION OF UTERUS, OPEN APPROACH: ICD-10-PCS | Performed by: OBSTETRICS & GYNECOLOGY

## 2019-10-14 PROCEDURE — C9290 INJ, BUPIVACAINE LIPOSOME: HCPCS | Performed by: ANESTHESIOLOGY

## 2019-10-14 PROCEDURE — 86923 COMPATIBILITY TEST ELECTRIC: CPT

## 2019-10-14 PROCEDURE — 82803 BLOOD GASES ANY COMBINATION: CPT

## 2019-10-14 PROCEDURE — 88307 TISSUE EXAM BY PATHOLOGIST: CPT | Performed by: PATHOLOGY

## 2019-10-14 PROCEDURE — 84295 ASSAY OF SERUM SODIUM: CPT

## 2019-10-14 PROCEDURE — 0UT70ZZ RESECTION OF BILATERAL FALLOPIAN TUBES, OPEN APPROACH: ICD-10-PCS | Performed by: OBSTETRICS & GYNECOLOGY

## 2019-10-14 PROCEDURE — 58150 TOTAL HYSTERECTOMY: CPT | Performed by: OBSTETRICS & GYNECOLOGY

## 2019-10-14 PROCEDURE — P9016 RBC LEUKOCYTES REDUCED: HCPCS

## 2019-10-14 PROCEDURE — 99024 POSTOP FOLLOW-UP VISIT: CPT | Performed by: OBSTETRICS & GYNECOLOGY

## 2019-10-14 PROCEDURE — 0TJB8ZZ INSPECTION OF BLADDER, VIA NATURAL OR ARTIFICIAL OPENING ENDOSCOPIC: ICD-10-PCS | Performed by: OBSTETRICS & GYNECOLOGY

## 2019-10-14 PROCEDURE — NC001 PR NO CHARGE: Performed by: OBSTETRICS & GYNECOLOGY

## 2019-10-14 PROCEDURE — 99231 SBSQ HOSP IP/OBS SF/LOW 25: CPT | Performed by: OBSTETRICS & GYNECOLOGY

## 2019-10-14 PROCEDURE — 82330 ASSAY OF CALCIUM: CPT

## 2019-10-14 PROCEDURE — 86901 BLOOD TYPING SEROLOGIC RH(D): CPT | Performed by: OBSTETRICS & GYNECOLOGY

## 2019-10-14 PROCEDURE — 59514 CESAREAN DELIVERY ONLY: CPT | Performed by: OBSTETRICS & GYNECOLOGY

## 2019-10-14 PROCEDURE — 82948 REAGENT STRIP/BLOOD GLUCOSE: CPT

## 2019-10-14 PROCEDURE — 82947 ASSAY GLUCOSE BLOOD QUANT: CPT

## 2019-10-14 PROCEDURE — 84132 ASSAY OF SERUM POTASSIUM: CPT

## 2019-10-14 PROCEDURE — 86850 RBC ANTIBODY SCREEN: CPT | Performed by: OBSTETRICS & GYNECOLOGY

## 2019-10-14 PROCEDURE — 82805 BLOOD GASES W/O2 SATURATION: CPT | Performed by: OBSTETRICS & GYNECOLOGY

## 2019-10-14 PROCEDURE — 85014 HEMATOCRIT: CPT

## 2019-10-14 PROCEDURE — 85730 THROMBOPLASTIN TIME PARTIAL: CPT | Performed by: OBSTETRICS & GYNECOLOGY

## 2019-10-14 PROCEDURE — 85384 FIBRINOGEN ACTIVITY: CPT | Performed by: OBSTETRICS & GYNECOLOGY

## 2019-10-14 RX ORDER — CALCIUM CARBONATE 200(500)MG
1000 TABLET,CHEWABLE ORAL DAILY PRN
Status: DISCONTINUED | OUTPATIENT
Start: 2019-10-14 | End: 2019-10-19 | Stop reason: HOSPADM

## 2019-10-14 RX ORDER — HYDROMORPHONE HCL/PF 1 MG/ML
SYRINGE (ML) INJECTION AS NEEDED
Status: DISCONTINUED | OUTPATIENT
Start: 2019-10-14 | End: 2019-10-14 | Stop reason: SURG

## 2019-10-14 RX ORDER — MAGNESIUM HYDROXIDE 1200 MG/15ML
LIQUID ORAL AS NEEDED
Status: DISCONTINUED | OUTPATIENT
Start: 2019-10-14 | End: 2019-10-14 | Stop reason: HOSPADM

## 2019-10-14 RX ORDER — FENTANYL CITRATE/PF 50 MCG/ML
25 SYRINGE (ML) INJECTION
Status: COMPLETED | OUTPATIENT
Start: 2019-10-14 | End: 2019-10-14

## 2019-10-14 RX ORDER — CLINDAMYCIN PHOSPHATE 900 MG/50ML
INJECTION INTRAVENOUS AS NEEDED
Status: DISCONTINUED | OUTPATIENT
Start: 2019-10-14 | End: 2019-10-14 | Stop reason: SURG

## 2019-10-14 RX ORDER — FENTANYL CITRATE 50 UG/ML
INJECTION, SOLUTION INTRAMUSCULAR; INTRAVENOUS AS NEEDED
Status: DISCONTINUED | OUTPATIENT
Start: 2019-10-14 | End: 2019-10-14 | Stop reason: SURG

## 2019-10-14 RX ORDER — ONDANSETRON 2 MG/ML
4 INJECTION INTRAMUSCULAR; INTRAVENOUS EVERY 6 HOURS PRN
Status: DISCONTINUED | OUTPATIENT
Start: 2019-10-14 | End: 2019-10-19 | Stop reason: HOSPADM

## 2019-10-14 RX ORDER — PROMETHAZINE HYDROCHLORIDE 25 MG/ML
12.5 INJECTION, SOLUTION INTRAMUSCULAR; INTRAVENOUS ONCE AS NEEDED
Status: DISCONTINUED | OUTPATIENT
Start: 2019-10-14 | End: 2019-10-14 | Stop reason: HOSPADM

## 2019-10-14 RX ORDER — SIMETHICONE 80 MG
80 TABLET,CHEWABLE ORAL 4 TIMES DAILY PRN
Status: DISCONTINUED | OUTPATIENT
Start: 2019-10-14 | End: 2019-10-19 | Stop reason: HOSPADM

## 2019-10-14 RX ORDER — GENTAMICIN SULFATE 40 MG/ML
INJECTION, SOLUTION INTRAMUSCULAR; INTRAVENOUS AS NEEDED
Status: DISCONTINUED | OUTPATIENT
Start: 2019-10-14 | End: 2019-10-14 | Stop reason: SURG

## 2019-10-14 RX ORDER — HYDROMORPHONE HCL/PF 1 MG/ML
0.5 SYRINGE (ML) INJECTION
Status: DISCONTINUED | OUTPATIENT
Start: 2019-10-14 | End: 2019-10-14 | Stop reason: HOSPADM

## 2019-10-14 RX ORDER — BUPIVACAINE HYDROCHLORIDE 2.5 MG/ML
INJECTION, SOLUTION INFILTRATION; PERINEURAL
Status: DISCONTINUED | OUTPATIENT
Start: 2019-10-14 | End: 2019-10-14 | Stop reason: SURG

## 2019-10-14 RX ORDER — MEPERIDINE HYDROCHLORIDE 25 MG/ML
12.5 INJECTION INTRAMUSCULAR; INTRAVENOUS; SUBCUTANEOUS AS NEEDED
Status: DISCONTINUED | OUTPATIENT
Start: 2019-10-14 | End: 2019-10-14 | Stop reason: HOSPADM

## 2019-10-14 RX ORDER — NALOXONE HYDROCHLORIDE 0.4 MG/ML
0.1 INJECTION, SOLUTION INTRAMUSCULAR; INTRAVENOUS; SUBCUTANEOUS
Status: DISCONTINUED | OUTPATIENT
Start: 2019-10-14 | End: 2019-10-15

## 2019-10-14 RX ORDER — SODIUM CHLORIDE, SODIUM LACTATE, POTASSIUM CHLORIDE, CALCIUM CHLORIDE 600; 310; 30; 20 MG/100ML; MG/100ML; MG/100ML; MG/100ML
75 INJECTION, SOLUTION INTRAVENOUS CONTINUOUS
Status: DISCONTINUED | OUTPATIENT
Start: 2019-10-14 | End: 2019-10-14

## 2019-10-14 RX ORDER — DIPHENHYDRAMINE HYDROCHLORIDE 50 MG/ML
25 INJECTION INTRAMUSCULAR; INTRAVENOUS EVERY 6 HOURS PRN
Status: DISCONTINUED | OUTPATIENT
Start: 2019-10-14 | End: 2019-10-19 | Stop reason: HOSPADM

## 2019-10-14 RX ORDER — SODIUM CHLORIDE, SODIUM LACTATE, POTASSIUM CHLORIDE, CALCIUM CHLORIDE 600; 310; 30; 20 MG/100ML; MG/100ML; MG/100ML; MG/100ML
125 INJECTION, SOLUTION INTRAVENOUS CONTINUOUS
Status: DISCONTINUED | OUTPATIENT
Start: 2019-10-14 | End: 2019-10-14

## 2019-10-14 RX ORDER — SODIUM CHLORIDE 9 MG/ML
125 INJECTION, SOLUTION INTRAVENOUS CONTINUOUS
Status: DISCONTINUED | OUTPATIENT
Start: 2019-10-14 | End: 2019-10-15

## 2019-10-14 RX ORDER — ALBUMIN, HUMAN INJ 5% 5 %
SOLUTION INTRAVENOUS CONTINUOUS PRN
Status: DISCONTINUED | OUTPATIENT
Start: 2019-10-14 | End: 2019-10-14

## 2019-10-14 RX ORDER — ALBUTEROL SULFATE 2.5 MG/3ML
2.5 SOLUTION RESPIRATORY (INHALATION) ONCE AS NEEDED
Status: DISCONTINUED | OUTPATIENT
Start: 2019-10-14 | End: 2019-10-14 | Stop reason: HOSPADM

## 2019-10-14 RX ORDER — ONDANSETRON 2 MG/ML
INJECTION INTRAMUSCULAR; INTRAVENOUS AS NEEDED
Status: DISCONTINUED | OUTPATIENT
Start: 2019-10-14 | End: 2019-10-14 | Stop reason: SURG

## 2019-10-14 RX ORDER — LABETALOL 20 MG/4 ML (5 MG/ML) INTRAVENOUS SYRINGE
5
Status: DISCONTINUED | OUTPATIENT
Start: 2019-10-14 | End: 2019-10-14 | Stop reason: HOSPADM

## 2019-10-14 RX ORDER — SODIUM CHLORIDE, SODIUM LACTATE, POTASSIUM CHLORIDE, CALCIUM CHLORIDE 600; 310; 30; 20 MG/100ML; MG/100ML; MG/100ML; MG/100ML
INJECTION, SOLUTION INTRAVENOUS CONTINUOUS PRN
Status: DISCONTINUED | OUTPATIENT
Start: 2019-10-14 | End: 2019-10-14

## 2019-10-14 RX ORDER — ONDANSETRON 2 MG/ML
4 INJECTION INTRAMUSCULAR; INTRAVENOUS ONCE AS NEEDED
Status: DISCONTINUED | OUTPATIENT
Start: 2019-10-14 | End: 2019-10-14 | Stop reason: HOSPADM

## 2019-10-14 RX ORDER — GLYCOPYRROLATE 0.2 MG/ML
INJECTION INTRAMUSCULAR; INTRAVENOUS AS NEEDED
Status: DISCONTINUED | OUTPATIENT
Start: 2019-10-14 | End: 2019-10-14 | Stop reason: SURG

## 2019-10-14 RX ORDER — SUCCINYLCHOLINE/SOD CL,ISO/PF 100 MG/5ML
SYRINGE (ML) INTRAVENOUS AS NEEDED
Status: DISCONTINUED | OUTPATIENT
Start: 2019-10-14 | End: 2019-10-14 | Stop reason: SURG

## 2019-10-14 RX ORDER — NEOSTIGMINE METHYLSULFATE 1 MG/ML
INJECTION INTRAVENOUS AS NEEDED
Status: DISCONTINUED | OUTPATIENT
Start: 2019-10-14 | End: 2019-10-14 | Stop reason: SURG

## 2019-10-14 RX ORDER — SENNOSIDES 8.6 MG
1 TABLET ORAL
Status: DISCONTINUED | OUTPATIENT
Start: 2019-10-14 | End: 2019-10-15

## 2019-10-14 RX ORDER — LIDOCAINE HYDROCHLORIDE 10 MG/ML
INJECTION, SOLUTION INFILTRATION; PERINEURAL AS NEEDED
Status: DISCONTINUED | OUTPATIENT
Start: 2019-10-14 | End: 2019-10-14 | Stop reason: SURG

## 2019-10-14 RX ORDER — SODIUM CHLORIDE 9 MG/ML
INJECTION, SOLUTION INTRAVENOUS CONTINUOUS PRN
Status: DISCONTINUED | OUTPATIENT
Start: 2019-10-14 | End: 2019-10-14

## 2019-10-14 RX ORDER — DEXAMETHASONE SODIUM PHOSPHATE 10 MG/ML
INJECTION, SOLUTION INTRAMUSCULAR; INTRAVENOUS AS NEEDED
Status: DISCONTINUED | OUTPATIENT
Start: 2019-10-14 | End: 2019-10-14 | Stop reason: SURG

## 2019-10-14 RX ORDER — ROCURONIUM BROMIDE 10 MG/ML
INJECTION, SOLUTION INTRAVENOUS AS NEEDED
Status: DISCONTINUED | OUTPATIENT
Start: 2019-10-14 | End: 2019-10-14 | Stop reason: SURG

## 2019-10-14 RX ORDER — PROPOFOL 10 MG/ML
INJECTION, EMULSION INTRAVENOUS AS NEEDED
Status: DISCONTINUED | OUTPATIENT
Start: 2019-10-14 | End: 2019-10-14 | Stop reason: SURG

## 2019-10-14 RX ORDER — DOCUSATE SODIUM 100 MG/1
100 CAPSULE, LIQUID FILLED ORAL 2 TIMES DAILY
Status: DISCONTINUED | OUTPATIENT
Start: 2019-10-14 | End: 2019-10-19 | Stop reason: HOSPADM

## 2019-10-14 RX ADMIN — CLINDAMYCIN PHOSPHATE 900 MG: 900 INJECTION, SOLUTION INTRAVENOUS at 12:20

## 2019-10-14 RX ADMIN — FENTANYL CITRATE 25 MCG: 50 INJECTION INTRAMUSCULAR; INTRAVENOUS at 16:12

## 2019-10-14 RX ADMIN — FENTANYL CITRATE 100 MCG: 50 INJECTION, SOLUTION INTRAMUSCULAR; INTRAVENOUS at 12:09

## 2019-10-14 RX ADMIN — SODIUM CHLORIDE, SODIUM LACTATE, POTASSIUM CHLORIDE, AND CALCIUM CHLORIDE: .6; .31; .03; .02 INJECTION, SOLUTION INTRAVENOUS at 12:03

## 2019-10-14 RX ADMIN — HYDROMORPHONE HYDROCHLORIDE 0.5 MG: 1 INJECTION, SOLUTION INTRAMUSCULAR; INTRAVENOUS; SUBCUTANEOUS at 14:26

## 2019-10-14 RX ADMIN — DEXAMETHASONE SODIUM PHOSPHATE 10 MG: 10 INJECTION, SOLUTION INTRAMUSCULAR; INTRAVENOUS at 12:37

## 2019-10-14 RX ADMIN — FENTANYL CITRATE 100 MCG: 50 INJECTION, SOLUTION INTRAMUSCULAR; INTRAVENOUS at 12:29

## 2019-10-14 RX ADMIN — ROCURONIUM BROMIDE 40 MG: 50 INJECTION, SOLUTION INTRAVENOUS at 12:48

## 2019-10-14 RX ADMIN — SODIUM CHLORIDE 125 ML/HR: 0.9 INJECTION, SOLUTION INTRAVENOUS at 17:01

## 2019-10-14 RX ADMIN — HYDROMORPHONE HYDROCHLORIDE 0.5 MG: 1 INJECTION, SOLUTION INTRAMUSCULAR; INTRAVENOUS; SUBCUTANEOUS at 16:42

## 2019-10-14 RX ADMIN — ROCURONIUM BROMIDE 20 MG: 50 INJECTION, SOLUTION INTRAVENOUS at 13:50

## 2019-10-14 RX ADMIN — FERROUS SULFATE TAB 325 MG (65 MG ELEMENTAL FE) 325 MG: 325 (65 FE) TAB at 08:00

## 2019-10-14 RX ADMIN — HYDROMORPHONE HYDROCHLORIDE 0.5 MG: 1 INJECTION, SOLUTION INTRAMUSCULAR; INTRAVENOUS; SUBCUTANEOUS at 17:00

## 2019-10-14 RX ADMIN — GLYCOPYRROLATE 0.4 MG: 0.2 INJECTION, SOLUTION INTRAMUSCULAR; INTRAVENOUS at 15:44

## 2019-10-14 RX ADMIN — ONDANSETRON 4 MG: 2 INJECTION INTRAMUSCULAR; INTRAVENOUS at 15:24

## 2019-10-14 RX ADMIN — ROCURONIUM BROMIDE 10 MG: 50 INJECTION, SOLUTION INTRAVENOUS at 13:30

## 2019-10-14 RX ADMIN — FENTANYL CITRATE 25 MCG: 50 INJECTION INTRAMUSCULAR; INTRAVENOUS at 15:57

## 2019-10-14 RX ADMIN — LIDOCAINE HYDROCHLORIDE 50 MG: 10 INJECTION, SOLUTION INFILTRATION; PERINEURAL at 12:26

## 2019-10-14 RX ADMIN — FENTANYL CITRATE 25 MCG: 50 INJECTION INTRAMUSCULAR; INTRAVENOUS at 16:03

## 2019-10-14 RX ADMIN — SODIUM CHLORIDE, SODIUM LACTATE, POTASSIUM CHLORIDE, AND CALCIUM CHLORIDE: .6; .31; .03; .02 INJECTION, SOLUTION INTRAVENOUS at 13:30

## 2019-10-14 RX ADMIN — Medication 100 MG: at 12:26

## 2019-10-14 RX ADMIN — HEPARIN SODIUM 10000 UNITS: 5000 INJECTION INTRAVENOUS; SUBCUTANEOUS at 09:27

## 2019-10-14 RX ADMIN — DOCUSATE SODIUM 100 MG: 100 CAPSULE, LIQUID FILLED ORAL at 09:26

## 2019-10-14 RX ADMIN — HYDROMORPHONE HYDROCHLORIDE 1 MG: 1 INJECTION, SOLUTION INTRAMUSCULAR; INTRAVENOUS; SUBCUTANEOUS at 12:37

## 2019-10-14 RX ADMIN — GENTAMICIN SULFATE 160 MG: 40 INJECTION, SOLUTION INTRAMUSCULAR; INTRAVENOUS at 12:09

## 2019-10-14 RX ADMIN — BUPIVACAINE HYDROCHLORIDE 20 ML: 2.5 INJECTION, SOLUTION INFILTRATION; PERINEURAL at 15:40

## 2019-10-14 RX ADMIN — NEOSTIGMINE METHYLSULFATE 3 MG: 1 INJECTION, SOLUTION INTRAVENOUS at 15:44

## 2019-10-14 RX ADMIN — SODIUM CHLORIDE: 0.9 INJECTION, SOLUTION INTRAVENOUS at 13:34

## 2019-10-14 RX ADMIN — PRENATAL VIT W/ FE FUMARATE-FA TAB 27-0.8 MG 1 TABLET: 27-0.8 TAB at 09:26

## 2019-10-14 RX ADMIN — ONDANSETRON 4 MG: 2 INJECTION INTRAMUSCULAR; INTRAVENOUS at 12:37

## 2019-10-14 RX ADMIN — SODIUM CHLORIDE: 0.9 INJECTION, SOLUTION INTRAVENOUS at 12:29

## 2019-10-14 RX ADMIN — Medication: at 18:10

## 2019-10-14 RX ADMIN — ALBUMIN (HUMAN): 12.5 INJECTION, SOLUTION INTRAVENOUS at 14:06

## 2019-10-14 RX ADMIN — PROPOFOL 200 MG: 10 INJECTION, EMULSION INTRAVENOUS at 12:26

## 2019-10-14 RX ADMIN — FENTANYL CITRATE 25 MCG: 50 INJECTION INTRAMUSCULAR; INTRAVENOUS at 16:31

## 2019-10-14 NOTE — PLAN OF CARE
Problem: ANTEPARTUM  Goal: Maintain pregnancy as long as maternal and/or fetal condition is stable  Description  INTERVENTIONS:  - Maternal surveillance  - Fetal surveillance  nst q shift  - Monitor uterine activity  nst q shift  - Medications as ordered  - Bedrest w/brp  Outcome: Progressing     Problem: PAIN - ADULT  Goal: Verbalizes/displays adequate comfort level or baseline comfort level  Description  Interventions:  - Encourage patient to monitor pain and request assistance  - Assess pain using appropriate pain scale 0-10   - Administer analgesics based on type and severity of pain and evaluate response  - Implement non-pharmacological measures as appropriate and evaluate response  - Consider cultural and social influences on pain and pain management  - Notify physician/advanced practitioner if interventions unsuccessful or patient reports new pain   Outcome: Progressing     Problem: INFECTION - ADULT  Goal: Absence or prevention of progression during hospitalization  Description  INTERVENTIONS:  - Assess and monitor for signs and symptoms of infection  - Monitor lab/diagnostic results  - Monitor all insertion sites, i e  indwelling lines  - [place] appropriate cooling/warming therapies per order  - Administer medications as ordered  - Instruct and encourage patient and family to use good hand hygiene technique   Outcome: Progressing  Goal: Absence of fever/infection during neutropenic period  Description  INTERVENTIONS:  - Monitor WBC    Outcome: Progressing     Problem: SAFETY ADULT  Goal: Patient will remain free of falls  Description  INTERVENTIONS:  - Assess patient frequently for physical needs  -  Identify cognitive and physical deficits and behaviors that affect risk of falls    -  [place] fall precautions as indicated by assessment   - Educate patient/family on patient safety including physical limitations  - Instruct patient to call for assistance with activity based on assessment  - Modify environment to reduce risk of injury   Outcome: Progressing  Goal: Maintain or return to baseline ADL function  Description  INTERVENTIONS:  -  Assess patient's ability to carry out ADLs; assess patient's baseline for ADL function and identify physical deficits which impact ability to perform ADLs (bathing, care of mouth/teeth, toileting, grooming, dressing, etc )  - Assess/evaluate cause of self-care deficits   - Assess range of motion  - Assess patient's mobility; develop plan if impaired  - Assess patient's need for assistive devices and provide as appropriate  - Encourage maximum independence but intervene and supervise when necessary  - Involve family in performance of ADLs  - Assess for home care needs following discharge   - Provide patient education as appropriate   Outcome: Progressing  Goal: Maintain or return mobility status to optimal level  Description  INTERVENTIONS:  - Assess patient's baseline mobility status (ambulation, transfers, stairs, etc )    - Identify cognitive and physical deficits and behaviors that affect mobility  - [place] fall precautions as indicated by assessment  - Record patient progress and toleration of activity level on Mobility SBAR; progress patient to next Phase/Stage  - Instruct patient to call for assistance with activity based on assessment   Outcome: Progressing     Problem: DISCHARGE PLANNING  Goal: Discharge to home or other facility with appropriate resources  Description  INTERVENTIONS:  - Identify barriers to discharge w/patient and caregiver  - Arrange for needed discharge resources and transportation as appropriate  - Identify discharge learning needs (meds, wound care, etc )  - Refer to Case Management Department for coordinating discharge planning if the patient needs post-hospital services based on physician/advanced practitioner order or complex needs related to functional status, cognitive ability, or social support system   Outcome: Progressing     Problem: Knowledge Deficit  Goal: Patient/family/caregiver demonstrates understanding of disease process, treatment plan, medications, and discharge instructions  Description  Complete learning assessment and assess knowledge base    Interventions:  - Provide teaching at level of understanding  - Provide teaching via preferred learning methods  Outcome: Progressing

## 2019-10-14 NOTE — PROGRESS NOTES
Late entry:    Patient seen earlier this morning at 33 weeks gestation, admitted for observation due to placenta percreta  She was complaining of pelvic pressure with walking as well as pain with urination  She states she has to "squeeze" to urinate and this is painful  She denies any hematuria  Discussed with patient plan of care, continued admission and delivery at 34 weeks/    I received another call around 11:15 am from Dr Pollo Kat ([facility]) and Dr Tamara Chang (OBGYN PGY-4) re: patient's increasing pain and contractions and concern re:  labor due to severity of discomfort and delivery was recommended

## 2019-10-14 NOTE — DISCHARGE SUMMARY
Discharge Summary -   Gilda Castle 26 y o  female MRN: [id number]  Unit/Bed#: -01 Encounter: [id number]    Admission Date: 10/11/2019     Discharge Date: 10/19/19    Delivering Attending: Ever Chaves MD  Discharging Attending: Dr Shayy Sheth    Principal Diagnosis:    pregnancy at 33w0d  Suspected placenta percreta  Spontaneous labor  Single fetus  A1GDM  Hx  section x4    Secondary Diagnosis:   Same as above, delivered  Bipolar disorder  Obesity    Procedures:    hysterectomy    Hospital Course:   Gilda Castle is a 26 y o  [id number] at 33w0d who was initially admitted for inpatient observation and management of suspected placenta percreta  Pt received a second course of betamethasone 10/11-10/12  She was asymptomatic until midday on 10/14 when she became acutely uncomfortable and was noted to have contractions on tocometry  Decision was made to proceed with  hysterectomy  She delivered a viable female  on 10/14/19 at 1232  Weight 4lbs 5oz via repeat  section utilizing a vertical midline incision and high classical uterine incision followed by  hysterectomy  Apgars were 8 (1 min) and 9 (5 min)   was transferred to NICU  Patient tolerated the procedure well and was transferred to recovery in stable condition  She did receive 2u PRBC transfusion intraoperatively  Her post-operative course was complicated by a postop ileus that resolved with conservative management a postop ileus   Preoperative Hb was 10 3, intraoperative iSTAT was in the 8s, PACU Hb 10 4, postoperative Hb 9 0  Her postoperative pain was well controlled with oral analgesics  On day of discharge, she was ambulating and able to reasonably perform all ADLs  She was voiding and had appropriate bowel function  Pain was well controlled  She was discharged home on post-operative day #5 without complications   Patient was instructed to follow up with her OB as an outpatient and was given appropriate warnings to call provider if she develops signs of infection or uncontrolled pain  Complications: None,     Condition at discharge: stable     Discharge Medications: For a complete list of the patient's medications, please refer to her medical reconcillation report  Discharge instructions/Information to patient and family:   See after visit summary for information provided to patient and family  Provisions for Follow-Up Care:  See after visit summary for information related to follow-up care and any pertinent home health orders  Disposition: See After Visit Summary for discharge disposition information      Planned Readmission: No    Dar Couch MD, MPH  OB/GYN, PGY4  10/19/2019, 7:33 PM

## 2019-10-14 NOTE — OP NOTE
OPERATIVE REPORT  PATIENT NAME: Adelita Alatorre    :  1990  MRN: [id number]  Pt Location: [facility] OR ROOM 08    SURGERY DATE: 10/14/2019    Surgeon(s) and Role:     * Nickie Guerrero MD - Primary     * Mirella Srinivasan MD - Assisting     * Kamlesh Cantu MD - Assisting    Preop Diagnosis:  Suspected placenta percreta  33 weeks of gestation    Procedure(s) (LRB):  RAY, BILATERAL SALPINGECTOMY (N/A)   SECTION () REPEAT (N/A)  CYSTOSCOPY (N/A)    Specimen(s):  ID Type Source Tests Collected by Time Destination   1 : with bilateral fallopian tubes, and placenta and cervix Tissue Uterus TISSUE EXAM Nickie Guerrero MD 10/14/2019  2:03 PM    A :  Cord Blood Cord BLOOD GAS, VENOUS, CORD, BLOOD GAS, ARTERIAL, CORD Nickie Guerrero MD 10/14/2019 12:33 PM        Estimated Blood Loss:   2200 mL    Drains:  Flores catheter    Anesthesia Type:   General    Operative Indications:  Suspected placenta percreta  Visibly uncomfortable with contraction on tocometry  33 weeks of gestation    Operative Findings:  Viable female , weight 4lbs 5oz, APGARs of 8 (1 min) and 9 (5 min)  Clear amniotic fluid  Three vessel umbilical cord, clamped with sterile umbilical cord clamp and returned to uterus  Uterine tone initially excellent; over the course of the procedure tone declined and was quite boggy at removal of specimen  Adhesive disease from omentum to left adnexa, otherwise grossly normal appearing fallopian tubes and ovaries bilaterally  Extremely dense adhesive disease between lower uterine segment and bladder  Cervix appeared to be removed in entirety with specimen  Cystoscopic examination revealed bilateral ureteral efflux without evidence of injury to bladder  Excellent hemostasis at completion of procedure  Arterial cord pH 7 313, base excess -0 6  Venous cord pH 7 402, base excess -1 6    Complications:   Hemorrhage requiring 2u PRBC transfusion    Procedure and Technique:  Prema Silva is a 31yo G9 now  initially presenting for scheduled inpatient stay on labor and delivery with a suspected placenta percreta  During the course of her stay, on 10/14, she was noted to become acutely more uncomfortable with 7-8/10 lower back pain  She was noted to have contractions on the monitor  Decision was made to proceed with  section due to suspicion of placenta percreta, now with contractions, and concern for fetal and maternal wellbeing  Patient was made aware of these findings and the proposed plan  Risks, benefits, possible complications, alternate treatment options, and expected outcomes were discussed with the patient  The patient agreed with the proposed plan and gave informed consent  The patient was taken to the operating room where she was properly identified to the OR staff and attending physician  Fetal heart tones were appreciated and found to be appropriate  A Flores catheter was aseptically inserted and SCDs were placed  The abdomen was prepped with Chloraprep and following appropriate drying time, the patient was draped in the usual sterile manner for a Pfannenstiel incision  The patient had received Clinda and gent IV pre-operatively for prophylaxis  A Time Out was held and the above information confirmed  The patient was identified as Prema Silva and the procedure verified as  Delivery  Pt then received general anesthesia and procedure started once anesthesia gave permission to proceed  A vertical midline incision from just above umbilicus to 3cm suprapubic was made and carried down through the underlying subcutaneous tissue to the fascia using a scalpel  Rectus fascia was then incised in the midline and extended cephalo-caudally using electrocautery  All anatomic layers were well-demarcated      The rectus muscles were  and the peritoneum was identified and subsequently entered and extended longitudinally with blunt dissection  The vesicouterine peritoneum was identified  The [place]-O retractor was inserted  A high classical uterine incision was made with the scalpel and extended cephalo-caudally with blunt dissection  With amnion remaining intact, the fetus was palpated to determine position; the feet were unable to be palpated however the fetal buttock was able to be elevated to the level of the hysterotomy  The amnion was entered bluntly  Surgeons hand was inserted through the hysterotomy and the fetal buttock was palpated, elevated, and delivered through the uterine incision  The remainder of the fetus delivered quickly  Baby had spontaneous cry with good tone  The umbilical cord was clamped and cut  The infant was handed off to the  providers  Arterial and venous cord gases, cord blood, and a segment of umbilical cord were obtained for evaluation and promptly sent to the lab  The three vessel cord was then clamped using a sterile umbilical cord clamp and returned to the uterus  The hysterotomy was then closed with looped PDS  At this point, Dr Deng Kramer stepped in and performed a total abdominal hysterectomy  For full details, please see his operative note  At completion of the hysterectomy, gloves and gowns were doffed and fresh, sterile gowns and gloves were donned  The sterile closure tray was then utilized  Looped PDS was used to close fascia in running unlocked fashion  Subcutaneous tissues were closed with 2-0 Plain  The skin was closed with 4-0 Monocryl in a subcuticular fashion  Histoacryl was applied  At the conclusion of the procedure, all needle, sponge, and instrument counts were noted to be correct x2  The patient tolerated the procedure well and was transferred to her the recovery room in stable condition  Dr Deng Kramer and Dr Jennifer Rose were present and participated in all key portions of the case  At conclusion of procedure, pt underwent a TAPS block with anesthesia   Please see   Delayne Doe note for full details      Patient Disposition:  PACU , hemodynamically stable and extubated and stable    SIGNATURE: Mercy Hirsch DO  DATE: October 14, 2019  TIME: 4:06 PM

## 2019-10-14 NOTE — OP NOTE
OPERATIVE REPORT  PATIENT NAME: Bernarda Echeverria    :  1990  MRN: [id number]  Pt Location: [facility] OR ROOM 08    SURGERY DATE: 10/14/2019    Surgeon(s) and Role:     * Jacoby Cornejo MD - Primary     * Rubia Morgan MD - Assisting     * Tripp Huffman DO - Assisting     * Modesta De La Vega MD - Assisting    Preop Diagnosis:  * No pre-op diagnosis entered *    * No Diagnosis Codes entered *    Procedure(s) (LRB):  RAY, BILATERAL SALPINGECTOMY (N/A)   SECTION () REPEAT (N/A)  CYSTOSCOPY (N/A)    Specimen(s):  ID Type Source Tests Collected by Time Destination   1 : with bilateral fallopian tubes, and placenta and cervix Tissue Uterus TISSUE EXAM Jacoby Cornejo MD 10/14/2019 1403    A :  Cord Blood Cord BLOOD GAS, VENOUS, CORD, BLOOD GAS, ARTERIAL, CORD Jacoby Cornejo MD 10/14/2019 1233        Estimated Blood Loss:   2200 mL    Drains:  Urethral Catheter Non-latex 16 Fr  (Active)   Number of days: 0       Anesthesia Type:   General endotracheal    Operative Indications:  Placenta acreta  Thirty-three weeks gestation delivered    Operative Findings:  Upon delivery of the infant I approached the operative field  The uterus had been closed  There was no bleeding  There were dense adhesions between the bladder and the lower uterine segment  The tubes and ovaries were otherwise unremarkable the omentum was densely adherent to the left adnexa  Adhesions were taken down  The uterus was removed with the placenta intact  There was minimal plane between the bladder and the anterior uterus  This resulted in the placenta bleeding during the case which resulted in moderate obstetrical hemorrhage  The uterus cervix placenta and tubes were removed  A cystoscopy was performed postprocedure  No bladder injury was noted  Bilateral ureteral jets were observed      Complications:   None    Procedure and Technique:  Upon successful delivery of the , the uterus was closed in the case was then transferred to my care  I scrubbed and approach the field  The self retaining retractor was removed  The fascia was opened the midline  The fascial incision extended superiorly and anteriorly  The peritoneum was identified and entered  The peritoneal incision extended superiorly and inferiorly as well  Any adhesions were taken down  A Bookwalter self retaining retractor was placed  The cornua of the uterus was grasped with 2 Rebeca clamps  The retro-peritoneal space on the right side was opened using cautery  The round ligament was transected  The ureter was identified coursing normally within the medial leaf of the broad ligament  The infundibulopelvic ligament on the right side was skeletonized, the suspend story ligament of the ovary was taken down with an Enseal devce  The mesosalpinx was taken down in the same fashion  Hemostasis was assured  The infundibular pelvic ligament was skeletonized with the Bovie device and the ovary was then packed back out of the pelvis  This procedure was then repeated on the left side  The retro-peritoneal space on the left side was opened using cautery  The round ligament was suture ligated and  transected  The ureter was identified coursing normally within the medial leaf of the broad ligament  The bladder was taken down below the level of the internal os of the cervix  This exposed a very thin plane between the bladder and the anterior uterus  In doing this the placenta extruded partially into the abdomen  The uterine vessels were skeletonized bilaterally  They were clamped with an Enseal device in multiple passes    The cardinal ligaments were taken down in a  similar fashion  The bladder was then further dissected down to the level of the upper vagina with the Bovie electrocautery device  Moderate bleeding was noted at this time  The Enseal device was then used to come across the upper portion of the vagina and uterosacral ligaments   The specimen was then removed  The vaginal apex was then secured using interrupted figure-of-eight 0 Vicryl suture with excellent hemostasis noted  Further bleeding in the bilateral parametria was controlled with interrupted 0 Vicryl sutures either in a running or figure-of-eight fashion  The ureters were traced in noted to be intact  The bladder appeared to have no injury  The pelvis was irrigated  There is no evidence of bleeding noted  The Flores catheter was removed  A cystoscopy was performed with a 0 degree scope  The bladder was filled with sterile here again to  No bladder injury was noted  The bilateral ureteral jets were identified  The Flores was replaced  I then transferred care back to the obstetrical team for closure after extensive irrigation and evaluation for hemostasis  No complications were noted  I was present for the entire procedure     I was present for the entire procedure    Patient Disposition:  PACU     SIGNATURE: Ailyn Darby MD  DATE: October 14, 2019  TIME: 3:19 PM

## 2019-10-14 NOTE — CONSULTS
10/14/2019 at 11 45 am  The patient is a pleasant 76-year-old female  G 9  was admitted to the hospital at 32 4/7  weeks of gestation because of placenta percreta  Plan was for  hysterectomy Monday 10/21  2019   Today she is 33 0/7 weeks gestation, appears visible uncomfortable , OB plan Dr Aaliyah Cardoso will be doing C/S hysterectomy this afternoon   She has received 2 courses of BTM , most recent 10/8, 10/9    Pregnancy Complications:      Patient Active Problem List   Diagnosis    Anemia during pregnancy    H/O  section complicating pregnancy    Bipolar disorder ([facility] [address] )    32 weeks gestation of pregnancy    History of IUFD    Obesity affecting pregnancy in third trimester    GDM (gestational diabetes mellitus)    Influenza vaccine refused    Request for sterilization    Placenta percreta during pregnancy in third trimester, antepartum    Back pain    B12 deficiency    Other iron deficiency anemias    34 weeks gestation of pregnancy      PMHx :  Thyroid diease   Bipolar disorder     PSHx:   section  X 4  SAB x3  IUFD at 21 weeks     Labs:  Blood type: O+  Antibody: negative  Group B strep: unknown  HIV: negative  Hepatitis B: negative  RPR: NR  Rubella: Immune    I spoke with mom about the risks of a premature infant delivery at 35 0/7  weeks gestation, including but not limited to delivery room resuscitation (need for intubation, CPR, surfactant, UVC placement, epi), developmental delay, CP, IVH, PVL, ROP, PDA (medical vs surgical closure), RDS, CLD (long term complications discussed as well), intubation, surfactant, mechanical ventilation, NEC, feeding intolerance/immaturity, importance of BM, NG feeds, central lines, TPN, anemia, blood transfusions, sepsis, and isolette for thermoregulation  General NICU care also discussed  Mom both understand risks  All questions/concerns answered  FOB is Anamaria Willett on his way to hospital

## 2019-10-14 NOTE — ANESTHESIA POSTPROCEDURE EVALUATION
Post-Op Assessment Note    CV Status:  Stable  Pain Score: 2    Pain management: adequate     Mental Status:  Alert and awake   Hydration Status:  Euvolemic   PONV Controlled:  Controlled   Airway Patency:  Patent   Post Op Vitals Reviewed: Yes      Staff: CRNA           /71 (10/14/19 1555)    Temp      Pulse     Resp      SpO2 100 % (10/14/19 1555)

## 2019-10-14 NOTE — SOCIAL WORK
Consult for social issues  Per discussion with ANDRES Sultana, issue is pt's limited childcare  As per 10/13 note from Dr Denilson Núñez, pt's partner "reported that the patient's sister would be able to come into town to take care of the children starting tomorrow " Discussed situation with ANDRES Sultana and Dr Renetta Christianson  Met with pt who reports she has 4 kids at home; 7 yo boy, 9 yo girl, 4 yo boy, and 10 yo boy  Pt reports her 6and 5year old sons are with her mom in [place]  The 8 and 10 yo do not have school today because of [address] day but family is caring for them today  Pt reports her 8year old daughter has low functioning autism and is considered "retarded"  Pt reports daughter does not connect with anyone therefore any person who watches her must know her and be familiar with her in order to be able to care for her properly  Pt reports her  works full time but can care for the kids at night and take some time off but not full days for indefinite period of time  Pt's brother is close with her daughter but he is currently in assisted  Pt reports her sister is a new mom and her priority is her child, but plan is for her sister to get pt's daughter on and off the school bus and watch them after school  Pt's stepfather's girlfriend's child goes to the same school as pt's youngest son, therefore she is able to pick him up and drop him off from school at home, where pt's sister will watch them  Pt reports they are still working on the details of this plan and it may change day by day because her daughter can be challenging to care for at times  During conversation, pt started to experience increasing pain which kept her from continuing discussion with CM and she asked for her nurse immediately  RN's Alba Olivia and Taz Sultana notified and addressed situation immediately per pt request     Notified Nury of pt's plan for childcare   CM has no additional childcare resources at this time and pt did not ask for any assistance or resources  Staff is to re-consult CM as needed

## 2019-10-14 NOTE — UTILIZATION REVIEW
Initial Clinical Review    Admission: Date/Time/Statement: Inpatient Admission Orders (From admission, onward)     Ordered        10/11/19 1709  Inpatient Admission  Once                   Orders Placed This Encounter   Procedures    Inpatient Admission     Standing Status:   Standing     Number of Occurrences:   1     Order Specific Question:   Admitting Physician     Answer:   Rachelle Bob [[id number]]     Order Specific Question:   Level of Care     Answer:   Med Surg [[id number]]     Order Specific Question:   Estimated length of stay     Answer:   More than 2 Midnights     Order Specific Question:   Certification     Answer:   I certify that inpatient services are medically necessary for this patient for a duration of greater than two midnights  See H&P and MD Progress Notes for additional information about the patient's course of treatment  No chief complaint on file  Assessment/Plan:  27 YO FEMALE PRESENTED TO L/D AS PLANNED  INPATIENT ADMISSION  @ 32 4/7 WKS WITH PLACENTA PERCRETA  G9 P 4 PATIENT WILL REMAINS IN HOSPITAL UNTIL  10-21-19 AT [facility] TIME A  /HYSTERECTOMY WILL BE DONE  PMHX  A 1 GM ANEMIA,  PLAN HAVE 6 U PRBC  6 UNITS FFP AND 6 UNIT PLATELETS  UNTIL DELIVERY  TWO LARGE IV BORES  PLAN OBSERVATION PATIENT FOR ANY S/S OF LABOR OR VAGINAL BLEEDING  CONSULT GYN ONCOLOGY,NEONATOLOGY AND ANESTHESIOLOGY AND NST TID      Plan:   1  Admit to L&D- hysterectomy scheduled in the main OR for Monday 10/21/2019  2  CBC, RPR, type and screen every 3 days, IV access   3  Consults:  Perinatology, gynecology Oncology, anesthesiology, neonatology, hematology   4  Anemia:  Start Venofer infusion  6  A1GDM:  Monitor blood glucose, anticipate hyperglycemia in the setting of recent betamethasone administration  7  FEN: regular  8  PPX: SCDs, Heparin 99724 units BID  9   IUP: NST TID       OB Triage Vitals   Temperature Pulse Respirations Blood Pressure SpO2   10/11/19 2000 10/11/19 2000 10/11/19 2000 10/11/19 2000 10/11/19 2000   98 1 °F (36 7 °C) 77 18 117/60 99 %      Temp Source Heart Rate Source Patient Position - Orthostatic VS BP Location FiO2 (%)   10/11/19 2000 10/11/19 2000 10/12/19 0004 10/11/19 2000 --   Oral Monitor Lying Left arm       Pain Score       10/11/19 1900       No Pain        Wt Readings from Last 1 Encounters:   10/11/19 96 2 kg (212 lb)     Additional Vital Signs:   Pertinent Labs/Diagnostic Test Results:   Results from last 7 days   Lab Units 10/11/19  2001   WBC Thousand/uL 12 93*   HEMOGLOBIN g/dL 10 3*   HEMATOCRIT % 33 0*   PLATELETS Thousands/uL 262     Results from last 7 days   Lab Units 10/14/19  1008 10/14/19  0747 10/13/19  2212 10/13/19  1507 10/13/19  0758 10/12/19  2118 10/12/19  1442 10/12/19  1034 10/12/19  0824 10/11/19  2109   POC GLUCOSE mg/dl 97 96 109 102 80 101 110 91 89 96         Past Medical History:   Diagnosis Date    Disease of thyroid gland     Pap smear abnormality of cervix with LGSIL 5/20/2016    Varicella     Had as a child     Present on Admission:   34 weeks gestation of pregnancy   Placenta percreta during pregnancy in third trimester, antepartum      Admitting Diagnosis: 32 weeks gestation of pregnancy [Z3A 32]  Age/Sex: 26 y o  female  Admission Orders:    Medications:  docusate sodium 100 mg Oral BID   ferrous sulfate 325 mg Oral Daily With Breakfast   heparin (porcine) 10,000 Units Subcutaneous Q12H [address]   prenatal multivitamin 1 tablet Oral Daily          acetaminophen 650 mg Oral Q4H PRN   calcium carbonate 1,000 mg Oral Daily PRN       IP CONSULT TO ANESTHESIOLOGY  IP CONSULT TO NEONATOLOGY  IP CONSULT TO PERINATOLOGY  IP CONSULT TO HEMATOLOGY  IP CONSULT TO CASE MANAGEMENT    2 IV SITES AT ALLTIME  BLOOD PRODUCT ON HAND AT ALL TIME  NST Q SHIFT  SCD  HIGH RISK FOR OB HEMORRHAGE    Continued Stay Review    Date: 10-14-19                         Current Patient Class: INPATIENT Current Level of Care: *MEDICAL  HPI:29 y o  female initially admitted on *10-11-19    Assessment/Plan: Plan:  Placenta percreta:  - +Pelvic pressure, unchanged  May be related to multiparity or to possible bladder involvement  - Scheduled for  hysterectomy 10/21 in main OR  - Crossed for 6u PRBC, 6u FFP, 1 6 pack of PLT  - Type and screen to be updated today, next due 10/17  - Awaiting additional consultations  - s/p betamethasone  - Continue inpatient until delivery  - Pt did have some concerns regarding inpatient status until after delivery; pt has childcare difficulties  Will ask for CM assistance  - Cont NST TID  (+) INCREASING BACK PAIN PATIENT VERY UNCOMFORTABLE FETAL TACHYCARDIA NOTED  WILL PROCEED WITH DELIVERY     GYN ONcology, Anesthesia, NICU, OB Charge, Main OR Charge       Pertinent Labs/Diagnostic Results:   Results from last 7 days   Lab Units 10/11/19  2001   WBC Thousand/uL 12 93*   HEMOGLOBIN g/dL 10 3*   HEMATOCRIT % 33 0*   PLATELETS Thousands/uL 262       Results from last 7 days   Lab Units 10/14/19  1008 10/14/19  0747 10/13/19  2212 10/13/19  1507 10/13/19  0758 10/12/19  2118 10/12/19  1442 10/12/19  1034 10/12/19  0824 10/11/19  2109   POC GLUCOSE mg/dl 97 96 109 102 80 101 110 91 89 96   Vital Signs:   10/14/19 0800  --  --  --  --  --  --  --  --   Comment rows:   OBSERV: baby active, according to the patient  denies contractions  does c/o lower abdominal pressure when ambulating  encouraged to use binder when oob   at 10/14/19 0800   10/14/19 0743  98 3 °F (36 8 °C)  82  18  129/78  99 %  None (Room air)  --  Lying   10/14/19 0630  --  --  --  --  --  --  --  --   Comment rows:   OBSERV: notified dr Mohit Guerrero pt is jeannette on monitor but denies feeling them; will continue to monitor pt at 10/14/19 0630   10/14/19 0030  98 6 °F (37 °C)  101  18  110/48Abnormal   97 %  None (Room air)  --           Medications:     Medications:  docusate sodium 100 mg Oral BID   ferrous sulfate 325 mg Oral Daily With Breakfast   heparin (porcine) 10,000 Units Subcutaneous Q12H [facility]   prenatal multivitamin 1 tablet Oral Daily          acetaminophen 650 mg Oral Q4H PRN   calcium carbonate 1,000 mg Oral Daily PRN       Discharge Plan: INPATIENT UNTIL DELIVERY THAN D/C TO HOME        Network Utilization Review Department  Phone: [phone]; Fax [phone]  [email or web address]  ATTENTION: Please call with any questions or concerns to [phone]  and carefully listen to the prompts so that you are directed to the right person  Send all requests for admission clinical reviews, approved or denied determinations and any other requests to fax [phone]   All voicemails are confidential

## 2019-10-14 NOTE — QUICK NOTE
Called to pt room due to patient discomfort  Pt reporting 7-8/10 bilateral lower back discomfort  Appears visibly uncomfortable, hand pressing on lower abdomen and lower back  Reports the pain started "several minutes ago" and has been unremitting  Pain is worsening  Started immediately after voiding  Not sure if she is having contractions or not as she hasn't had contractions previously      Dr Oziel Verma notified  Called Dr Alexa Ma, also aware    Plan to call GYN ONcology, Anesthesia, NICU, OB Charge, Main OR Charge    Maikel Mead, DO  OB/GYN, PGY4  10/14/2019, 11:16 AM

## 2019-10-14 NOTE — PROGRESS NOTES
Progress Note - Maternal-Fetal Medicine   Jack Francis 26 y o  female MRN: [id number]  Unit/Bed#: -01 Encounter: [id number]    Assessment:  31yo [id number] at 33w0d admitted with suspected placenta percreta    Plan:  Placenta percreta:  - +Pelvic pressure, unchanged  May be related to multiparity or to possible bladder involvement  - Scheduled for  hysterectomy 10/21 in main OR  - Crossed for 6u PRBC, 6u FFP, 1 6 pack of PLT  - Type and screen to be updated today, next due 10/17  - Awaiting additional consultations  - s/p betamethasone  - Continue inpatient until delivery  - Pt did have some concerns regarding inpatient status until after delivery; pt has childcare difficulties  Will ask for CM assistance  - Cont NST TID    FEN:  - CHO controlled diet  - Heplocked    DVT Ppx:  - SCDs bilaterally  - Heparin to continue; hold if clinical status changes  - Encourage ambulation    Subjective/Objective   Chief Complaint: I'm still feeling pressure    Subjective: Pt reports feeling well today aside from suprapubic pressure that is worse with voiding  Ambulating  Tolerating PO  Otherwise without complaint today  Has not yet tried an abdominal binder to assist with pressure  +FM  Denies LOF, VB, cramping, contractions  Objective:   Vitals: Blood pressure (!) 110/48, pulse 101, temperature 98 6 °F (37 °C), temperature source Oral, resp  rate 18, height 5' 6" (1 676 m), weight 96 2 kg (212 lb), last menstrual period 2019, SpO2 97 %, not currently breastfeeding  ,Body mass index is 34 22 kg/m²  No intake or output data in the 24 hours ending 10/14/19 0641    Invasive Devices     Peripheral Intravenous Line            Peripheral IV 10/11/19 Right Forearm 2 days    Peripheral IV 10/12/19 Left Hand 1 day                Physical Exam:   Gen: AaOx3, NAD, pleasant, cooperative  Abd: Gravid but otherwise soft, nontender, nondistended  +BS     : Size approximately equal to dates  Extremities: No edema, nontender, Negative Cassandra's bilaterally    FHT: 0554-Present: 140bpm, moderate variability, +accels, no decels  [place] is quiet; maternal movement appreciated at times  Lab, Imaging and other studies: I have personally reviewed pertinent reports            Lydia Hernandes,   OB/GYN, PGY4  10/14/2019, 6:51 AM

## 2019-10-14 NOTE — ANESTHESIA PREPROCEDURE EVALUATION
Review of Systems/Medical History  Patient summary reviewed        Cardiovascular  Negative cardio ROS Exercise tolerance (METS): >4,     Pulmonary  Negative pulmonary ROS        GI/Hepatic  Negative GI/hepatic ROS          Negative  ROS        Endo/Other  Negative endo/other ROS Diabetes well controlled type 2 , History of thyroid disease , hypothyroidism,      GYN  Negative gynecology ROS          Hematology  Negative hematology ROS Anemia ,     Musculoskeletal  Negative musculoskeletal ROS        Neurology  Negative neurology ROS      Psychology   Negative psychology ROS              Physical Exam    Airway    Mallampati score: II  TM Distance: >3 FB  Neck ROM: full     Dental   No notable dental hx     Cardiovascular  Comment: Negative ROS,     Pulmonary      Other Findings        Anesthesia Plan  ASA Score- 3 Emergent    Anesthesia Type- general with ASA Monitors  Additional Monitors:   Airway Plan: ETT  Comment: Patient seen and examined  History reviewed  Patient to be done under general anesthesia with ETT and routine monitors  Risks discussed with the patient  Consent obtained        Plan Factors-    Induction- intravenous  Postoperative Plan- Plan for postoperative opioid use  Planned trial extubation    Informed Consent- Anesthetic plan and risks discussed with patient  I personally reviewed this patient with the CRNA  Discussed and agreed on the Anesthesia Plan with the CRNA  Manjula Johnson

## 2019-10-14 NOTE — ANESTHESIA PROCEDURE NOTES
Peripheral Block    Patient location during procedure: OR  Start time: 10/14/2019 3:30 PM  Reason for block: at surgeon's request and post-op pain management  Staffing  Anesthesiologist: Judy Riojas MD  Performed: anesthesiologist   Preanesthetic Checklist  Completed: patient identified, site marked, surgical consent, pre-op evaluation, timeout performed, IV checked, risks and benefits discussed and monitors and equipment checked  Peripheral Block  Patient position: supine  Prep: ChloraPrep  Patient monitoring: continuous pulse ox, frequent blood pressure checks, cardiac monitor and heart rate  Anesthesia block type: rectus Sheath block  Laterality: bilateral  Injection technique: single-shot  Procedures: ultrasound guided, Ultrasound guidance required for the procedure to increase accuracy and safety of medication placement and decrease risk of complications    Ultrasound permanent image savedbupivacaine (MARCAINE) 0 25 % perineural infiltration, 20 mL  Needle  Needle type: Stimuplex   Needle gauge: 22 G  Needle length: 10 cm  Needle localization: ultrasound guidance  Needle insertion depth: 4 cm  Test dose: negative  Assessment  Injection assessment: incremental injection, local visualized surrounding nerve on ultrasound, negative aspiration for heme and no paresthesia on injection  Paresthesia pain: none  Heart rate change: no  Slow fractionated injection: yes  Post-procedure:  site cleaned  patient tolerated the procedure well with no immediate complications

## 2019-10-15 ENCOUNTER — HOSPITAL ENCOUNTER (OUTPATIENT)
Dept: INFUSION CENTER | Facility: HOSPITAL | Age: 29
Discharge: HOME | End: 2019-10-15
Attending: INTERNAL MEDICINE

## 2019-10-15 LAB
ABO GROUP BLD BPU: NORMAL
ALBUMIN SERPL BCP-MCNC: 2.2 G/DL (ref 3.5–5)
ALP SERPL-CCNC: 73 U/L (ref 46–116)
ALT SERPL W P-5'-P-CCNC: 12 U/L (ref 12–78)
ANION GAP SERPL CALCULATED.3IONS-SCNC: 8 MMOL/L (ref 4–13)
APTT PPP: 31 SECONDS (ref 23–37)
AST SERPL W P-5'-P-CCNC: 17 U/L (ref 5–45)
BASOPHILS # BLD AUTO: 0.03 THOUSANDS/ΜL (ref 0–0.1)
BASOPHILS NFR BLD AUTO: 0 % (ref 0–1)
BILIRUB SERPL-MCNC: 0.61 MG/DL (ref 0.2–1)
BPU ID: NORMAL
BUN SERPL-MCNC: 4 MG/DL (ref 5–25)
CALCIUM SERPL-MCNC: 8.5 MG/DL (ref 8.3–10.1)
CHLORIDE SERPL-SCNC: 107 MMOL/L (ref 100–108)
CO2 SERPL-SCNC: 25 MMOL/L (ref 21–32)
CREAT SERPL-MCNC: 0.46 MG/DL (ref 0.6–1.3)
CROSSMATCH: NORMAL
EOSINOPHIL # BLD AUTO: 0.03 THOUSAND/ΜL (ref 0–0.61)
EOSINOPHIL NFR BLD AUTO: 0 % (ref 0–6)
ERYTHROCYTE [DISTWIDTH] IN BLOOD BY AUTOMATED COUNT: 16.1 % (ref 11.6–15.1)
GFR SERPL CREATININE-BSD FRML MDRD: 134 ML/MIN/1.73SQ M
GLUCOSE SERPL-MCNC: 90 MG/DL (ref 65–140)
HCT VFR BLD AUTO: 28.4 % (ref 34.8–46.1)
HGB BLD-MCNC: 9 G/DL (ref 11.5–15.4)
IMM GRANULOCYTES # BLD AUTO: 0.21 THOUSAND/UL (ref 0–0.2)
IMM GRANULOCYTES NFR BLD AUTO: 1 % (ref 0–2)
INR PPP: 1.07 (ref 0.84–1.19)
LYMPHOCYTES # BLD AUTO: 3.64 THOUSANDS/ΜL (ref 0.6–4.47)
LYMPHOCYTES NFR BLD AUTO: 25 % (ref 14–44)
MCH RBC QN AUTO: 27.4 PG (ref 26.8–34.3)
MCHC RBC AUTO-ENTMCNC: 31.7 G/DL (ref 31.4–37.4)
MCV RBC AUTO: 87 FL (ref 82–98)
MONOCYTES # BLD AUTO: 1.31 THOUSAND/ΜL (ref 0.17–1.22)
MONOCYTES NFR BLD AUTO: 9 % (ref 4–12)
NEUTROPHILS # BLD AUTO: 9.6 THOUSANDS/ΜL (ref 1.85–7.62)
NEUTS SEG NFR BLD AUTO: 65 % (ref 43–75)
NRBC BLD AUTO-RTO: 0 /100 WBCS
PLATELET # BLD AUTO: 209 THOUSANDS/UL (ref 149–390)
PMV BLD AUTO: 10.3 FL (ref 8.9–12.7)
POTASSIUM SERPL-SCNC: 3.9 MMOL/L (ref 3.5–5.3)
PROT SERPL-MCNC: 5.2 G/DL (ref 6.4–8.2)
PROTHROMBIN TIME: 13.5 SECONDS (ref 11.6–14.5)
RBC # BLD AUTO: 3.28 MILLION/UL (ref 3.81–5.12)
SODIUM SERPL-SCNC: 140 MMOL/L (ref 136–145)
UNIT DISPENSE STATUS: NORMAL
UNIT PRODUCT CODE: NORMAL
UNIT RH: NORMAL
WBC # BLD AUTO: 14.82 THOUSAND/UL (ref 4.31–10.16)

## 2019-10-15 PROCEDURE — 99024 POSTOP FOLLOW-UP VISIT: CPT | Performed by: OBSTETRICS & GYNECOLOGY

## 2019-10-15 PROCEDURE — 85025 COMPLETE CBC W/AUTO DIFF WBC: CPT | Performed by: OBSTETRICS & GYNECOLOGY

## 2019-10-15 PROCEDURE — 80053 COMPREHEN METABOLIC PANEL: CPT | Performed by: OBSTETRICS & GYNECOLOGY

## 2019-10-15 PROCEDURE — 85730 THROMBOPLASTIN TIME PARTIAL: CPT | Performed by: OBSTETRICS & GYNECOLOGY

## 2019-10-15 PROCEDURE — 85610 PROTHROMBIN TIME: CPT | Performed by: OBSTETRICS & GYNECOLOGY

## 2019-10-15 RX ORDER — SENNOSIDES 8.6 MG
1 TABLET ORAL
Status: DISCONTINUED | OUTPATIENT
Start: 2019-10-15 | End: 2019-10-19 | Stop reason: HOSPADM

## 2019-10-15 RX ORDER — ACETAMINOPHEN 325 MG/1
650 TABLET ORAL EVERY 6 HOURS SCHEDULED
Status: DISCONTINUED | OUTPATIENT
Start: 2019-10-15 | End: 2019-10-16

## 2019-10-15 RX ORDER — SODIUM CHLORIDE, SODIUM LACTATE, POTASSIUM CHLORIDE, CALCIUM CHLORIDE 600; 310; 30; 20 MG/100ML; MG/100ML; MG/100ML; MG/100ML
125 INJECTION, SOLUTION INTRAVENOUS CONTINUOUS
Status: DISCONTINUED | OUTPATIENT
Start: 2019-10-15 | End: 2019-10-15

## 2019-10-15 RX ORDER — OXYCODONE HYDROCHLORIDE 10 MG/1
10 TABLET ORAL EVERY 4 HOURS PRN
Status: DISCONTINUED | OUTPATIENT
Start: 2019-10-15 | End: 2019-10-19 | Stop reason: HOSPADM

## 2019-10-15 RX ORDER — KETOROLAC TROMETHAMINE 30 MG/ML
30 INJECTION, SOLUTION INTRAMUSCULAR; INTRAVENOUS ONCE
Status: COMPLETED | OUTPATIENT
Start: 2019-10-15 | End: 2019-10-15

## 2019-10-15 RX ORDER — IBUPROFEN 600 MG/1
600 TABLET ORAL EVERY 6 HOURS PRN
Status: DISCONTINUED | OUTPATIENT
Start: 2019-10-15 | End: 2019-10-17

## 2019-10-15 RX ORDER — HYDROMORPHONE HCL/PF 1 MG/ML
0.5 SYRINGE (ML) INJECTION
Status: DISCONTINUED | OUTPATIENT
Start: 2019-10-15 | End: 2019-10-16

## 2019-10-15 RX ORDER — OXYCODONE HYDROCHLORIDE 5 MG/1
5 TABLET ORAL EVERY 4 HOURS PRN
Status: DISCONTINUED | OUTPATIENT
Start: 2019-10-15 | End: 2019-10-19 | Stop reason: HOSPADM

## 2019-10-15 RX ADMIN — HYDROMORPHONE HYDROCHLORIDE 0.5 MG: 1 INJECTION, SOLUTION INTRAMUSCULAR; INTRAVENOUS; SUBCUTANEOUS at 17:03

## 2019-10-15 RX ADMIN — HYDROMORPHONE HYDROCHLORIDE 0.5 MG: 1 INJECTION, SOLUTION INTRAMUSCULAR; INTRAVENOUS; SUBCUTANEOUS at 20:01

## 2019-10-15 RX ADMIN — ONDANSETRON 4 MG: 2 INJECTION INTRAMUSCULAR; INTRAVENOUS at 19:54

## 2019-10-15 RX ADMIN — SIMETHICONE CHEW TAB 80 MG 80 MG: 80 TABLET ORAL at 13:43

## 2019-10-15 RX ADMIN — ACETAMINOPHEN 650 MG: 325 TABLET ORAL at 12:24

## 2019-10-15 RX ADMIN — IBUPROFEN 600 MG: 600 TABLET, FILM COATED ORAL at 10:35

## 2019-10-15 RX ADMIN — PRENATAL VIT W/ FE FUMARATE-FA TAB 27-0.8 MG 1 TABLET: 27-0.8 TAB at 08:37

## 2019-10-15 RX ADMIN — OXYCODONE HYDROCHLORIDE 10 MG: 10 TABLET ORAL at 22:52

## 2019-10-15 RX ADMIN — DOCUSATE SODIUM 100 MG: 100 CAPSULE, LIQUID FILLED ORAL at 08:37

## 2019-10-15 RX ADMIN — SODIUM CHLORIDE 125 ML/HR: 0.9 INJECTION, SOLUTION INTRAVENOUS at 00:33

## 2019-10-15 RX ADMIN — OXYCODONE HYDROCHLORIDE 10 MG: 10 TABLET ORAL at 13:43

## 2019-10-15 RX ADMIN — FERROUS SULFATE TAB 325 MG (65 MG ELEMENTAL FE) 325 MG: 325 (65 FE) TAB at 07:51

## 2019-10-15 RX ADMIN — ENOXAPARIN SODIUM 40 MG: 40 INJECTION SUBCUTANEOUS at 07:51

## 2019-10-15 RX ADMIN — IBUPROFEN 600 MG: 600 TABLET, FILM COATED ORAL at 16:10

## 2019-10-15 RX ADMIN — ACETAMINOPHEN 650 MG: 325 TABLET ORAL at 18:29

## 2019-10-15 RX ADMIN — SODIUM CHLORIDE, SODIUM LACTATE, POTASSIUM CHLORIDE, AND CALCIUM CHLORIDE 125 ML/HR: .6; .31; .03; .02 INJECTION, SOLUTION INTRAVENOUS at 08:35

## 2019-10-15 RX ADMIN — DOCUSATE SODIUM 100 MG: 100 CAPSULE, LIQUID FILLED ORAL at 18:30

## 2019-10-15 RX ADMIN — KETOROLAC TROMETHAMINE 30 MG: 30 INJECTION, SOLUTION INTRAMUSCULAR at 01:36

## 2019-10-15 NOTE — PROGRESS NOTES
Progress Note - OB/GYN  Yobani Carreno 26 y o  female MRN: [id number]  Unit/Bed#: -01 Encounter: [id number]      Subjective:  Yobani Carreno reports "mild cramping" but otherwise reports feeling well without complaints  Pain is overall well controlled with Dilaudid PCA  She is tolerating PO liquids without nausea or vomiting, not yet attempted solids  She is spontaneously voiding by chairez catheter with adequate urine output  She does not yet have any bowel function and is not yet ambulating  Vitals:   /57 (BP Location: Left arm)   Pulse 76   Temp 98 6 °F (37 °C) (Oral)   Resp 16   Ht 5' 6" (1 676 m)   Wt 96 2 kg (212 lb)   LMP 03/23/2019 (Within Days)   SpO2 96%   Breastfeeding? No   BMI 34 22 kg/m²       Intake/Output Summary (Last 24 hours) at 10/15/2019 0126  Last data filed at 10/15/2019 0032  Gross per 24 hour   Intake 4392 88 ml   Output 4975 ml   Net -582 12 ml       Invasive Devices     Peripheral Intravenous Line            Peripheral IV 10/14/19 Left Wrist less than 1 day    Peripheral IV 10/14/19 Right Forearm less than 1 day          Drain            Urethral Catheter Non-latex 16 Fr  less than 1 day                Physical Exam:   Physical Exam   Constitutional: She is oriented to person, place, and time  She appears well-developed and well-nourished  No distress  Genitourinary:   Genitourinary Comments: Chairez in place draining clear yellow urine   HENT:   Head: Normocephalic and atraumatic  Cardiovascular: Normal rate, regular rhythm and normal heart sounds  Pulmonary/Chest: Effort normal and breath sounds normal  No respiratory distress  Abdominal: Soft  She exhibits no distension  Appropriately tender, no rebound or guarding    Incision dressing clean/dry/intact   Musculoskeletal: Normal range of motion  She exhibits no edema or tenderness  SCDs on bilaterally   Neurological: She is alert and oriented to person, place, and time  Skin: Skin is warm and dry  Psychiatric: She has a normal mood and affect  Her behavior is normal          Labs:   No results displayed because visit has over 200 results  Patient Active Problem List   Diagnosis    Anemia during pregnancy    H/O  section complicating pregnancy    Bipolar disorder ([facility] [address] )    33 weeks gestation of pregnancy    History of IUFD    Obesity affecting pregnancy in third trimester    GDM (gestational diabetes mellitus)    Influenza vaccine refused    Request for sterilization    Placenta percreta during pregnancy in third trimester, antepartum    Back pain    B12 deficiency    Other iron deficiency anemias    34 weeks gestation of pregnancy    Delivery by  hysterectomy        A/P: Katia Han is a 26year old [id number] POD # 1 s/p  section, total abdominal hysterectomy, bilateral salpingectomy and cystoscopy for placenta accreta, overall doing well postoperatively  Hgb 10 3, EBL 2L, status post intraoperative transfusion of 2 units PRBC  Hgb 10 4, coags WNL  Follow up AM labs  Pain medication: status post TAPS block   Continue Dilaudid PCA, Toradol PRN for cramping  Flores in place, continue to monitor urine output  Encourage pumping  Encourage ambulation as tolerated  Encourage PO intake as tolerated    Sumanth Gruber MD  10/15/2019  1:26 AM

## 2019-10-15 NOTE — UTILIZATION REVIEW
Continued Stay Review    Date: 10-15-19                         Current Patient Class: inpatient Current Level of Care: medical    HPI:29 y o  female initially admitted on *10-11-19    Assessment/Plan: 10-14-19  (+)  Back pain  Fetal tachycardia   Urgent / total abdominal hysterectomy    Female  Apgar 8/9   Wt 1960 grams  Infant taken to NICU  2 u PRBC given on OR     Pertinent Labs/Diagnostic Results:   Results from last 7 days   Lab Units 10/15/19  0811 10/14/19  1614 10/14/19  1414 10/11/19  2001   WBC Thousand/uL 14 82* 21 28*  --  12 93*   HEMOGLOBIN g/dL 9 0* 10 4*  --  10 3*   I STAT HEMOGLOBIN g/dl  --   --  8 8*  --    HEMATOCRIT % 28 4* 32 8*  --  33 0*   HEMATOCRIT, ISTAT %  --   --  26*  --    PLATELETS Thousands/uL 209 246  --  262   NEUTROS ABS Thousands/µL 9 60* 18 06*  --   --          Results from last 7 days   Lab Units 10/15/19  0811 10/14/19  1614 10/14/19  1414   SODIUM mmol/L 140 140  --    POTASSIUM mmol/L 3 9 3 9  --    CHLORIDE mmol/L 107 111*  --    CO2 mmol/L 25 20*  --    CO2, I-STAT mmol/L  --   --  22   ANION GAP mmol/L 8 9  --    BUN mg/dL 4* 6  --    CREATININE mg/dL 0 46* 0 57*  --    EGFR ml/min/1 73sq m 134 125  --    CALCIUM mg/dL 8 5 7 8*  --    CALCIUM, IONIZED, ISTAT mmol/L  --   --  1 07*     Results from last 7 days   Lab Units 10/15/19  0811 10/14/19  1614   AST U/L 17 11   ALT U/L 12 11*   ALK PHOS U/L 73 76   TOTAL PROTEIN g/dL 5 2* 5 4*   ALBUMIN g/dL 2 2* 2 5*   TOTAL BILIRUBIN mg/dL 0 61 0 75     Results from last 7 days   Lab Units 10/14/19  1616 10/14/19  1008 10/14/19  0747 10/13/19  2212 10/13/19  1507 10/13/19  0758 10/12/19  2118 10/12/19  1442 10/12/19  1034 10/12/19  0824   POC GLUCOSE mg/dl 134 97 96 109 102 80 101 110 91 89     Results from last 7 days   Lab Units 10/15/19  0811 10/14/19  1614   GLUCOSE RANDOM mg/dL 90 141*     Results from last 7 days   Lab Units 10/14/19  1414   I STAT BASE EXC mmol/L -4*   I STAT O2 SAT % 99*   ISTAT PH ART  7 349* I STAT ART PCO2 mm HG 38 1   I STAT ART PO2 mm  0*   I STAT ART HCO3 mmol/L 21 0*     Results from last 7 days   Lab Units 10/15/19  0811 10/14/19  1614   PROTIME seconds 13 5 14 2   INR  1 07 1 14   PTT seconds 31 27       Vital Signs:   0/15/19 0407  97 9 °F (36 6 °C)  83  18  114/70  --  --  97 %  --  --  Lying   10/14/19 2359  98 6 °F (37 °C)  76  16  104/57  --  --  96 %  None (Room air)  --  Lying   10/14/19 2046  98 3 °F (36 8 °C)  76  18  104/58  --  --  96 %  None (Room air)  WDL  Lying   10/14/19 1945  98 2 °F (36 8 °C)  79  18  102/60  --  --  97 %  None (Room air)  --  Lying   10/14/19 1845  98 2 °F (36 8 °C)  77  16  104/62  --  --  96 %  None (Room air             Medications:     Medications:  acetaminophen 650 mg Oral Q6H [address]   docusate sodium 100 mg Oral BID   docusate sodium 100 mg Oral BID   enoxaparin 40 mg Subcutaneous Q24H   ferrous sulfate 325 mg Oral Daily With Breakfast   prenatal multivitamin 1 tablet Oral Daily          calcium carbonate 1,000 mg Oral Daily PRN   diphenhydrAMINE 25 mg Intravenous Q6H PRN   HYDROmorphone 0 5 mg Intravenous Q3H PRN   ibuprofen 600 mg Oral Q6H PRN   magnesium hydroxide 30 mL Oral Daily PRN   ondansetron 4 mg Intravenous Q6H PRN   oxyCODONE 10 mg Oral Q4H PRN   oxyCODONE 5 mg Oral Q4H PRN   senna 1 tablet Oral HS PRN   simethicone 80 mg Oral 4x Daily PRN       Discharge Plan: home    Network Utilization Review Department  Phone: [phone]; Fax [phone]  [email or web address]  ATTENTION: Please call with any questions or concerns to [phone]  and carefully listen to the prompts so that you are directed to the right person  Send all requests for admission clinical reviews, approved or denied determinations and any other requests to fax [phone]   All voicemails are confidential

## 2019-10-15 NOTE — PLAN OF CARE
Problem: PAIN - ADULT  Goal: Verbalizes/displays adequate comfort level or baseline comfort level  Description  Interventions:  - Encourage patient to monitor pain and request assistance  - Assess pain using appropriate pain scale 0-10   - Administer analgesics based on type and severity of pain and evaluate response  - Implement non-pharmacological measures as appropriate and evaluate response  - Consider cultural and social influences on pain and pain management  - Notify physician/advanced practitioner if interventions unsuccessful or patient reports new pain   Outcome: Progressing     Problem: INFECTION - ADULT  Goal: Absence or prevention of progression during hospitalization  Description  INTERVENTIONS:  - Assess and monitor for signs and symptoms of infection  - Monitor lab/diagnostic results  - Monitor all insertion sites, i e  indwelling lines  - [place] appropriate cooling/warming therapies per order  - Administer medications as ordered  - Instruct and encourage patient and family to use good hand hygiene technique   Outcome: Progressing  Goal: Absence of fever/infection during neutropenic period  Description  INTERVENTIONS:  - Monitor WBC    Outcome: Progressing     Problem: SAFETY ADULT  Goal: Patient will remain free of falls  Description  INTERVENTIONS:  - Assess patient frequently for physical needs  -  Identify cognitive and physical deficits and behaviors that affect risk of falls    -  [place] fall precautions as indicated by assessment   - Educate patient/family on patient safety including physical limitations  - Instruct patient to call for assistance with activity based on assessment  - Modify environment to reduce risk of injury   Outcome: Progressing  Goal: Maintain or return to baseline ADL function  Description  INTERVENTIONS:  -  Assess patient's ability to carry out ADLs; assess patient's baseline for ADL function and identify physical deficits which impact ability to perform ADLs (bathing, care of mouth/teeth, toileting, grooming, dressing, etc )  - Assess/evaluate cause of self-care deficits   - Assess range of motion  - Assess patient's mobility; develop plan if impaired  - Assess patient's need for assistive devices and provide as appropriate  - Encourage maximum independence but intervene and supervise when necessary  - Involve family in performance of ADLs  - Assess for home care needs following discharge   - Provide patient education as appropriate   Outcome: Progressing  Goal: Maintain or return mobility status to optimal level  Description  INTERVENTIONS:  - Assess patient's baseline mobility status (ambulation, transfers, stairs, etc )    - Identify cognitive and physical deficits and behaviors that affect mobility  - [place] fall precautions as indicated by assessment  - Record patient progress and toleration of activity level on Mobility SBAR; progress patient to next Phase/Stage  - Instruct patient to call for assistance with activity based on assessment   Outcome: Progressing     Problem: DISCHARGE PLANNING  Goal: Discharge to home or other facility with appropriate resources  Description  INTERVENTIONS:  - Identify barriers to discharge w/patient and caregiver  - Arrange for needed discharge resources and transportation as appropriate  - Identify discharge learning needs (meds, wound care, etc )  - Refer to Case Management Department for coordinating discharge planning if the patient needs post-hospital services based on physician/advanced practitioner order or complex needs related to functional status, cognitive ability, or social support system   Outcome: Progressing     Problem: Knowledge Deficit  Goal: Patient/family/caregiver demonstrates understanding of disease process, treatment plan, medications, and discharge instructions  Description  Complete learning assessment and assess knowledge base    Interventions:  - Provide teaching at level of understanding  - Provide teaching via preferred learning methods  Outcome: Progressing     Problem: POSTPARTUM  Goal: Experiences normal postpartum course  Description  INTERVENTIONS:  - Monitor maternal vital signs  - Assess uterine involution and lochia  Outcome: Progressing  Goal: Appropriate maternal -  bonding  Description  INTERVENTIONS:  - Identify family support  - Assess for appropriate maternal/infant bonding   -Encourage maternal/infant bonding opportunities  - Referral to  or  as needed  Outcome: Progressing  Goal: Establishment of infant feeding pattern  Description  INTERVENTIONS:  - Assess breast/bottle feeding  - Refer to lactation as needed  Outcome: Progressing  Goal: Incision(s), wounds(s) or drain site(s) healing without S/S of infection  Description  INTERVENTIONS  - Assess and document risk factors for skin impairment   - Assess and document dressing, incision, wound bed, drain sites and surrounding tissue  - Consider nutrition services referral as needed  - Oral mucous membranes remain intact  - Provide patient/ family education  Outcome: Progressing

## 2019-10-15 NOTE — UTILIZATION REVIEW
Notification of Maternity/Delivery & [place] Birth Information for Admission  Notification of Maternity/Delivery & [place] Birth Information for Admission to our facility [facility]  Be advised that this patient was admitted to our facility under Inpatient Status  Please contact the Utilization Review Department where the patient is receiving care services for additional admission information  [facility] ([address] [place] [address])  Place of Service Code: 21   [facility]  Presentation Date & Time: 10/11/2019  4:26 PM  Inpatient Admission Date & Time: 10/11/19 1626  Discharge Date & Time: No discharge date for patient encounter  Discharge Disposition (if discharged): Home/Self Care  Attending Physician and NPI#: Yolie Jai, [address] [[id number]]  Attending Physician:  Dr Adam Dubose  NPI: [id number]  Address: [facility] [address], [facility] [address] [facility]   Phone Number: [phone]   Fax Number: [phone]     Mother of [place] Information: Kathy Morgan   MRN: [id number] YOB: 1990   Mother's Admitting Diagnosis: 28 weeks gestation of pregnancy [Z3A 32]  Estimated Date of Delivery: 19  Type of Delivery: , Classical    Delivering clinician:     OB History        9    Para   6    Term   5       1    AB   3    Living   5       SAB   3    TAB   0    Ectopic   0    Multiple   0    Live Births   5                Name & MRN:   Information for the patient's :  Melissa Gunn Girl Kin Angry) [[id number]]     [place] Delivery Information:  Sex: female  Delivered 10/14/2019 12:32 PM by , Classical; Gestational Age: 28w0d    [place] Measurements:  Weight: 4 lb 5 1 oz (1960 g); Height: 17 32"    APGAR 1 minute 5 minutes 10 minutes   Totals: 8 9        [address] Utilization Review Department  Phone: [phone];  Fax [phone]  [email or web address]  ATTENTION: Please call with any questions or concerns to [phone]  and carefully listen to the prompts so that you are directed to the right person  Send all requests for admission clinical reviews, approved or denied determinations and any other requests to fax [phone]   All voicemails are confidential

## 2019-10-15 NOTE — PROGRESS NOTES
Progress Note - OB/GYN   Lizandro Ferreira 26 y o  female MRN: [id number]  Unit/Bed#: -01 Encounter: [id number]    Assessment:  31yo G9 now  s/p  hysterectomy for suspected placenta percreta, POD#1    Plan:   hysterectomy:  - POD#1  - Preop Hb 10 3 -> intraop iSTAT 8's -> PACU 10 4 -> AM Labs pending  - PACU Coags WNL, repeat this AM  - Continue routine postoperative care  - TAPS block, dPCA for pain management, plan to transition to PO meds later today  - Flores in place, draining clear urine  UOP overnight 2 5L, greater than 2cc/kg/hr UOP  Voiding trial not to exceed 4 hours  - Plan to start Lovenox for DVT Ppx pending AM labs  - Encourage ambulation  - Encourage increased PO water intake  - Encourage incentive spirometry  - Baby in NICU    FEN:  - Regular diet as tolerated  - Will discontinue IV fluids as pt drinking without difficulty    DVT Ppx:  - SCDs  - Encourage ambulation  - Plan to start Lovenox pending AM labs    Subjective/Objective   Chief Complaint: I'm feeling a little cramping    Subjective: Pt reports feeling well today aside from cramping  Has not yet been out of bed  Tolerating PO  Flores in place  No BM yet, no flatus  Denies fevers, chills, night sweats  Denies HA, CP, SOB, extremity pain  Looking forward to getting to the NICU to see the baby  Objective:   Vitals: Blood pressure 114/70, pulse 83, temperature 97 9 °F (36 6 °C), temperature source Oral, resp  rate 18, height 5' 6" (1 676 m), weight 96 2 kg (212 lb), last menstrual period 2019, SpO2 97 %, not currently breastfeeding  ,Body mass index is 34 22 kg/m²        Intake/Output Summary (Last 24 hours) at 10/15/2019 0636  Last data filed at 10/15/2019 0600  Gross per 24 hour   Intake 4397 58 ml   Output 5600 ml   Net -1202 42 ml       Invasive Devices     Peripheral Intravenous Line            Peripheral IV 10/14/19 Left Wrist less than 1 day    Peripheral IV 10/14/19 Right Forearm less than 1 day Drain            Urethral Catheter Non-latex 16 Fr  less than 1 day                Physical Exam:   Gen: AaOx3, NAD, pleasant, cooperative, appears pale  Card: RRR, no murmurs, rubs, or gallops  Pulm: CTAB, no wheezes, rales, or rhonchi  Speaks in a whispered voice  Abd: Soft, appropriately tender, mildly distended  Incision is clean, dry, and intact; did not examine lower half of incision at patient's request; removal of tape was bothering her  Bandage was clean/dry/intact throughout  Extremities: No edema, nontender, Negative Cassandra's bilaterally, wearing SCDs      Lab, Imaging and other studies: I have personally reviewed pertinent reports              Edwin Phoenix DO  OB/GYN, PGY4  10/15/2019, 6:42 AM

## 2019-10-15 NOTE — PLAN OF CARE
Problem: ANTEPARTUM  Goal: Maintain pregnancy as long as maternal and/or fetal condition is stable  Description  INTERVENTIONS:  - Maternal surveillance  - Fetal surveillance  nst q shift  - Monitor uterine activity  nst q shift  - Medications as ordered  - Bedrest w/brp     Outcome: Completed

## 2019-10-16 LAB
ALBUMIN SERPL BCP-MCNC: 2.5 G/DL (ref 3.5–5)
ALP SERPL-CCNC: 82 U/L (ref 46–116)
ALT SERPL W P-5'-P-CCNC: 12 U/L (ref 12–78)
ANION GAP SERPL CALCULATED.3IONS-SCNC: 10 MMOL/L (ref 4–13)
AST SERPL W P-5'-P-CCNC: 13 U/L (ref 5–45)
BASOPHILS # BLD AUTO: 0.03 THOUSANDS/ΜL (ref 0–0.1)
BASOPHILS NFR BLD AUTO: 0 % (ref 0–1)
BILIRUB SERPL-MCNC: 0.75 MG/DL (ref 0.2–1)
BUN SERPL-MCNC: 8 MG/DL (ref 5–25)
CALCIUM SERPL-MCNC: 9 MG/DL (ref 8.3–10.1)
CHLORIDE SERPL-SCNC: 106 MMOL/L (ref 100–108)
CO2 SERPL-SCNC: 25 MMOL/L (ref 21–32)
CREAT SERPL-MCNC: 0.61 MG/DL (ref 0.6–1.3)
EOSINOPHIL # BLD AUTO: 0.03 THOUSAND/ΜL (ref 0–0.61)
EOSINOPHIL NFR BLD AUTO: 0 % (ref 0–6)
ERYTHROCYTE [DISTWIDTH] IN BLOOD BY AUTOMATED COUNT: 16.4 % (ref 11.6–15.1)
GFR SERPL CREATININE-BSD FRML MDRD: 122 ML/MIN/1.73SQ M
GLUCOSE SERPL-MCNC: 95 MG/DL (ref 65–140)
HCT VFR BLD AUTO: 32.7 % (ref 34.8–46.1)
HGB BLD-MCNC: 10.6 G/DL (ref 11.5–15.4)
IMM GRANULOCYTES # BLD AUTO: 0.26 THOUSAND/UL (ref 0–0.2)
IMM GRANULOCYTES NFR BLD AUTO: 2 % (ref 0–2)
LYMPHOCYTES # BLD AUTO: 2.54 THOUSANDS/ΜL (ref 0.6–4.47)
LYMPHOCYTES NFR BLD AUTO: 17 % (ref 14–44)
MCH RBC QN AUTO: 27.7 PG (ref 26.8–34.3)
MCHC RBC AUTO-ENTMCNC: 32.4 G/DL (ref 31.4–37.4)
MCV RBC AUTO: 86 FL (ref 82–98)
MONOCYTES # BLD AUTO: 1.96 THOUSAND/ΜL (ref 0.17–1.22)
MONOCYTES NFR BLD AUTO: 13 % (ref 4–12)
NEUTROPHILS # BLD AUTO: 9.85 THOUSANDS/ΜL (ref 1.85–7.62)
NEUTS SEG NFR BLD AUTO: 68 % (ref 43–75)
NRBC BLD AUTO-RTO: 0 /100 WBCS
PLATELET # BLD AUTO: 298 THOUSANDS/UL (ref 149–390)
PMV BLD AUTO: 10.5 FL (ref 8.9–12.7)
POTASSIUM SERPL-SCNC: 3.7 MMOL/L (ref 3.5–5.3)
PROT SERPL-MCNC: 6.2 G/DL (ref 6.4–8.2)
RBC # BLD AUTO: 3.82 MILLION/UL (ref 3.81–5.12)
SODIUM SERPL-SCNC: 141 MMOL/L (ref 136–145)
WBC # BLD AUTO: 14.67 THOUSAND/UL (ref 4.31–10.16)

## 2019-10-16 PROCEDURE — 80053 COMPREHEN METABOLIC PANEL: CPT | Performed by: OBSTETRICS & GYNECOLOGY

## 2019-10-16 PROCEDURE — 99024 POSTOP FOLLOW-UP VISIT: CPT | Performed by: OBSTETRICS & GYNECOLOGY

## 2019-10-16 PROCEDURE — 85025 COMPLETE CBC W/AUTO DIFF WBC: CPT | Performed by: OBSTETRICS & GYNECOLOGY

## 2019-10-16 RX ORDER — KETOROLAC TROMETHAMINE 30 MG/ML
30 INJECTION, SOLUTION INTRAMUSCULAR; INTRAVENOUS ONCE
Status: COMPLETED | OUTPATIENT
Start: 2019-10-16 | End: 2019-10-16

## 2019-10-16 RX ORDER — SODIUM CHLORIDE, SODIUM LACTATE, POTASSIUM CHLORIDE, CALCIUM CHLORIDE 600; 310; 30; 20 MG/100ML; MG/100ML; MG/100ML; MG/100ML
125 INJECTION, SOLUTION INTRAVENOUS CONTINUOUS
Status: DISCONTINUED | OUTPATIENT
Start: 2019-10-16 | End: 2019-10-19 | Stop reason: HOSPADM

## 2019-10-16 RX ORDER — METOCLOPRAMIDE HYDROCHLORIDE 5 MG/ML
10 INJECTION INTRAMUSCULAR; INTRAVENOUS EVERY 6 HOURS PRN
Status: DISCONTINUED | OUTPATIENT
Start: 2019-10-16 | End: 2019-10-16

## 2019-10-16 RX ORDER — ACETAMINOPHEN 325 MG/1
975 TABLET ORAL EVERY 6 HOURS SCHEDULED
Status: DISCONTINUED | OUTPATIENT
Start: 2019-10-16 | End: 2019-10-19 | Stop reason: HOSPADM

## 2019-10-16 RX ORDER — METOCLOPRAMIDE HYDROCHLORIDE 5 MG/ML
10 INJECTION INTRAMUSCULAR; INTRAVENOUS EVERY 6 HOURS SCHEDULED
Status: DISCONTINUED | OUTPATIENT
Start: 2019-10-16 | End: 2019-10-19 | Stop reason: HOSPADM

## 2019-10-16 RX ADMIN — KETOROLAC TROMETHAMINE 30 MG: 30 INJECTION, SOLUTION INTRAMUSCULAR at 12:35

## 2019-10-16 RX ADMIN — METOCLOPRAMIDE 10 MG: 5 INJECTION, SOLUTION INTRAMUSCULAR; INTRAVENOUS at 11:30

## 2019-10-16 RX ADMIN — HYDROMORPHONE HYDROCHLORIDE 0.5 MG: 1 INJECTION, SOLUTION INTRAMUSCULAR; INTRAVENOUS; SUBCUTANEOUS at 11:51

## 2019-10-16 RX ADMIN — KETOROLAC TROMETHAMINE 30 MG: 30 INJECTION, SOLUTION INTRAMUSCULAR at 06:19

## 2019-10-16 RX ADMIN — HYDROMORPHONE HYDROCHLORIDE 0.5 MG: 1 INJECTION, SOLUTION INTRAMUSCULAR; INTRAVENOUS; SUBCUTANEOUS at 03:29

## 2019-10-16 RX ADMIN — KETOROLAC TROMETHAMINE 30 MG: 30 INJECTION, SOLUTION INTRAMUSCULAR at 20:06

## 2019-10-16 RX ADMIN — SODIUM CHLORIDE, SODIUM LACTATE, POTASSIUM CHLORIDE, AND CALCIUM CHLORIDE 125 ML/HR: .6; .31; .03; .02 INJECTION, SOLUTION INTRAVENOUS at 23:22

## 2019-10-16 RX ADMIN — ENOXAPARIN SODIUM 40 MG: 40 INJECTION SUBCUTANEOUS at 11:36

## 2019-10-16 RX ADMIN — HYDROMORPHONE HYDROCHLORIDE 0.5 MG: 1 INJECTION, SOLUTION INTRAMUSCULAR; INTRAVENOUS; SUBCUTANEOUS at 17:40

## 2019-10-16 RX ADMIN — SODIUM CHLORIDE, SODIUM LACTATE, POTASSIUM CHLORIDE, AND CALCIUM CHLORIDE 500 ML: .6; .31; .03; .02 INJECTION, SOLUTION INTRAVENOUS at 04:02

## 2019-10-16 RX ADMIN — ONDANSETRON 4 MG: 2 INJECTION INTRAMUSCULAR; INTRAVENOUS at 03:29

## 2019-10-16 RX ADMIN — METOCLOPRAMIDE 10 MG: 5 INJECTION, SOLUTION INTRAMUSCULAR; INTRAVENOUS at 19:32

## 2019-10-16 RX ADMIN — METOCLOPRAMIDE 10 MG: 5 INJECTION, SOLUTION INTRAMUSCULAR; INTRAVENOUS at 06:16

## 2019-10-16 NOTE — PLAN OF CARE
Problem: PAIN - ADULT  Goal: Verbalizes/displays adequate comfort level or baseline comfort level  Description  Interventions:  - Encourage patient to monitor pain and request assistance  - Assess pain using appropriate pain scale 0-10   - Administer analgesics based on type and severity of pain and evaluate response  - Implement non-pharmacological measures as appropriate and evaluate response  - Consider cultural and social influences on pain and pain management  - Notify physician/advanced practitioner if interventions unsuccessful or patient reports new pain   Outcome: Progressing     Problem: INFECTION - ADULT  Goal: Absence or prevention of progression during hospitalization  Description  INTERVENTIONS:  - Assess and monitor for signs and symptoms of infection  - Monitor lab/diagnostic results  - Monitor all insertion sites, i e  indwelling lines  - [place] appropriate cooling/warming therapies per order  - Administer medications as ordered  - Instruct and encourage patient and family to use good hand hygiene technique   Outcome: Progressing  Goal: Absence of fever/infection during neutropenic period  Description  INTERVENTIONS:  - Monitor WBC    Outcome: Progressing     Problem: SAFETY ADULT  Goal: Patient will remain free of falls  Description  INTERVENTIONS:  - Assess patient frequently for physical needs  -  Identify cognitive and physical deficits and behaviors that affect risk of falls    -  [place] fall precautions as indicated by assessment   - Educate patient/family on patient safety including physical limitations  - Instruct patient to call for assistance with activity based on assessment  - Modify environment to reduce risk of injury   Outcome: Progressing  Goal: Maintain or return to baseline ADL function  Description  INTERVENTIONS:  -  Assess patient's ability to carry out ADLs; assess patient's baseline for ADL function and identify physical deficits which impact ability to perform ADLs (bathing, care of mouth/teeth, toileting, grooming, dressing, etc )  - Assess/evaluate cause of self-care deficits   - Assess range of motion  - Assess patient's mobility; develop plan if impaired  - Assess patient's need for assistive devices and provide as appropriate  - Encourage maximum independence but intervene and supervise when necessary  - Involve family in performance of ADLs  - Assess for home care needs following discharge   - Provide patient education as appropriate   Outcome: Progressing  Goal: Maintain or return mobility status to optimal level  Description  INTERVENTIONS:  - Assess patient's baseline mobility status (ambulation, transfers, stairs, etc )    - Identify cognitive and physical deficits and behaviors that affect mobility  - [place] fall precautions as indicated by assessment  - Record patient progress and toleration of activity level on Mobility SBAR; progress patient to next Phase/Stage  - Instruct patient to call for assistance with activity based on assessment   Outcome: Progressing     Problem: DISCHARGE PLANNING  Goal: Discharge to home or other facility with appropriate resources  Description  INTERVENTIONS:  - Identify barriers to discharge w/patient and caregiver  - Arrange for needed discharge resources and transportation as appropriate  - Identify discharge learning needs (meds, wound care, etc )  - Refer to Case Management Department for coordinating discharge planning if the patient needs post-hospital services based on physician/advanced practitioner order or complex needs related to functional status, cognitive ability, or social support system   Outcome: Progressing     Problem: Knowledge Deficit  Goal: Patient/family/caregiver demonstrates understanding of disease process, treatment plan, medications, and discharge instructions  Description  Complete learning assessment and assess knowledge base    Interventions:  - Provide teaching at level of understanding  - Provide teaching via preferred learning methods  Outcome: Progressing     Problem: POSTPARTUM  Goal: Experiences normal postpartum course  Description  INTERVENTIONS:  - Monitor maternal vital signs   Outcome: Progressing  Goal: Appropriate maternal -  bonding  Description  INTERVENTIONS:  - Identify family support  - Assess for appropriate maternal/infant bonding   -Encourage maternal/infant bonding opportunities  - Referral to  or  as needed  Outcome: Progressing  Goal: Establishment of infant feeding pattern  Description  INTERVENTIONS:  - Assess breast/bottle feeding  - Refer to lactation as needed  Outcome: Progressing  Goal: Incision(s), wounds(s) or drain site(s) healing without S/S of infection  Description  INTERVENTIONS  - Assess and document risk factors for skin impairment   - Assess and document dressing, incision and surrounding tissue  - Consider nutrition services referral as needed  - Oral mucous membranes remain intact  - Provide patient/ family education   Outcome: Progressing     Problem: DISCHARGE PLANNING - CARE MANAGEMENT  Goal: Discharge to post-acute care or home with appropriate resources  Description  INTERVENTIONS:  - Conduct assessment to determine patient/family and health care team treatment goals, and need for post-acute services based on payer coverage, community resources, and patient preferences, and barriers to discharge  - Address psychosocial, clinical, and financial barriers to discharge as identified in assessment in conjunction with the patient/family and health care team  - Arrange appropriate level of post-acute services according to patients   needs and preference and payer coverage in collaboration with the physician and health care team  - Communicate with and update the patient/family, physician, and health care team regarding progress on the discharge plan  - Arrange appropriate transportation to post-acute venues  Outcome: Progressing

## 2019-10-16 NOTE — SOCIAL WORK
Consult(s): Breast pump  Also seen for baby in NICU   [de-identified] name/gender: Layla, girl   Delivery method/date: c/s on 10/14   Gestational Age: 35 weeks   NICU/Nursery: NICU    CM met with pt and FOB to introduce CM services, complete assessment, and provide CM contact info      Pt and FOB reported the following:      Pt/Mother of baby: Yobani Carreno cell [phone]   Father of baby:  John Rosales cell [phone]   Emaline Folds Other Legal Guardian(s) for baby: no   Alternate emergency contact: her sister Kenney Sharma cell [phone]   Other children: 2nd child together as they have 10year old son; pt has 6year old son, 8 yr old daughter (with low functioning autism), and 5 yr old son from previous relationships; 6 and 5 yr old are in [place] with her mom; her sister is watching the other 2   Lives with: her kids and brother in law; FOB lives in another residence in [facility] (they did not elaborate why)   Support System: family, friends   Baby Supplies: have all supplies   Bottle or Breast Feeding: bottle   Breast Pump if breast feeding: declined pump   [employer] Assistance Programs/WIC/EBT/SSI: yes has WIC and EBT   : pt cares for the kids at home   Work/School: [employer] works full time, has some time off now  [employer]: they can drive but do not have their own car so they borrow family members' cars   Pediatrician: Tommy Cuellar Hx or Treatment: denies   Substance Abuse: denies   Legal Issues: denies   Community Referrals, C&Y, VNA: denies   Insurance for baby: will add to [employer]   POA/LW: denies     CM reviewed d/c planning process including the following: identifying help at home, patient preference for d/c planning needs, Discharge [facility] Meds to Bed program, availability of treatment team to discuss questions or concerns patient and/or family may have regarding understanding medications and recognizing signs and symptoms once discharged  CM also encouraged patient to follow up with all recommended appointments after discharge  Patient advised of importance for patient and family to participate in managing patients medical well being  Pt and FOB deny any other CM needs at this time  Encouraged family to contact CM as needed  No other CM needs noted for d/c home when medically cleared  Will follow baby in NICU

## 2019-10-16 NOTE — PROGRESS NOTES
Patient reports moderate abdominal pain  I evaluated patient at bedside, she continues to report burping, but denies passage of gas  Exam notable for diffuse abdominal distension and absent bowel sounds consistent with ileus  No rebound/guarding  Labs WNL  I discussed conservative management of postoperative ileus and encouraged ambulation  She has limited PO intake of clears, reports nausea, no episodes of emesis since this morning  We reviewed that if she fails conservative management the next step would be an NGT  Will give Toradol x 1 for pain now, use narcotics sparingly, continue simethicone, chew gum, hot tea, and clear liquid diet  Will obtain XR abdominal series  Encourage ambulation       Dr Ranjeet Corea aware

## 2019-10-16 NOTE — PLAN OF CARE
Problem: PAIN - ADULT  Goal: Verbalizes/displays adequate comfort level or baseline comfort level  Description  Interventions:  - Encourage patient to monitor pain and request assistance  - Assess pain using appropriate pain scale 0-10   - Administer analgesics based on type and severity of pain and evaluate response  - Implement non-pharmacological measures as appropriate and evaluate response  - Consider cultural and social influences on pain and pain management  - Notify physician/advanced practitioner if interventions unsuccessful or patient reports new pain   Outcome: Progressing     Problem: INFECTION - ADULT  Goal: Absence or prevention of progression during hospitalization  Description  INTERVENTIONS:  - Assess and monitor for signs and symptoms of infection  - Monitor lab/diagnostic results  - Monitor all insertion sites, i e  indwelling lines  - [place] appropriate cooling/warming therapies per order  - Administer medications as ordered  - Instruct and encourage patient and family to use good hand hygiene technique   Outcome: Progressing  Goal: Absence of fever/infection during neutropenic period  Description  INTERVENTIONS:  - Monitor WBC    Outcome: Progressing     Problem: SAFETY ADULT  Goal: Patient will remain free of falls  Description  INTERVENTIONS:  - Assess patient frequently for physical needs  -  Identify cognitive and physical deficits and behaviors that affect risk of falls    -  [place] fall precautions as indicated by assessment   - Educate patient/family on patient safety including physical limitations  - Instruct patient to call for assistance with activity based on assessment  - Modify environment to reduce risk of injury   Outcome: Progressing  Goal: Maintain or return to baseline ADL function  Description  INTERVENTIONS:  -  Assess patient's ability to carry out ADLs; assess patient's baseline for ADL function and identify physical deficits which impact ability to perform ADLs (bathing, care of mouth/teeth, toileting, grooming, dressing, etc )  - Assess/evaluate cause of self-care deficits   - Assess range of motion  - Assess patient's mobility; develop plan if impaired  - Assess patient's need for assistive devices and provide as appropriate  - Encourage maximum independence but intervene and supervise when necessary  - Involve family in performance of ADLs  - Assess for home care needs following discharge   - Provide patient education as appropriate   Outcome: Progressing  Goal: Maintain or return mobility status to optimal level  Description  INTERVENTIONS:  - Assess patient's baseline mobility status (ambulation, transfers, stairs, etc )    - Identify cognitive and physical deficits and behaviors that affect mobility  - [place] fall precautions as indicated by assessment  - Record patient progress and toleration of activity level on Mobility SBAR; progress patient to next Phase/Stage  - Instruct patient to call for assistance with activity based on assessment   Outcome: Progressing     Problem: DISCHARGE PLANNING  Goal: Discharge to home or other facility with appropriate resources  Description  INTERVENTIONS:  - Identify barriers to discharge w/patient and caregiver  - Arrange for needed discharge resources and transportation as appropriate  - Identify discharge learning needs (meds, wound care, etc )  - Refer to Case Management Department for coordinating discharge planning if the patient needs post-hospital services based on physician/advanced practitioner order or complex needs related to functional status, cognitive ability, or social support system   Outcome: Progressing     Problem: Knowledge Deficit  Goal: Patient/family/caregiver demonstrates understanding of disease process, treatment plan, medications, and discharge instructions  Description  Complete learning assessment and assess knowledge base    Interventions:  - Provide teaching at level of understanding  - Provide teaching via preferred learning methods  Outcome: Progressing     Problem: POSTPARTUM  Goal: Experiences normal postpartum course  Description  INTERVENTIONS:  - Monitor maternal vital signs   Outcome: Progressing  Goal: Appropriate maternal -  bonding  Description  INTERVENTIONS:  - Identify family support  - Assess for appropriate maternal/infant bonding   -Encourage maternal/infant bonding opportunities  - Referral to  or  as needed  Outcome: Progressing  Goal: Establishment of infant feeding pattern  Description  INTERVENTIONS:  - Assess breast/bottle feeding  - Refer to lactation as needed  Outcome: Progressing  Goal: Incision(s), wounds(s) or drain site(s) healing without S/S of infection  Description  INTERVENTIONS  - Assess and document risk factors for skin impairment   - Assess and document dressing, incision and surrounding tissue  - Consider nutrition services referral as needed  - Oral mucous membranes remain intact  - Provide patient/ family education   Outcome: Progressing     Problem: DISCHARGE PLANNING - CARE MANAGEMENT  Goal: Discharge to post-acute care or home with appropriate resources  Description  INTERVENTIONS:  - Conduct assessment to determine patient/family and health care team treatment goals, and need for post-acute services based on payer coverage, community resources, and patient preferences, and barriers to discharge  - Address psychosocial, clinical, and financial barriers to discharge as identified in assessment in conjunction with the patient/family and health care team  - Arrange appropriate level of post-acute services according to patients   needs and preference and payer coverage in collaboration with the physician and health care team  - Communicate with and update the patient/family, physician, and health care team regarding progress on the discharge plan  - Arrange appropriate transportation to post-acute venues  Outcome: Progressing

## 2019-10-16 NOTE — PROGRESS NOTES
Progress Note - OB/GYN   Samina Carson 26 y o  female MRN: [id number]  Unit/Bed#: -01 Encounter: [id number]    Assessment:  31yo G9 now  s/p  hysterectomy for suspected placenta percreta, POD#2    Plan:   hysterectomy:  - POD#2  - Preop Hb 10 3 -> intraop Hb 8's -> PACU 10 4 -> POD#1 9 0  Plan to repeat today  - Cont routine postoperative care  - Transitioned to PO analgesics yesterday  - Passed voiding trial  - Encourage ambulation as tolerated  - Encourage increased PO water intake as tolerated  - Encourage incentive spirometry  - Baby in NICU  - Lovenox for DVT PPx    Concern for postoperative ileus:  - Reglan ATC  - If develops emesis, can consider NGT  - Will repeat labs and consider imaging    FEN:  - Clear liquid diet as tolerated  - Plan to provide IV fluid bolus    DVT Ppx:  - SCDs bilaterally  - Encourage ambulation  - Lovenox      Subjective/Objective   Chief Complaint: I'm not doing great    Subjective: Pt reports feeling unwell today  Was up for much of the night with nausea  Ambulating  Tolerating PO but has decreased appetite  Voiding without difficulty  No BM yet, no flatus yet; belching  Denies HA, CP, SOB, extremity pain  Objective:   Vitals: Blood pressure 126/75, pulse 101, temperature (!) 97 4 °F (36 3 °C), temperature source Oral, resp  rate 16, height 5' 6" (1 676 m), weight 96 2 kg (212 lb), last menstrual period 2019, SpO2 97 %, currently breastfeeding  ,Body mass index is 34 22 kg/m²  Intake/Output Summary (Last 24 hours) at 10/16/2019 0647  Last data filed at 10/16/2019 0350  Gross per 24 hour   Intake 1256 25 ml   Output 1450 ml   Net -193 75 ml       Invasive Devices     Peripheral Intravenous Line            Peripheral IV 10/14/19 Left Wrist 1 day    Peripheral IV 10/14/19 Right Forearm 1 day                Physical Exam:   Gen: AaOx3, NAD, appears less cheerful than usual, pallor from yesterday appears improved     Card: RRR, no murmurs, rubs, or gallops  Pulm: CTAB, no wheezes, rales, or rhonchi  Good inspiratory effort  Abd: Soft, appropriately tender, moderately distended  BS are decreased at this time  Pt declines to have incisional bandage removed; bandage appears clean dry and intact  Extremities: No edema, nontender, Negative Cassandra's bilaterally      Lab, Imaging and other studies: I have personally reviewed pertinent reports              Chet Garay DO  OB/GYN, PGY4  10/16/2019, 2:23 PM

## 2019-10-17 ENCOUNTER — HOSPITAL ENCOUNTER (OUTPATIENT)
Dept: INFUSION CENTER | Facility: HOSPITAL | Age: 29
Discharge: HOME | End: 2019-10-17
Attending: INTERNAL MEDICINE

## 2019-10-17 ENCOUNTER — APPOINTMENT (INPATIENT)
Dept: RADIOLOGY | Facility: HOSPITAL | Age: 29
DRG: 540 | End: 2019-10-17
Payer: COMMERCIAL

## 2019-10-17 PROCEDURE — 93005 ELECTROCARDIOGRAM TRACING: CPT

## 2019-10-17 PROCEDURE — 74022 RADEX COMPL AQT ABD SERIES: CPT

## 2019-10-17 PROCEDURE — 71275 CT ANGIOGRAPHY CHEST: CPT

## 2019-10-17 PROCEDURE — 99024 POSTOP FOLLOW-UP VISIT: CPT | Performed by: OBSTETRICS & GYNECOLOGY

## 2019-10-17 RX ORDER — KETOROLAC TROMETHAMINE 30 MG/ML
30 INJECTION, SOLUTION INTRAMUSCULAR; INTRAVENOUS ONCE
Status: COMPLETED | OUTPATIENT
Start: 2019-10-17 | End: 2019-10-17

## 2019-10-17 RX ORDER — IBUPROFEN 600 MG/1
600 TABLET ORAL EVERY 6 HOURS PRN
Status: DISCONTINUED | OUTPATIENT
Start: 2019-10-17 | End: 2019-10-19 | Stop reason: HOSPADM

## 2019-10-17 RX ORDER — METOCLOPRAMIDE HYDROCHLORIDE 5 MG/ML
10 INJECTION INTRAMUSCULAR; INTRAVENOUS EVERY 6 HOURS PRN
Status: DISCONTINUED | OUTPATIENT
Start: 2019-10-17 | End: 2019-10-19 | Stop reason: HOSPADM

## 2019-10-17 RX ADMIN — METOCLOPRAMIDE 10 MG: 5 INJECTION, SOLUTION INTRAMUSCULAR; INTRAVENOUS at 19:27

## 2019-10-17 RX ADMIN — ACETAMINOPHEN 975 MG: 325 TABLET ORAL at 19:35

## 2019-10-17 RX ADMIN — IOHEXOL 85 ML: 350 INJECTION, SOLUTION INTRAVENOUS at 20:21

## 2019-10-17 RX ADMIN — KETOROLAC TROMETHAMINE 30 MG: 30 INJECTION, SOLUTION INTRAMUSCULAR at 19:34

## 2019-10-17 RX ADMIN — ENOXAPARIN SODIUM 40 MG: 40 INJECTION SUBCUTANEOUS at 08:51

## 2019-10-17 RX ADMIN — KETOROLAC TROMETHAMINE 30 MG: 30 INJECTION, SOLUTION INTRAMUSCULAR at 12:28

## 2019-10-17 RX ADMIN — ONDANSETRON 4 MG: 2 INJECTION INTRAMUSCULAR; INTRAVENOUS at 09:51

## 2019-10-17 RX ADMIN — SODIUM CHLORIDE, SODIUM LACTATE, POTASSIUM CHLORIDE, AND CALCIUM CHLORIDE 125 ML/HR: .6; .31; .03; .02 INJECTION, SOLUTION INTRAVENOUS at 15:54

## 2019-10-17 RX ADMIN — ONDANSETRON 4 MG: 2 INJECTION INTRAMUSCULAR; INTRAVENOUS at 18:16

## 2019-10-17 RX ADMIN — ACETAMINOPHEN 975 MG: 325 TABLET ORAL at 14:18

## 2019-10-17 RX ADMIN — METOCLOPRAMIDE 10 MG: 5 INJECTION, SOLUTION INTRAMUSCULAR; INTRAVENOUS at 14:18

## 2019-10-17 RX ADMIN — ACETAMINOPHEN 975 MG: 325 TABLET ORAL at 02:17

## 2019-10-17 RX ADMIN — SODIUM CHLORIDE, SODIUM LACTATE, POTASSIUM CHLORIDE, AND CALCIUM CHLORIDE 500 ML: .6; .31; .03; .02 INJECTION, SOLUTION INTRAVENOUS at 16:48

## 2019-10-17 RX ADMIN — ACETAMINOPHEN 975 MG: 325 TABLET ORAL at 07:48

## 2019-10-17 RX ADMIN — METOCLOPRAMIDE 10 MG: 5 INJECTION, SOLUTION INTRAMUSCULAR; INTRAVENOUS at 07:50

## 2019-10-17 RX ADMIN — METOCLOPRAMIDE 10 MG: 5 INJECTION, SOLUTION INTRAMUSCULAR; INTRAVENOUS at 02:00

## 2019-10-17 RX ADMIN — SODIUM CHLORIDE, SODIUM LACTATE, POTASSIUM CHLORIDE, AND CALCIUM CHLORIDE 125 ML/HR: .6; .31; .03; .02 INJECTION, SOLUTION INTRAVENOUS at 07:30

## 2019-10-17 NOTE — PROGRESS NOTES
Progress Note - OB/GYN   Miguel Collins 26 y o  female MRN: [id number]  Unit/Bed#: -01 Encounter: [id number]    Assessment:  29yo G9 now  s/p  hysterectomy for suspected placenta percreta, POD#3    Plan:   hysterectomy:  - POD#3  - Preop Hb 10 3 ->2u PRBC intraoperatively ->->-> 10 6 this AM  - Cont routine postoperative care  - Transitioned to PO analgesics  - Encourage ambulation as tolerated  - Encourage increased PO water intake as tolerated  - Encourage incentive spirometry  - Baby in NICU; encouraged pt to walk back and forth to NICU to see baby  - Cont Lovenox    Postoperative ileus:  - Cont reglan ATC  - +nausea without emesis at this time, will consider NGT if emesis develops  - Plan for x ray today  - NPO aside from meds at this time    FEN:  - NPO except sips with meds  - IV fluids    DVT Ppx:  - SCDs bilaterally  - Encourage ambulation  - Lovenox    Subjective/Objective   Chief Complaint: I'm nauseous    Subjective: Pt reporting nausea this morning  Denies emesis to me  Reports she hasn't been out of bed as much as people are encouraging her to be  Reports incisional pain and abdominal discomfort  +belching, no flatus  Voiding  Ambulating within room but no further  Denies fevers, chills, night sweats  No CP, SOB, cough, palpitations  Objective:   Vitals: Blood pressure 102/70, pulse 101, temperature 98 3 °F (36 8 °C), temperature source Oral, resp  rate 16, height 5' 6" (1 676 m), weight 96 2 kg (212 lb), last menstrual period 2019, SpO2 98 %, not currently breastfeeding  ,Body mass index is 34 22 kg/m²        Intake/Output Summary (Last 24 hours) at 10/17/2019 0758  Last data filed at 10/17/2019 0729  Gross per 24 hour   Intake 1014 58 ml   Output --   Net 1014 58 ml       Invasive Devices     Peripheral Intravenous Line            Peripheral IV 10/14/19 Left Wrist 2 days    Peripheral IV 10/14/19 Right Forearm 2 days                Physical Exam:   Gen: AaOx3, appears uncomfortable, pleasant  Abd: Soft, nontender, moderately distended but appears less distended than yesterday  Absent BS  Pt declines to allow me to remove bandage over incision; bandage over incision is clean, dry, and intact  Extremities: No edema, nontender, Negative Cassandra's bilaterally      Lab, Imaging and other studies: I have personally reviewed pertinent reports              Yandel Harris DO  OB/GYN, PGY4  10/17/2019, 8:58 AM

## 2019-10-17 NOTE — QUICK NOTE
Nursing notified me of persistent tachycardia  I presented to patient room to evaluate  Pt reports that she is in pain, and that is why she is tachycardic  Reports she does not want to go down for further imaging and is not interested in an EKG or CTA-PE study  I discussed the importance of the above tests to make sure that her heart and lungs are healthy  I discussed that she is at an elevated risk for blood clots due to recent pregnancy, recent major surgery, and decreased ambulation  Pt demonstrated understanding of her increased risks  I again encouraged her to consider undergoing her CTA-PE and EKG; she consented  Vitals:    10/17/19 1555   BP: 107/54   Pulse: (!) 119   Resp: 18   Temp: 98 3 °F (36 8 °C)   SpO2:      Gen: AaOx3, appears mildly uncomfortable, pleasant  Card: RRR, slight blowing holosystolic murmur, no rubs or gallops  Pulm: CTAB, no wheezes, rales, or rhonchi   Moves air into bases  Extremities: No edema, nontender, wearing SCDs bilaterally      Dr Nir Riddle aware      Live Lynch,   OB/GYN, PGY4  10/17/2019, 5:36 PM

## 2019-10-18 LAB
ALBUMIN SERPL BCP-MCNC: 1.8 G/DL (ref 3.5–5)
ALP SERPL-CCNC: 71 U/L (ref 46–116)
ALT SERPL W P-5'-P-CCNC: 13 U/L (ref 12–78)
ANION GAP SERPL CALCULATED.3IONS-SCNC: 5 MMOL/L (ref 4–13)
AST SERPL W P-5'-P-CCNC: 11 U/L (ref 5–45)
ATRIAL RATE: 119 BPM
BASOPHILS # BLD AUTO: 0.02 THOUSANDS/ΜL (ref 0–0.1)
BASOPHILS NFR BLD AUTO: 0 % (ref 0–1)
BILIRUB SERPL-MCNC: 0.75 MG/DL (ref 0.2–1)
BUN SERPL-MCNC: 13 MG/DL (ref 5–25)
CALCIUM SERPL-MCNC: 8.1 MG/DL (ref 8.3–10.1)
CHLORIDE SERPL-SCNC: 109 MMOL/L (ref 100–108)
CO2 SERPL-SCNC: 27 MMOL/L (ref 21–32)
CREAT SERPL-MCNC: 0.53 MG/DL (ref 0.6–1.3)
EOSINOPHIL # BLD AUTO: 0.07 THOUSAND/ΜL (ref 0–0.61)
EOSINOPHIL NFR BLD AUTO: 1 % (ref 0–6)
ERYTHROCYTE [DISTWIDTH] IN BLOOD BY AUTOMATED COUNT: 16.5 % (ref 11.6–15.1)
GFR SERPL CREATININE-BSD FRML MDRD: 128 ML/MIN/1.73SQ M
GLUCOSE SERPL-MCNC: 99 MG/DL (ref 65–140)
HCT VFR BLD AUTO: 27.7 % (ref 34.8–46.1)
HGB BLD-MCNC: 8.8 G/DL (ref 11.5–15.4)
IMM GRANULOCYTES # BLD AUTO: 0.09 THOUSAND/UL (ref 0–0.2)
IMM GRANULOCYTES NFR BLD AUTO: 1 % (ref 0–2)
LYMPHOCYTES # BLD AUTO: 1.36 THOUSANDS/ΜL (ref 0.6–4.47)
LYMPHOCYTES NFR BLD AUTO: 15 % (ref 14–44)
MCH RBC QN AUTO: 27.8 PG (ref 26.8–34.3)
MCHC RBC AUTO-ENTMCNC: 31.8 G/DL (ref 31.4–37.4)
MCV RBC AUTO: 87 FL (ref 82–98)
MONOCYTES # BLD AUTO: 1.65 THOUSAND/ΜL (ref 0.17–1.22)
MONOCYTES NFR BLD AUTO: 18 % (ref 4–12)
NEUTROPHILS # BLD AUTO: 6.22 THOUSANDS/ΜL (ref 1.85–7.62)
NEUTS SEG NFR BLD AUTO: 65 % (ref 43–75)
NRBC BLD AUTO-RTO: 0 /100 WBCS
P AXIS: 45 DEGREES
PLATELET # BLD AUTO: 281 THOUSANDS/UL (ref 149–390)
PMV BLD AUTO: 10.3 FL (ref 8.9–12.7)
POTASSIUM SERPL-SCNC: 3.6 MMOL/L (ref 3.5–5.3)
PR INTERVAL: 120 MS
PROT SERPL-MCNC: 5.5 G/DL (ref 6.4–8.2)
QRS AXIS: 66 DEGREES
QRSD INTERVAL: 74 MS
QT INTERVAL: 316 MS
QTC INTERVAL: 444 MS
RBC # BLD AUTO: 3.17 MILLION/UL (ref 3.81–5.12)
SODIUM SERPL-SCNC: 141 MMOL/L (ref 136–145)
T WAVE AXIS: 26 DEGREES
VENTRICULAR RATE: 119 BPM
WBC # BLD AUTO: 9.41 THOUSAND/UL (ref 4.31–10.16)

## 2019-10-18 PROCEDURE — 93010 ELECTROCARDIOGRAM REPORT: CPT | Performed by: INTERNAL MEDICINE

## 2019-10-18 PROCEDURE — 85025 COMPLETE CBC W/AUTO DIFF WBC: CPT | Performed by: OBSTETRICS & GYNECOLOGY

## 2019-10-18 PROCEDURE — 80053 COMPREHEN METABOLIC PANEL: CPT | Performed by: OBSTETRICS & GYNECOLOGY

## 2019-10-18 PROCEDURE — 99024 POSTOP FOLLOW-UP VISIT: CPT | Performed by: OBSTETRICS & GYNECOLOGY

## 2019-10-18 RX ADMIN — METOCLOPRAMIDE 10 MG: 5 INJECTION, SOLUTION INTRAMUSCULAR; INTRAVENOUS at 20:15

## 2019-10-18 RX ADMIN — ENOXAPARIN SODIUM 40 MG: 40 INJECTION SUBCUTANEOUS at 08:08

## 2019-10-18 RX ADMIN — ACETAMINOPHEN 975 MG: 325 TABLET ORAL at 14:30

## 2019-10-18 RX ADMIN — SODIUM CHLORIDE, SODIUM LACTATE, POTASSIUM CHLORIDE, AND CALCIUM CHLORIDE 125 ML/HR: .6; .31; .03; .02 INJECTION, SOLUTION INTRAVENOUS at 08:40

## 2019-10-18 RX ADMIN — OXYCODONE HYDROCHLORIDE 5 MG: 5 TABLET ORAL at 15:42

## 2019-10-18 RX ADMIN — ACETAMINOPHEN 975 MG: 325 TABLET ORAL at 20:17

## 2019-10-18 RX ADMIN — IBUPROFEN 600 MG: 600 TABLET, FILM COATED ORAL at 18:45

## 2019-10-18 RX ADMIN — METOCLOPRAMIDE 10 MG: 5 INJECTION, SOLUTION INTRAMUSCULAR; INTRAVENOUS at 08:11

## 2019-10-18 RX ADMIN — ACETAMINOPHEN 975 MG: 325 TABLET ORAL at 08:04

## 2019-10-18 RX ADMIN — ONDANSETRON 4 MG: 2 INJECTION INTRAMUSCULAR; INTRAVENOUS at 15:42

## 2019-10-18 NOTE — PROGRESS NOTES
Patient states she passed flatus yesterday and this morning but now is vomiting, at least 2x last night even with sips of water  Abdomen is still distended but seems less than yesterday  Incision is clean, dry and intact  /61 (BP Location: Right arm)   Pulse 103   Temp 98 9 °F (37 2 °C) (Oral)   Resp 18   Ht 5' 6" (1 676 m)   Wt 96 2 kg (212 lb)   LMP 03/23/2019 (Within Days)   SpO2 96%   Breastfeeding? No   BMI 34 22 kg/m²   Discussed with patient diagnosis of prolonged post-op ileus  Will place pt on NPO once again, check labs and will order further imaging   Patient declines NG tube

## 2019-10-18 NOTE — PROGRESS NOTES
Gyn Oncology Progress note   Linus Downing 26 y o  female MRN: [id number]  Unit/Bed#: -01 Encounter: [id number]    A/P: Linus Downing is a 26 y o  female POD#4 s/p  hysterectomy for placenta percreta complicated by prolonged post-op ileus, currently w/o nausea but had episodes of vomiting last night, currently tolerating sips with meds and passing gas frequently (no BM)    1  S/p  hysterectomy for abnormal placentation   - Pre-op Hgb 10 3 --> 2u PRBC intraop --> 10 6 --> 8 8, no leukocytosis   - CTA negative for PE yday, EKG sinus tachy   - F/u final path  2  Post-op ileus   - XR obstruction series - post-op small bowel ileus, no intraperitoneal air   - CT abd pelvis w contrast - d/c'd   - Pt reports passing gas, no BM   -  N&V w sips, no N&V since last night, abd is distended and appropriately tender, incision midline is c/d/i   - Advance diet slowly to liquid diet today   - Encouraged ambulation   - Zofran, Reglan, milk of mag, senna PRN, shalom colace  3  DVT ppx   - SCDs, Lovenox 40mg q24h  4  Dispo   - Inpatient until post-op bowel function returns    No acute events overnight  Pain is well controlled  Pt is tolerating PO  Pt is passing flatus  Pt is ambulating  Denies fevers/chills  Review of Systems   Constitutional: Negative for chills and fever  Respiratory: Negative for chest tightness, shortness of breath and wheezing  Cardiovascular: Negative for chest pain and palpitations  Gastrointestinal: Positive for abdominal distention  Negative for abdominal pain, constipation, diarrhea, nausea and vomiting  Genitourinary: Negative for flank pain, frequency, vaginal bleeding and vaginal pain  Musculoskeletal: Negative for back pain  Neurological: Negative for seizures, weakness, light-headedness and headaches  Psychiatric/Behavioral: Negative for agitation and confusion         /61 (BP Location: Right arm)   Pulse 103   Temp 98 9 °F (37 2 °C) (Oral)   Resp 18   Ht 5' 6" (1 676 m)   Wt 96 2 kg (212 lb)   LMP 03/23/2019 (Within Days)   SpO2 96%   Breastfeeding? No   BMI 34 22 kg/m²     I/O last 3 completed shifts: In: 1014 6 [I V :1014 6]  Out: -   No intake/output data recorded  Lab Results   Component Value Date    WBC 14 67 (H) 10/16/2019    HGB 10 6 (L) 10/16/2019    HCT 32 7 (L) 10/16/2019    MCV 86 10/16/2019     10/16/2019       Lab Results   Component Value Date    GLUCOSE 159 (H) 10/14/2019    CALCIUM 9 0 10/16/2019    K 3 7 10/16/2019    CO2 25 10/16/2019     10/16/2019    BUN 8 10/16/2019    CREATININE 0 61 10/16/2019       Lab Results   Component Value Date/Time    POCGLU 134 10/14/2019 04:16 PM    POCGLU 97 10/14/2019 10:08 AM    POCGLU 96 10/14/2019 07:47 AM    POCGLU 109 10/13/2019 10:12 PM    POCGLU 102 10/13/2019 03:07 PM       Physical Exam   Constitutional: She is oriented to person, place, and time  She appears well-developed and well-nourished  No distress  HENT:   Head: Normocephalic and atraumatic  Right Ear: External ear normal    Left Ear: External ear normal    Eyes: Pupils are equal, round, and reactive to light  Conjunctivae and EOM are normal    Cardiovascular: Regular rhythm and normal heart sounds  Pulmonary/Chest: Effort normal  No stridor  No respiratory distress  She has no wheezes  Abdominal: Soft  She exhibits distension  She exhibits no mass  There is tenderness  There is no rebound and no guarding  Vertical midline incision c/d/i, percussion tympanic in all 4 quadrants   Neurological: She is alert and oriented to person, place, and time  Skin: Skin is warm and dry  Capillary refill takes less than 2 seconds  She is not diaphoretic  Psychiatric: She has a normal mood and affect  Her behavior is normal  Judgment and thought content normal    Vitals reviewed        Mercedes Nieto DO  PGY-3 GYN/ONC  10/18/2019 6:48 AM

## 2019-10-18 NOTE — UTILIZATION REVIEW
Continued Stay Review    Date: 10-18-19                          Current Patient Class: inpatient*  Current Level of Care: med/gissel    HPI:29 y o  female initially admitted on   10-11-18    Assessment/Plan:   A/P: Samina Carson is a 26 y o  female POD#4 s/p  hysterectomy for placenta percreta complicated by prolonged post-op ileus, currently w/o nausea but had episodes of vomiting last night, currently tolerating sips with meds and passing gas frequently (no BM)     1  S/p  hysterectomy for abnormal placentation              - Pre-op Hgb 10 3 --> 2u PRBC intraop --> 10 6 --> 8 8, no leukocytosis              - CTA negative for PE yday, EKG sinus tachy              - F/u final path  2  Post-op ileus              - XR obstruction series - post-op small bowel ileus, no intraperitoneal air              - CT abd pelvis w contrast - d/c'd              - Pt reports passing gas, no BM              - Yday N&V w sips, no N&V since last night, abd is distended and appropriately tender, incision midline is c/d/i              - Advance diet slowly to liquid diet today              - Encouraged ambulation              - Zofran, Reglan, milk of mag, senna PRN, shalom colace  3  DVT ppx              - SCDs, Lovenox 40mg q24h    Pertinent Labs/Diagnostic Results:   Results from last 7 days   Lab Units 10/18/19  0746 10/16/19  1705 10/15/19  0811 10/14/19  1614  10/14/19  1414   WBC Thousand/uL 9 41 14 67* 14 82* 21 28*  --   --    HEMOGLOBIN g/dL 8 8* 10 6* 9 0* 10 4*  --   --    I STAT HEMOGLOBIN g/dl  --   --   --   --   --  8 8*   HEMATOCRIT % 27 7* 32 7* 28 4* 32 8*  --   --    HEMATOCRIT, ISTAT %  --   --   --   --   --  26*   PLATELETS Thousands/uL 281 298 209 246  --   --    NEUTROS ABS Thousands/µL 6 22 9 85* 9 60* 18 06*   < >  --     < > = values in this interval not displayed           Results from last 7 days   Lab Units 10/18/19  0746 10/16/19  1705 10/15/19  0811 10/14/19  1614 10/14/19  1414   SODIUM mmol/L 141 141 140 140  --    POTASSIUM mmol/L 3 6 3 7 3 9 3 9  --    CHLORIDE mmol/L 109* 106 107 111*  --    CO2 mmol/L 27 25 25 20*  --    CO2, I-STAT mmol/L  --   --   --   --  22   ANION GAP mmol/L 5 10 8 9  --    BUN mg/dL 13 8 4* 6  --    CREATININE mg/dL 0 53* 0 61 0 46* 0 57*  --    EGFR ml/min/1 73sq m 128 122 134 125  --    CALCIUM mg/dL 8 1* 9 0 8 5 7 8*  --    CALCIUM, IONIZED, ISTAT mmol/L  --   --   --   --  1 07*     Results from last 7 days   Lab Units 10/18/19  0746 10/16/19  1705 10/15/19  0811 10/14/19  1614   AST U/L 11 13 17 11   ALT U/L 13 12 12 11*   ALK PHOS U/L 71 82 73 76   TOTAL PROTEIN g/dL 5 5* 6 2* 5 2* 5 4*   ALBUMIN g/dL 1 8* 2 5* 2 2* 2 5*   TOTAL BILIRUBIN mg/dL 0 75 0 75 0 61 0 75     Results from last 7 days   Lab Units 10/14/19  1616 10/14/19  1008 10/14/19  0747 10/13/19  2212 10/13/19  1507 10/13/19  0758 10/12/19  2118 10/12/19  1442 10/12/19  1034 10/12/19  0824   POC GLUCOSE mg/dl 134 97 96 109 102 80 101 110 91 89     Results from last 7 days   Lab Units 10/18/19  0746 10/16/19  1705 10/15/19  0811 10/14/19  1614   GLUCOSE RANDOM mg/dL 99 95 90 141*       Results from last 7 days   Lab Units 10/14/19  1414   I STAT BASE EXC mmol/L -4*   I STAT O2 SAT % 99*   ISTAT PH ART  7 349*   I STAT ART PCO2 mm HG 38 1   I STAT ART PO2 mm  0*   I STAT ART HCO3 mmol/L 21 0*       Results from last 7 days   Lab Units 10/15/19  0811 10/14/19  1614   PROTIME seconds 13 5 14 2   INR  1 07 1 14   PTT seconds 31 27     Vital Signs:  10/18/19 1542  99 1 °F (37 3 °C)  112Abnormal   18  122/63  98 %  --  --  --   10/18/19 1207  98 7 °F (37 1 °C)  113Abnormal   20  110/73  97 %  None (Room air)  --  Lying   10/18/19 0735  100 °F (37 8 °C)  126Abnormal   20  106/60  95 %  --             Medications:     Medications:  acetaminophen 975 mg Oral Q6H JERARDO   docusate sodium 100 mg Oral BID   enoxaparin 40 mg Subcutaneous Q24H   ferrous sulfate 325 mg Oral Daily With Breakfast   iohexol 50 mL Oral 90 min pre-procedure   metoclopramide 10 mg Intravenous Q6H [facility]   prenatal multivitamin 1 tablet Oral Daily       lactated ringers 125 mL/hr Intravenous Continuous       calcium carbonate 1,000 mg Oral Daily PRN   diphenhydrAMINE 25 mg Intravenous Q6H PRN   ibuprofen 600 mg Oral Q6H PRN   magnesium hydroxide 30 mL Oral Daily PRN   metoclopramide 10 mg Intravenous Q6H PRN   ondansetron 4 mg Intravenous Q6H PRN   oxyCODONE 10 mg Oral Q4H PRN   oxyCODONE 5 mg Oral Q4H PRN   senna 1 tablet Oral HS PRN   simethicone 80 mg Oral 4x Daily PRN       Discharge Plan:   home    Network Utilization Review Department  Phone: [phone]; Fax [phone]  [email or web address]  ATTENTION: Please call with any questions or concerns to [phone]  and carefully listen to the prompts so that you are directed to the right person  Send all requests for admission clinical reviews, approved or denied determinations and any other requests to fax [phone]   All voicemails are confidential

## 2019-10-18 NOTE — QUICK NOTE
Family Medicine Resident Quick Note  10/18/19  3:04 PM    Patient evaluated this afternoon  She reports she has been up and walking around her room and back and forth to the bathroom  She has been tolerating sips of clear liquids without nausea or vomiting this afternoon  Was concerned about getting up and walking in the hallway after an episode in the room when she thought she was just passing gas, had some incontinence of watery stool, reports two episodes of this  No formed bowel movements  Continued cramping abdominal pain but does feel this is slightly improved compared to earlier       Encouraged continued ambulation, will continue to monitor I&Os and abdominal exam      Vitals:    10/18/19 1207   BP: 110/73   Pulse: (!) 113   Resp: 20   Temp: 98 7 °F (37 1 °C)   SpO2: 97%     Gen: Alert and oriented, appears mildly uncomfortably, resting comfortably   Cardiac: RRR, no murmur  Pulm: CTA bilaterally, no wheezes  Abd: distension present, surgical dressing in place clean/dry/intact, diffuse tenderness to palpation, no rebound or rigidity     [place], DO

## 2019-10-18 NOTE — PLAN OF CARE
Problem: PAIN - ADULT  Goal: Verbalizes/displays adequate comfort level or baseline comfort level  Description  Interventions:  - Encourage patient to monitor pain and request assistance  - Assess pain using appropriate pain scale 0-10   - Administer analgesics based on type and severity of pain and evaluate response  - Implement non-pharmacological measures as appropriate and evaluate response  - Consider cultural and social influences on pain and pain management  - Notify physician/advanced practitioner if interventions unsuccessful or patient reports new pain   Outcome: Progressing     Problem: INFECTION - ADULT  Goal: Absence or prevention of progression during hospitalization  Description  INTERVENTIONS:  - Assess and monitor for signs and symptoms of infection  - Monitor lab/diagnostic results  - Monitor all insertion sites, i e  indwelling lines  - [place] appropriate cooling/warming therapies per order  - Administer medications as ordered  - Instruct and encourage patient and family to use good hand hygiene technique   Outcome: Progressing  Goal: Absence of fever/infection during neutropenic period  Description  INTERVENTIONS:  - Monitor WBC    Outcome: Progressing     Problem: SAFETY ADULT  Goal: Patient will remain free of falls  Description  INTERVENTIONS:  - Assess patient frequently for physical needs  -  Identify cognitive and physical deficits and behaviors that affect risk of falls    -  [place] fall precautions as indicated by assessment   - Educate patient/family on patient safety including physical limitations  - Instruct patient to call for assistance with activity based on assessment  - Modify environment to reduce risk of injury   Outcome: Progressing  Goal: Maintain or return to baseline ADL function  Description  INTERVENTIONS:  -  Assess patient's ability to carry out ADLs; assess patient's baseline for ADL function and identify physical deficits which impact ability to perform ADLs (bathing, care of mouth/teeth, toileting, grooming, dressing, etc )  - Assess/evaluate cause of self-care deficits   - Assess range of motion  - Assess patient's mobility; develop plan if impaired  - Assess patient's need for assistive devices and provide as appropriate  - Encourage maximum independence but intervene and supervise when necessary  - Involve family in performance of ADLs  - Assess for home care needs following discharge   - Provide patient education as appropriate   Outcome: Progressing  Goal: Maintain or return mobility status to optimal level  Description  INTERVENTIONS:  - Assess patient's baseline mobility status (ambulation, transfers, stairs, etc )    - Identify cognitive and physical deficits and behaviors that affect mobility  - [place] fall precautions as indicated by assessment  - Record patient progress and toleration of activity level on Mobility SBAR; progress patient to next Phase/Stage  - Instruct patient to call for assistance with activity based on assessment   Outcome: Progressing     Problem: DISCHARGE PLANNING  Goal: Discharge to home or other facility with appropriate resources  Description  INTERVENTIONS:  - Identify barriers to discharge w/patient and caregiver  - Arrange for needed discharge resources and transportation as appropriate  - Identify discharge learning needs (meds, wound care, etc )  - Refer to Case Management Department for coordinating discharge planning if the patient needs post-hospital services based on physician/advanced practitioner order or complex needs related to functional status, cognitive ability, or social support system   Outcome: Progressing     Problem: Knowledge Deficit  Goal: Patient/family/caregiver demonstrates understanding of disease process, treatment plan, medications, and discharge instructions  Description  Complete learning assessment and assess knowledge base    Interventions:  - Provide teaching at level of understanding  - Provide teaching via preferred learning methods  Outcome: Progressing     Problem: POSTPARTUM  Goal: Experiences normal postpartum course  Description  INTERVENTIONS:  - Monitor maternal vital signs   Outcome: Progressing  Goal: Appropriate maternal -  bonding  Description  INTERVENTIONS:  - Identify family support  - Assess for appropriate maternal/infant bonding   -Encourage maternal/infant bonding opportunities  - Referral to  or  as needed  Outcome: Progressing  Goal: Establishment of infant feeding pattern  Description  INTERVENTIONS:  - Assess breast/bottle feeding  - Refer to lactation as needed  Outcome: Progressing  Goal: Incision(s), wounds(s) or drain site(s) healing without S/S of infection  Description  INTERVENTIONS  - Assess and document risk factors for skin impairment   - Assess and document dressing, incision and surrounding tissue  - Consider nutrition services referral as needed  - Oral mucous membranes remain intact  - Provide patient/ family education   Outcome: Progressing     Problem: DISCHARGE PLANNING - CARE MANAGEMENT  Goal: Discharge to post-acute care or home with appropriate resources  Description  INTERVENTIONS:  - Conduct assessment to determine patient/family and health care team treatment goals, and need for post-acute services based on payer coverage, community resources, and patient preferences, and barriers to discharge  - Address psychosocial, clinical, and financial barriers to discharge as identified in assessment in conjunction with the patient/family and health care team  - Arrange appropriate level of post-acute services according to patients   needs and preference and payer coverage in collaboration with the physician and health care team  - Communicate with and update the patient/family, physician, and health care team regarding progress on the discharge plan  - Arrange appropriate transportation to post-acute venues  Outcome: Progressing Problem: GASTROINTESTINAL - ADULT  Goal: Minimal or absence of nausea and/or vomiting  Description  INTERVENTIONS:  - Administer IV fluids if ordered to ensure adequate hydration  - Maintain NPO status until nausea and vomiting are resolved  - Nasogastric tube if ordered  - Administer ordered antiemetic medications as needed  - Provide nonpharmacologic comfort measures as appropriate  - Advance diet as tolerated, if ordered  - Consider nutrition services referral to assist patient with adequate nutrition and appropriate food choices  Outcome: Progressing  Goal: Maintains or returns to baseline bowel function  Description  INTERVENTIONS:  - Assess bowel function  - Encourage oral fluids to ensure adequate hydration  - Administer IV fluids if ordered to ensure adequate hydration  - Administer ordered medications as needed  - Encourage mobilization and activity  - Consider nutritional services referral to assist patient with adequate nutrition and appropriate food choices  Outcome: Progressing  Goal: Maintains adequate nutritional intake  Description  INTERVENTIONS:  - Monitor percentage of each meal consumed  - Identify factors contributing to decreased intake, treat as appropriate  - Assist with meals as needed  - Monitor I&O, weight, and lab values if indicated  - Obtain nutrition services referral as needed  Outcome: Progressing     Problem: Prexisting or High Potential for Compromised Skin Integrity  Goal: Skin integrity is maintained or improved  Description  INTERVENTIONS:  - Identify patients at risk for skin breakdown  - Assess and monitor skin integrity  - Assess and monitor nutrition and hydration status  - Monitor labs   - Assess for incontinence   - Turn and reposition patient  - Assist with mobility/ambulation  - Relieve pressure over bony prominences  - Avoid friction and shearing  - Provide appropriate hygiene as needed including keeping skin clean and dry  - Evaluate need for skin moisturizer/barrier cream  - Collaborate with interdisciplinary team   - Patient/family teaching  - Consider wound care consult   Outcome: Progressing

## 2019-10-19 VITALS
WEIGHT: 212 LBS | DIASTOLIC BLOOD PRESSURE: 70 MMHG | BODY MASS INDEX: 34.07 KG/M2 | HEIGHT: 66 IN | SYSTOLIC BLOOD PRESSURE: 124 MMHG | HEART RATE: 112 BPM | OXYGEN SATURATION: 96 % | RESPIRATION RATE: 16 BRPM | TEMPERATURE: 98.3 F

## 2019-10-19 PROBLEM — Z3A.32 32 WEEKS GESTATION OF PREGNANCY: Status: ACTIVE | Noted: 2019-10-19

## 2019-10-19 PROCEDURE — 99024 POSTOP FOLLOW-UP VISIT: CPT | Performed by: STUDENT IN AN ORGANIZED HEALTH CARE EDUCATION/TRAINING PROGRAM

## 2019-10-19 RX ORDER — METOCLOPRAMIDE 10 MG/1
10 TABLET ORAL 3 TIMES DAILY PRN
Qty: 30 TABLET | Refills: 0 | Status: SHIPPED | OUTPATIENT
Start: 2019-10-19 | End: 2019-11-11 | Stop reason: ALTCHOICE

## 2019-10-19 RX ORDER — IBUPROFEN 600 MG/1
600 TABLET ORAL EVERY 6 HOURS PRN
Qty: 30 TABLET | Refills: 0 | Status: SHIPPED | OUTPATIENT
Start: 2019-10-19 | End: 2019-11-11 | Stop reason: ALTCHOICE

## 2019-10-19 RX ORDER — SENNOSIDES 8.6 MG
650 CAPSULE ORAL EVERY 8 HOURS PRN
Qty: 30 TABLET | Refills: 0 | Status: SHIPPED | OUTPATIENT
Start: 2019-10-19 | End: 2020-09-10 | Stop reason: SDUPTHER

## 2019-10-19 RX ADMIN — ENOXAPARIN SODIUM 40 MG: 40 INJECTION SUBCUTANEOUS at 09:09

## 2019-10-19 RX ADMIN — METOCLOPRAMIDE 10 MG: 5 INJECTION, SOLUTION INTRAMUSCULAR; INTRAVENOUS at 02:14

## 2019-10-19 RX ADMIN — METOCLOPRAMIDE 10 MG: 5 INJECTION, SOLUTION INTRAMUSCULAR; INTRAVENOUS at 09:21

## 2019-10-19 NOTE — PLAN OF CARE
Problem: PAIN - ADULT  Goal: Verbalizes/displays adequate comfort level or baseline comfort level  Description  Interventions:  - Encourage patient to monitor pain and request assistance  - Assess pain using appropriate pain scale 0-10   - Administer analgesics based on type and severity of pain and evaluate response  - Implement non-pharmacological measures as appropriate and evaluate response  - Consider cultural and social influences on pain and pain management  - Notify physician/advanced practitioner if interventions unsuccessful or patient reports new pain   Outcome: Progressing     Problem: INFECTION - ADULT  Goal: Absence or prevention of progression during hospitalization  Description  INTERVENTIONS:  - Assess and monitor for signs and symptoms of infection  - Monitor lab/diagnostic results  - Monitor all insertion sites, i e  indwelling lines  - [place] appropriate cooling/warming therapies per order  - Administer medications as ordered  - Instruct and encourage patient and family to use good hand hygiene technique   Outcome: Progressing  Goal: Absence of fever/infection during neutropenic period  Description  INTERVENTIONS:  - Monitor WBC    Outcome: Progressing     Problem: SAFETY ADULT  Goal: Patient will remain free of falls  Description  INTERVENTIONS:  - Assess patient frequently for physical needs  -  Identify cognitive and physical deficits and behaviors that affect risk of falls    -  [place] fall precautions as indicated by assessment   - Educate patient/family on patient safety including physical limitations  - Instruct patient to call for assistance with activity based on assessment  - Modify environment to reduce risk of injury   Outcome: Progressing  Goal: Maintain or return to baseline ADL function  Description  INTERVENTIONS:  -  Assess patient's ability to carry out ADLs; assess patient's baseline for ADL function and identify physical deficits which impact ability to perform ADLs (bathing, care of mouth/teeth, toileting, grooming, dressing, etc )  - Assess/evaluate cause of self-care deficits   - Assess range of motion  - Assess patient's mobility; develop plan if impaired  - Assess patient's need for assistive devices and provide as appropriate  - Encourage maximum independence but intervene and supervise when necessary  - Involve family in performance of ADLs  - Assess for home care needs following discharge   - Provide patient education as appropriate   Outcome: Progressing  Goal: Maintain or return mobility status to optimal level  Description  INTERVENTIONS:  - Assess patient's baseline mobility status (ambulation, transfers, stairs, etc )    - Identify cognitive and physical deficits and behaviors that affect mobility  - [place] fall precautions as indicated by assessment  - Record patient progress and toleration of activity level on Mobility SBAR; progress patient to next Phase/Stage  - Instruct patient to call for assistance with activity based on assessment   Outcome: Progressing     Problem: DISCHARGE PLANNING  Goal: Discharge to home or other facility with appropriate resources  Description  INTERVENTIONS:  - Identify barriers to discharge w/patient and caregiver  - Arrange for needed discharge resources and transportation as appropriate  - Identify discharge learning needs (meds, wound care, etc )  - Refer to Case Management Department for coordinating discharge planning if the patient needs post-hospital services based on physician/advanced practitioner order or complex needs related to functional status, cognitive ability, or social support system   Outcome: Progressing     Problem: Knowledge Deficit  Goal: Patient/family/caregiver demonstrates understanding of disease process, treatment plan, medications, and discharge instructions  Description  Complete learning assessment and assess knowledge base    Interventions:  - Provide teaching at level of understanding  - Provide teaching via preferred learning methods  Outcome: Progressing     Problem: POSTPARTUM  Goal: Experiences normal postpartum course  Description  INTERVENTIONS:  - Monitor maternal vital signs   Outcome: Progressing  Goal: Appropriate maternal -  bonding  Description  INTERVENTIONS:  - Identify family support  - Assess for appropriate maternal/infant bonding   -Encourage maternal/infant bonding opportunities  - Referral to  or  as needed  Outcome: Progressing  Goal: Establishment of infant feeding pattern  Description  INTERVENTIONS:  - Assess breast/bottle feeding  - Refer to lactation as needed  Outcome: Progressing  Goal: Incision(s), wounds(s) or drain site(s) healing without S/S of infection  Description  INTERVENTIONS  - Assess and document risk factors for skin impairment   - Assess and document dressing, incision and surrounding tissue  - Consider nutrition services referral as needed  - Oral mucous membranes remain intact  - Provide patient/ family education   Outcome: Progressing     Problem: DISCHARGE PLANNING - CARE MANAGEMENT  Goal: Discharge to post-acute care or home with appropriate resources  Description  INTERVENTIONS:  - Conduct assessment to determine patient/family and health care team treatment goals, and need for post-acute services based on payer coverage, community resources, and patient preferences, and barriers to discharge  - Address psychosocial, clinical, and financial barriers to discharge as identified in assessment in conjunction with the patient/family and health care team  - Arrange appropriate level of post-acute services according to patients   needs and preference and payer coverage in collaboration with the physician and health care team  - Communicate with and update the patient/family, physician, and health care team regarding progress on the discharge plan  - Arrange appropriate transportation to post-acute venues  Outcome: Progressing Problem: GASTROINTESTINAL - ADULT  Goal: Minimal or absence of nausea and/or vomiting  Description  INTERVENTIONS:  - Administer IV fluids if ordered to ensure adequate hydration  - Maintain NPO status until nausea and vomiting are resolved  - Nasogastric tube if ordered  - Administer ordered antiemetic medications as needed  - Provide nonpharmacologic comfort measures as appropriate  - Advance diet as tolerated, if ordered  - Consider nutrition services referral to assist patient with adequate nutrition and appropriate food choices  Outcome: Progressing  Goal: Maintains or returns to baseline bowel function  Description  INTERVENTIONS:  - Assess bowel function  - Encourage oral fluids to ensure adequate hydration  - Administer IV fluids if ordered to ensure adequate hydration  - Administer ordered medications as needed  - Encourage mobilization and activity  - Consider nutritional services referral to assist patient with adequate nutrition and appropriate food choices  Outcome: Progressing  Goal: Maintains adequate nutritional intake  Description  INTERVENTIONS:  - Monitor percentage of each meal consumed  - Identify factors contributing to decreased intake, treat as appropriate  - Assist with meals as needed  - Monitor I&O, weight, and lab values if indicated  - Obtain nutrition services referral as needed  Outcome: Progressing     Problem: Prexisting or High Potential for Compromised Skin Integrity  Goal: Skin integrity is maintained or improved  Description  INTERVENTIONS:  - Identify patients at risk for skin breakdown  - Assess and monitor skin integrity  - Assess and monitor nutrition and hydration status  - Monitor labs   - Assess for incontinence   - Turn and reposition patient  - Assist with mobility/ambulation  - Relieve pressure over bony prominences  - Avoid friction and shearing  - Provide appropriate hygiene as needed including keeping skin clean and dry  - Evaluate need for skin moisturizer/barrier cream  - Collaborate with interdisciplinary team   - Patient/family teaching  - Consider wound care consult   Outcome: Progressing     Problem: Potential for Falls  Goal: Patient will remain free of falls  Description  INTERVENTIONS:  - Assess patient frequently for physical needs  -  Identify cognitive and physical deficits and behaviors that affect risk of falls    -  [place] fall precautions as indicated by assessment   - Educate patient/family on patient safety including physical limitations  - Instruct patient to call for assistance with activity based on assessment  - Modify environment to reduce risk of injury   Outcome: Progressing

## 2019-10-19 NOTE — PROGRESS NOTES
Progress Note - OB/GYN   Mark Weiss 26 y o  female MRN: [id number]  Unit/Bed#: -01 Encounter: [id number]    Assessment:  26 y o  [id number] s/p  hysterectomy for suspected placenta percreta post-operative day 5    Plan:  1  Postop/ postpartum care   -Continue postoperative care  -Encourage ambulation  -Pain control: s/p Taps block and dilaudid PCA, PO analgesics, limit opiates if possible secondary to ileus  -Tolerating diaet   -baby in NICU  2  Postop Ileus, now resolved   - Bowel function resumed   - no nausea or vomiting    3  Tachycardia, improved   -s/p EKG (sinus tachy)   - CTA chest negative   4  Dispo: Plan for discharge today if she remains stable    Subjective/Objective   Chief Complaint:       Subjective:  Mark Weiss is doing well this morning  She denies nausea or vomiting  SHe will order breakfast this morning  She has been ambulating and states that she feels much better  Pain: Well controlled with pain medication regimen  Tolerating PO: yes  Voiding: yes  Flatus: yes  BM: no  Ambulating: yes  Breastfeeding: no  Chest pain: no  Shortness of breath: no  Leg pain: no  Lochia: Decreasing    Objective:     Vitals: Blood pressure 110/65, pulse 102, temperature 98 3 °F (36 8 °C), temperature source Oral, resp  rate 16, height 5' 6" (1 676 m), weight 96 2 kg (212 lb), last menstrual period 2019, SpO2 96 %, not currently breastfeeding      Intake/Output Summary (Last 24 hours) at 10/19/2019 0843  Last data filed at 10/19/2019 0130  Gross per 24 hour   Intake 200 ml   Output 500 ml   Net -300 ml       Physical Exam:     General: NAD  Cardiovascular: RRR, no murmur, nl S1/S2   Lungs: CTAB, non-labored breathing   Abdomen: Soft, no distension/rebound/guarding/tenderness   Fundus: Firm, non-tender, fundus: at the umbilicus   Incision: C/D/I and healing well  Lower Extremities: Non-tender  Other: none    Lab, Imaging and other studies:     Recent Results (from the past 72 hour(s)) CBC and differential    Collection Time: 10/16/19  5:05 PM   Result Value Ref Range    WBC 14 67 (H) 4 31 - 10 16 Thousand/uL    RBC 3 82 3 81 - 5 12 Million/uL    Hemoglobin 10 6 (L) 11 5 - 15 4 g/dL    Hematocrit 32 7 (L) 34 8 - 46 1 %    MCV 86 82 - 98 fL    MCH 27 7 26 8 - 34 3 pg    MCHC 32 4 31 4 - 37 4 g/dL    RDW 16 4 (H) 11 6 - 15 1 %    MPV 10 5 8 9 - 12 7 fL    Platelets 293 802 - 616 Thousands/uL    nRBC 0 /100 WBCs    Neutrophils Relative 68 43 - 75 %    Immat GRANS % 2 0 - 2 %    Lymphocytes Relative 17 14 - 44 %    Monocytes Relative 13 (H) 4 - 12 %    Eosinophils Relative 0 0 - 6 %    Basophils Relative 0 0 - 1 %    Neutrophils Absolute 9 85 (H) 1 85 - 7 62 Thousands/µL    Immature Grans Absolute 0 26 (H) 0 00 - 0 20 Thousand/uL    Lymphocytes Absolute 2 54 0 60 - 4 47 Thousands/µL    Monocytes Absolute 1 96 (H) 0 17 - 1 22 Thousand/µL    Eosinophils Absolute 0 03 0 00 - 0 61 Thousand/µL    Basophils Absolute 0 03 0 00 - 0 10 Thousands/µL   Comprehensive metabolic panel    Collection Time: 10/16/19  5:05 PM   Result Value Ref Range    Sodium 141 136 - 145 mmol/L    Potassium 3 7 3 5 - 5 3 mmol/L    Chloride 106 100 - 108 mmol/L    CO2 25 21 - 32 mmol/L    ANION GAP 10 4 - 13 mmol/L    BUN 8 5 - 25 mg/dL    Creatinine 0 61 0 60 - 1 30 mg/dL    Glucose 95 65 - 140 mg/dL    Calcium 9 0 8 3 - 10 1 mg/dL    AST 13 5 - 45 U/L    ALT 12 12 - 78 U/L    Alkaline Phosphatase 82 46 - 116 U/L    Total Protein 6 2 (L) 6 4 - 8 2 g/dL    Albumin 2 5 (L) 3 5 - 5 0 g/dL    Total Bilirubin 0 75 0 20 - 1 00 mg/dL    eGFR 122 ml/min/1 73sq m   ECG 12 lead    Collection Time: 10/17/19  4:16 PM   Result Value Ref Range    Ventricular Rate 119 BPM    Atrial Rate 119 BPM    DE Interval 120 ms    QRSD Interval 74 ms    QT Interval 316 ms    QTC Interval 444 ms    P Axis 45 degrees    QRS Axis 66 degrees    T Wave Axis 26 degrees   CBC and differential    Collection Time: 10/18/19  7:46 AM   Result Value Ref Range    WBC 9  41 4 31 - 10 16 Thousand/uL    RBC 3 17 (L) 3 81 - 5 12 Million/uL    Hemoglobin 8 8 (L) 11 5 - 15 4 g/dL    Hematocrit 27 7 (L) 34 8 - 46 1 %    MCV 87 82 - 98 fL    MCH 27 8 26 8 - 34 3 pg    MCHC 31 8 31 4 - 37 4 g/dL    RDW 16 5 (H) 11 6 - 15 1 %    MPV 10 3 8 9 - 12 7 fL    Platelets 031 339 - 879 Thousands/uL    nRBC 0 /100 WBCs    Neutrophils Relative 65 43 - 75 %    Immat GRANS % 1 0 - 2 %    Lymphocytes Relative 15 14 - 44 %    Monocytes Relative 18 (H) 4 - 12 %    Eosinophils Relative 1 0 - 6 %    Basophils Relative 0 0 - 1 %    Neutrophils Absolute 6 22 1 85 - 7 62 Thousands/µL    Immature Grans Absolute 0 09 0 00 - 0 20 Thousand/uL    Lymphocytes Absolute 1 36 0 60 - 4 47 Thousands/µL    Monocytes Absolute 1 65 (H) 0 17 - 1 22 Thousand/µL    Eosinophils Absolute 0 07 0 00 - 0 61 Thousand/µL    Basophils Absolute 0 02 0 00 - 0 10 Thousands/µL   Comprehensive metabolic panel    Collection Time: 10/18/19  7:46 AM   Result Value Ref Range    Sodium 141 136 - 145 mmol/L    Potassium 3 6 3 5 - 5 3 mmol/L    Chloride 109 (H) 100 - 108 mmol/L    CO2 27 21 - 32 mmol/L    ANION GAP 5 4 - 13 mmol/L    BUN 13 5 - 25 mg/dL    Creatinine 0 53 (L) 0 60 - 1 30 mg/dL    Glucose 99 65 - 140 mg/dL    Calcium 8 1 (L) 8 3 - 10 1 mg/dL    AST 11 5 - 45 U/L    ALT 13 12 - 78 U/L    Alkaline Phosphatase 71 46 - 116 U/L    Total Protein 5 5 (L) 6 4 - 8 2 g/dL    Albumin 1 8 (L) 3 5 - 5 0 g/dL    Total Bilirubin 0 75 0 20 - 1 00 mg/dL    eGFR 128 ml/min/1 73sq m     Meds:    acetaminophen 975 mg Oral Q6H JERARDO   docusate sodium 100 mg Oral BID   enoxaparin 40 mg Subcutaneous Q24H   ferrous sulfate 325 mg Oral Daily With Breakfast   iohexol 50 mL Oral 90 min pre-procedure   metoclopramide 10 mg Intravenous Q6H [facility]   prenatal multivitamin 1 tablet Oral Daily       calcium carbonate 1,000 mg Daily PRN   diphenhydrAMINE 25 mg Q6H PRN   ibuprofen 600 mg Q6H PRN   magnesium hydroxide 30 mL Daily PRN   metoclopramide 10 mg Q6H PRN ondansetron 4 mg Q6H PRN   oxyCODONE 10 mg Q4H PRN   oxyCODONE 5 mg Q4H PRN   senna 1 tablet HS PRN   simethicone 80 mg 4x Daily PRN             Signature / Title: Kianna Tovar MD, Ob/Gyn, PGY-4  Date: 10/19/2019  Time: 8:43 AM

## 2019-10-22 NOTE — UTILIZATION REVIEW
Continued Stay Review    Date: 10-19-19                       Current Patient Class:  inpatient  Current Level of Care: medical    HPI:29 y o  female initially admitted on  10-11-19    Assessment/Plan:   Assessment:  26 y o  [id number] s/p  hysterectomy for suspected placenta percreta post-operative day 5     Plan:  1  Postop/ postpartum care              -Continue postoperative care  -Encourage ambulation  -Pain control: s/p Taps block and dilaudid PCA, PO analgesics, limit opiates if possible secondary to ileus  -Tolerating diaet   -baby in NICU  2  Postop Ileus, now resolved              - Bowel function resumed              - no nausea or vomiting    3   Tachycardia, improved              -s/p EKG (sinus tachy)              - CTA chest negative     Pertinent Labs/Diagnostic Results:   Results from last 7 days   Lab Units 10/18/19  0746 10/16/19  1705   WBC Thousand/uL 9 41 14 67*   HEMOGLOBIN g/dL 8 8* 10 6*   HEMATOCRIT % 27 7* 32 7*   PLATELETS Thousands/uL 281 298   NEUTROS ABS Thousands/µL 6 22 9 85*         Results from last 7 days   Lab Units 10/18/19  0746 10/16/19  1705   SODIUM mmol/L 141 141   POTASSIUM mmol/L 3 6 3 7   CHLORIDE mmol/L 109* 106   CO2 mmol/L 27 25   ANION GAP mmol/L 5 10   BUN mg/dL 13 8   CREATININE mg/dL 0 53* 0 61   EGFR ml/min/1 73sq m 128 122   CALCIUM mg/dL 8 1* 9 0     Results from last 7 days   Lab Units 10/18/19  0746 10/16/19  1705   AST U/L 11 13   ALT U/L 13 12   ALK PHOS U/L 71 82   TOTAL PROTEIN g/dL 5 5* 6 2*   ALBUMIN g/dL 1 8* 2 5*   TOTAL BILIRUBIN mg/dL 0 75 0 75         Results from last 7 days   Lab Units 10/18/19  0746 10/16/19  1705   GLUCOSE RANDOM mg/dL 99 95     Vital Signs:   10/19/19 0219  98 3 °F (36 8 °C)  102  16  110/65  --  --  --  --   10/18/19 2017  --  --  --  --  --  None (Room air)  X  --   10/18/19 2009  99 4 °F (37 4 °C)  116Abnormal   20  111/61  96 %  None (Room air)  --  Lying   10/18/19 1542  99 1 °F (37 3 °C)  112Abnormal   18  122/63  98 %  --  --  --   10/18/19 1207  98 7 °F (37 1 °C)  113Abnormal   20  110/73  97 %  None (Room air)           Medications:     No current facility-administered medications for this encounter  No current facility-administered medications for this encounter  No current facility-administered medications for this encounter  Discharge Plan: home 10-20-19    Network Utilization Review Department  [email or web address]  ATTENTION: Please call with any questions or concerns to [phone] and carefully listen to the prompts so that you are directed to the right person  All voicemails are confidential   Manjula Gresham all requests for admission clinical reviews, approved or denied determinations and any other requests to dedicated fax number below belonging to the campus where the patient is receiving treatment    FACILITY NAME UR FAX NUMBER   ADMISSION DENIALS (Administrative/Medical Necessity) [address] [facility] (Maternity/NICU/Pediatrics) [phone]   [address] [facility] [phone]   Hemal Aviles [facility] [address] [facility] [phone]   Hedy Piña 2000 [facility] [address] [phone]

## 2019-11-11 ENCOUNTER — POSTPARTUM VISIT (OUTPATIENT)
Dept: OBGYN CLINIC | Facility: CLINIC | Age: 29
End: 2019-11-11
Payer: COMMERCIAL

## 2019-11-11 VITALS
HEART RATE: 84 BPM | BODY MASS INDEX: 32.95 KG/M2 | SYSTOLIC BLOOD PRESSURE: 116 MMHG | HEIGHT: 66 IN | WEIGHT: 205 LBS | DIASTOLIC BLOOD PRESSURE: 62 MMHG

## 2019-11-11 DIAGNOSIS — R23.2 HOT FLASHES: ICD-10-CM

## 2019-11-11 DIAGNOSIS — Z86.32 HISTORY OF DIET-CONTROLLED GESTATIONAL DIABETES MELLITUS: ICD-10-CM

## 2019-11-11 PROBLEM — O24.419 GDM (GESTATIONAL DIABETES MELLITUS): Status: RESOLVED | Noted: 2019-09-06 | Resolved: 2019-11-11

## 2019-11-11 PROBLEM — Z30.2 REQUEST FOR STERILIZATION: Status: RESOLVED | Noted: 2019-09-10 | Resolved: 2019-11-11

## 2019-11-11 PROBLEM — Z3A.32 32 WEEKS GESTATION OF PREGNANCY: Status: RESOLVED | Noted: 2019-10-19 | Resolved: 2019-11-11

## 2019-11-11 PROBLEM — Z3A.34 34 WEEKS GESTATION OF PREGNANCY: Status: RESOLVED | Noted: 2019-10-10 | Resolved: 2019-11-11

## 2019-11-11 PROBLEM — O99.213 OBESITY AFFECTING PREGNANCY IN THIRD TRIMESTER: Status: RESOLVED | Noted: 2019-08-06 | Resolved: 2019-11-11

## 2019-11-11 PROBLEM — O43.233: Status: RESOLVED | Noted: 2019-09-10 | Resolved: 2019-11-11

## 2019-11-11 PROBLEM — Z28.21 INFLUENZA VACCINE REFUSED: Status: RESOLVED | Noted: 2019-09-10 | Resolved: 2019-11-11

## 2019-11-11 PROBLEM — O34.219 H/O CESAREAN SECTION COMPLICATING PREGNANCY: Status: RESOLVED | Noted: 2019-05-28 | Resolved: 2019-11-11

## 2019-11-11 PROBLEM — Z87.59 HISTORY OF IUFD: Status: RESOLVED | Noted: 2019-08-06 | Resolved: 2019-11-11

## 2019-11-11 PROBLEM — Z3A.33 33 WEEKS GESTATION OF PREGNANCY: Status: RESOLVED | Noted: 2019-08-06 | Resolved: 2019-11-11

## 2019-11-11 LAB — SL AMB POCT HGB: 8.3

## 2019-11-11 PROCEDURE — 99214 OFFICE O/P EST MOD 30 MIN: CPT | Performed by: OBSTETRICS & GYNECOLOGY

## 2019-11-11 PROCEDURE — 85018 HEMOGLOBIN: CPT | Performed by: OBSTETRICS & GYNECOLOGY

## 2019-11-11 NOTE — PATIENT INSTRUCTIONS
Postpartum Bleeding   WHAT YOU NEED TO KNOW:   Postpartum bleeding is vaginal bleeding after childbirth  This bleeding is normal, whether your baby was born vaginally or by   It contains blood and the tissue that lined the inside of your uterus when you were pregnant  DISCHARGE INSTRUCTIONS:   What to expect with postpartum bleeding:  Postpartum bleeding usually lasts at least 10 days, and may last longer than 6 weeks  Your bleeding may range from light (barely staining a pad) to heavy (soaking a pad in 1 hour)  Usually, you have heavy bleeding right after childbirth, which slows over the next few weeks until it stops  The bleeding is red or dark brown with clots for the first 1 to 3 days  It then turns pink for several days, and then becomes a white or yellow discharge until it ends  Follow up with your obstetrician as directed:  Do not have sex until your obstetrician says it is okay  Write down your questions so you remember to ask them during your visits  Contact your healthcare provider or obstetrician if:   · Your bleeding increases, or you have heavy bleeding that soaks 1 pad in 1 hour for 2 hours in a row  · You pass large blood clots  · You are breathing faster than normal, or your heart is beating faster than normal     · You are urinating less than usual, or not at all  · You feel dizzy  · You have questions or concerns about your condition or care  Return to the emergency department if:   · You are suddenly short of breath and feel lightheaded  · You have sudden chest pain  © 2017 [facility] Information is for End User's use only and may not be sold, redistributed or otherwise used for commercial purposes  All illustrations and images included in CareNotes® are the copyrighted property of [employer]  or José Miguel Scott  The above information is an  only  It is not intended as medical advice for individual conditions or treatments  Talk to your doctor, nurse or pharmacist before following any medical regimen to see if it is safe and effective for you  It was a pleasure seeing you today  You may receive a survey in the mail or in your e-mail regarding today's visit  Your feedback is very important to us and allows us to improve our service to you and our community  Please take time to complete the survey  Please reach out to us anytime if you have any questions or concerns

## 2019-11-11 NOTE — PROGRESS NOTES
Subjective     Miguel Collins is a 26 y o  female who presents for a postpartum visit  She is 4 weeks postpartum following a C-hysterecromy  I have fully reviewed the prenatal and intrapartum course  The delivery was at 33 gestational weeks  Outcome: repeat  section, low transverse incision and C-hysterectomy  Anesthesia: general  Postpartum course has been uncomplicated  Baby's course has been uncomplicated, discharged home at 3 weeks  Baby is feeding by bottle - Similac Neosure  Bleeding no bleeding  Bowel function is normal  Bladder function is normal  Patient is not sexually active  Contraception method is status post hysterectomy  Postpartum depression screening: negative  The following portions of the patient's history were reviewed and updated as appropriate: allergies, current medications, past family history, past medical history, past social history, past surgical history and problem list      Patient is complaining of feeling hot and cold after surgery, every other day  Last PAP: 19  GDM: yes      Review of Systems   Constitutional: Negative  HENT: Negative  Eyes: Negative  Respiratory: Negative  Cardiovascular: Negative  Gastrointestinal: Negative  Endocrine: Negative  Genitourinary:        As noted in HPI   Musculoskeletal: Negative  Skin: Negative  Allergic/Immunologic: Negative  Neurological: Negative  Hematological: Negative  Psychiatric/Behavioral: Negative            Objective     /62 (BP Location: Right arm, Patient Position: Sitting, Cuff Size: Standard)   Pulse 84   Ht 5' 6" (1 676 m)   Wt 93 kg (205 lb)   LMP 2019 (Within Days)   BMI 33 09 kg/m²    General:  alert and oriented, in no acute distress   Abdomen: soft, non-tender; bowel sounds normal; no masses,  no organomegaly   Incision clean, dry and intact    Vulva:  normal   Vagina: normal vagina, no discharge, exudate, lesion, or erythema; vaginal cuff intact   Cervix: absent   Corpus: uterus absent   Adnexa:  normal adnexa       Assessment/Plan     4 weeks postpartum exam  S/p C-hysterectomy        1  Contraception: status post hysterectomy  2  Advised pelvic rest for 4 more weeks  3  Follow up in: 6 months for yearly exam or as needed  4  Continue iron for now, await CBC and ferritin levels  5   [address] ordered for hot flashes, we will call pt with results      Problem List Items Addressed This Visit        Other    Delivery by  hysterectomy    Anemia, postpartum    Relevant Orders    CBC and differential    Ferritin    Postpartum care and examination - Primary    Relevant Orders    POCT hemoglobin fingerstick (Completed)    History of diet-controlled gestational diabetes mellitus    Relevant Orders    Glucose LORIE 2HR 75GM Nonpreg

## 2019-12-03 ENCOUNTER — HOSPITAL ENCOUNTER (EMERGENCY)
Facility: HOSPITAL | Age: 29
Discharge: HOME | End: 2019-12-03
Attending: EMERGENCY MEDICINE | Admitting: EMERGENCY MEDICINE
Payer: COMMERCIAL

## 2019-12-03 VITALS
RESPIRATION RATE: 16 BRPM | SYSTOLIC BLOOD PRESSURE: 136 MMHG | BODY MASS INDEX: 33.13 KG/M2 | WEIGHT: 205.25 LBS | TEMPERATURE: 96.5 F | DIASTOLIC BLOOD PRESSURE: 75 MMHG | OXYGEN SATURATION: 100 % | HEART RATE: 71 BPM

## 2019-12-03 DIAGNOSIS — R10.9 ABDOMINAL PAIN: ICD-10-CM

## 2019-12-03 DIAGNOSIS — R19.7 DIARRHEA, UNSPECIFIED TYPE: Primary | ICD-10-CM

## 2019-12-03 LAB
BILIRUB UR QL STRIP: NEGATIVE
CLARITY UR: CLEAR
COLOR UR: NORMAL
GLUCOSE UR STRIP-MCNC: NEGATIVE MG/DL
HGB UR QL STRIP.AUTO: NEGATIVE
KETONES UR STRIP-MCNC: NEGATIVE MG/DL
LEUKOCYTE ESTERASE UR QL STRIP: NEGATIVE
NITRITE UR QL STRIP: NEGATIVE
PH UR STRIP.AUTO: 7 [PH]
PROT UR STRIP-MCNC: NEGATIVE MG/DL
SP GR UR STRIP.AUTO: 1.01 (ref 1–1.04)
UROBILINOGEN UA: NEGATIVE MG/DL

## 2019-12-03 PROCEDURE — 99284 EMERGENCY DEPT VISIT MOD MDM: CPT

## 2019-12-03 PROCEDURE — 99284 EMERGENCY DEPT VISIT MOD MDM: CPT | Performed by: EMERGENCY MEDICINE

## 2019-12-03 PROCEDURE — 81003 URINALYSIS AUTO W/O SCOPE: CPT | Performed by: EMERGENCY MEDICINE

## 2019-12-03 RX ORDER — ALUMINA, MAGNESIA, AND SIMETHICONE 2400; 2400; 240 MG/30ML; MG/30ML; MG/30ML
10 SUSPENSION ORAL EVERY 6 HOURS PRN
Qty: 355 ML | Refills: 0 | Status: SHIPPED | OUTPATIENT
Start: 2019-12-03 | End: 2020-09-09

## 2019-12-03 RX ORDER — MAGNESIUM HYDROXIDE/ALUMINUM HYDROXICE/SIMETHICONE 120; 1200; 1200 MG/30ML; MG/30ML; MG/30ML
30 SUSPENSION ORAL ONCE
Status: COMPLETED | OUTPATIENT
Start: 2019-12-03 | End: 2019-12-03

## 2019-12-03 RX ORDER — SUCRALFATE ORAL 1 G/10ML
1 SUSPENSION ORAL 4 TIMES DAILY
Qty: 420 ML | Refills: 0 | Status: SHIPPED | OUTPATIENT
Start: 2019-12-03 | End: 2020-09-09

## 2019-12-03 RX ORDER — SUCRALFATE ORAL 1 G/10ML
1000 SUSPENSION ORAL ONCE
Status: COMPLETED | OUTPATIENT
Start: 2019-12-03 | End: 2019-12-03

## 2019-12-03 RX ORDER — PANTOPRAZOLE SODIUM 20 MG/1
20 TABLET, DELAYED RELEASE ORAL DAILY
Qty: 20 TABLET | Refills: 0 | Status: SHIPPED | OUTPATIENT
Start: 2019-12-03 | End: 2020-09-09

## 2019-12-03 RX ORDER — PANTOPRAZOLE SODIUM 40 MG/1
40 TABLET, DELAYED RELEASE ORAL ONCE
Status: COMPLETED | OUTPATIENT
Start: 2019-12-03 | End: 2019-12-03

## 2019-12-03 RX ADMIN — PANTOPRAZOLE SODIUM 40 MG: 40 TABLET, DELAYED RELEASE ORAL at 10:50

## 2019-12-03 RX ADMIN — ALUMINUM HYDROXIDE, MAGNESIUM HYDROXIDE, AND SIMETHICONE 30 ML: 200; 200; 20 SUSPENSION ORAL at 10:50

## 2019-12-03 RX ADMIN — SUCRALFATE 1000 MG: 1 SUSPENSION ORAL at 10:50

## 2019-12-05 NOTE — ED PROVIDER NOTES
History  Chief Complaint   Patient presents with    Abdominal Pain     abd pain and diarrhea since last night  History provided by:  Patient  Abdominal Pain   Pain location:  Generalized  Pain quality: aching and cramping    Pain radiates to:  Does not radiate  Pain severity:  Mild  Onset quality:  Gradual  Timing:  Intermittent  Progression:  Waxing and waning  Chronicity:  New  Relieved by:  Nothing  Worsened by:  Nothing  Ineffective treatments:  None tried  Associated symptoms: diarrhea and nausea    Associated symptoms: no chest pain, no chills, no cough, no dysuria, no fever, no shortness of breath, no sore throat and no vomiting    Risk factors: obesity and recent hospitalization        Prior to Admission Medications   Prescriptions Last Dose Informant Patient Reported? Taking? ACCU-CHEK FASTCLIX LANCETS MISC  Self No No   Sig: by Does not apply route 4 (four) times a day Test blood sugar 4 times per day, fasting and 2 hours after the start of each meal   ACCU-CHEK GUIDE test strip  Self No No   Sig: Test 4 times a day and as instructed     Ferrous Sulfate (FEROSUL PO)  Self Yes No   Sig: Every 12 hours   Prenatal Vit-Fe Fumarate-FA ([employer] PRENATAL) 28-0 8 MG TABS  Self No No   Sig: Take 1 tablet by mouth daily   acetaminophen (TYLENOL) 650 mg CR tablet   No No   Sig: Take 1 tablet (650 mg total) by mouth every 8 (eight) hours as needed for mild pain or moderate pain      Facility-Administered Medications: None       Past Medical History:   Diagnosis Date    Disease of thyroid gland     Pap smear abnormality of cervix with LGSIL 2016    Varicella     Had as a child       Past Surgical History:   Procedure Laterality Date     SECTION      CYSTOSCOPY N/A 10/14/2019    Procedure: CYSTOSCOPY;  Surgeon: Vadim Silverman MD;  [facility];  Service: Gynecology    DILATION AND EVACUATION      Molar    HYSTERECTOMY N/A 10/14/2019    Procedure: RAY, BILATERAL SALPINGECTOMY;  Surgeon: Natasha Jimenez MD;  [facility];  Service: Gynecology    LAPAROSCOPY      [place]  DELIVERY ONLY N/A 10/14/2019    Procedure: Erika Peralta () REPEAT;  Surgeon: Natasha Jimenez MD;  [facility];  Service: Gynecology       Family History   Problem Relation Age of Onset    Hypertension Mother    [facility] Lupus Mother     Pulmonary embolism Mother     No Known Problems Father     Asthma Sister      I have reviewed and agree with the history as documented  Social History     Tobacco Use    Smoking status: Never Smoker    Smokeless tobacco: Never Used   Substance Use Topics    Alcohol use: Not Currently    Drug use: No        Review of Systems   Constitutional: Negative for chills and fever  HENT: Negative for rhinorrhea, sore throat and trouble swallowing  Eyes: Negative for pain  Respiratory: Negative for cough, shortness of breath, wheezing and stridor  Cardiovascular: Negative for chest pain and leg swelling  Gastrointestinal: Positive for abdominal pain, diarrhea and nausea  Negative for vomiting  Endocrine: Negative for polyuria  Genitourinary: Negative for dysuria, flank pain and urgency  Musculoskeletal: Negative for joint swelling, myalgias and neck stiffness  Skin: Negative for rash  Allergic/Immunologic: Negative for immunocompromised state  Neurological: Negative for dizziness, syncope, weakness, numbness and headaches  Psychiatric/Behavioral: Negative for confusion and suicidal ideas  All other systems reviewed and are negative  Physical Exam  Physical Exam   Constitutional: She is oriented to person, place, and time  She appears well-developed and well-nourished  HENT:   Head: Normocephalic and atraumatic  Eyes: Pupils are equal, round, and reactive to light  EOM are normal    Neck: Normal range of motion  Neck supple  Cardiovascular: Normal rate and regular rhythm  Exam reveals no friction rub  No murmur heard    Pulmonary/Chest: Breath sounds normal  No respiratory distress  She has no wheezes  She has no rales  Abdominal: Soft  Bowel sounds are normal  She exhibits no distension  There is generalized tenderness  Musculoskeletal: Normal range of motion  She exhibits no edema or tenderness  Neurological: She is alert and oriented to person, place, and time  Skin: Skin is warm  No rash noted  Psychiatric: She has a normal mood and affect  Nursing note and vitals reviewed        Vital Signs  ED Triage Vitals [12/03/19 1018]   Temperature Pulse Respirations Blood Pressure SpO2   (!) 96 5 °F (35 8 °C) 71 16 136/75 100 %      Temp Source Heart Rate Source Patient Position - Orthostatic VS BP Location FiO2 (%)   Tympanic Monitor Sitting Left arm --      Pain Score       7           Vitals:    12/03/19 1018   BP: 136/75   Pulse: 71   Patient Position - Orthostatic VS: Sitting         Visual Acuity      ED Medications  Medications   aluminum-magnesium hydroxide-simethicone (MYLANTA) 200-200-20 mg/5 mL oral suspension 30 mL (30 mL Oral Given 12/3/19 1050)   sucralfate (CARAFATE) oral suspension 1,000 mg (1,000 mg Oral Given 12/3/19 1050)   pantoprazole (PROTONIX) EC tablet 40 mg (40 mg Oral Given 12/3/19 1050)       Diagnostic Studies  Results Reviewed     Procedure Component Value Units Date/Time    UA w Reflex to Microscopic w Reflex to Culture [[id number]]  (Normal) Collected:  12/03/19 1050    Lab Status:  Final result Specimen:  Urine, Clean Catch Updated:  12/03/19 1146     Color, UA Straw     Clarity, UA Clear     Specific Gravity, UA 1 010     pH, UA 7 0     Leukocytes, UA Negative     Nitrite, UA Negative     Protein, UA Negative mg/dl      Glucose, UA Negative mg/dl      Ketones, UA Negative mg/dl      Bilirubin, UA Negative     Blood, UA Negative     UROBILINOGEN UA Negative mg/dL                  No orders to display              Procedures  Procedures         ED Course                               MDM  Number of Diagnoses or Management Options  Abdominal pain: new and requires workup  Diarrhea, unspecified type: new and requires workup  Diagnosis management comments: This is a 76-year-old female who presents emergency department noted epigastric and generalized abdominal discomfort and crampy symptoms  The patient has been noted having diarrhea over last several days duration  Recently admitted to hospital for hysterectomy  In this time in the emergency department the patient has no right lower quadrant left lower quadrant abdominal tenderness on examination  She has some mild epigastric diet tenderness and fullness possibility of gastroenteritis, peptic ulcer disease, treatment as such with Maalox as well as Carafate  Plan will be for outpatient management followup given strict instructions when to return back to the emergency department offer the patient to stay for further evaluation and check urine for urinary tract infection  The patient declined wants to leave the hospital at this point time  Pt re-examined and evaluated after testing and treatment  Spoke with the patient and feeling improved and sxs have resolved  Will discharge home with close f/u with pcp and instructed to return to the ED if sxs worsen or continue  Pt agrees with the plan for discharge and feels comfortable to go home with proper f/u  Advised to return for worsening or additional problems  Diagnostic tests were reviewed and questions answered  Diagnosis, care plan and treatment options were discussed  The patient understand instructions and will follow up as directed  Counseling: I had a detailed discussion with the patient and/or guardian regarding: the historical points, exam findings, and any diagnostic results supporting the discharge diagnosis, lab results, radiology results, discharge instructions reviewed with patient and/or family/caregiver and understanding was verbalized   Instructions given to return to the emergency department if symptoms worsen or persist, or if there are any questions or concerns that arise at home  All labs reviewed and utilized in the medical decision making process    All radiology studies independently viewed by me and interpreted by the radiologist        Amount and/or Complexity of Data Reviewed  Clinical lab tests: ordered and reviewed  Tests in the medicine section of CPT®: ordered and reviewed  Review and summarize past medical records: yes  Independent visualization of images, tracings, or specimens: yes          Disposition  Final diagnoses:   Diarrhea, unspecified type   Abdominal pain     Time reflects when diagnosis was documented in both MDM as applicable and the Disposition within this note     Time User Action Codes Description Comment    12/3/2019 10:54 AM Carlos Cabral R Add [R19 7] Diarrhea, unspecified type     12/3/2019 10:55 AM Didi Valdez Add [R10 9] Abdominal pain       ED Disposition     ED Disposition Condition Date/Time Comment    Discharge Stable Tue Dec 3, 2019 10:54 AM Olivia Bonilla discharge to home/self care              Follow-up Information     Follow up With Specialties Details Why [address], [place] Family Medicine Schedule an appointment as soon as possible for a visit  If symptoms worsen [address]  [address] [facility] [address]             Discharge Medication List as of 12/3/2019 10:56 AM      START taking these medications    Details   aluminum-magnesium hydroxide-simethicone (MAALOX MAX) 400-400-40 MG/5ML suspension Take 10 mL by mouth every 6 (six) hours as needed for indigestion or heartburn, Starting Tue 12/3/2019, Normal      pantoprazole (PROTONIX) 20 mg tablet Take 1 tablet (20 mg total) by mouth daily, Starting Tue 12/3/2019, Normal      sucralfate (CARAFATE) 1 g/10 mL suspension Take 10 mL (1 g total) by mouth 4 (four) times a day, Starting Tue 12/3/2019, Normal         CONTINUE these medications which have NOT CHANGED    Details ACCU-CHEK FASTCLIX LANCETS MISC by Does not apply route 4 (four) times a day Test blood sugar 4 times per day, fasting and 2 hours after the start of each meal, Starting Mon 9/9/2019, Normal      ACCU-CHEK GUIDE test strip Test 4 times a day and as instructed , Normal      acetaminophen (TYLENOL) 650 mg CR tablet Take 1 tablet (650 mg total) by mouth every 8 (eight) hours as needed for mild pain or moderate pain, Starting Sat 10/19/2019, Print      Ferrous Sulfate (FEROSUL PO) Every 12 hours, Starting Thu 1/25/2018, Historical Med      Prenatal Vit-Fe Fumarate-FA ([employer]S PRENATAL) 28-0 8 MG TABS Take 1 tablet by mouth daily, Starting Thu 6/13/2019, Normal           No discharge procedures on file      ED Provider  Electronically Signed by           Suzy Michaud DO  12/05/19 7644

## 2019-12-17 ENCOUNTER — TELEPHONE (OUTPATIENT)
Dept: HEMATOLOGY ONCOLOGY | Facility: CLINIC | Age: 29
End: 2019-12-17

## 2019-12-18 ENCOUNTER — TELEPHONE (OUTPATIENT)
Dept: HEMATOLOGY ONCOLOGY | Facility: CLINIC | Age: 29
End: 2019-12-18

## 2019-12-18 NOTE — TELEPHONE ENCOUNTER
The patient was called to complete the blood work prior to the appointment on 12/20/2019  the patient stated she will complete it before the appointment

## 2019-12-19 ENCOUNTER — APPOINTMENT (OUTPATIENT)
Dept: LAB | Facility: HOSPITAL | Age: 29
End: 2019-12-19
Payer: COMMERCIAL

## 2019-12-19 ENCOUNTER — TELEPHONE (OUTPATIENT)
Dept: HEMATOLOGY ONCOLOGY | Facility: CLINIC | Age: 29
End: 2019-12-19

## 2019-12-19 ENCOUNTER — TRANSCRIBE ORDERS (OUTPATIENT)
Dept: ADMINISTRATIVE | Facility: HOSPITAL | Age: 29
End: 2019-12-19

## 2019-12-19 DIAGNOSIS — R23.2 HOT FLASHES: ICD-10-CM

## 2019-12-19 LAB
ALBUMIN SERPL BCP-MCNC: 4.4 G/DL (ref 3–5.2)
ALP SERPL-CCNC: 98 U/L (ref 43–122)
ALT SERPL W P-5'-P-CCNC: 43 U/L (ref 9–52)
ANION GAP SERPL CALCULATED.3IONS-SCNC: 11 MMOL/L (ref 5–14)
APTT PPP: 29 SECONDS (ref 25–32)
AST SERPL W P-5'-P-CCNC: 32 U/L (ref 14–36)
BASOPHILS # BLD AUTO: 0 THOUSANDS/ΜL (ref 0–0.1)
BASOPHILS NFR BLD AUTO: 1 % (ref 0–1)
BILIRUB SERPL-MCNC: 1 MG/DL
BUN SERPL-MCNC: 12 MG/DL (ref 5–25)
CALCIUM SERPL-MCNC: 9.5 MG/DL (ref 8.4–10.2)
CHLORIDE SERPL-SCNC: 104 MMOL/L (ref 97–108)
CO2 SERPL-SCNC: 25 MMOL/L (ref 22–30)
CREAT SERPL-MCNC: 0.65 MG/DL (ref 0.6–1.2)
DEPRECATED AT III PPP: 94 % OF NORMAL (ref 92–136)
EOSINOPHIL # BLD AUTO: 0.2 THOUSAND/ΜL (ref 0–0.4)
EOSINOPHIL NFR BLD AUTO: 2 % (ref 0–6)
ERYTHROCYTE [DISTWIDTH] IN BLOOD BY AUTOMATED COUNT: 16.2 %
FERRITIN SERPL-MCNC: 13 NG/ML (ref 8–388)
FSH SERPL-ACNC: 2.1 MIU/ML
GFR SERPL CREATININE-BSD FRML MDRD: 120 ML/MIN/1.73SQ M
GLUCOSE SERPL-MCNC: 100 MG/DL (ref 70–99)
HCT VFR BLD AUTO: 36.5 % (ref 36–46)
HGB BLD-MCNC: 11.6 G/DL (ref 12–16)
INR PPP: 0.96 (ref 0.91–1.09)
IRON SATN MFR SERPL: 10 %
IRON SERPL-MCNC: 39 UG/DL (ref 50–170)
LYMPHOCYTES # BLD AUTO: 2.5 THOUSANDS/ΜL (ref 0.5–4)
LYMPHOCYTES NFR BLD AUTO: 30 % (ref 25–45)
MCH RBC QN AUTO: 25.3 PG (ref 26–34)
MCHC RBC AUTO-ENTMCNC: 31.9 G/DL (ref 31–36)
MCV RBC AUTO: 79 FL (ref 80–100)
MONOCYTES # BLD AUTO: 0.5 THOUSAND/ΜL (ref 0.2–0.9)
MONOCYTES NFR BLD AUTO: 6 % (ref 1–10)
NEUTROPHILS # BLD AUTO: 5.2 THOUSANDS/ΜL (ref 1.8–7.8)
NEUTS SEG NFR BLD AUTO: 62 % (ref 45–65)
PLATELET # BLD AUTO: 489 THOUSANDS/UL (ref 150–450)
PLATELET BLD QL SMEAR: ABNORMAL
PMV BLD AUTO: 9.1 FL (ref 8.9–12.7)
POTASSIUM SERPL-SCNC: 4.1 MMOL/L (ref 3.6–5)
PROT SERPL-MCNC: 8.4 G/DL (ref 5.9–8.4)
PROTHROMBIN TIME: 10.7 SECONDS (ref 9.8–12)
RBC # BLD AUTO: 4.6 MILLION/UL (ref 4–5.2)
RBC MORPH BLD: NORMAL
SODIUM SERPL-SCNC: 140 MMOL/L (ref 137–147)
TIBC SERPL-MCNC: 392 UG/DL (ref 250–450)
VIT B12 SERPL-MCNC: 156 PG/ML (ref 100–900)
WBC # BLD AUTO: 8.5 THOUSAND/UL (ref 4.5–11)

## 2019-12-19 PROCEDURE — 80053 COMPREHEN METABOLIC PANEL: CPT

## 2019-12-19 PROCEDURE — 83001 ASSAY OF GONADOTROPIN (FSH): CPT

## 2019-12-19 PROCEDURE — 85705 THROMBOPLASTIN INHIBITION: CPT

## 2019-12-19 PROCEDURE — 85300 ANTITHROMBIN III ACTIVITY: CPT

## 2019-12-19 PROCEDURE — 85670 THROMBIN TIME PLASMA: CPT

## 2019-12-19 PROCEDURE — 82728 ASSAY OF FERRITIN: CPT

## 2019-12-19 PROCEDURE — 85305 CLOT INHIBIT PROT S TOTAL: CPT

## 2019-12-19 PROCEDURE — 85732 THROMBOPLASTIN TIME PARTIAL: CPT

## 2019-12-19 PROCEDURE — 86038 ANTINUCLEAR ANTIBODIES: CPT

## 2019-12-19 PROCEDURE — 36415 COLL VENOUS BLD VENIPUNCTURE: CPT

## 2019-12-19 PROCEDURE — 85306 CLOT INHIBIT PROT S FREE: CPT

## 2019-12-19 PROCEDURE — 86146 BETA-2 GLYCOPROTEIN ANTIBODY: CPT

## 2019-12-19 PROCEDURE — 83540 ASSAY OF IRON: CPT

## 2019-12-19 PROCEDURE — 82607 VITAMIN B-12: CPT

## 2019-12-19 PROCEDURE — 86147 CARDIOLIPIN ANTIBODY EA IG: CPT

## 2019-12-19 PROCEDURE — 85610 PROTHROMBIN TIME: CPT

## 2019-12-19 PROCEDURE — 85730 THROMBOPLASTIN TIME PARTIAL: CPT

## 2019-12-19 PROCEDURE — 85613 RUSSELL VIPER VENOM DILUTED: CPT

## 2019-12-19 PROCEDURE — 83550 IRON BINDING TEST: CPT

## 2019-12-19 PROCEDURE — 86430 RHEUMATOID FACTOR TEST QUAL: CPT

## 2019-12-19 PROCEDURE — 85025 COMPLETE CBC W/AUTO DIFF WBC: CPT

## 2019-12-19 NOTE — TELEPHONE ENCOUNTER
The patient was called to complete the blood work prior to the appointment on 12/20/2019  the patient stated she will complete the blood work

## 2019-12-20 ENCOUNTER — OFFICE VISIT (OUTPATIENT)
Dept: HEMATOLOGY ONCOLOGY | Facility: CLINIC | Age: 29
End: 2019-12-20
Payer: COMMERCIAL

## 2019-12-20 VITALS
BODY MASS INDEX: 32.75 KG/M2 | SYSTOLIC BLOOD PRESSURE: 116 MMHG | HEART RATE: 92 BPM | OXYGEN SATURATION: 98 % | DIASTOLIC BLOOD PRESSURE: 68 MMHG | TEMPERATURE: 97.4 F | WEIGHT: 203.8 LBS | RESPIRATION RATE: 16 BRPM | HEIGHT: 66 IN

## 2019-12-20 DIAGNOSIS — E53.8 VITAMIN B12 DEFICIENCY: ICD-10-CM

## 2019-12-20 DIAGNOSIS — D50.9 IRON DEFICIENCY ANEMIA, UNSPECIFIED IRON DEFICIENCY ANEMIA TYPE: Primary | ICD-10-CM

## 2019-12-20 LAB
CARDIOLIPIN IGA SER IA-ACNC: <9 APL U/ML (ref 0–11)
CARDIOLIPIN IGG SER IA-ACNC: <9 GPL U/ML (ref 0–14)
CARDIOLIPIN IGM SER IA-ACNC: 11 MPL U/ML (ref 0–12)
RHEUMATOID FACT SER QL LA: NEGATIVE
RYE IGE QN: NEGATIVE

## 2019-12-20 PROCEDURE — 99214 OFFICE O/P EST MOD 30 MIN: CPT | Performed by: INTERNAL MEDICINE

## 2019-12-20 NOTE — PROGRESS NOTES
Hematology/Oncology Outpatient Follow-up  Norma Arrington 26 y o  female 1990 [id number]    Date:  12/20/2019    Assessment and Plan:  1  Iron deficiency anemia, unspecified iron deficiency anemia type  The patient continues to be iron deficient  She was told that possibly she developed significant allergy to the Venofer and for that reason she should not get Venofer any more  I did offer her to try a different type of iron treatment like injectafer which is well known to cause less allergic reaction  However, the patient refused that option stating that she would rather take oral iron supplements on every other day basis  The patient was told that we will recheck her iron panel prior to her next visit in about 3-4 months from now and then make a decision of iron IV with premedication in form of injectafer could be entertained  - CBC and differential; Future  - Comprehensive metabolic panel; Future  - Iron Panel (Includes Ferritin, Iron Sat%, Iron, and TIBC); Future  - Ferritin; Future  - Vitamin B12; Future  - Methylmalonic acid, serum; Future    2  Vitamin B12 deficiency  She also seems to have significant vitamin B12 deficiency, I did offer her vitamin B12 injections which she refused  She stated that she would rather take vitamin B12 sublingual supplements on a daily basis  We will have to follow-up on her vitamin B12 level and consider vitamin B12 injections if the oral supplement does not work  The rest of her hypercoagulable workup is still pending will give her a call regarding the result or discussed at the next visit  - CBC and differential; Future  - Comprehensive metabolic panel; Future  - Iron Panel (Includes Ferritin, Iron Sat%, Iron, and TIBC); Future  - Ferritin; Future  - Vitamin B12; Future  - Methylmalonic acid, serum;  Future      HPI:  The patient has medical history of bipolar disorder, vitamin B12 deficiency, chronic anemia, she had multiple miscarriages all prior to 10 weeks of pregnancy, she has a history of stillbirth and 2011 and molar pregnancy in 2013  She has 5 kids  She denied any history of clotting event  The patient was recently evaluated for her anemia and was found to be iron deficient  She was treated with iron IV inform of Venofer  She tolerated the 1st treatment relatively well without any issues however she developed a rash and itching to the anterior neck with the 2nd treatment which resolved  Her 3rd treatment on the  she had significant itching and rash despite premedication  The patient stated that she had her a  on the 2019 which went without significant complications  Interval history:  She came in today for a follow-up visit  She had her blood work done yesterday on 2019 which showed significant vitamin B12 deficiency of 156 her hemoglobin was 11 6 with normal white cells  Her platelet count was 830  The white cell differential is within normal range  Her CMP was normal with normal PTT and INR  The iron panel continues to show iron deficiency with ferritin of 13 and saturation of 10%  C-reactive protein in September was 18 5  Monoclonal gammopathy workup came back negative in September  She denies bleeding from any sites at this point  The rest of her blood work is still pending  ROS: Review of Systems   Constitutional: Negative for activity change, appetite change, chills, diaphoresis, fatigue, fever and unexpected weight change  HENT: Negative for congestion, dental problem, ear discharge, ear pain, facial swelling, hearing loss, mouth sores, nosebleeds, postnasal drip, sore throat, tinnitus and trouble swallowing  Eyes: Negative for discharge, redness, itching and visual disturbance  Respiratory: Negative for cough, chest tightness, shortness of breath and wheezing  Cardiovascular: Negative for chest pain, palpitations and leg swelling     Gastrointestinal: Negative for abdominal distention, abdominal pain, anal bleeding, blood in stool, constipation, diarrhea, nausea and vomiting  Genitourinary: Negative for difficulty urinating, dysuria, flank pain, frequency, hematuria and urgency  Musculoskeletal: Negative for arthralgias, back pain, gait problem, joint swelling, myalgias and neck pain  Skin: Negative for color change, pallor, rash and wound  Neurological: Positive for headaches  Negative for dizziness, syncope, speech difficulty, weakness, light-headedness and numbness  Hematological: Negative for adenopathy  Does not bruise/bleed easily  Psychiatric/Behavioral: Positive for sleep disturbance  Negative for agitation, behavioral problems and confusion         Past Medical History:   Diagnosis Date    Disease of thyroid gland     Pap smear abnormality of cervix with LGSIL 2016    Varicella     Had as a child       Past Surgical History:   Procedure Laterality Date     SECTION      CYSTOSCOPY N/A 10/14/2019    Procedure: CYSTOSCOPY;  Surgeon: Nickie Guerrero MD;  [facility];  Service: Gynecology    DILATION AND EVACUATION      Molar    HYSTERECTOMY N/A 10/14/2019    Procedure: RAY, BILATERAL SALPINGECTOMY;  Surgeon: Nickie Guerrero MD;  [facility];  Service: Gynecology    LAPAROSCOPY      ID  DELIVERY ONLY N/A 10/14/2019    Procedure: Arianne Oliveira () REPEAT;  Surgeon: Nickie Guerrero MD;  [facility];  Service: Gynecology       Social History     Socioeconomic History    Marital status: /Civil Union     Spouse name: None    Number of children: 4    Years of education: None    Highest education level: None   Occupational History    None   Social Needs    Financial resource strain: None    Food insecurity:     Worry: None     Inability: None    Transportation needs:     Medical: None     Non-medical: None   Tobacco Use    Smoking status: Never Smoker    Smokeless tobacco: Never Used   Substance and Sexual Activity    Alcohol use: Not Currently    Drug use: No    Sexual activity: Yes     Partners: Male     Birth control/protection: None   Lifestyle    Physical activity:     Days per week: None     Minutes per session: None    Stress: None   Relationships    Social connections:     Talks on phone: None     Gets together: None     Attends Hinduism service: None     Active member of club or organization: None     Attends meetings of clubs or organizations: None     Relationship status: None    Intimate partner violence:     Fear of current or ex partner: None     Emotionally abused: None     Physically abused: None     Forced sexual activity: None   Other Topics Concern    None   Social History Narrative    Always wears seat belts ; caffeine use       Family History   Problem Relation Age of Onset    Hypertension Mother     Lupus Mother     Pulmonary embolism Mother     No Known Problems Father     Asthma Sister        Allergies   Allergen Reactions    Penicillins Anaphylaxis     Anaphylaxis    Shrimp (Diagnostic)     Venofer [Iron Sucrose] Itching         Current Outpatient Medications:     acetaminophen (TYLENOL) 650 mg CR tablet, Take 1 tablet (650 mg total) by mouth every 8 (eight) hours as needed for mild pain or moderate pain, Disp: 30 tablet, Rfl: 0    Ferrous Sulfate (FEROSUL PO), Every 12 hours, Disp: , Rfl:     ACCU-CHEK FASTCLIX LANCETS MISC, by Does not apply route 4 (four) times a day Test blood sugar 4 times per day, fasting and 2 hours after the start of each meal (Patient not taking: Reported on 12/20/2019), Disp: 102 each, Rfl: 3    ACCU-CHEK GUIDE test strip, Test 4 times a day and as instructed   (Patient not taking: Reported on 12/20/2019), Disp: 100 each, Rfl: 3    aluminum-magnesium hydroxide-simethicone (MAALOX MAX) 400-400-40 MG/5ML suspension, Take 10 mL by mouth every 6 (six) hours as needed for indigestion or heartburn (Patient not taking: Reported on 12/20/2019), Disp: 035 mL, Rfl: 0    pantoprazole (PROTONIX) 20 mg tablet, Take 1 tablet (20 mg total) by mouth daily (Patient not taking: Reported on 12/20/2019), Disp: 20 tablet, Rfl: 0    Prenatal Vit-Fe Fumarate-FA (CVS PRENATAL) 28-0 8 MG TABS, Take 1 tablet by mouth daily (Patient not taking: Reported on 12/20/2019), Disp: 30 each, Rfl: 6    sucralfate (CARAFATE) 1 g/10 mL suspension, Take 10 mL (1 g total) by mouth 4 (four) times a day (Patient not taking: Reported on 12/20/2019), Disp: 420 mL, Rfl: 0      Physical Exam:  /68 (BP Location: Left arm, Cuff Size: Adult)   Pulse 92   Temp (!) 97 4 °F (36 3 °C) (Tympanic)   Resp 16   Ht 5' 6" (1 676 m)   Wt 92 4 kg (203 lb 12 8 oz)   SpO2 98%   BMI 32 89 kg/m²     Physical Exam   Constitutional: She is oriented to person, place, and time  She appears well-developed and well-nourished  No distress  HENT:   Head: Normocephalic and atraumatic  Nose: Nose normal    Mouth/Throat: Oropharynx is clear and moist    Eyes: Pupils are equal, round, and reactive to light  Conjunctivae and EOM are normal  Right eye exhibits no discharge  Left eye exhibits no discharge  No scleral icterus  Neck: Normal range of motion  Neck supple  No JVD present  No tracheal deviation present  No thyromegaly present  Cardiovascular: Normal rate, regular rhythm and normal heart sounds  Exam reveals no friction rub  No murmur heard  Pulmonary/Chest: Effort normal and breath sounds normal  No stridor  No respiratory distress  She has no wheezes  She has no rales  She exhibits no tenderness  Abdominal: Soft  Bowel sounds are normal  She exhibits no distension and no mass  There is no hepatosplenomegaly, splenomegaly or hepatomegaly  There is no tenderness  There is no rebound and no guarding  Musculoskeletal: Normal range of motion  She exhibits no edema, tenderness or deformity  Lymphadenopathy:     She has no cervical adenopathy     Neurological: She is alert and oriented to person, place, and time  She has normal reflexes  No cranial nerve deficit  Coordination normal    Skin: Skin is warm and dry  No rash noted  She is not diaphoretic  No erythema  No pallor  Psychiatric: She has a normal mood and affect  Her behavior is normal  Judgment and thought content normal          Labs:  Lab Results   Component Value Date    WBC 8 50 12/19/2019    HGB 11 6 (L) 12/19/2019    HCT 36 5 12/19/2019    MCV 79 (L) 12/19/2019     (H) 12/19/2019     Lab Results   Component Value Date    K 4 1 12/19/2019     12/19/2019    CO2 25 12/19/2019    BUN 12 12/19/2019    CREATININE 0 65 12/19/2019    GLUCOSE 159 (H) 10/14/2019    GLUF 98 09/06/2019    CALCIUM 9 5 12/19/2019    AST 32 12/19/2019    ALT 43 12/19/2019    ALKPHOS 98 12/19/2019    EGFR 120 12/19/2019       Patient voiced understanding and agreement in the above discussion  Aware to contact our office with questions/symptoms in the interim

## 2019-12-21 LAB
APTT SCREEN TO CONFIRM RATIO: 1.06 RATIO (ref 0–1.4)
B2 GLYCOPROT1 IGA SER-ACNC: <9 GPI IGA UNITS (ref 0–25)
B2 GLYCOPROT1 IGG SER-ACNC: <9 GPI IGG UNITS (ref 0–20)
B2 GLYCOPROT1 IGM SER-ACNC: <9 GPI IGM UNITS (ref 0–32)
CONFIRM APTT/NORMAL: 37.4 SEC (ref 0–55)
LA PPP-IMP: NORMAL
PROT S ACT/NOR PPP: 78 % (ref 57–157)
PROT S ACT/NOR PPP: 80 % (ref 63–140)
PROT S PPP-ACNC: 71 % (ref 60–150)
SCREEN APTT: 37.3 SEC (ref 0–51.9)
SCREEN DRVVT: 43.5 SEC (ref 0–47)
THROMBIN TIME: 17 SEC (ref 0–23)

## 2020-03-13 ENCOUNTER — TELEPHONE (OUTPATIENT)
Dept: HEMATOLOGY ONCOLOGY | Facility: CLINIC | Age: 30
End: 2020-03-13

## 2020-03-13 NOTE — TELEPHONE ENCOUNTER
I tried to contact the patient to remind her to have her lab work done prior to her appt on 3/20/20  Her phone is temporally out of service

## 2020-03-19 ENCOUNTER — TELEPHONE (OUTPATIENT)
Dept: HEMATOLOGY ONCOLOGY | Facility: CLINIC | Age: 30
End: 2020-03-19

## 2020-03-19 NOTE — TELEPHONE ENCOUNTER
I tried to call the patient to go over the pre appt COVID-19 questions  The patients phone # is still temporarily out of service

## 2020-03-20 ENCOUNTER — TELEPHONE (OUTPATIENT)
Dept: HEMATOLOGY ONCOLOGY | Facility: CLINIC | Age: 30
End: 2020-03-20

## 2020-03-20 NOTE — TELEPHONE ENCOUNTER
Patient missed f/u apt today Fri 3/20 at 10:30 w/[facility]  Called patient, and patient's phone is not in service  Couldn't leave a message

## 2020-04-02 NOTE — ADDENDUM NOTE
Encounter addended by: Yoshi Sanchez RN on: 4/2/2020 1:53 PM   Actions taken: Specialty comments modified

## 2020-09-09 ENCOUNTER — OFFICE VISIT (OUTPATIENT)
Dept: FAMILY MEDICINE CLINIC | Facility: CLINIC | Age: 30
End: 2020-09-09
Payer: COMMERCIAL

## 2020-09-09 VITALS
WEIGHT: 217 LBS | TEMPERATURE: 98.4 F | HEART RATE: 100 BPM | SYSTOLIC BLOOD PRESSURE: 130 MMHG | OXYGEN SATURATION: 99 % | DIASTOLIC BLOOD PRESSURE: 80 MMHG | BODY MASS INDEX: 34.87 KG/M2 | HEIGHT: 66 IN

## 2020-09-09 DIAGNOSIS — R00.0 TACHYCARDIA: ICD-10-CM

## 2020-09-09 DIAGNOSIS — D64.9 ANEMIA, UNSPECIFIED TYPE: Primary | ICD-10-CM

## 2020-09-09 DIAGNOSIS — R01.1 SYSTOLIC MURMUR: ICD-10-CM

## 2020-09-09 DIAGNOSIS — Z13.220 LIPID SCREENING: ICD-10-CM

## 2020-09-09 DIAGNOSIS — E01.0 THYROMEGALY: ICD-10-CM

## 2020-09-09 DIAGNOSIS — Z13.1 SCREENING FOR DIABETES MELLITUS: ICD-10-CM

## 2020-09-09 DIAGNOSIS — R42 DIZZINESS: ICD-10-CM

## 2020-09-09 DIAGNOSIS — Z13.29 THYROID DISORDER SCREENING: ICD-10-CM

## 2020-09-09 PROCEDURE — 99214 OFFICE O/P EST MOD 30 MIN: CPT | Performed by: INTERNAL MEDICINE

## 2020-09-09 NOTE — PROGRESS NOTES
Assessment/Plan:         Diagnoses and all orders for this visit:  Dizziness  -     Thyroid stimulating immunoglobulin  -     Echo complete with contrast if indicated; Future  EKG in the office unremarkable except for possible atrial enlargement by will did as well as sinus tachycardia  Lab studies will be ordered cardiogram ordered  Patient is not orthostatic in the office  Rule out secondary causes thyroid test would be ordered CBC will be checked  Anemia, unspecified type  -     CBC and differential  -     Comprehensive metabolic panel  -     Iron Panel (Includes Ferritin, Iron Sat%, Iron, and TIBC)      Systolic murmur  -     Thyroid stimulating immunoglobulin  -     Echo complete with contrast if indicated; Future    Thyroid disorder screening  -     TSH, 3rd generation with Free T4 reflex    Thyromegaly  Thyroid ultrasound next appointment  Patient is 10 months postpartum but does not mention any tenderness or neck pain  Screening for diabetes mellitus  As above  Lipid screening  -     Lipid panel    Tachycardia  -     Thyroid stimulating immunoglobulin  -     Echo complete with contrast if indicated; Future        Subjective:      Patient ID: Neri Riddle is a 31 y o  female  HPI  This is Dr Corrinne Pates patient  Patient has history of anemia and has been established with hematology  Recently underwent hysterectomy after 5 pregnancies repeat   Currently not taking any iron tablets  No recent lab studies  Today's visit is prompted by dizziness that she experiencing with the last 1 month  She does not mention shortness of breath with exertion as well  She denies any orthopnea chest pain edema lower extremity  Denies any cough hemoptysis fever chills  She reports no melena bowel changes  She describes sensation of dizziness as lightheadedness  Occasionally she would have vertigo feeling as well  Denies any ear pressure or ear pain  No hearing deficit tinnitus    Does get migraine headaches  No head trauma  She is emotionally well situated with her 2nd marriage does mention increased stress in the house  She is currently not working  She is getting good enough night sleep she tells me  The following portions of the patient's history were reviewed and updated as appropriate: allergies, current medications, past family history, past medical history, past social history, past surgical history and problem list     Review of Systems   Constitutional: Negative for appetite change, fatigue and fever  HENT: Negative for congestion and ear pain  Respiratory: Negative for cough and shortness of breath  Cardiovascular: Negative for chest pain, palpitations and leg swelling  Gastrointestinal: Negative for abdominal pain, blood in stool, constipation, diarrhea and nausea  Genitourinary: Negative for decreased urine volume and difficulty urinating  Neurological: Negative for dizziness, seizures and headaches  Objective:      /80 (BP Location: Left arm, Patient Position: Sitting, Cuff Size: Extra-Large)   Pulse 100   Temp 98 4 °F (36 9 °C)   Ht 5' 6" (1 676 m)   Wt 98 4 kg (217 lb)   SpO2 99%   BMI 35 02 kg/m²   Blood pressure repeated in sitting and standing position without changes in the pressure       Physical Exam  Constitutional:       General: She is not in acute distress  Appearance: She is well-developed  Comments: High BMI noted   HENT:      Head: Normocephalic  Mouth/Throat:      Pharynx: No oropharyngeal exudate  Eyes:      General: No scleral icterus  Extraocular Movements: Extraocular movements intact  Conjunctiva/sclera: Conjunctivae normal    Neck:      Thyroid: No thyromegaly  Comments: Nodular enlarged thyroid gland nontender not warm  Cardiovascular:      Rate and Rhythm: Normal rate and regular rhythm  Pulses: Normal pulses  Heart sounds: Murmur (Soft systolic murmur) present  Pulmonary:      Effort: Pulmonary effort is normal  No respiratory distress  Breath sounds: Normal breath sounds  No wheezing or rales  Abdominal:      General: Bowel sounds are normal  There is no distension  Palpations: Abdomen is soft  Tenderness: There is no abdominal tenderness  There is no guarding or rebound  Lymphadenopathy:      Cervical: No cervical adenopathy  Skin:     General: Skin is warm  Coloration: Skin is not pale  Neurological:      General: No focal deficit present  Mental Status: She is alert and oriented to person, place, and time  Cranial Nerves: No cranial nerve deficit  Deep Tendon Reflexes: Reflexes are normal and symmetric  Psychiatric:         Behavior: Behavior normal          Thought Content:  Thought content normal          Judgment: Judgment normal

## 2020-09-10 ENCOUNTER — APPOINTMENT (OUTPATIENT)
Dept: LAB | Facility: HOSPITAL | Age: 30
End: 2020-09-10
Payer: COMMERCIAL

## 2020-09-10 ENCOUNTER — TELEPHONE (OUTPATIENT)
Dept: FAMILY MEDICINE CLINIC | Facility: CLINIC | Age: 30
End: 2020-09-10

## 2020-09-10 DIAGNOSIS — R00.0 TACHYCARDIA: Primary | ICD-10-CM

## 2020-09-10 DIAGNOSIS — E53.8 VITAMIN B12 DEFICIENCY: ICD-10-CM

## 2020-09-10 DIAGNOSIS — O99.019 ANEMIA DURING PREGNANCY: ICD-10-CM

## 2020-09-10 DIAGNOSIS — R79.89 ABNORMAL TSH: ICD-10-CM

## 2020-09-10 DIAGNOSIS — E04.9 GOITER: ICD-10-CM

## 2020-09-10 DIAGNOSIS — D50.9 IRON DEFICIENCY ANEMIA, UNSPECIFIED IRON DEFICIENCY ANEMIA TYPE: ICD-10-CM

## 2020-09-10 LAB
ALBUMIN SERPL BCP-MCNC: 3.6 G/DL (ref 3–5.2)
ALP SERPL-CCNC: 109 U/L (ref 43–122)
ALT SERPL W P-5'-P-CCNC: 21 U/L (ref 9–52)
ANION GAP SERPL CALCULATED.3IONS-SCNC: 7 MMOL/L (ref 5–14)
AST SERPL W P-5'-P-CCNC: 26 U/L (ref 14–36)
BASOPHILS # BLD AUTO: 0 THOUSANDS/ΜL (ref 0–0.1)
BASOPHILS NFR BLD AUTO: 0 % (ref 0–1)
BILIRUB SERPL-MCNC: 0.5 MG/DL
BUN SERPL-MCNC: 13 MG/DL (ref 5–25)
CALCIUM SERPL-MCNC: 9.5 MG/DL (ref 8.4–10.2)
CHLORIDE SERPL-SCNC: 105 MMOL/L (ref 97–108)
CHOLEST SERPL-MCNC: 154 MG/DL
CO2 SERPL-SCNC: 26 MMOL/L (ref 22–30)
CREAT SERPL-MCNC: 0.43 MG/DL (ref 0.6–1.2)
EOSINOPHIL # BLD AUTO: 0.2 THOUSAND/ΜL (ref 0–0.4)
EOSINOPHIL NFR BLD AUTO: 2 % (ref 0–6)
ERYTHROCYTE [DISTWIDTH] IN BLOOD BY AUTOMATED COUNT: 17.1 %
FERRITIN SERPL-MCNC: 14 NG/ML (ref 8–388)
GFR SERPL CREATININE-BSD FRML MDRD: 136 ML/MIN/1.73SQ M
GLUCOSE P FAST SERPL-MCNC: 106 MG/DL (ref 70–99)
HCT VFR BLD AUTO: 34.7 % (ref 36–46)
HDLC SERPL-MCNC: 31 MG/DL
HGB BLD-MCNC: 11.1 G/DL (ref 12–16)
HYPERCHROMIA BLD QL SMEAR: PRESENT
IRON SATN MFR SERPL: 14 %
IRON SERPL-MCNC: 45 UG/DL (ref 50–170)
LDLC SERPL CALC-MCNC: 98 MG/DL
LYMPHOCYTES # BLD AUTO: 3.6 THOUSANDS/ΜL (ref 0.5–4)
LYMPHOCYTES NFR BLD AUTO: 35 % (ref 25–45)
MCH RBC QN AUTO: 24.4 PG (ref 26–34)
MCHC RBC AUTO-ENTMCNC: 32.1 G/DL (ref 31–36)
MCV RBC AUTO: 76 FL (ref 80–100)
MONOCYTES # BLD AUTO: 0.9 THOUSAND/ΜL (ref 0.2–0.9)
MONOCYTES NFR BLD AUTO: 9 % (ref 1–10)
NEUTROPHILS # BLD AUTO: 5.5 THOUSANDS/ΜL (ref 1.8–7.8)
NEUTS SEG NFR BLD AUTO: 54 % (ref 45–65)
NONHDLC SERPL-MCNC: 123 MG/DL
PLATELET # BLD AUTO: 456 THOUSANDS/UL (ref 150–450)
PLATELET BLD QL SMEAR: ABNORMAL
PMV BLD AUTO: 8.4 FL (ref 8.9–12.7)
POTASSIUM SERPL-SCNC: 3.8 MMOL/L (ref 3.6–5)
PROT SERPL-MCNC: 7.3 G/DL (ref 5.9–8.4)
RBC # BLD AUTO: 4.57 MILLION/UL (ref 4–5.2)
RBC MORPH BLD: ABNORMAL
RETICS # CALC: 1.13 % (ref 0.87–2.63)
SODIUM SERPL-SCNC: 138 MMOL/L (ref 137–147)
T4 FREE SERPL-MCNC: 2.25 NG/DL (ref 0.76–1.46)
TIBC SERPL-MCNC: 323 UG/DL (ref 250–450)
TRIGL SERPL-MCNC: 126 MG/DL
TSH SERPL DL<=0.05 MIU/L-ACNC: <0.015 UIU/ML (ref 0.47–4.68)
VIT B12 SERPL-MCNC: 194 PG/ML (ref 100–900)
WBC # BLD AUTO: 10.2 THOUSAND/UL (ref 4.5–11)

## 2020-09-10 PROCEDURE — 83540 ASSAY OF IRON: CPT | Performed by: INTERNAL MEDICINE

## 2020-09-10 PROCEDURE — 84439 ASSAY OF FREE THYROXINE: CPT | Performed by: INTERNAL MEDICINE

## 2020-09-10 PROCEDURE — 84443 ASSAY THYROID STIM HORMONE: CPT | Performed by: INTERNAL MEDICINE

## 2020-09-10 PROCEDURE — 83550 IRON BINDING TEST: CPT | Performed by: INTERNAL MEDICINE

## 2020-09-10 PROCEDURE — 83918 ORGANIC ACIDS TOTAL QUANT: CPT

## 2020-09-10 PROCEDURE — 85025 COMPLETE CBC W/AUTO DIFF WBC: CPT | Performed by: INTERNAL MEDICINE

## 2020-09-10 PROCEDURE — 84445 ASSAY OF TSI GLOBULIN: CPT | Performed by: INTERNAL MEDICINE

## 2020-09-10 PROCEDURE — 82728 ASSAY OF FERRITIN: CPT | Performed by: INTERNAL MEDICINE

## 2020-09-10 PROCEDURE — 82607 VITAMIN B-12: CPT

## 2020-09-10 PROCEDURE — 80053 COMPREHEN METABOLIC PANEL: CPT | Performed by: INTERNAL MEDICINE

## 2020-09-10 PROCEDURE — 80061 LIPID PANEL: CPT | Performed by: INTERNAL MEDICINE

## 2020-09-10 PROCEDURE — 85045 AUTOMATED RETICULOCYTE COUNT: CPT

## 2020-09-10 PROCEDURE — 36415 COLL VENOUS BLD VENIPUNCTURE: CPT | Performed by: INTERNAL MEDICINE

## 2020-09-10 RX ORDER — SENNOSIDES 8.6 MG
650 CAPSULE ORAL EVERY 8 HOURS PRN
Qty: 30 TABLET | Refills: 0 | Status: SHIPPED | OUTPATIENT
Start: 2020-09-10

## 2020-09-10 NOTE — TELEPHONE ENCOUNTER
Patient made aware of results and recommendation  Patient wants to call to schedule  Patient will call if help is needed  Patient needs refill   This is pending

## 2020-09-10 NOTE — TELEPHONE ENCOUNTER
----- Message from Esthela Lagunas MD sent at 9/10/2020 11:16 AM EDT -----  TSH is suppressed   this would explain her tachycardia     I have ordered thyroid uptake test   Please schedule as soon as possible

## 2020-09-11 ENCOUNTER — TELEPHONE (OUTPATIENT)
Dept: ENDOCRINOLOGY | Facility: CLINIC | Age: 30
End: 2020-09-11

## 2020-09-11 DIAGNOSIS — R00.0 TACHYCARDIA: ICD-10-CM

## 2020-09-11 DIAGNOSIS — E05.90 HYPERTHYROIDISM: Primary | ICD-10-CM

## 2020-09-11 RX ORDER — PROPRANOLOL HYDROCHLORIDE 10 MG/1
10 TABLET ORAL EVERY 12 HOURS SCHEDULED
Qty: 60 TABLET | Refills: 1 | Status: SHIPPED | OUTPATIENT
Start: 2020-09-11 | End: 2021-04-27 | Stop reason: SDUPTHER

## 2020-09-11 NOTE — TELEPHONE ENCOUNTER
Patient is new and not sure where would be best place to put her in next week  Did you want me to put her in at noon one day?   Thank you

## 2020-09-12 LAB — TSI SER-ACNC: 10.4 IU/L (ref 0–0.55)

## 2020-09-14 LAB
METHYLMALONATE SERPL-SCNC: 314 NMOL/L (ref 0–378)
SL AMB DISCLAIMER: NORMAL

## 2020-09-17 ENCOUNTER — TELEPHONE (OUTPATIENT)
Dept: ENDOCRINOLOGY | Facility: CLINIC | Age: 30
End: 2020-09-17

## 2020-09-17 ENCOUNTER — CONSULT (OUTPATIENT)
Dept: ENDOCRINOLOGY | Facility: CLINIC | Age: 30
End: 2020-09-17
Payer: COMMERCIAL

## 2020-09-17 VITALS
HEIGHT: 66 IN | HEART RATE: 85 BPM | WEIGHT: 217 LBS | SYSTOLIC BLOOD PRESSURE: 112 MMHG | TEMPERATURE: 97.3 F | DIASTOLIC BLOOD PRESSURE: 60 MMHG | BODY MASS INDEX: 34.87 KG/M2

## 2020-09-17 DIAGNOSIS — R73.01 IMPAIRED FASTING GLUCOSE: ICD-10-CM

## 2020-09-17 DIAGNOSIS — E05.00 GRAVES DISEASE: Primary | ICD-10-CM

## 2020-09-17 PROCEDURE — 1036F TOBACCO NON-USER: CPT | Performed by: INTERNAL MEDICINE

## 2020-09-17 PROCEDURE — 99244 OFF/OP CNSLTJ NEW/EST MOD 40: CPT | Performed by: INTERNAL MEDICINE

## 2020-09-17 RX ORDER — METHIMAZOLE 10 MG/1
TABLET ORAL
Qty: 45 TABLET | Refills: 3 | Status: SHIPPED | OUTPATIENT
Start: 2020-09-17 | End: 2021-04-27 | Stop reason: SDUPTHER

## 2020-09-17 NOTE — PROGRESS NOTES
Ky Kramer 31 y o  female MRN: [id number]    Encounter: [id number]      Assessment/Plan     Problem List Items Addressed This Visit        Endocrine    Graves disease - Primary     Lab testing consistent with Graves disease-she does not need to have  thyroid uptake and scan  I have counseled her on complications of uncontrolled did Graves disease including risk of arrhythmia, congestive heart failure, bone loss and thyroid storm  For now will continue beta blockers, start methimazole 15 mg daily, side effects discussed  She is to call the office if she has any fever, rashes, sore throat  Will repeat thyroid function test in the next month         Relevant Medications    methimazole (TAPAZOLE) 10 mg tablet    Other Relevant Orders    T4, free Lab Collect    T3 Lab Collect    TSH, 3rd generation Lab Collect    Impaired fasting glucose     Repeat fasting glucose, also check hemoglobin A1c         Relevant Orders    HEMOGLOBIN A1C W/ EAG ESTIMATION Lab Collect    Comprehensive metabolic panel Lab Collect        CC:   Hyperthyroidism    History of Present Illness     HPI:  54-year-old female referred here for evaluation of hyperthyroidism  She had initially presented to her primary care with C/o dizziness , during workup was found to have a suppressed TSH and elevated free T4  No palpitations , lost 5-6 lbs and gained it back, usually has a BM every 1-2 times a week- has been like that since she was a child   C/o heat intolerance - chronic   Excessive sweating for 3-4 years   C/o sleep disturbances , wakes up multiple times -for many years   C/o anxiety - chronic   No tremors   11 months post partum ,   No galactorrhea     No neck pain , no URI ,no CT scan with IV dye            Review of Systems   Constitutional: Positive for fatigue  Negative for unexpected weight change  Eyes: Negative for visual disturbance  Respiratory: Negative for cough  Cardiovascular: Negative for palpitations and leg swelling  Gastrointestinal: Negative for diarrhea, nausea and vomiting  Musculoskeletal: Negative for gait problem  Skin: Negative for wound  Neurological: Positive for dizziness  Negative for tremors and weakness  Psychiatric/Behavioral: Positive for sleep disturbance  All other systems reviewed and are negative        Historical Information   Past Medical History:   Diagnosis Date    Disease of thyroid gland     Pap smear abnormality of cervix with LGSIL 2016    Varicella     Had as a child     Past Surgical History:   Procedure Laterality Date     SECTION      CYSTOSCOPY N/A 10/14/2019    Procedure: CYSTOSCOPY;  Surgeon: Florence Marcus MD;  [facility]: [facility];  Service: Gynecology    DILATION AND EVACUATION      Molar    HYSTERECTOMY N/A 10/14/2019    Procedure: RAY, BILATERAL SALPINGECTOMY;  Surgeon: Florence Marcus MD;  [facility]: [facility];  Service: Gynecology    LAPAROSCOPY      SD  DELIVERY ONLY N/A 10/14/2019    Procedure:  SECTION () REPEAT;  Surgeon: Florence Marcus MD;  Location: [facility];  Service: Gynecology     Social History   Social History     Substance and Sexual Activity   Alcohol Use Not Currently     Social History     Substance and Sexual Activity   Drug Use No     Social History     Tobacco Use   Smoking Status Never Smoker   Smokeless Tobacco Never Used     Family History:   Family History   Problem Relation Age of Onset    Hypertension Mother     Lupus Mother     Pulmonary embolism Mother     No Known Problems Father     Asthma Sister     Thyroid disease unspecified Family        Meds/Allergies   Current Outpatient Medications   Medication Sig Dispense Refill    acetaminophen (TYLENOL) 650 mg CR tablet Take 1 tablet (650 mg total) by mouth every 8 (eight) hours as needed for mild pain or moderate pain 30 tablet 0    propranolol (INDERAL) 10 mg tablet Take 1 tablet (10 mg total) by mouth every 12 (twelve) hours 60 tablet 1    methimazole (TAPAZOLE) 10 mg tablet 1 1/2 tab daily 45 tablet 3     No current facility-administered medications for this visit  Allergies   Allergen Reactions    Penicillins Anaphylaxis     Anaphylaxis    Shrimp (Diagnostic)     Venofer [Iron Sucrose] Itching       Objective   Vitals: Blood pressure 112/60, pulse 85, temperature (!) 97 3 °F (36 3 °C), height 5' 6" (1 676 m), weight 98 4 kg (217 lb), not currently breastfeeding  Physical Exam  Vitals signs reviewed  Constitutional:       Appearance: Normal appearance  She is obese  She is not diaphoretic  HENT:      Head: Normocephalic and atraumatic  Eyes:      General: No scleral icterus  Extraocular Movements: Extraocular movements intact  Comments: No exophthalmos or lid lag   Neck:      Musculoskeletal: Neck supple  Comments: Thyroid gland feels slightly enlarged and firm, no tenderness  Cardiovascular:      Rate and Rhythm: Normal rate and regular rhythm  Heart sounds: Normal heart sounds  No murmur  No gallop  Pulmonary:      Effort: Pulmonary effort is normal  No respiratory distress  Breath sounds: Normal breath sounds  No wheezing or rales  Abdominal:      General: There is no distension  Palpations: Abdomen is soft  Tenderness: There is no abdominal tenderness  There is no guarding  Musculoskeletal:      Right lower leg: No edema  Left lower leg: No edema  Lymphadenopathy:      Cervical: No cervical adenopathy  Skin:     General: Skin is warm and dry  Neurological:      General: No focal deficit present  Mental Status: She is alert and oriented to person, place, and time  Psychiatric:         Mood and Affect: Mood normal          Behavior: Behavior normal          Thought Content: Thought content normal          Judgment: Judgment normal          The history was obtained from the review of the chart, patient      Lab Results:   Lab Results   Component Value Date/Time    TSH 3RD GENERATON <0 015 (L) 09/10/2020 08:17 AM    Free T4 2 25 (H) 09/10/2020 08:17 AM       Imaging Studies:          Portions of the record may have been created with voice recognition software  Occasional wrong word or "sound a like" substitutions may have occurred due to the inherent limitations of voice recognition software  Read the chart carefully and recognize, using context, where substitutions have occurred

## 2020-09-17 NOTE — TELEPHONE ENCOUNTER
Patient read the handout given and she wanted to let you know that she does experience forgetfulness and wanted to know if this would get worse with time

## 2020-09-17 NOTE — PATIENT INSTRUCTIONS
Methimazole (By mouth)   Methimazole (meth-IM-a-zole)  Treats hyperthyroidism (too much thyroid hormone produced by the thyroid gland)  Brand Name(s): Tapazole   There may be other brand names for this medicine  When This Medicine Should Not Be Used: You should not use this medicine if you have had an allergic reaction to methimazole or carbimazole, or if you are pregnant or breastfeeding  How to Use This Medicine:   Tablet  · Your doctor will tell you how much of this medicine to take and how often  Do not take more medicine or take it more often than your doctor tells you to  · You may take this medicine with or without food  · It is best to take this medicine at the same time every day  If a dose is missed:   · If you miss a dose or forget to take your medicine, take it as soon as you can  If it is almost time for your next dose, wait until then to take the medicine and skip the missed dose  · Do not use extra medicine to make up for a missed dose  How to Store and Dispose of This Medicine:   · Store the medicine at room temperature, away from heat, moisture, and direct light  · Keep all medicine out of the reach of children and never share your medicine with anyone  Drugs and Foods to Avoid:   Ask your doctor or pharmacist before using any other medicine, including over-the-counter medicines, vitamins, and herbal products  · Make sure your doctor knows if you are using a blood thinner (Coumadin®), blood pressure medicine (such as atenolol, metoprolol, propranolol, Corgard®, Inderal®, Lopressor®, Toprol®, Tenormin®), digoxin (Lanoxin®), or theophylline (Alen-Dur®)  Warnings While Using This Medicine:   · Using this medicine while you are pregnant can harm your unborn baby  Use an effective form of birth control to keep from getting pregnant  If you think you have become pregnant while using the medicine, tell your doctor right away    · Make sure any doctor or dentist who treats you knows that you are using this medicine  Possible Side Effects While Using This Medicine:   Call your doctor right away if you notice any of these side effects:  · Dark-colored urine, pale stools  · Fever, chills, cough, sore throat  · Nausea, vomiting, loss of appetite, pain in the upper stomach  · Numbness, tingling, or burning in your hands or feet  · Severe skin rash or itching  · Unusual bruising or bleeding  · Unusual weakness or tiredness  · Yellow skin or eyes  If you notice these less serious side effects, talk with your doctor:   · Dizziness  · Joint pain  · Mild nausea and vomiting, stomach upset  · Mild skin rash  If you notice other side effects that you think are caused by this medicine, tell your doctor  Call your doctor for medical advice about side effects  You may report side effects to FDA at 1-436-FDA-9197  © 2017 [address] Information is for End User's use only and may not be sold, redistributed or otherwise used for commercial purposes  The above information is an  only  It is not intended as medical advice for individual conditions or treatments  Talk to your doctor, nurse or pharmacist before following any medical regimen to see if it is safe and effective for you  Graves Disease   AMBULATORY CARE:   Graves disease  is an autoimmune disease that causes your immune system to attack your thyroid gland  This causes your body to make too much thyroid hormone and leads to hyperthyroidism  The thyroid gland is a butterfly-shaped organ that is found in the front part of your neck  Thyroid hormones regulate body temperature, heart rate, and weight     Common signs and symptoms include the following:   · Nervousness, irritability, fatigue, or muscle weakness    · Trouble sleeping or increased sensitivity to heat    · Hand tremors or increased sweating    · An irregular or fast heartbeat    · Diarrhea or more frequent bowel movements than usual    · Losing weight without trying    · Children may have a decreased attention span that leads to a drop in school grades    · Symptoms of Graves ophthalmology (GO) such as protruding eyeballs, puffy eyelids, double vision, light sensitivity, or pain or pressure in your eyes    · Reddening or thickening of skin on the shins  Thyroid storm:  A thyroid storm happens when your thyroid hormone levels get too high  Your body temperature may go very high, your heart may beat very fast, and you may have problems thinking  You may have increased sweating, vomiting, or diarrhea  You may have seizures or go into a coma  Thyroid storm may happen if you have hyperthyroidism and get an infection or stop taking your thyroid medicine  Injuries, burns, and certain medicines can also cause a thyroid storm  Call 911 or have someone call 911 for any of the following:   · You have a seizure  · You feel like you are going to faint  · You have sudden chest pain or shortness of breath  Seek care immediately if:   · You have a fever  · You have trouble thinking clearly  · You are shaking or sweating  · Your heart is beating faster than usual, or you have an irregular heartbeat  Contact your healthcare provider if:   · You have nausea, vomiting, or diarrhea  · You feel nervous, restless, confused or agitated       · You run out of thyroid medicine, or you have stopped taking it  · You have questions or concerns about your condition or care  Treatment for Graves disease  may include any of the following:  · Antithyroid medicines  decrease thyroid hormone levels and your symptoms  · Radioactive iodine  is given to damage or kill some thyroid gland cells  This may decrease the amount of thyroid hormone produced  Tell your healthcare provider if you are breastfeeding, or you know or think you are pregnant  This medicine can be harmful to your baby  · Heart medicine  may be given to control and regulate your heart rate      · Treatment for eye problems  may also be needed  Eye drops can help to relieve dry or irritated eyes  Steroids may be given to decrease eye swelling and pain  Special lenses may be needed if you have double vision  Radiation may be applied to your eyes to decrease swelling if you have severe eye problems  · Surgery  may be done to remove all or part of the thyroid gland  Manage Graves disease:   · Do not smoke or be around secondhand smoke  Cigarette smoke increases your risk of GO or can make it worse if you already have it  Nicotine and other chemicals in cigarettes and cigars can also cause lung damage  Ask your healthcare provider for information if you currently smoke and need help to quit  E-cigarettes or smokeless tobacco still contain nicotine  Talk to your healthcare provider before you use these products  · Sleep with your head elevated if you have eye symptoms  This may help to decrease swelling of your eyelids  Your healthcare provider may recommend that you sleep with your eyes taped shut  This can help to prevent dry eyes  · Sunglasses  may help decrease light sensitivity  · Take medicine as directed and go to follow-up appointments  Do not stop taking your medicine unless your healthcare provider has asked you to  This could increase your thyroid levels and lead to other health problems  You will need to go to regular follow-up appointments to have your thyroid levels checked  Follow up with your healthcare provider as directed: You may need to return for regular blood tests to check your thyroid hormone levels  This will help your healthcare provider decide if you are getting the right amount of medicine  Do not stop taking your medicines without talking to your healthcare provider first  Write down your questions so you remember to ask them during your visits     © 2017 [facility] Information is for End User's use only and may not be sold, redistributed or otherwise used for commercial purposes  All illustrations and images included in CareNotes® are the copyrighted property of [employer]  or [employer]  The above information is an  only  It is not intended as medical advice for individual conditions or treatments  Talk to your doctor, nurse or pharmacist before following any medical regimen to see if it is safe and effective for you

## 2020-10-02 ENCOUNTER — TELEPHONE (OUTPATIENT)
Dept: FAMILY MEDICINE CLINIC | Facility: CLINIC | Age: 30
End: 2020-10-02

## 2020-10-23 PROBLEM — E05.90 HYPERTHYROIDISM: Status: ACTIVE | Noted: 2020-10-23

## 2020-12-03 ENCOUNTER — HOSPITAL ENCOUNTER (EMERGENCY)
Facility: HOSPITAL | Age: 30
Discharge: HOME | End: 2020-12-03
Attending: EMERGENCY MEDICINE | Admitting: EMERGENCY MEDICINE
Payer: COMMERCIAL

## 2020-12-03 VITALS
DIASTOLIC BLOOD PRESSURE: 79 MMHG | RESPIRATION RATE: 18 BRPM | OXYGEN SATURATION: 99 % | WEIGHT: 217.37 LBS | HEART RATE: 95 BPM | TEMPERATURE: 98.6 F | BODY MASS INDEX: 35.09 KG/M2 | SYSTOLIC BLOOD PRESSURE: 137 MMHG

## 2020-12-03 DIAGNOSIS — R19.7 DIARRHEA: ICD-10-CM

## 2020-12-03 DIAGNOSIS — R68.89 FLU-LIKE SYMPTOMS: ICD-10-CM

## 2020-12-03 DIAGNOSIS — M79.10 MYALGIA: Primary | ICD-10-CM

## 2020-12-03 PROCEDURE — 99282 EMERGENCY DEPT VISIT SF MDM: CPT | Performed by: PHYSICIAN ASSISTANT

## 2020-12-03 PROCEDURE — 99283 EMERGENCY DEPT VISIT LOW MDM: CPT

## 2020-12-03 PROCEDURE — U0003 INFECTIOUS AGENT DETECTION BY NUCLEIC ACID (DNA OR RNA); SEVERE ACUTE RESPIRATORY SYNDROME CORONAVIRUS 2 (SARS-COV-2) (CORONAVIRUS DISEASE [COVID-19]), AMPLIFIED PROBE TECHNIQUE, MAKING USE OF HIGH THROUGHPUT TECHNOLOGIES AS DESCRIBED BY CMS-2020-01-R: HCPCS | Performed by: PHYSICIAN ASSISTANT

## 2020-12-04 LAB — SARS-COV-2 RNA SPEC QL NAA+PROBE: NOT DETECTED

## 2021-01-12 ENCOUNTER — OFFICE VISIT (OUTPATIENT)
Dept: FAMILY MEDICINE CLINIC | Facility: CLINIC | Age: 31
End: 2021-01-12
Payer: COMMERCIAL

## 2021-01-12 VITALS
BODY MASS INDEX: 34.55 KG/M2 | HEART RATE: 94 BPM | OXYGEN SATURATION: 98 % | HEIGHT: 66 IN | WEIGHT: 215 LBS | TEMPERATURE: 99 F

## 2021-01-12 DIAGNOSIS — D18.01 HEMANGIOMA OF SKIN: ICD-10-CM

## 2021-01-12 DIAGNOSIS — E05.00 GRAVES DISEASE: Primary | ICD-10-CM

## 2021-01-12 DIAGNOSIS — I83.93 SPIDER VEINS OF BOTH LOWER EXTREMITIES: ICD-10-CM

## 2021-01-12 PROCEDURE — 3008F BODY MASS INDEX DOCD: CPT | Performed by: INTERNAL MEDICINE

## 2021-01-12 PROCEDURE — 99213 OFFICE O/P EST LOW 20 MIN: CPT | Performed by: INTERNAL MEDICINE

## 2021-01-12 NOTE — PROGRESS NOTES
Assessment/Plan:         Diagnoses and all orders for this visit:    Hemangioma of skin  -     Ambulatory referral to Dermatology; Future  -     CBC and differential  Reassured the patient that benign in nature however because of the multiple lesions appearing the same time Dermatology consultation  Spider veins of both lower extremities    weight loss compression stockings  Graves disease  Patient encouraged to follow up with endocrinology    Subjective:      Patient ID: Nilsa Lindsey is a 31 y o  female  HPI  Patient is here with rash in her back which she started noticing few weeks ago  She has noticed for the last couple of weeks she has been quite pruritic has noticed several red raised lesions  She is short have all appeared with the last couple weeks to a month  The rash is consistent with hemangioma  There is mild acne of her back as well  She also is concern spider veins her lower extremity  It appears her mom has significant spider veins  We discuss different treatment options including weight loss Nathan stocking  Encouraged patient to follow up with endocrinology for directions about methimazole  Heart rate is noted to be on the higher side  She is also due for lab studies  The following portions of the patient's history were reviewed and updated as appropriate: allergies, current medications, past family history, past medical history, past social history, past surgical history and problem list     Review of Systems      Objective:      Pulse 94   Temp 99 °F (37 2 °C)   Ht 5' 6" (1 676 m)   Wt 97 5 kg (215 lb)   LMP 03/23/2019 (Within Days)   SpO2 98%   BMI 34 70 kg/m²          Physical Exam  Musculoskeletal:      Right lower leg: No edema  Left lower leg: No edema     Skin:     Comments: Several Hemangioma back arms    Spider nevi lower extremity

## 2021-02-17 ENCOUNTER — ANNUAL EXAM (OUTPATIENT)
Dept: OBGYN CLINIC | Facility: CLINIC | Age: 31
End: 2021-02-17
Payer: COMMERCIAL

## 2021-02-17 VITALS
HEIGHT: 66 IN | BODY MASS INDEX: 32.78 KG/M2 | SYSTOLIC BLOOD PRESSURE: 106 MMHG | WEIGHT: 204 LBS | TEMPERATURE: 97.3 F | HEART RATE: 101 BPM | DIASTOLIC BLOOD PRESSURE: 70 MMHG

## 2021-02-17 DIAGNOSIS — K59.04 FUNCTIONAL CONSTIPATION: ICD-10-CM

## 2021-02-17 DIAGNOSIS — Z90.711 HISTORY OF HYSTERECTOMY, SUPRACERVICAL: ICD-10-CM

## 2021-02-17 DIAGNOSIS — Z01.411 ENCNTR FOR GYN EXAM (GENERAL) (ROUTINE) W ABNORMAL FINDINGS: Primary | ICD-10-CM

## 2021-02-17 DIAGNOSIS — N94.10 DYSPAREUNIA IN FEMALE: ICD-10-CM

## 2021-02-17 PROBLEM — Z86.32 HISTORY OF DIET-CONTROLLED GESTATIONAL DIABETES MELLITUS: Status: RESOLVED | Noted: 2019-11-11 | Resolved: 2021-02-17

## 2021-02-17 PROBLEM — O99.019 ANEMIA DURING PREGNANCY: Status: RESOLVED | Noted: 2019-04-30 | Resolved: 2021-02-17

## 2021-02-17 LAB
BACTERIA UR QL AUTO: ABNORMAL /HPF
BILIRUB UR QL STRIP: NEGATIVE
CAOX CRY URNS QL MICRO: ABNORMAL /HPF
CLARITY UR: CLEAR
COLOR UR: YELLOW
GLUCOSE UR STRIP-MCNC: NEGATIVE MG/DL
HGB UR QL STRIP.AUTO: NEGATIVE
KETONES UR STRIP-MCNC: NEGATIVE MG/DL
LEUKOCYTE ESTERASE UR QL STRIP: NEGATIVE
NITRITE UR QL STRIP: NEGATIVE
NON-SQ EPI CELLS URNS QL MICRO: ABNORMAL /HPF
PH UR STRIP.AUTO: 6 [PH]
PROT UR STRIP-MCNC: NEGATIVE MG/DL
RBC #/AREA URNS AUTO: ABNORMAL /HPF
SP GR UR STRIP.AUTO: 1.03 (ref 1–1.03)
UROBILINOGEN UR QL STRIP.AUTO: 0.2 E.U./DL
WBC #/AREA URNS AUTO: ABNORMAL /HPF

## 2021-02-17 PROCEDURE — 1036F TOBACCO NON-USER: CPT | Performed by: OBSTETRICS & GYNECOLOGY

## 2021-02-17 PROCEDURE — 0503F POSTPARTUM CARE VISIT: CPT | Performed by: OBSTETRICS & GYNECOLOGY

## 2021-02-17 PROCEDURE — 87086 URINE CULTURE/COLONY COUNT: CPT | Performed by: OBSTETRICS & GYNECOLOGY

## 2021-02-17 PROCEDURE — 87591 N.GONORRHOEAE DNA AMP PROB: CPT | Performed by: OBSTETRICS & GYNECOLOGY

## 2021-02-17 PROCEDURE — 87491 CHLMYD TRACH DNA AMP PROBE: CPT | Performed by: OBSTETRICS & GYNECOLOGY

## 2021-02-17 PROCEDURE — 81001 URINALYSIS AUTO W/SCOPE: CPT | Performed by: OBSTETRICS & GYNECOLOGY

## 2021-02-17 PROCEDURE — 3008F BODY MASS INDEX DOCD: CPT | Performed by: OBSTETRICS & GYNECOLOGY

## 2021-02-17 PROCEDURE — 99395 PREV VISIT EST AGE 18-39: CPT | Performed by: OBSTETRICS & GYNECOLOGY

## 2021-02-17 RX ORDER — DOCUSATE SODIUM 100 MG/1
100 CAPSULE, LIQUID FILLED ORAL 2 TIMES DAILY
Qty: 60 CAPSULE | Refills: 6 | Status: SHIPPED | OUTPATIENT
Start: 2021-02-17 | End: 2022-04-05 | Stop reason: SDUPTHER

## 2021-02-17 RX ORDER — POLYETHYLENE GLYCOL 3350 17 G/17G
17 POWDER, FOR SOLUTION ORAL DAILY PRN
Qty: 507 G | Refills: 1 | Status: SHIPPED | OUTPATIENT
Start: 2021-02-17 | End: 2021-09-16

## 2021-02-17 NOTE — PATIENT INSTRUCTIONS
Dyspareunia in Women   AMBULATORY CARE:   Dyspareunia  is pain during intercourse (sex)  You may have pain before, during, or after sex  You may have pain every time you have sex, or only certain times  You may have had pain since the first time you had sex, or the pain might start suddenly  Dyspareunia may cause you to feel embarrassed or cause problems in your relationship with your partner  Many causes of dyspareunia can be treated  It is important for you to talk with your healthcare provider about your symptoms  Common signs and symptoms:  Signs and symptoms will depend on the cause  You may have any of the following:  · Pain anywhere from the opening of your vagina to your abdomen    · Pain with penetration, including when you insert a sex toy    · A feeling of pressure or burning anywhere in your vagina    · Less interest in having sex, trouble becoming aroused, or trouble having an orgasm    · A watery discharge from your vagina    Contact your healthcare provider if:   · You have new or worsening symptoms  · You have questions or concerns about your condition or care  Treatment  depends on the cause of your symptoms  You may need any of the following:  · Medicine  may be given to treat an infection or to relieve pain  Pain medicine may be given as a pill or as a cream you can apply to your vagina  · Estrogen  may be given as a cream, a pill, or a ring that fits in your vagina  Estrogen may help relieve your symptoms if they are caused by low estrogen levels  · Lubricant  can help make sex more comfortable  Lubricants are available without a prescription  Talk to your healthcare provider about which kind to use if you are using condoms for birth control  Oil-based lubricants can damage condoms  Choose a silicone-based or water-based lubricant  · Surgery  may be needed to remove a tumor or fibroid  Surgery can also be used to remove extra tissue from your vagina, or to widen your vagina   You may need surgery to fix a problem with a structure in your lower abdomen  Manage dyspareunia:   · A sitz bath  may help reduce inflammation  To make a sitz bath, fill the bathtub with warm water until it is at about the level of your belly button  Stay in the bath for about 15 to 20 minutes  A sitz bath is also available as a small tub that will fit under your toilet seat  You will fill the tub with water as directed once it is under the toilet seat  Your healthcare provider can tell you how often to use a sitz bath  · Kegel exercises  may be recommended to help strengthen your pelvic muscles  Pelvic muscles hold your pelvic organs, such as your bladder and uterus, in place  To do Kegel exercises, tighten your pelvic muscles slowly  It should feel like you are trying to hold back urine  Hold these muscles and count to 3  Relax, tighten them quickly, and release  Repeat the cycle 10 times  · A change of position  can help make sex more comfortable  You might also want to try having sex at different times of the month if you have monthly periods  This will help you find a time of the month that is most comfortable for you  · Therapy  with a mental health counselor may help you feel less anxious about sex  You can talk to a counselor by yourself or with your partner  Follow up with your healthcare provider as directed:  Write down your questions so you remember to ask them during your visits  © Copyright [address] Information is for End User's use only and may not be sold, redistributed or otherwise used for commercial purposes  All illustrations and images included in CareNotes® are the copyrighted property of A D A M , Inc  or [facility] Allison Merrill   The above information is an  only  It is not intended as medical advice for individual conditions or treatments   Talk to your doctor, nurse or pharmacist before following any medical regimen to see if it is safe and effective for you

## 2021-02-17 NOTE — PROGRESS NOTES
Assessment        Diagnoses and all orders for this visit:    Encntr for gyn exam (general) (routine) w abnormal findings    Delivery by  hysterectomy    Dyspareunia in female  -     US pelvis complete w transvaginal; Future    Functional constipation  -     docusate sodium (COLACE) 100 mg capsule; Take 1 capsule (100 mg total) by mouth 2 (two) times a day  -     polyethylene glycol (GLYCOLAX) 17 GM/SCOOP powder; Take 17 g by mouth daily as needed (constipation)  -     Cancel: Ambulatory referral to Gastroenterology; Future  -     Ambulatory referral to Gastroenterology; Future                  Plan      All questions answered  Contraception: status post hysterectomy  Discussed healthy lifestyle modifications  Educational material distributed  Follow up in 1 year  Follow up as needed  Pelvic ultrasound  Patient with history of  hysterectomy  Examination is shows still present ectocervix  She does have some mild cervical tenderness and presents with vaginal pressure  Pelvic ultrasound was ordered to check bilateral ovaries for any pelvic masses  Advised lubricants for intercourse  Will call pt with results  Pap will need to be collected in  since cervix still present  Advised Colace and Miralax -- Referral to GI if constipation continues  Michael      Hailee Bowen is a 31 y o  female who presents for annual exam       Chief Complaint   Patient presents with    Gynecologic Exam     Last pap 19 negative - and has c/o of vaginal pressure x1 month       Patient with history of delivery via  hysterectomy on 2019  At that time, total abdominal hysterectomy with bilateral salpingectomy was performed  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  She has been on medications for hyperthyroidism/Grave's disease  Supracervical   H/o vaginal pressure x 1 month  She has some pain with intercourse  No vaginal dryness    She has no problems with urination or BM  She does have constipation, states she only has a bowel movement 1-2x/month,      Last Pap: 19  Last mammogram:   Colorectal cancer screening:DEXA:      Current contraception: status post hysterectomy  History of abnormal Pap smear: no  History of abnormal mammogram: no  Family history of uterine or ovarian cancer: no  Family history of breast cancer: no  Family history of colon cancer: no        Menstrual History:  OB History        9    Para   6    Term   5       1    AB   3    Living   5       SAB   3    TAB   0    Ectopic   0    Multiple   0    Live Births   5                Menarche age: 14  Patient's last menstrual period was 2019 (within days)           Past Medical History:   Diagnosis Date    Anemia, postpartum 2019    Delivery by  hysterectomy 10/14/2019    Disease of thyroid gland     History of diet-controlled gestational diabetes mellitus 2019    Pap smear abnormality of cervix with LGSIL 2016    Varicella     Had as a child     Past Surgical History:   Procedure Laterality Date   [facility] N/A 10/14/2019    Procedure: CYSTOSCOPY;  Surgeon: Heber Sauer MD;  [facility];  Service: Gynecology    DILATION AND EVACUATION      Molar    HYSTERECTOMY N/A 10/14/2019    Procedure: RAY, BILATERAL SALPINGECTOMY;  Surgeon: Heber Sauer MD;  [facility];  Service: Gynecology    LAPAROSCOPY      [place]  DELIVERY ONLY N/A 10/14/2019    Procedure: Maria Del Carmen Morelos () REPEAT;  Surgeon: Heber Sauer MD;  [facility];  Service: Gynecology     Family History   Problem Relation Age of Onset    Hypertension Mother    Kelin Slipper Lupus Mother     Pulmonary embolism Mother     No Known Problems Father     Asthma Sister     No Known Problems Brother     Thyroid disease unspecified Family     No Known Problems Daughter     No Known Problems Son    Kelin Slipper Other Maternal Grandmother         Pre-diabetic    Heart murmur Brother     No Known Problems Daughter     No Known Problems Son     Asthma Son        Social History     Tobacco Use    Smoking status: Never Smoker    Smokeless tobacco: Never Used   Substance Use Topics    Alcohol use: Not Currently    Drug use: No          Current Outpatient Medications:     acetaminophen (TYLENOL) 650 mg CR tablet, Take 1 tablet (650 mg total) by mouth every 8 (eight) hours as needed for mild pain or moderate pain, Disp: 30 tablet, Rfl: 0    methimazole (TAPAZOLE) 10 mg tablet, 1 1/2 tab daily, Disp: 45 tablet, Rfl: 3    propranolol (INDERAL) 10 mg tablet, Take 1 tablet (10 mg total) by mouth every 12 (twelve) hours, Disp: 60 tablet, Rfl: 1    docusate sodium (COLACE) 100 mg capsule, Take 1 capsule (100 mg total) by mouth 2 (two) times a day, Disp: 60 capsule, Rfl: 6    polyethylene glycol (GLYCOLAX) 17 GM/SCOOP powder, Take 17 g by mouth daily as needed (constipation), Disp: 507 g, Rfl: 1    Allergies   Allergen Reactions    Penicillins Anaphylaxis     Anaphylaxis    Shrimp (Diagnostic)     Venofer [Iron Sucrose] Itching           Review of Systems   Constitutional: Positive for unexpected weight change  HENT: Negative  Eyes: Negative  Respiratory: Negative  Cardiovascular: Negative  Gastrointestinal: Positive for constipation  Endocrine: Negative  Genitourinary:        As noted in HPI   Musculoskeletal: Negative  Skin: Negative  Allergic/Immunologic: Negative  Neurological: Negative  Hematological: Negative  Psychiatric/Behavioral: Negative  /70 (BP Location: Right arm, Patient Position: Sitting, Cuff Size: Large)   Pulse 101   Temp (!) 97 3 °F (36 3 °C) (Temporal)   Ht 5' 6" (1 676 m)   Wt 92 5 kg (204 lb)   LMP 03/23/2019 (Within Days)   BMI 32 93 kg/m²         Physical Exam  Constitutional:       Appearance: She is well-developed     Genitourinary:      Pelvic exam was performed with patient supine  Urethra, bladder, right adnexa, left adnexa and rectum normal       No vulval lesion, tenderness, ulcerations, Bartholin's cyst or rash noted  No signs of labial injury  No posterior fourchette tenderness, injury, rash or lesion present  No inguinal adenopathy present in the right or left side  No signs of injury in the vagina  No vaginal discharge, erythema, tenderness, bleeding or prolapse  No foreign body in the vagina  Cervical motion tenderness present  No cervical lesion, bleeding or polyp  Uterus is absent  No right or left adnexal mass present  Right adnexa not tender or full  Left adnexa not tender or full  Rectum:      No external hemorrhoid  HENT:      Head: Normocephalic  Cardiovascular:      Rate and Rhythm: Regular rhythm  Heart sounds: Normal heart sounds  No murmur  Pulmonary:      Effort: Pulmonary effort is normal  No respiratory distress  Breath sounds: Normal breath sounds  Chest:      Chest wall: No mass or tenderness  Breasts:         Right: No inverted nipple, mass, nipple discharge, skin change or tenderness  Left: No inverted nipple, mass, nipple discharge, skin change or tenderness  Abdominal:      General: There is no distension  Palpations: Abdomen is soft  Tenderness: There is no abdominal tenderness  There is no guarding  Lymphadenopathy:      Lower Body: No right inguinal adenopathy  No left inguinal adenopathy  Neurological:      Mental Status: She is alert and oriented to person, place, and time  Skin:     General: Skin is warm and dry

## 2021-02-18 LAB — BACTERIA UR CULT: NORMAL

## 2021-02-21 LAB
C TRACH DNA SPEC QL NAA+PROBE: NEGATIVE
N GONORRHOEA DNA SPEC QL NAA+PROBE: NEGATIVE

## 2021-02-22 ENCOUNTER — TELEPHONE (OUTPATIENT)
Dept: OBGYN CLINIC | Facility: CLINIC | Age: 31
End: 2021-02-22

## 2021-02-22 DIAGNOSIS — N20.0 KIDNEY STONE: Primary | ICD-10-CM

## 2021-04-27 ENCOUNTER — OFFICE VISIT (OUTPATIENT)
Dept: FAMILY MEDICINE CLINIC | Facility: CLINIC | Age: 31
End: 2021-04-27
Payer: COMMERCIAL

## 2021-04-27 ENCOUNTER — TELEPHONE (OUTPATIENT)
Dept: ENDOCRINOLOGY | Facility: CLINIC | Age: 31
End: 2021-04-27

## 2021-04-27 ENCOUNTER — APPOINTMENT (OUTPATIENT)
Dept: LAB | Facility: HOSPITAL | Age: 31
End: 2021-04-27
Attending: INTERNAL MEDICINE
Payer: COMMERCIAL

## 2021-04-27 VITALS
HEIGHT: 66 IN | HEART RATE: 98 BPM | BODY MASS INDEX: 31.66 KG/M2 | SYSTOLIC BLOOD PRESSURE: 100 MMHG | DIASTOLIC BLOOD PRESSURE: 62 MMHG | WEIGHT: 197 LBS | OXYGEN SATURATION: 99 %

## 2021-04-27 DIAGNOSIS — E05.90 HYPERTHYROIDISM: ICD-10-CM

## 2021-04-27 DIAGNOSIS — R73.01 IMPAIRED FASTING GLUCOSE: ICD-10-CM

## 2021-04-27 DIAGNOSIS — E05.90 HYPERTHYROIDISM: Primary | ICD-10-CM

## 2021-04-27 DIAGNOSIS — R00.0 TACHYCARDIA: ICD-10-CM

## 2021-04-27 DIAGNOSIS — D64.9 ANEMIA, UNSPECIFIED TYPE: Primary | ICD-10-CM

## 2021-04-27 DIAGNOSIS — E05.00 GRAVES DISEASE: Primary | ICD-10-CM

## 2021-04-27 DIAGNOSIS — E05.00 GRAVES DISEASE: ICD-10-CM

## 2021-04-27 LAB
ALBUMIN SERPL BCP-MCNC: 3.7 G/DL (ref 3–5.2)
ALP SERPL-CCNC: 114 U/L (ref 43–122)
ALT SERPL W P-5'-P-CCNC: 24 U/L
ANION GAP SERPL CALCULATED.3IONS-SCNC: 4 MMOL/L (ref 5–14)
AST SERPL W P-5'-P-CCNC: 26 U/L (ref 14–36)
BASOPHILS # BLD AUTO: 0 THOUSANDS/ΜL (ref 0–0.1)
BASOPHILS NFR BLD AUTO: 0 % (ref 0–1)
BILIRUB SERPL-MCNC: 0.58 MG/DL
BUN SERPL-MCNC: 11 MG/DL (ref 5–25)
CALCIUM SERPL-MCNC: 9.5 MG/DL (ref 8.4–10.2)
CHLORIDE SERPL-SCNC: 105 MMOL/L (ref 97–108)
CO2 SERPL-SCNC: 30 MMOL/L (ref 22–30)
CREAT SERPL-MCNC: 0.32 MG/DL (ref 0.6–1.2)
EOSINOPHIL # BLD AUTO: 0.1 THOUSAND/ΜL (ref 0–0.4)
EOSINOPHIL NFR BLD AUTO: 2 % (ref 0–6)
ERYTHROCYTE [DISTWIDTH] IN BLOOD BY AUTOMATED COUNT: 15.7 %
EST. AVERAGE GLUCOSE BLD GHB EST-MCNC: 114 MG/DL
FERRITIN SERPL-MCNC: 20 NG/ML (ref 8–388)
GFR SERPL CREATININE-BSD FRML MDRD: 150 ML/MIN/1.73SQ M
GLUCOSE P FAST SERPL-MCNC: 117 MG/DL (ref 70–99)
HBA1C MFR BLD: 5.6 %
HCT VFR BLD AUTO: 33.4 % (ref 36–46)
HGB BLD-MCNC: 10.9 G/DL (ref 12–16)
HYPERCHROMIA BLD QL SMEAR: PRESENT
IRON SATN MFR SERPL: 21 %
IRON SERPL-MCNC: 63 UG/DL (ref 50–170)
LYMPHOCYTES # BLD AUTO: 3.1 THOUSANDS/ΜL (ref 0.5–4)
LYMPHOCYTES NFR BLD AUTO: 35 % (ref 25–45)
MCH RBC QN AUTO: 25.3 PG (ref 26–34)
MCHC RBC AUTO-ENTMCNC: 32.6 G/DL (ref 31–36)
MCV RBC AUTO: 78 FL (ref 80–100)
MICROCYTES BLD QL AUTO: PRESENT
MONOCYTES # BLD AUTO: 0.9 THOUSAND/ΜL (ref 0.2–0.9)
MONOCYTES NFR BLD AUTO: 10 % (ref 1–10)
NEUTROPHILS # BLD AUTO: 4.6 THOUSANDS/ΜL (ref 1.8–7.8)
NEUTS SEG NFR BLD AUTO: 53 % (ref 45–65)
PLATELET # BLD AUTO: 321 THOUSANDS/UL (ref 150–450)
PLATELET BLD QL SMEAR: ADEQUATE
PMV BLD AUTO: 8.9 FL (ref 8.9–12.7)
POTASSIUM SERPL-SCNC: 3.6 MMOL/L (ref 3.6–5)
PROT SERPL-MCNC: 7.2 G/DL (ref 5.9–8.4)
RBC # BLD AUTO: 4.29 MILLION/UL (ref 4–5.2)
RBC MORPH BLD: NORMAL
SODIUM SERPL-SCNC: 139 MMOL/L (ref 137–147)
T3 SERPL-MCNC: 5.4 NG/ML (ref 0.6–1.8)
T3FREE SERPL-MCNC: 19.82 PG/ML (ref 2.3–4.2)
T4 FREE SERPL-MCNC: 4.12 NG/DL (ref 0.76–1.46)
TIBC SERPL-MCNC: 296 UG/DL (ref 250–450)
TSH SERPL DL<=0.05 MIU/L-ACNC: <0.015 UIU/ML (ref 0.47–4.68)
WBC # BLD AUTO: 8.7 THOUSAND/UL (ref 4.5–11)

## 2021-04-27 PROCEDURE — 36415 COLL VENOUS BLD VENIPUNCTURE: CPT | Performed by: INTERNAL MEDICINE

## 2021-04-27 PROCEDURE — 99214 OFFICE O/P EST MOD 30 MIN: CPT | Performed by: INTERNAL MEDICINE

## 2021-04-27 PROCEDURE — 1036F TOBACCO NON-USER: CPT | Performed by: INTERNAL MEDICINE

## 2021-04-27 PROCEDURE — 82728 ASSAY OF FERRITIN: CPT | Performed by: INTERNAL MEDICINE

## 2021-04-27 PROCEDURE — 83036 HEMOGLOBIN GLYCOSYLATED A1C: CPT

## 2021-04-27 PROCEDURE — 83550 IRON BINDING TEST: CPT | Performed by: INTERNAL MEDICINE

## 2021-04-27 PROCEDURE — 85025 COMPLETE CBC W/AUTO DIFF WBC: CPT | Performed by: INTERNAL MEDICINE

## 2021-04-27 PROCEDURE — 80053 COMPREHEN METABOLIC PANEL: CPT | Performed by: INTERNAL MEDICINE

## 2021-04-27 PROCEDURE — 84481 FREE ASSAY (FT-3): CPT

## 2021-04-27 PROCEDURE — 83540 ASSAY OF IRON: CPT | Performed by: INTERNAL MEDICINE

## 2021-04-27 PROCEDURE — 84439 ASSAY OF FREE THYROXINE: CPT

## 2021-04-27 PROCEDURE — 84480 ASSAY TRIIODOTHYRONINE (T3): CPT

## 2021-04-27 PROCEDURE — 84443 ASSAY THYROID STIM HORMONE: CPT | Performed by: INTERNAL MEDICINE

## 2021-04-27 RX ORDER — METHIMAZOLE 10 MG/1
TABLET ORAL
Qty: 45 TABLET | Refills: 0 | Status: SHIPPED | OUTPATIENT
Start: 2021-04-27 | End: 2021-04-28 | Stop reason: DRUGHIGH

## 2021-04-27 RX ORDER — PROPRANOLOL HYDROCHLORIDE 10 MG/1
10 TABLET ORAL EVERY 12 HOURS SCHEDULED
Qty: 60 TABLET | Refills: 1 | Status: SHIPPED | OUTPATIENT
Start: 2021-04-27 | End: 2021-09-16

## 2021-04-27 NOTE — TELEPHONE ENCOUNTER
Patient left a message on our answering machine asking for a call back from the doctor  She was not specific as to what she needed  Her number is [phone]  Thank you

## 2021-04-27 NOTE — TELEPHONE ENCOUNTER
Please send in script for methimazole 20 mg daily ( I believe PCP may have sent in script for 15 mg   Repeat free t4 and t3 in 2 weeks and f/u in 1 month

## 2021-04-27 NOTE — TELEPHONE ENCOUNTER
I received message from PCP as well - it appears that she has not been taking her meds - both methimazole and the beta blocker and she was advised to restart   Why did she stop her meds ?   She needs to restart meds - repeat labs in 3 weeks and f/u in 1 month

## 2021-04-27 NOTE — TELEPHONE ENCOUNTER
Katie called from [facility] with a Critical Result on:    Free T4 = 4 12    Will send [employer]

## 2021-04-27 NOTE — PROGRESS NOTES
Assessment/Plan:         Diagnoses and all orders for this visit:    Graves disease  -     Cancel: CBC and differential  -     Comprehensive metabolic panel  -     TSH, 3rd generation  -     T4, free; Future  -     T3, free; Future  -     methimazole (TAPAZOLE) 10 mg tablet; 1 1/2 tab daily  As discussed above  Hyperthyroidism  -     propranolol (INDERAL) 10 mg tablet; Take 1 tablet (10 mg total) by mouth every 12 (twelve) hours    Tachycardia  -     propranolol (INDERAL) 10 mg tablet; Take 1 tablet (10 mg total) by mouth every 12 (twelve) hours        Subjective:      Patient ID: Alonso Altamirano is a 31 y o  female  HPI  Patient is here to follow-up on episodes of palpitation not feeling well  Fortunately patient was diagnosed with Graves disease and has not been taking her medication  She has not followed up with endocrinology  She does not feel dizzy today however has been having these episodes  I would order lab studies and resume her medication  Current patient to follow-up with endocrinology as well  The following portions of the patient's history were reviewed and updated as appropriate: allergies, current medications, past family history, past medical history, past social history, past surgical history and problem list     Review of Systems      Objective:      /62 (BP Location: Right arm, Patient Position: Sitting, Cuff Size: Standard)   Pulse 98   Ht 5' 6" (1 676 m)   Wt 89 4 kg (197 lb)   LMP 03/23/2019 (Within Days)   SpO2 99%   BMI 31 80 kg/m²          Physical Exam  Cardiovascular:      Rate and Rhythm: Regular rhythm  Tachycardia present  Heart sounds: Normal heart sounds  No murmur  Pulmonary:      Effort: Pulmonary effort is normal  No respiratory distress  Breath sounds: Normal breath sounds  No wheezing or rales  Musculoskeletal:      Right lower leg: No edema  Left lower leg: No edema  Neurological:      Mental Status: She is alert               BMI Counseling: Body mass index is 31 8 kg/m²  The BMI is above normal  Nutrition recommendations include decreasing portion sizes, limiting drinks that contain sugar, moderation in carbohydrate intake, increasing intake of lean protein, reducing intake of saturated and trans fat and reducing intake of cholesterol  Exercise recommendations include exercising 3-5 times per week

## 2021-04-27 NOTE — TELEPHONE ENCOUNTER
Called and spoke to patient  She said she had an episode while at work of shaking in her hands  She also has been feeling heaviness in her chest, SOB and some swelling in her feet  She saw her PCP today and had labs drawn, but some results are still pending  Patient would like to know what she should do  Please advise

## 2021-04-28 ENCOUNTER — TELEPHONE (OUTPATIENT)
Dept: FAMILY MEDICINE CLINIC | Facility: CLINIC | Age: 31
End: 2021-04-28

## 2021-04-28 RX ORDER — METHIMAZOLE 10 MG/1
20 TABLET ORAL DAILY
Qty: 60 TABLET | Refills: 1 | Status: SHIPPED | OUTPATIENT
Start: 2021-04-28 | End: 2021-08-16 | Stop reason: SDUPTHER

## 2021-04-28 NOTE — PROGRESS NOTES
BMI Counseling: Body mass index is 31 8 kg/m²  The BMI is above normal  Nutrition recommendations include decreasing overall calorie intake

## 2021-04-28 NOTE — TELEPHONE ENCOUNTER
Called and spoke to patient  Notified her of your message  She said she wasn't taking the medication because she is not good at remembering to take her pills  Placed lab orders and transferred her out front to schedule

## 2021-04-28 NOTE — TELEPHONE ENCOUNTER
I had another message yesterday - she needs to take methimazole 20 mg not 15 , please make sure new script sent in

## 2021-04-29 ENCOUNTER — TELEPHONE (OUTPATIENT)
Dept: ENDOCRINOLOGY | Facility: CLINIC | Age: 31
End: 2021-04-29

## 2021-04-29 NOTE — TELEPHONE ENCOUNTER
Patient called and said her pharmacy would not fill her prescription, I called pharmacy they said didn't realize it was a dose increase and filled script called pt to let her know it was done

## 2021-05-04 ENCOUNTER — TELEMEDICINE (OUTPATIENT)
Dept: FAMILY MEDICINE CLINIC | Facility: CLINIC | Age: 31
End: 2021-05-04
Payer: COMMERCIAL

## 2021-05-04 DIAGNOSIS — Z20.822 EXPOSURE TO COVID-19 VIRUS: Primary | ICD-10-CM

## 2021-05-04 DIAGNOSIS — Z20.822 EXPOSURE TO COVID-19 VIRUS: ICD-10-CM

## 2021-05-04 DIAGNOSIS — J40 BRONCHITIS: ICD-10-CM

## 2021-05-04 PROCEDURE — U0005 INFEC AGEN DETEC AMPLI PROBE: HCPCS | Performed by: INTERNAL MEDICINE

## 2021-05-04 PROCEDURE — U0003 INFECTIOUS AGENT DETECTION BY NUCLEIC ACID (DNA OR RNA); SEVERE ACUTE RESPIRATORY SYNDROME CORONAVIRUS 2 (SARS-COV-2) (CORONAVIRUS DISEASE [COVID-19]), AMPLIFIED PROBE TECHNIQUE, MAKING USE OF HIGH THROUGHPUT TECHNOLOGIES AS DESCRIBED BY CMS-2020-01-R: HCPCS | Performed by: INTERNAL MEDICINE

## 2021-05-04 PROCEDURE — G2012 BRIEF CHECK IN BY MD/QHP: HCPCS | Performed by: INTERNAL MEDICINE

## 2021-05-04 RX ORDER — BUDESONIDE AND FORMOTEROL FUMARATE DIHYDRATE 160; 4.5 UG/1; UG/1
2 AEROSOL RESPIRATORY (INHALATION) 2 TIMES DAILY
Qty: 10.2 G | Refills: 1 | Status: SHIPPED | OUTPATIENT
Start: 2021-05-04 | End: 2022-04-22 | Stop reason: SDUPTHER

## 2021-05-04 NOTE — PROGRESS NOTES
COVID-19 Outpatient Progress Note    Assessment/Plan:    Problem List Items Addressed This Visit     None      Visit Diagnoses     Exposure to COVID-19 virus    -  Primary    Relevant Orders    Novel Coronavirus (Covid-19),PCR [facility] - Collected at Mobile [facility] or [facility]    Bronchitis        Relevant Medications    budesonide-formoterol (SYMBICORT) 160-4 5 mcg/act inhaler         Disposition:     I referred patient to one of our centralized sites for a COVID-19 swab  I have spent 15 minutes directly with the patient  Greater than 50% of this time was spent in counseling/coordination of care regarding: risks and benefits of treatment options, instructions for management and impressions  Encounter provider Moe Inman MD    Provider located at [address] [place] [address] Dr , [address] [address] [place] [address] Road  [phone]    Recent Visits  Date Type Provider Dept   04/27/21 Office Visit Moe Inman MD [facility]   Showing recent visits within past 7 days and meeting all other requirements     Today's Visits  Date Type Provider Dept   05/04/21 Telemedicine Moe Inman MD [address] today's visits and meeting all other requirements     Future Appointments  No visits were found meeting these conditions  Showing future appointments within next 150 days and meeting all other requirements        Patient agrees to participate in a virtual check in via telephone or video visit instead of presenting to the office to address urgent/immediate medical needs  Patient is aware this is a billable service  After connecting through Telephone, the patient was identified by name and date of birth  Dulce Villa was informed that this was a telemedicine visit and that the exam was being conducted confidentially over secure lines  My office door was closed  No one else was in the room   Dulce Villa acknowledged consent and understanding of privacy and security of the telemedicine visit  I informed the patient that I have reviewed her record in Epic and presented the opportunity for her to ask any questions regarding the visit today  The patient agreed to participate  It was my intent to perform this visit via video technology but the patient was not able to do a video connection so the visit was completed via audio telephone only  Subjective:   Madelin Church is a 31 y o  female who is concerned about COVID-19  Patient's symptoms include sore throat, cough and shortness of breath  Patient denies fever, chills, fatigue, malaise, congestion, rhinorrhea, anosmia, loss of taste, abdominal pain, nausea, vomiting, diarrhea, myalgias and headaches       Date of symptom onset: 2021    Lab Results   Component Value Date    SARSCOV2 Not Detected 2020     Past Medical History:   Diagnosis Date    Anemia, postpartum 2019    Delivery by  hysterectomy 10/14/2019    Disease of thyroid gland     History of diet-controlled gestational diabetes mellitus 2019    Pap smear abnormality of cervix with LGSIL 2016    Varicella     Had as a child     Past Surgical History:   Procedure Laterality Date     SECTION      CYSTOSCOPY N/A 10/14/2019    Procedure: CYSTOSCOPY;  Surgeon: Dulce Russell MD;  [facility];  Service: Gynecology    DILATION AND EVACUATION      Molar    HYSTERECTOMY N/A 10/14/2019    Procedure: RAY, BILATERAL SALPINGECTOMY;  Surgeon: Dulce Russell MD;  [facility];  Service: Gynecology    LAPAROSCOPY      [place]  DELIVERY ONLY N/A 10/14/2019    Procedure:  SECTION () REPEAT;  Surgeon: Dulce Russell MD;  [facility];  Service: Gynecology     Current Outpatient Medications   Medication Sig Dispense Refill    acetaminophen (TYLENOL) 650 mg CR tablet Take 1 tablet (650 mg total) by mouth every 8 (eight) hours as needed for mild pain or moderate pain 30 tablet 0    budesonide-formoterol (SYMBICORT) 160-4 5 mcg/act inhaler Inhale 2 puffs 2 (two) times a day Rinse mouth after use  10 2 g 1    docusate sodium (COLACE) 100 mg capsule Take 1 capsule (100 mg total) by mouth 2 (two) times a day 60 capsule 6    methimazole (TAPAZOLE) 10 mg tablet Take 2 tablets (20 mg total) by mouth daily 60 tablet 1    polyethylene glycol (GLYCOLAX) 17 GM/SCOOP powder Take 17 g by mouth daily as needed (constipation) 507 g 1    propranolol (INDERAL) 10 mg tablet Take 1 tablet (10 mg total) by mouth every 12 (twelve) hours 60 tablet 1     No current facility-administered medications for this visit  Allergies   Allergen Reactions    Penicillins Anaphylaxis     Anaphylaxis    Shrimp (Diagnostic) - Food Allergy     Venofer [Iron Sucrose] Itching       Review of Systems   Constitutional: Negative for chills, fatigue and fever  HENT: Positive for sore throat and voice change  Negative for congestion and rhinorrhea  Respiratory: Positive for cough and shortness of breath  Gastrointestinal: Negative for abdominal pain, diarrhea, nausea and vomiting  Musculoskeletal: Negative for myalgias  Neurological: Negative for headaches  Objective: There were no vitals filed for this visit  Physical Exam   Sounds haorse  Patient will come to our office for curbside pulse oximeter  VIRTUAL VISIT DISCLAIMER    Neri Riddle acknowledges that she has consented to an online visit or consultation  She understands that the online visit is based solely on information provided by her, and that, in the absence of a face-to-face physical evaluation by the physician, the diagnosis she receives is both limited and provisional in terms of accuracy and completeness  This is not intended to replace a full medical face-to-face evaluation by the physician  Neri Riddle understands and accepts these terms

## 2021-05-05 LAB — SARS-COV-2 RNA RESP QL NAA+PROBE: NEGATIVE

## 2021-06-30 ENCOUNTER — DOCUMENTATION (OUTPATIENT)
Dept: OBGYN CLINIC | Facility: CLINIC | Age: 31
End: 2021-06-30

## 2021-06-30 ENCOUNTER — TELEPHONE (OUTPATIENT)
Dept: OBGYN CLINIC | Facility: CLINIC | Age: 31
End: 2021-06-30

## 2021-06-30 DIAGNOSIS — K59.00 CONSTIPATION, UNSPECIFIED CONSTIPATION TYPE: Primary | ICD-10-CM

## 2021-06-30 RX ORDER — DOCUSATE SODIUM 100 MG/1
100 CAPSULE, LIQUID FILLED ORAL 2 TIMES DAILY
Qty: 60 CAPSULE | Refills: 11 | Status: SHIPPED | OUTPATIENT
Start: 2021-06-30 | End: 2021-09-16

## 2021-06-30 NOTE — TELEPHONE ENCOUNTER
Patient noted a ball in her vagina after cleaning her self  Patient declines any pain or difficulty with voiding or bowel movements but does report constipation  Discussed with patient possible small prolapse  Advised stool softeners and OK for her to keep her appointment Tuesday unless symptoms worsen and she has pain or difficulty voiding or having a bowel movement

## 2021-06-30 NOTE — TELEPHONE ENCOUNTER
Patient called stating she feels a lump/"ball" in her vaginal area- states she has had it for a little while but last night she couldn't sleep because of it and is concerned  Pt scheduled an appt for next week 7/6 as that was your first opening and she declined to see Harlan Weathers sooner, however pt wants to know if you could call her to discuss this as she is worried?

## 2021-07-06 ENCOUNTER — TELEPHONE (OUTPATIENT)
Dept: OBGYN CLINIC | Facility: CLINIC | Age: 31
End: 2021-07-06

## 2021-07-06 NOTE — TELEPHONE ENCOUNTER
----- Message from Boaz Moreau MD sent at 7/6/2021  9:34 AM EDT -----  Regarding: No show  Please call pt to reschedule appt if still needed

## 2021-07-16 ENCOUNTER — HOSPITAL ENCOUNTER (EMERGENCY)
Facility: HOSPITAL | Age: 31
Discharge: HOME | End: 2021-07-16
Attending: EMERGENCY MEDICINE
Payer: COMMERCIAL

## 2021-07-16 VITALS
DIASTOLIC BLOOD PRESSURE: 76 MMHG | HEART RATE: 94 BPM | TEMPERATURE: 98.6 F | SYSTOLIC BLOOD PRESSURE: 135 MMHG | RESPIRATION RATE: 17 BRPM | OXYGEN SATURATION: 98 %

## 2021-07-16 DIAGNOSIS — I83.93 VARICOSE VEINS OF BOTH LOWER EXTREMITIES: ICD-10-CM

## 2021-07-16 DIAGNOSIS — M79.604 BILATERAL LEG PAIN: Primary | ICD-10-CM

## 2021-07-16 DIAGNOSIS — M79.605 BILATERAL LEG PAIN: Primary | ICD-10-CM

## 2021-07-16 PROCEDURE — 99283 EMERGENCY DEPT VISIT LOW MDM: CPT

## 2021-07-16 PROCEDURE — 99282 EMERGENCY DEPT VISIT SF MDM: CPT | Performed by: PHYSICIAN ASSISTANT

## 2021-07-17 NOTE — ED PROVIDER NOTES
History  Chief Complaint   Patient presents with    Leg Pain     b/l leg pain for one year, denies injury, able to ambulate     Patient is a 26 y/o female presenting to the ED for evaluation of bilateral leg pain  Pt states for >a year she has been having bilateral leg pain, worse with walking/standing up, described as throbbing sensation which has been worsening and occurring more often  Pt also has been noticing varicose veins on bilateral legs  Pt states her mom has hx of DVT, so she wanted to make sure it was not that  Pt has not tried any compression stocking or medication for sx  Patient denies fever, injury/trauma, hx of DVT/PE, recent travel (long car/plane rides), recent immobilization, recent surgical procedure, estrogen use, active cancer, chest pain, SOB, or hemoptysis  Prior to Admission Medications   Prescriptions Last Dose Informant Patient Reported? Taking?   acetaminophen (TYLENOL) 650 mg CR tablet   No No   Sig: Take 1 tablet (650 mg total) by mouth every 8 (eight) hours as needed for mild pain or moderate pain   budesonide-formoterol (SYMBICORT) 160-4 5 mcg/act inhaler   No No   Sig: Inhale 2 puffs 2 (two) times a day Rinse mouth after use     docusate sodium (COLACE) 100 mg capsule   No No   Sig: Take 1 capsule (100 mg total) by mouth 2 (two) times a day   docusate sodium (COLACE) 100 mg capsule   No No   Sig: Take 1 capsule (100 mg total) by mouth 2 (two) times a day   methimazole (TAPAZOLE) 10 mg tablet   No No   Sig: Take 2 tablets (20 mg total) by mouth daily   polyethylene glycol (GLYCOLAX) 17 GM/SCOOP powder   No No   Sig: Take 17 g by mouth daily as needed (constipation)   propranolol (INDERAL) 10 mg tablet   No No   Sig: Take 1 tablet (10 mg total) by mouth every 12 (twelve) hours      Facility-Administered Medications: None       Past Medical History:   Diagnosis Date    Anemia, postpartum 2019    Delivery by  hysterectomy 10/14/2019    Disease of thyroid gland     History of diet-controlled gestational diabetes mellitus 2019    Pap smear abnormality of cervix with LGSIL 2016    Varicella     Had as a child       Past Surgical History:   Procedure Laterality Date     SECTION      CYSTOSCOPY N/A 10/14/2019    Procedure: Terry Mata;  Surgeon: Daniela Matthew MD;  [facility];  Service: Gynecology    DILATION AND EVACUATION      Molar    HYSTERECTOMY N/A 10/14/2019    Procedure: RAY, BILATERAL SALPINGECTOMY;  Surgeon: Daniela Matthew MD;  [facility];  Service: Gynecology    LAPAROSCOPY      NH  DELIVERY ONLY N/A 10/14/2019    Procedure: Cassaundra Rubinstein () REPEAT;  Surgeon: Daniela Matthew MD;  [facility];  Service: Gynecology       Family History   Problem Relation Age of Onset    Hypertension Mother    [facility] Lupus Mother     Pulmonary embolism Mother     No Known Problems Father     Asthma Sister     No Known Problems Brother     Thyroid disease unspecified Family     No Known Problems Daughter     No Known Problems Son     Other Maternal Grandmother         Pre-diabetic    Heart murmur Brother     No Known Problems Daughter     No Known Problems Son     Asthma Son      I have reviewed and agree with the history as documented  E-Cigarette/Vaping    E-Cigarette Use Never User      E-Cigarette/Vaping Substances    Nicotine No     THC No     CBD No     Flavoring No     Other No     Unknown No      Social History     Tobacco Use    Smoking status: Never Smoker    Smokeless tobacco: Never Used   Vaping Use    Vaping Use: Never used   Substance Use Topics    Alcohol use: Not Currently    Drug use: No       Review of Systems   Musculoskeletal:        Bilateral leg pain (R>L)   All other systems reviewed and are negative  Physical Exam  Physical Exam  Constitutional:       General: She is not in acute distress  Appearance: She is well-developed   She is not ill-appearing, toxic-appearing or diaphoretic  HENT:      Head: Normocephalic and atraumatic  Right Ear: External ear normal       Left Ear: External ear normal       Nose: Nose normal       Mouth/Throat:      Lips: Pink  Mouth: Mucous membranes are moist    Eyes:      Extraocular Movements: Extraocular movements intact  Conjunctiva/sclera: Conjunctivae normal    Pulmonary:      Effort: Pulmonary effort is normal    Musculoskeletal:      Cervical back: Normal range of motion and neck supple  Right lower leg: Normal  No swelling, tenderness or bony tenderness  No edema  Left lower leg: Normal  No swelling, tenderness or bony tenderness  No edema  Comments: Varicose veins noted bilaterally  Negative jose's bilaterally   No swelling appreciated    Skin:     General: Skin is warm and dry  Capillary Refill: Capillary refill takes less than 2 seconds  Neurological:      Mental Status: She is alert and oriented to person, place, and time  GCS: GCS eye subscore is 4  GCS verbal subscore is 5  GCS motor subscore is 6     Psychiatric:         Mood and Affect: Mood and affect normal          Speech: Speech normal          Vital Signs  ED Triage Vitals [07/16/21 2035]   Temperature Pulse Respirations Blood Pressure SpO2   98 6 °F (37 °C) 94 17 135/76 98 %      Temp src Heart Rate Source Patient Position - Orthostatic VS BP Location FiO2 (%)   -- -- -- -- --      Pain Score       9           Vitals:    07/16/21 2035   BP: 135/76   Pulse: 94         Visual Acuity      ED Medications  Medications - No data to display    Diagnostic Studies  Results Reviewed     None                 VAS lower limb venous duplex study, unilateral/limited    (Results Pending)   VAS lower limb venous duplex study, unilateral/limited    (Results Pending)              Procedures  Procedures         ED Course  ED Course as of Jul 16 2201 Fri Jul 16, 2021 2156 Patient wanting to leave, does not want to wait for vascular study, will give outpatient rx for vascular study  [facility]  Number of Diagnoses or Management Options  Bilateral leg pain: new and requires workup  Varicose veins of both lower extremities: new and requires workup  Diagnosis management comments: Patient is a 28 y/o female presenting to the ED for evaluation of bilateral leg pain  Pt states for >a year she has been having bilateral leg pain, worse with walking/standing up, described as throbbing sensation which has been worsening and occurring more often  Pt also has been noticing varicose veins on bilateral legs  Varicose veins noted bilaterally on exam, no calf tenderness or swelling appreciated  Patient concern for DVT  Vascular study ordered, patient states she does not want to wait for the study, will give Rx for outpatient vascular study to rule out DVT  Patient to follow-up with PCP and/or vascular for further evaluation and management of varicose veins causing bilateral leg pain  Patient verbalizes understanding and agrees with plan  The management plan was discussed in detail with the patient at bedside and all questions were answered  Prior to discharge, I provided both verbal and written instructions  I discussed with the patient the signs and symptoms for which to return to the emergency department  All questions were answered and patient was comfortable with the plan of care and discharged to home  The patient agrees to return to the Emergency Department for concerns and/or progression of illness          Disposition  Final diagnoses:   Bilateral leg pain   Varicose veins of both lower extremities     Time reflects when diagnosis was documented in both MDM as applicable and the Disposition within this note     Time User Action Codes Description Comment    7/16/2021  9:56 PM Allison Mullen [M79 605] Left leg pain     7/16/2021  9:56 PM Allison Lange,  M79 605] Bilateral leg pain 7/16/2021  9:57 PM Elieralexander Samuel Modify [C27 312,  M79 605] Bilateral leg pain     7/16/2021  9:57 PM Elieralexander Santiagouels Remove [M79 605] Left leg pain     7/16/2021  9:57 PM Brett Villagomezs Add [I83 93] Varicose veins of both lower extremities       ED Disposition     ED Disposition Condition Date/Time Comment    Discharge Stable Fri Jul 16, 2021  9:56 PM Carlos Rangel discharge to home/self care  Follow-up Information     Follow up With Specialties Details Why Contact Info Additional Via Brielle Vieyra MD Internal Medicine Schedule an appointment as soon as possible for a visit   [address] [address] [phone]       [facility] Vascular Surgery Schedule an appointment as soon as possible for a visit  If symptoms worsen [facility] [id number] [phone] [address], [address] [place] [address], [facility], [address] [place] [address], [id number] [phone]          Patient's Medications   Discharge Prescriptions    No medications on file     Outpatient Discharge Orders   VAS lower limb venous duplex study, unilateral/limited   Standing Status: Future Standing Exp   Date: 07/16/25       PDMP Review     None          ED Provider  Electronically Signed by           Royal Kamaljit PA-C  07/16/21 6946

## 2021-07-19 ENCOUNTER — TELEPHONE (OUTPATIENT)
Dept: GASTROENTEROLOGY | Facility: MEDICAL CENTER | Age: 31
End: 2021-07-19

## 2021-07-19 ENCOUNTER — OFFICE VISIT (OUTPATIENT)
Dept: FAMILY MEDICINE CLINIC | Facility: CLINIC | Age: 31
End: 2021-07-19
Payer: COMMERCIAL

## 2021-07-19 ENCOUNTER — HOSPITAL ENCOUNTER (OUTPATIENT)
Dept: NON INVASIVE DIAGNOSTICS | Facility: HOSPITAL | Age: 31
Discharge: HOME | End: 2021-07-19
Payer: COMMERCIAL

## 2021-07-19 ENCOUNTER — HOSPITAL ENCOUNTER (OUTPATIENT)
Dept: RADIOLOGY | Facility: HOSPITAL | Age: 31
Discharge: HOME | End: 2021-07-19
Payer: COMMERCIAL

## 2021-07-19 VITALS
HEIGHT: 66 IN | DIASTOLIC BLOOD PRESSURE: 70 MMHG | BODY MASS INDEX: 29.41 KG/M2 | TEMPERATURE: 97.5 F | SYSTOLIC BLOOD PRESSURE: 116 MMHG | WEIGHT: 183 LBS

## 2021-07-19 DIAGNOSIS — M79.662 BILATERAL CALF PAIN: ICD-10-CM

## 2021-07-19 DIAGNOSIS — E05.90 HYPERTHYROIDISM: ICD-10-CM

## 2021-07-19 DIAGNOSIS — M79.604 PAIN IN BOTH LOWER EXTREMITIES: ICD-10-CM

## 2021-07-19 DIAGNOSIS — M79.605 PAIN IN BOTH LOWER EXTREMITIES: ICD-10-CM

## 2021-07-19 DIAGNOSIS — M79.605 PAIN IN BOTH LOWER EXTREMITIES: Primary | ICD-10-CM

## 2021-07-19 DIAGNOSIS — M79.661 BILATERAL CALF PAIN: ICD-10-CM

## 2021-07-19 DIAGNOSIS — M54.9 BACK PAIN, UNSPECIFIED BACK LOCATION, UNSPECIFIED BACK PAIN LATERALITY, UNSPECIFIED CHRONICITY: ICD-10-CM

## 2021-07-19 DIAGNOSIS — M79.604 PAIN IN BOTH LOWER EXTREMITIES: Primary | ICD-10-CM

## 2021-07-19 PROCEDURE — 1036F TOBACCO NON-USER: CPT | Performed by: INTERNAL MEDICINE

## 2021-07-19 PROCEDURE — 72110 X-RAY EXAM L-2 SPINE 4/>VWS: CPT

## 2021-07-19 PROCEDURE — 93970 EXTREMITY STUDY: CPT | Performed by: SURGERY

## 2021-07-19 PROCEDURE — 3008F BODY MASS INDEX DOCD: CPT | Performed by: INTERNAL MEDICINE

## 2021-07-19 PROCEDURE — 93970 EXTREMITY STUDY: CPT

## 2021-07-19 PROCEDURE — 99214 OFFICE O/P EST MOD 30 MIN: CPT | Performed by: INTERNAL MEDICINE

## 2021-07-19 NOTE — PROGRESS NOTES
Assessment/Plan:         Diagnoses and all orders for this visit:    Pain in both lower extremities  -     XR spine lumbar minimum 4 views non injury; Future  -     Comprehensive metabolic panel  -     CBC and differential  -     Magnesium; Future    Bilateral calf pain  -     VAS lower limb venous duplex study, complete bilateral; Future      Back pain    Hyperthyroidism  See discussion above  Subjective:      Patient ID: Danelle Tyler is a 27 y o  female  HPI  Patient history of Graves disease with noncompliance to medication is here with bilateral leg cramps and discomfort that prompted her to go to the urgent care over the weekend  Patient reports she began to have discomfort after she was standing for prolonged time throbbing sensation to the extent she had to lay down  Since the pain was not getting better she went to the ER  Patient was asked to follow-up  Patient has family history of DVT  Unfortunately patient is not taking her medication  She has not followed up with endocrinology  Patient reports back discomfort  Denies any lower extremity weakness but does mention numbness and tingling as mentioned above  Denies any fecal or bowel incontinence  Denies any fevers and chills  She has lost more weight  Does not report any palpitation but does mention feeling dizzy from time to time  She is trying to keep herself hydrated  The following portions of the patient's history were reviewed and updated as appropriate: allergies, current medications, past family history, past medical history, past social history, past surgical history and problem list     Review of Systems   Constitutional: Positive for unexpected weight change  Negative for appetite change, chills and fever  Gastrointestinal: Negative for diarrhea  Musculoskeletal: Positive for back pain     Neurological:        As above         Objective:      /70   Temp 97 5 °F (36 4 °C)   Ht 5' 6" (1 676 m)   Wt 83 kg (183 lb) LMP 03/23/2019 (Within Days)   BMI 29 54 kg/m²          Physical Exam  Neck:      Comments: Goiter  Cardiovascular:      Rate and Rhythm: Tachycardia present  Musculoskeletal:         General: No tenderness (Right calf tenderness)  Right lower leg: No edema  Left lower leg: No edema  Comments: Tremors   Neurological:      Mental Status: She is oriented to person, place, and time        Comments: Good strength bilateral lower and upper extremity

## 2021-07-19 NOTE — TELEPHONE ENCOUNTER
Dr Deniece Leyden office called while patient was still with them and scheduled appointment for patient to be seen asap  Scheduled patient for Consult with Dr Fidelia Wharton at 8/3/21  Date/time and location was given to patient by PCP office

## 2021-08-03 ENCOUNTER — TELEPHONE (OUTPATIENT)
Dept: GASTROENTEROLOGY | Facility: MEDICAL CENTER | Age: 31
End: 2021-08-03

## 2021-08-03 NOTE — TELEPHONE ENCOUNTER
Called patient regarding no show appointment, pt states nothing is wrong and she does not need appointment

## 2021-08-16 ENCOUNTER — TELEPHONE (OUTPATIENT)
Dept: ENDOCRINOLOGY | Facility: CLINIC | Age: 31
End: 2021-08-16

## 2021-08-16 DIAGNOSIS — E05.90 HYPERTHYROIDISM: ICD-10-CM

## 2021-08-16 DIAGNOSIS — E05.90 HYPERTHYROIDISM: Primary | ICD-10-CM

## 2021-08-16 RX ORDER — METHIMAZOLE 10 MG/1
20 TABLET ORAL DAILY
Qty: 60 TABLET | Refills: 1 | Status: SHIPPED | OUTPATIENT
Start: 2021-08-16 | End: 2021-09-07 | Stop reason: SDUPTHER

## 2021-08-16 NOTE — TELEPHONE ENCOUNTER
Spoke to Pt, Pt is c/o lump right side of her neck, nauseas for the past 2 weeks, right shoulder pain radiating to her right side of her Head and lightheaded  Per Pt she did call her PCP and they told her that she needs to call us since they think her symptons are from her thyroid  with her Thyroid  Pt has an appointment with Dee Foote on 09/07/2021  Advise pt to get her labs done tomorrow  Pt is out of her medication    See medication request

## 2021-08-16 NOTE — TELEPHONE ENCOUNTER
Patient called stating that she is having sharp pain from the middle to right side of her neck that is travelling up into her head  She is scheduled to see Jess Saravia on 9/7  Can you please reach out to her? She is also asking for a refill on her Methimazole be sent to the [facility] on [address] [facility] [address] [facility] [facility] in [facility]  Her number is [phone]  Thank you

## 2021-08-16 NOTE — TELEPHONE ENCOUNTER
Message was review with pt , she will get her labs done tomorrow, Pt has an appointment on 09/07/2021, should will bring pt before the day?

## 2021-08-17 ENCOUNTER — TELEPHONE (OUTPATIENT)
Dept: ENDOCRINOLOGY | Facility: CLINIC | Age: 31
End: 2021-08-17

## 2021-08-17 ENCOUNTER — TELEPHONE (OUTPATIENT)
Dept: FAMILY MEDICINE CLINIC | Facility: CLINIC | Age: 31
End: 2021-08-17

## 2021-08-17 ENCOUNTER — LAB (OUTPATIENT)
Dept: LAB | Facility: HOSPITAL | Age: 31
End: 2021-08-17
Attending: INTERNAL MEDICINE
Payer: COMMERCIAL

## 2021-08-17 DIAGNOSIS — E05.90 HYPERTHYROIDISM: ICD-10-CM

## 2021-08-17 DIAGNOSIS — M79.605 PAIN IN BOTH LOWER EXTREMITIES: ICD-10-CM

## 2021-08-17 DIAGNOSIS — M79.604 PAIN IN BOTH LOWER EXTREMITIES: ICD-10-CM

## 2021-08-17 LAB
ALBUMIN SERPL BCP-MCNC: 3.7 G/DL (ref 3–5.2)
ALP SERPL-CCNC: 154 U/L (ref 43–122)
ALT SERPL W P-5'-P-CCNC: 22 U/L
ANION GAP SERPL CALCULATED.3IONS-SCNC: 9 MMOL/L (ref 5–14)
ANISOCYTOSIS BLD QL SMEAR: PRESENT
AST SERPL W P-5'-P-CCNC: 32 U/L (ref 14–36)
BASOPHILS # BLD AUTO: 0 THOUSANDS/ΜL (ref 0–0.1)
BASOPHILS NFR BLD AUTO: 0 % (ref 0–1)
BILIRUB SERPL-MCNC: 0.75 MG/DL
BUN SERPL-MCNC: 10 MG/DL (ref 5–25)
CALCIUM SERPL-MCNC: 9.8 MG/DL (ref 8.4–10.2)
CHLORIDE SERPL-SCNC: 107 MMOL/L (ref 97–108)
CO2 SERPL-SCNC: 26 MMOL/L (ref 22–30)
CREAT SERPL-MCNC: 0.33 MG/DL (ref 0.6–1.2)
EOSINOPHIL # BLD AUTO: 0.3 THOUSAND/ΜL (ref 0–0.4)
EOSINOPHIL NFR BLD AUTO: 3 % (ref 0–6)
ERYTHROCYTE [DISTWIDTH] IN BLOOD BY AUTOMATED COUNT: 15.2 %
GFR SERPL CREATININE-BSD FRML MDRD: 147 ML/MIN/1.73SQ M
GLUCOSE P FAST SERPL-MCNC: 107 MG/DL (ref 70–99)
HCT VFR BLD AUTO: 35 % (ref 36–46)
HGB BLD-MCNC: 11.4 G/DL (ref 12–16)
LYMPHOCYTES # BLD AUTO: 3.2 THOUSANDS/ΜL (ref 0.5–4)
LYMPHOCYTES NFR BLD AUTO: 33 % (ref 25–45)
MAGNESIUM SERPL-MCNC: 1.4 MG/DL (ref 1.6–2.3)
MCH RBC QN AUTO: 25.8 PG (ref 26–34)
MCHC RBC AUTO-ENTMCNC: 32.7 G/DL (ref 31–36)
MCV RBC AUTO: 79 FL (ref 80–100)
MONOCYTES # BLD AUTO: 0.8 THOUSAND/ΜL (ref 0.2–0.9)
MONOCYTES NFR BLD AUTO: 9 % (ref 1–10)
NEUTROPHILS # BLD AUTO: 5.4 THOUSANDS/ΜL (ref 1.8–7.8)
NEUTS SEG NFR BLD AUTO: 55 % (ref 45–65)
OVALOCYTES BLD QL SMEAR: PRESENT
PLATELET # BLD AUTO: 329 THOUSANDS/UL (ref 150–450)
PLATELET BLD QL SMEAR: ADEQUATE
PMV BLD AUTO: 8.7 FL (ref 8.9–12.7)
POTASSIUM SERPL-SCNC: 3.7 MMOL/L (ref 3.6–5)
PROT SERPL-MCNC: 7.4 G/DL (ref 5.9–8.4)
RBC # BLD AUTO: 4.43 MILLION/UL (ref 4–5.2)
RBC MORPH BLD: NORMAL
SODIUM SERPL-SCNC: 142 MMOL/L (ref 137–147)
T3 SERPL-MCNC: 3.9 NG/ML (ref 0.6–1.8)
T4 FREE SERPL-MCNC: 4.34 NG/DL (ref 0.76–1.46)
TSH SERPL DL<=0.05 MIU/L-ACNC: <0.015 UIU/ML (ref 0.47–4.68)
WBC # BLD AUTO: 9.7 THOUSAND/UL (ref 4.5–11)

## 2021-08-17 PROCEDURE — 84439 ASSAY OF FREE THYROXINE: CPT

## 2021-08-17 PROCEDURE — 80053 COMPREHEN METABOLIC PANEL: CPT | Performed by: INTERNAL MEDICINE

## 2021-08-17 PROCEDURE — 84480 ASSAY TRIIODOTHYRONINE (T3): CPT

## 2021-08-17 PROCEDURE — 83735 ASSAY OF MAGNESIUM: CPT

## 2021-08-17 PROCEDURE — 85025 COMPLETE CBC W/AUTO DIFF WBC: CPT | Performed by: INTERNAL MEDICINE

## 2021-08-17 PROCEDURE — 36415 COLL VENOUS BLD VENIPUNCTURE: CPT | Performed by: INTERNAL MEDICINE

## 2021-08-17 PROCEDURE — 84443 ASSAY THYROID STIM HORMONE: CPT

## 2021-08-17 NOTE — TELEPHONE ENCOUNTER
Has not been taking Methimazole consistently, only taking 20 mg every other day or even less than that  Stressed importance of taking Methimazole 20 mg daily as untreated hyperthyroidism can lead to of arrhythmia, congestive heart failure, bone loss and thyroid storm  Will repeat thyroid function test in one week

## 2021-08-17 NOTE — TELEPHONE ENCOUNTER
----- Message from Rudolph Orourke MD sent at 8/17/2021  3:58 PM EDT -----   Magnesium is low  Calling in a prescription of magnesium

## 2021-08-17 NOTE — TELEPHONE ENCOUNTER
[facility] lab called with an abnormal Free T4 of 4 34    Collected today at 9:09 am     09/17/2020 Dr Carolyne Tapia  09/07/2021 Crystal Steve

## 2021-08-17 NOTE — TELEPHONE ENCOUNTER
Pt received a call from Rashel Mora today since the lab called regarding abnormal labs  Pt does not remember when was the last time that she took her Methimazole , but she restated taking yesterday    Pt still c/o lump of her neck

## 2021-08-18 NOTE — TELEPHONE ENCOUNTER
Patient is calling back that the pain is back from there throat to ears and head  Tylenol is not helping and not sure what to do    Thank you

## 2021-08-18 NOTE — TELEPHONE ENCOUNTER
She needs to take her meds and if she just restarted me methimazole repeat tsh and free t4 prior to f/u in sept

## 2021-08-19 NOTE — TELEPHONE ENCOUNTER
Spoke to patient  She is very upset  She said she already spoke to her PCP and they told her to contact our office since the pain is in the area of her thyroid  She said between our office and there's we are just tossing her back and fourth  Please call pt back to discuss, because she is not happy

## 2021-08-19 NOTE — TELEPHONE ENCOUNTER
Should should speak with PCP regarding symptoms   Continue taking methimazole 20 mg daily consistently

## 2021-09-02 ENCOUNTER — APPOINTMENT (EMERGENCY)
Dept: RADIOLOGY | Facility: HOSPITAL | Age: 31
End: 2021-09-02
Payer: COMMERCIAL

## 2021-09-02 ENCOUNTER — APPOINTMENT (EMERGENCY)
Dept: CT IMAGING | Facility: HOSPITAL | Age: 31
End: 2021-09-02
Payer: COMMERCIAL

## 2021-09-02 ENCOUNTER — HOSPITAL ENCOUNTER (EMERGENCY)
Facility: HOSPITAL | Age: 31
Discharge: HOME | End: 2021-09-02
Attending: EMERGENCY MEDICINE | Admitting: EMERGENCY MEDICINE
Payer: COMMERCIAL

## 2021-09-02 VITALS
HEART RATE: 90 BPM | OXYGEN SATURATION: 99 % | SYSTOLIC BLOOD PRESSURE: 130 MMHG | TEMPERATURE: 98.3 F | DIASTOLIC BLOOD PRESSURE: 63 MMHG | RESPIRATION RATE: 18 BRPM

## 2021-09-02 DIAGNOSIS — R07.9 CHEST PAIN, UNSPECIFIED: Primary | ICD-10-CM

## 2021-09-02 DIAGNOSIS — J98.59 MEDIASTINAL MASS: ICD-10-CM

## 2021-09-02 DIAGNOSIS — Z86.39 HISTORY OF GRAVES' DISEASE: ICD-10-CM

## 2021-09-02 LAB
ALBUMIN SERPL BCP-MCNC: 3 G/DL (ref 3.5–5)
ALP SERPL-CCNC: 167 U/L (ref 46–116)
ALT SERPL W P-5'-P-CCNC: 23 U/L (ref 12–78)
ANION GAP SERPL CALCULATED.3IONS-SCNC: 8 MMOL/L (ref 4–13)
AST SERPL W P-5'-P-CCNC: 19 U/L (ref 5–45)
ATRIAL RATE: 101 BPM
BASOPHILS # BLD AUTO: 0.03 THOUSANDS/ΜL (ref 0–0.1)
BASOPHILS NFR BLD AUTO: 0 % (ref 0–1)
BILIRUB SERPL-MCNC: 0.29 MG/DL (ref 0.2–1)
BUN SERPL-MCNC: 9 MG/DL (ref 5–25)
CALCIUM ALBUM COR SERPL-MCNC: 9.4 MG/DL (ref 8.3–10.1)
CALCIUM SERPL-MCNC: 8.6 MG/DL (ref 8.3–10.1)
CHLORIDE SERPL-SCNC: 105 MMOL/L (ref 100–108)
CO2 SERPL-SCNC: 27 MMOL/L (ref 21–32)
CREAT SERPL-MCNC: 0.52 MG/DL (ref 0.6–1.3)
D DIMER PPP FEU-MCNC: 0.76 UG/ML FEU
EOSINOPHIL # BLD AUTO: 0.27 THOUSAND/ΜL (ref 0–0.61)
EOSINOPHIL NFR BLD AUTO: 2 % (ref 0–6)
ERYTHROCYTE [DISTWIDTH] IN BLOOD BY AUTOMATED COUNT: 14.4 % (ref 11.6–15.1)
GFR SERPL CREATININE-BSD FRML MDRD: 127 ML/MIN/1.73SQ M
GLUCOSE SERPL-MCNC: 110 MG/DL (ref 65–140)
HCT VFR BLD AUTO: 34.5 % (ref 34.8–46.1)
HGB BLD-MCNC: 10.9 G/DL (ref 11.5–15.4)
IMM GRANULOCYTES # BLD AUTO: 0.04 THOUSAND/UL (ref 0–0.2)
IMM GRANULOCYTES NFR BLD AUTO: 0 % (ref 0–2)
LIPASE SERPL-CCNC: 74 U/L (ref 73–393)
LYMPHOCYTES # BLD AUTO: 4.1 THOUSANDS/ΜL (ref 0.6–4.47)
LYMPHOCYTES NFR BLD AUTO: 33 % (ref 14–44)
MCH RBC QN AUTO: 25.9 PG (ref 26.8–34.3)
MCHC RBC AUTO-ENTMCNC: 31.6 G/DL (ref 31.4–37.4)
MCV RBC AUTO: 82 FL (ref 82–98)
MONOCYTES # BLD AUTO: 0.92 THOUSAND/ΜL (ref 0.17–1.22)
MONOCYTES NFR BLD AUTO: 7 % (ref 4–12)
NEUTROPHILS # BLD AUTO: 7.01 THOUSANDS/ΜL (ref 1.85–7.62)
NEUTS SEG NFR BLD AUTO: 58 % (ref 43–75)
NRBC BLD AUTO-RTO: 0 /100 WBCS
P AXIS: 63 DEGREES
PLATELET # BLD AUTO: 350 THOUSANDS/UL (ref 149–390)
PMV BLD AUTO: 9.8 FL (ref 8.9–12.7)
POTASSIUM SERPL-SCNC: 3.9 MMOL/L (ref 3.5–5.3)
PR INTERVAL: 126 MS
PROT SERPL-MCNC: 7.4 G/DL (ref 6.4–8.2)
QRS AXIS: 45 DEGREES
QRSD INTERVAL: 78 MS
QT INTERVAL: 336 MS
QTC INTERVAL: 435 MS
RBC # BLD AUTO: 4.21 MILLION/UL (ref 3.81–5.12)
SODIUM SERPL-SCNC: 140 MMOL/L (ref 136–145)
T WAVE AXIS: 6 DEGREES
T4 FREE SERPL-MCNC: 2.38 NG/DL (ref 0.76–1.46)
TROPONIN I SERPL-MCNC: <0.02 NG/ML
TSH SERPL DL<=0.05 MIU/L-ACNC: <0.007 UIU/ML (ref 0.36–3.74)
VENTRICULAR RATE: 101 BPM
WBC # BLD AUTO: 12.37 THOUSAND/UL (ref 4.31–10.16)

## 2021-09-02 PROCEDURE — 93005 ELECTROCARDIOGRAM TRACING: CPT

## 2021-09-02 PROCEDURE — 85379 FIBRIN DEGRADATION QUANT: CPT | Performed by: PHYSICIAN ASSISTANT

## 2021-09-02 PROCEDURE — 80053 COMPREHEN METABOLIC PANEL: CPT | Performed by: PHYSICIAN ASSISTANT

## 2021-09-02 PROCEDURE — 84443 ASSAY THYROID STIM HORMONE: CPT | Performed by: PHYSICIAN ASSISTANT

## 2021-09-02 PROCEDURE — 96361 HYDRATE IV INFUSION ADD-ON: CPT

## 2021-09-02 PROCEDURE — 84439 ASSAY OF FREE THYROXINE: CPT | Performed by: PHYSICIAN ASSISTANT

## 2021-09-02 PROCEDURE — 71046 X-RAY EXAM CHEST 2 VIEWS: CPT

## 2021-09-02 PROCEDURE — 99284 EMERGENCY DEPT VISIT MOD MDM: CPT | Performed by: PHYSICIAN ASSISTANT

## 2021-09-02 PROCEDURE — 36415 COLL VENOUS BLD VENIPUNCTURE: CPT | Performed by: PHYSICIAN ASSISTANT

## 2021-09-02 PROCEDURE — 71275 CT ANGIOGRAPHY CHEST: CPT

## 2021-09-02 PROCEDURE — 83690 ASSAY OF LIPASE: CPT | Performed by: PHYSICIAN ASSISTANT

## 2021-09-02 PROCEDURE — 96374 THER/PROPH/DIAG INJ IV PUSH: CPT

## 2021-09-02 PROCEDURE — G1004 CDSM NDSC: HCPCS

## 2021-09-02 PROCEDURE — 84484 ASSAY OF TROPONIN QUANT: CPT | Performed by: PHYSICIAN ASSISTANT

## 2021-09-02 PROCEDURE — 85025 COMPLETE CBC W/AUTO DIFF WBC: CPT | Performed by: PHYSICIAN ASSISTANT

## 2021-09-02 PROCEDURE — 93010 ELECTROCARDIOGRAM REPORT: CPT | Performed by: INTERNAL MEDICINE

## 2021-09-02 PROCEDURE — 99285 EMERGENCY DEPT VISIT HI MDM: CPT

## 2021-09-02 RX ORDER — KETOROLAC TROMETHAMINE 30 MG/ML
15 INJECTION, SOLUTION INTRAMUSCULAR; INTRAVENOUS ONCE
Status: COMPLETED | OUTPATIENT
Start: 2021-09-02 | End: 2021-09-02

## 2021-09-02 RX ADMIN — IOHEXOL 85 ML: 350 INJECTION, SOLUTION INTRAVENOUS at 16:52

## 2021-09-02 RX ADMIN — SODIUM CHLORIDE 1000 ML: 0.9 INJECTION, SOLUTION INTRAVENOUS at 14:30

## 2021-09-02 RX ADMIN — KETOROLAC TROMETHAMINE 15 MG: 30 INJECTION, SOLUTION INTRAMUSCULAR; INTRAVENOUS at 18:48

## 2021-09-02 NOTE — ED PROVIDER NOTES
History  Chief Complaint   Patient presents with    Chest Pain     left sided chest pain that goes from neck to fingers pain got worse pta  Patient is a 28-year-old female with a past medical history of Graves disease who presents with chest pain and intermittent shortness of breath that started this morning  Patient describes an aching pain on the left side of her chest that started this morning  She states the symptoms improved, however approximately 3-4 hours ago the pain returned and has been constant since then  She states the pain intermittently radiates into her left arm and left neck  She notes intermittent shortness of breath, but states that is not specifically related to the chest pain  She denies any BENAVIDEZ, leg pain or swelling, exertional chest pain, recent travel, OCPs, history of DVT/PEs, personal cardiac history  She notes intermittent pleuritic chest pain  She also notes intermittent palpitations, but states this is chronic with her Graves disease  Patient has been taking her medications as prescribed  Patient did note brief episode of sensation of her left arm feeling heavy or than her right arm earlier today, which quickly resolved  She denies any specific numbness, tingling, focal weakness, repeat episodes, headaches, dizziness, lightheadedness, vision changes  Patient states she is otherwise in her usual state of health and denies any fevers, chills, diaphoresis, congestion, cough, abdominal pain, nausea, vomiting, diarrhea, urinary changes, rash  Prior to Admission Medications   Prescriptions Last Dose Informant Patient Reported? Taking?    Magnesium 250 MG TABS   No No   Si pills a day for 3 days and then down to once a day   acetaminophen (TYLENOL) 650 mg CR tablet   No No   Sig: Take 1 tablet (650 mg total) by mouth every 8 (eight) hours as needed for mild pain or moderate pain   budesonide-formoterol (SYMBICORT) 160-4 5 mcg/act inhaler   No No   Sig: Inhale 2 puffs 2 (two) times a day Rinse mouth after use     docusate sodium (COLACE) 100 mg capsule   No No   Sig: Take 1 capsule (100 mg total) by mouth 2 (two) times a day   docusate sodium (COLACE) 100 mg capsule   No No   Sig: Take 1 capsule (100 mg total) by mouth 2 (two) times a day   Patient not taking: Reported on 2021   methimazole (TAPAZOLE) 10 mg tablet   No No   Sig: Take 2 tablets (20 mg total) by mouth daily   polyethylene glycol (GLYCOLAX) 17 GM/SCOOP powder   No No   Sig: Take 17 g by mouth daily as needed (constipation)   Patient not taking: Reported on 2021   propranolol (INDERAL) 10 mg tablet   No No   Sig: Take 1 tablet (10 mg total) by mouth every 12 (twelve) hours   Patient not taking: Reported on 2021      Facility-Administered Medications: None       Past Medical History:   Diagnosis Date    Anemia, postpartum 2019    Delivery by  hysterectomy 10/14/2019    Disease of thyroid gland     History of diet-controlled gestational diabetes mellitus 2019    Pap smear abnormality of cervix with LGSIL 2016    Varicella     Had as a child       Past Surgical History:   Procedure Laterality Date     SECTION      CYSTOSCOPY N/A 10/14/2019    Procedure: Mikel Gaster;  Surgeon: Keyon Gregg MD;  [facility];  Service: Gynecology    DILATION AND EVACUATION      Molar    HYSTERECTOMY N/A 10/14/2019    Procedure: RAY, BILATERAL SALPINGECTOMY;  Surgeon: Keyon Gregg MD;  [facility];  Service: Gynecology    LAPAROSCOPY      NC  DELIVERY ONLY N/A 10/14/2019    Procedure:  SECTION () REPEAT;  Surgeon: Keyon Gregg MD;  [facility];  Service: Gynecology       Family History   Problem Relation Age of Onset    Hypertension Mother    Jovanni Loja Lupus Mother     Pulmonary embolism Mother    Kameron Georges Mother     No Known Problems Father     Asthma Sister     No Known Problems Brother     Thyroid disease unspecified Family  No Known Problems Daughter     No Known Problems Son     Other Maternal Grandmother         Pre-diabetic    Heart murmur Brother     No Known Problems Daughter     No Known Problems Son     Asthma Son      I have reviewed and agree with the history as documented  E-Cigarette/Vaping    E-Cigarette Use Never User      E-Cigarette/Vaping Substances    Nicotine No     THC No     CBD No     Flavoring No     Other No     Unknown No      Social History     Tobacco Use    Smoking status: Never Smoker    Smokeless tobacco: Never Used   Vaping Use    Vaping Use: Never used   Substance Use Topics    Alcohol use: Not Currently    Drug use: No       Review of Systems   Constitutional: Negative for chills, diaphoresis and fever  HENT: Negative for congestion, ear pain, rhinorrhea and sore throat  Eyes: Negative for visual disturbance  Respiratory: Positive for shortness of breath (Intermittent)  Negative for cough, wheezing and stridor  Cardiovascular: Positive for chest pain and palpitations (Intermittent, not new)  Negative for leg swelling  Gastrointestinal: Negative for abdominal pain, diarrhea, nausea and vomiting  Genitourinary: Negative for difficulty urinating, dysuria, frequency, hematuria and urgency  Musculoskeletal: Positive for neck pain  Negative for myalgias and neck stiffness  Skin: Negative for color change, pallor and rash  Neurological: Negative for dizziness, weakness, light-headedness, numbness and headaches  All other systems reviewed and are negative  Physical Exam  Physical Exam  Vitals and nursing note reviewed  Constitutional:       General: She is awake  She is not in acute distress  Appearance: She is well-developed  She is not ill-appearing, toxic-appearing or diaphoretic  HENT:      Head: Normocephalic and atraumatic        Right Ear: External ear normal       Left Ear: External ear normal       Nose: Nose normal    Eyes:      Extraocular Movements: Extraocular movements intact  Conjunctiva/sclera: Conjunctivae normal       Pupils: Pupils are equal, round, and reactive to light  Cardiovascular:      Rate and Rhythm: Regular rhythm  Tachycardia present  Pulses: Normal pulses  Heart sounds: Normal heart sounds, S1 normal and S2 normal    Pulmonary:      Effort: Pulmonary effort is normal  No respiratory distress  Breath sounds: Normal breath sounds  No stridor  No decreased breath sounds or wheezing  Chest:      Chest wall: No tenderness  Abdominal:      General: Bowel sounds are normal  There is no distension  Palpations: Abdomen is soft  Tenderness: There is no abdominal tenderness  Musculoskeletal:         General: Normal range of motion  Cervical back: Normal range of motion and neck supple  Right lower leg: No edema  Left lower leg: No edema  Comments: Neurovascularly intact, 5/5 strength in bilateral upper and lower extremities   Skin:     General: Skin is warm and dry  Capillary Refill: Capillary refill takes less than 2 seconds  Neurological:      General: No focal deficit present  Mental Status: She is alert and oriented to person, place, and time  GCS: GCS eye subscore is 4  GCS verbal subscore is 5  GCS motor subscore is 6  Cranial Nerves: Cranial nerves are intact  Sensory: Sensation is intact  Motor: Motor function is intact  Coordination: Coordination is intact  Psychiatric:         Attention and Perception: Attention normal          Speech: Speech normal          Behavior: Behavior normal  Behavior is cooperative           Vital Signs  ED Triage Vitals [09/02/21 1319]   Temperature Pulse Respirations Blood Pressure SpO2   98 3 °F (36 8 °C) (!) 110 16 129/86 100 %      Temp Source Heart Rate Source Patient Position - Orthostatic VS BP Location FiO2 (%)   Oral Monitor Sitting Right arm --      Pain Score       7           Vitals:    09/02/21 1319 09/02/21 1528 09/02/21 1643   BP: 129/86 128/78 130/63   Pulse: (!) 110 84 90   Patient Position - Orthostatic VS: Sitting Lying Lying         Visual Acuity      ED Medications  Medications   sodium chloride 0 9 % bolus 1,000 mL (0 mL Intravenous Stopped 9/2/21 1645)   iohexol (OMNIPAQUE) 350 MG/ML injection (SINGLE-DOSE) 85 mL (85 mL Intravenous Given 9/2/21 1652)   ketorolac (TORADOL) injection 15 mg (15 mg Intravenous Given 9/2/21 1848)       Diagnostic Studies  Results Reviewed     Procedure Component Value Units Date/Time    TSH, 3rd generation with Free T4 reflex [[id number]]  (Abnormal) Collected: 09/02/21 1429    Lab Status: Final result Specimen: Blood from Arm, Left Updated: 09/02/21 1728     TSH 3RD GENERATON <0 007 uIU/mL     Narrative:      Patients undergoing fluorescein dye angiography may retain small amounts of fluorescein in the body for 48-72 hours post procedure  Samples containing fluorescein can produce falsely depressed TSH values  If the patient had this procedure,a specimen should be resubmitted post fluorescein clearance  T4, free [[id number]] Collected: 09/02/21 1429    Lab Status:  In process Specimen: Blood from Arm, Left Updated: 09/02/21 1728    Comprehensive metabolic panel [[id number]]  (Abnormal) Collected: 09/02/21 1429    Lab Status: Final result Specimen: Blood from Arm, Left Updated: 09/02/21 1506     Sodium 140 mmol/L      Potassium 3 9 mmol/L      Chloride 105 mmol/L      CO2 27 mmol/L      ANION GAP 8 mmol/L      BUN 9 mg/dL      Creatinine 0 52 mg/dL      Glucose 110 mg/dL      Calcium 8 6 mg/dL      Corrected Calcium 9 4 mg/dL      AST 19 U/L      ALT 23 U/L      Alkaline Phosphatase 167 U/L      Total Protein 7 4 g/dL      Albumin 3 0 g/dL      Total Bilirubin 0 29 mg/dL      eGFR 127 ml/min/1 73sq m     Narrative:      [place] guidelines for Chronic Kidney Disease (CKD):     Stage 1 with normal or high GFR (GFR > 90 mL/min/1 73 square meters)    Stage 2 Mild CKD (GFR = 60-89 mL/min/1 73 square meters)    Stage 3A Moderate CKD (GFR = 45-59 mL/min/1 73 square meters)    Stage 3B Moderate CKD (GFR = 30-44 mL/min/1 73 square meters)    Stage 4 Severe CKD (GFR = 15-29 mL/min/1 73 square meters)    Stage 5 End Stage CKD (GFR <15 mL/min/1 73 square meters)  Note: GFR calculation is accurate only with a steady state creatinine    Lipase [[id number]]  (Normal) Collected: 09/02/21 1429    Lab Status: Final result Specimen: Blood from Arm, Left Updated: 09/02/21 1506     Lipase 74 u/L     Troponin I [[id number]]  (Normal) Collected: 09/02/21 1429    Lab Status: Final result Specimen: Blood from Arm, Left Updated: 09/02/21 1503     Troponin I <0 02 ng/mL     D-Dimer [[id number]]  (Abnormal) Collected: 09/02/21 1429    Lab Status: Final result Specimen: Blood from Arm, Left Updated: 09/02/21 1458     D-Dimer, Quant 0 76 ug/ml FEU     CBC and differential [[id number]]  (Abnormal) Collected: 09/02/21 1429    Lab Status: Final result Specimen: Blood from Arm, Left Updated: 09/02/21 1440     WBC 12 37 Thousand/uL      RBC 4 21 Million/uL      Hemoglobin 10 9 g/dL      Hematocrit 34 5 %      MCV 82 fL      MCH 25 9 pg      MCHC 31 6 g/dL      RDW 14 4 %      MPV 9 8 fL      Platelets 007 Thousands/uL      nRBC 0 /100 WBCs      Neutrophils Relative 58 %      Immat GRANS % 0 %      Lymphocytes Relative 33 %      Monocytes Relative 7 %      Eosinophils Relative 2 %      Basophils Relative 0 %      Neutrophils Absolute 7 01 Thousands/µL      Immature Grans Absolute 0 04 Thousand/uL      Lymphocytes Absolute 4 10 Thousands/µL      Monocytes Absolute 0 92 Thousand/µL      Eosinophils Absolute 0 27 Thousand/µL      Basophils Absolute 0 03 Thousands/µL                  CTA ED chest PE study   Final Result by Ashish Tsang MD (09/02 1800)         1  No evidence for acute pulmonary embolism or other acute intrathoracic abnormality     2   Interim development of anterior mediastinal mass as described above  While nonspecific, the appearance is suggestive of thymic tissue in this 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 Graves' disease, known association  However,    other more ominous etiologies cannot be entirely excluded  Recommend consultation with the patient's endocrinologist with appropriate follow-up imaging  3   Additional findings as noted  The study was marked in [facility] for immediate notification  Workstation performed: [id number]         XR chest 2 views   ED Interpretation by Daniela Isabel PA-C (09/02 8972)   No acute pathology noted      Final Result by Jakob Mast MD (09/02 5344)      No acute cardiopulmonary disease  Workstation performed: [id number]                    Procedures  ECG 12 Lead Documentation Only    Date/Time: 9/2/2021 1:20 PM  Performed by: Daniela Isabel PA-C  Authorized by: Daniela Isabel PA-C     Indications / Diagnosis:  Chest pain  ECG reviewed by me, the ED Provider: yes    Patient location:  ED  Previous ECG:     Previous ECG:  Compared to current    Similarity:  No change  Rate:     ECG rate:  101    ECG rate assessment: tachycardic    Rhythm:     Rhythm: sinus rhythm    Ectopy:     Ectopy: none    QRS:     QRS axis:  Normal  Conduction:     Conduction: normal    ST segments:     ST segments:  Normal  T waves:     T waves: normal               ED Course  ED Course as of Sep 02 2004   Thu Sep 02, 2021   1511 Will order PE study   D-Dimer, Quant(!): 0 76   1511 Troponin I: <0 02   1546 Reviewed all results with patient thus far, answered questions  Patient states the pain comes and goes  Continues to declined any pain medication at this time      1803 IMPRESSION:        1  No evidence for acute pulmonary embolism or other acute intrathoracic abnormality  2   Interim development of anterior mediastinal mass as described above    While nonspecific, the appearance is suggestive of thymic tissue in this 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 Graves' disease, known association  However,   other more ominous etiologies cannot be entirely excluded  Recommend consultation with the patient's endocrinologist with appropriate follow-up imaging  3   Additional findings as noted  CTA ED chest PE study             HEART Risk Score      Most Recent Value   Heart Score Risk Calculator   History  1 Filed at: 09/02/2021 1840   ECG  0 Filed at: 09/02/2021 1840   Age  0 Filed at: 09/02/2021 1840   Risk Factors  0 Filed at: 09/02/2021 1840   Troponin  0 Filed at: 09/02/2021 1840   HEART Score  1 Filed at: 09/02/2021 1840                      SBIRT 20yo+      Most Recent Value   SBIRT (25 yo +)   In order to provide better care to our patients, we are screening all of our patients for alcohol and drug use  Would it be okay to ask you these screening questions? No Filed at: 09/02/2021 1347   Initial Alcohol Screen: US AUDIT-C    1  How often do you have a drink containing alcohol?  0 Filed at: 09/02/2021 1347   2  How many drinks containing alcohol do you have on a typical day you are drinking? 0 Filed at: 09/02/2021 1347   3a  Male UNDER 65: How often do you have five or more drinks on one occasion? 0 Filed at: 09/02/2021 1347   3b  FEMALE Any Age, or MALE 65+: How often do you have 4 or more drinks on one occassion? 0 Filed at: 09/02/2021 1347   Audit-C Score  0 Filed at: 09/02/2021 1347   BAILEY: How many times in the past year have you    Used an illegal drug or used a prescription medication for non-medical reasons?   Never Filed at: 09/02/2021 1347          Wells' Criteria for PE      Most Recent Value   Wells' Criteria for PE   Clinical signs and symptoms of DVT  0 Filed at: 09/02/2021 1546   PE is primary diagnosis or equally likely  3 Filed at: 09/02/2021 1546   HR >100  1 5 Filed at: 09/02/2021 1546   Immobilization at least 3 days or Surgery in the previous 4 weeks 0 Filed at: 09/02/2021 1546   Previous, objectively diagnosed PE or DVT  0 Filed at: 09/02/2021 1546   Hemoptysis  0 Filed at: 09/02/2021 1546   Malignancy with treatment within 6 months or palliative  0 Filed at: 09/02/2021 1546   Wells' Criteria Total  4 5 Filed at: 09/02/2021 1546                MDM  Number of Diagnoses or Management Options  Chest pain, unspecified  History of Graves' disease  Mediastinal mass  Diagnosis management comments: Reviewed all results with patient, including all CT findings, answered questions  Patient noted improvement of symptoms  No evidence of thyroid storm at this time  Patient does have endocrinologist with whom she follows closely  Recommended follow-up with endocrinology as soon as possible for further evaluation of symptoms and CT findings  Patient instructed continue all previously prescribed medications  Reviewed treatment at home  The management plan was discussed in detail with the patient at bedside and all questions were answered  Provided both verbal and written instructions  Reviewed red flag symptoms and strict return to ED instructions  Patient notes understanding and agrees to plan  Amount and/or Complexity of Data Reviewed  Discuss the patient with other providers: yes (Discussed case with Dr Carmina Sneed)        Disposition  Final diagnoses:   Chest pain, unspecified   History of Graves' disease   Mediastinal mass     Time reflects when diagnosis was documented in both MDM as applicable and the Disposition within this note     Time User Action Codes Description Comment    9/2/2021  6:41 PM Luis Manuel Mullen [[id number]] Chest pain, unspecified     9/2/2021  6:41 PM Costlow, Charyl Krabbe Add [[id number]] History of Graves' disease     9/2/2021  6:43 PM Luis Manuel Mullen [[id number]] Mediastinal mass       ED Disposition     ED Disposition Condition Date/Time Comment    Discharge Stable Thu Sep 2, 2021  6:44 PM Isidro Marshall discharge to home/self care  Follow-up Information     Follow up With Specialties Details Why [address] [facility] Emergency Department Emergency Medicine  If symptoms worsen [facility] [id number] [address] [facility] Emergency Department, [address] [facility] , [place], [place], [address]    Follow-up with endocrinologist as soon as possible for further evaluation and monitoring of symptoms         Jen Boyce MD Internal Medicine  As needed [facility]  [facility] [place] [id number] [phone]       [facility] Schedule an appointment as soon as possible for a visit   [address] [facility] [facility] [address], [address] [facility], [address] [place], [place], [address] Medication List as of 9/2/2021  6:47 PM      CONTINUE these medications which have NOT CHANGED    Details   acetaminophen (TYLENOL) 650 mg CR tablet Take 1 tablet (650 mg total) by mouth every 8 (eight) hours as needed for mild pain or moderate pain, Starting u 9/10/2020, Print      budesonide-formoterol (SYMBICORT) 160-4 5 mcg/act inhaler Inhale 2 puffs 2 (two) times a day Rinse mouth after use , Starting Tue 5/4/2021, Normal      !! docusate sodium (COLACE) 100 mg capsule Take 1 capsule (100 mg total) by mouth 2 (two) times a day, Starting Wed 2/17/2021, Normal      !! docusate sodium (COLACE) 100 mg capsule Take 1 capsule (100 mg total) by mouth 2 (two) times a day, Starting Wed 6/30/2021, Normal      Magnesium 250 MG TABS 2 pills a day for 3 days and then down to once a day, Normal      methimazole (TAPAZOLE) 10 mg tablet Take 2 tablets (20 mg total) by mouth daily, Starting Mon 8/16/2021, Until Fri 10/15/2021, Normal      polyethylene glycol (GLYCOLAX) 17 GM/SCOOP powder Take 17 g by mouth daily as needed (constipation), Starting Wed 2/17/2021, Normal      propranolol (INDERAL) 10 mg tablet Take 1 tablet (10 mg total) by mouth every 12 (twelve) hours, Starting Tue 4/27/2021, Normal       !! - Potential duplicate medications found  Please discuss with provider  No discharge procedures on file      PDMP Review     None          ED Provider  Electronically Signed by           Noble Calloway PA-C  09/02/21 2004       Noble Calloway PA-C  09/02/21 2005

## 2021-09-02 NOTE — DISCHARGE INSTRUCTIONS
Continue all previously prescribed medications  Continue Tylenol or ibuprofen home as needed for pain  Stay hydrated, drink plenty of fluids  Follow-up with endocrinology as soon as possible for further evaluation and monitoring of symptoms  Return to ED if symptoms worsen including increasing pain, difficulty breathing, fevers, tremors, sweating

## 2021-09-07 ENCOUNTER — OFFICE VISIT (OUTPATIENT)
Dept: ENDOCRINOLOGY | Facility: CLINIC | Age: 31
End: 2021-09-07
Payer: COMMERCIAL

## 2021-09-07 VITALS
DIASTOLIC BLOOD PRESSURE: 80 MMHG | WEIGHT: 189.4 LBS | BODY MASS INDEX: 30.44 KG/M2 | HEART RATE: 95 BPM | HEIGHT: 66 IN | SYSTOLIC BLOOD PRESSURE: 140 MMHG

## 2021-09-07 DIAGNOSIS — E05.00 GRAVES DISEASE: Primary | ICD-10-CM

## 2021-09-07 DIAGNOSIS — E05.90 HYPERTHYROIDISM: ICD-10-CM

## 2021-09-07 PROCEDURE — 99214 OFFICE O/P EST MOD 30 MIN: CPT | Performed by: NURSE PRACTITIONER

## 2021-09-07 RX ORDER — METHIMAZOLE 10 MG/1
25 TABLET ORAL DAILY
Qty: 75 TABLET | Refills: 3 | Status: SHIPPED | OUTPATIENT
Start: 2021-09-07 | End: 2021-09-23

## 2021-09-07 NOTE — ASSESSMENT & PLAN NOTE
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

## 2021-09-07 NOTE — PROGRESS NOTES
Established Patient Progress Note       Chief Complaint   Patient presents with    Hyperthyroidism        History of Present Illness:     Amber Meza is a 27 y o  adult with a history of hyperthyroidism due to graves  For the hyperthyroidism she reports she was inconsistent preciously with taking her Methimazole but since  has not missed any doses  Has been taking Methimazole 20 mg daily  She was at ER on  for chest pain  CT scan show thymic hyperplasia likely due to Graves disease  Free T4 was 2 38  Otherwise she was cleared by ER  She does report occasional palpitation, tremors, and neck pain         Patient Active Problem List   Diagnosis    Bipolar disorder ([address] )    Back pain    B12 deficiency    Other iron deficiency anemias    Postpartum care and examination    Hot flashes    Graves disease    Impaired fasting glucose    Hyperthyroidism      Past Medical History:   Diagnosis Date    Anemia, postpartum 2019    Delivery by  hysterectomy 10/14/2019    Disease of thyroid gland     History of diet-controlled gestational diabetes mellitus 2019    Pap smear abnormality of cervix with LGSIL 2016    Varicella     Had as a child      Past Surgical History:   Procedure Laterality Date     SECTION      CYSTOSCOPY N/A 10/14/2019    Procedure: CYSTOSCOPY;  Surgeon: Adair Correa MD;  [facility];  Service: Gynecology    DILATION AND EVACUATION      Molar    HYSTERECTOMY N/A 10/14/2019    Procedure: RAY, BILATERAL SALPINGECTOMY;  Surgeon: Adair Correa MD;  [facility];  Service: Gynecology    LAPAROSCOPY      [place]  DELIVERY ONLY N/A 10/14/2019    Procedure:  SECTION () REPEAT;  Surgeon: Adair Correa MD;  [facility];  Service: Gynecology      Family History   Problem Relation Age of Onset    Hypertension Mother    Chintan Laura Lupus Mother     Pulmonary embolism Mother    Colbert Kaye Mother     No Known Problems trouble swallowing  Eyes: Negative for visual disturbance  Respiratory: Negative for shortness of breath  Cardiovascular: Negative for chest pain and palpitations  Gastrointestinal: Negative for abdominal pain, constipation and diarrhea  Endocrine: Negative for cold intolerance, heat intolerance, polydipsia, polyphagia and polyuria  Genitourinary: Negative for frequency  Musculoskeletal: Negative for arthralgias and myalgias  Skin: Negative for rash  Neurological: Negative for dizziness and tremors  Hematological: Negative for adenopathy  Psychiatric/Behavioral: Negative for sleep disturbance  All other systems reviewed and are negative  Physical Exam:  Body mass index is 30 57 kg/m²  /80   Pulse 95   Ht 5' 6" (1 676 m)   Wt 85 9 kg (189 lb 6 4 oz)   LMP 03/23/2019 (Within Days)   BMI 30 57 kg/m²    Wt Readings from Last 3 Encounters:   09/07/21 85 9 kg (189 lb 6 4 oz)   07/19/21 83 kg (183 lb)   04/27/21 89 4 kg (197 lb)       Physical Exam  Vitals reviewed  Constitutional:       General: Sergo Madden is not in acute distress  Appearance: Sergo Barrier is well-developed  HENT:      Head: Normocephalic and atraumatic  Eyes:      Conjunctiva/sclera: Conjunctivae normal       Pupils: Pupils are equal, round, and reactive to light  Neck:      Thyroid: No thyromegaly  Cardiovascular:      Rate and Rhythm: Normal rate and regular rhythm  Heart sounds: Normal heart sounds  No murmur heard  Pulmonary:      Effort: Pulmonary effort is normal  No respiratory distress  Breath sounds: Normal breath sounds  No wheezing or rales  Abdominal:      General: Bowel sounds are normal  There is no distension  Palpations: Abdomen is soft  Tenderness: There is no abdominal tenderness  Musculoskeletal:         General: Normal range of motion  Cervical back: Normal range of motion and neck supple     Lymphadenopathy:      Cervical: No cervical adenopathy  Skin:     General: Skin is warm and dry  Neurological:      Mental Status: Blade Larios is alert and oriented to person, place, and time  Labs:     Component      Latest Ref Rng & Units 8/17/2021 9/2/2021   TSH 3RD GENERATON      0 358 - 3 740 uIU/mL <0 015 (L) <0 007 (L)   Free T4      0 76 - 1 46 ng/dL 4 34 (HH) 2 38 (H)   T3, Total      0 60 - 1 80 ng/mL 3 90 (H)      CTA - CHEST WITH IV CONTRAST - PULMONARY ANGIOGRAM     INDICATION:   PE suspected, intermediate prob, positive D-dimer  chest pain, SOB, positive d-dimer      FINDINGS:    MEDIASTINUM AND GLADYS:  Since the prior study of 10/17/2019, the patient has developed abnormal somewhat nodular soft tissue in the anterior mediastinum measuring overall 4 0 x 4 5 x 5 8 cm  This is felt to most likely reflect thymic hyperplasia, a known   association in patients with Graves' disease  However, other entities including thymic neoplasia, lymphoma, and other neoplasia such as germ cell neoplasm, cannot be entirely excluded  Appropriate clinical correlation and follow-up, particularly with   the patient's endocrinologist is advised  Mediastinum and gladys otherwise unremarkable  Interim development of anterior mediastinal mass as described above  While nonspecific, the appearance is suggestive of thymic tissue in this 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 Graves' disease, known association  However,   other more ominous etiologies cannot be entirely excluded  Recommend consultation with the patient's endocrinologist with appropriate follow-up imaging  Impression & Plan:    Problem List Items Addressed This Visit        Endocrine    Graves disease - Primary    Relevant Medications    methimazole (TAPAZOLE) 10 mg tablet    Other Relevant Orders    T4, free    TSH, 3rd generation    T3 Lab Collect    Hyperthyroidism     Free T4 remains elevated, increase Methimazole to 25 mg daily   Repeat thyroid function test in two weeks  Thymic hyperplasia can be related to untreated graves disease  Stressed importance of compliance with Methimazole  Untreated hyperthyroidism can lead to arrhythmia, congestive heart failure, bone loss and thyroid storm  Relevant Medications    methimazole (TAPAZOLE) 10 mg tablet    Other Relevant Orders    T4, free    TSH, 3rd generation    T3 Lab Collect          Orders Placed This Encounter   Procedures    T4, free     Standing Status:   Future     Standing Expiration Date:   9/7/2022    TSH, 3rd generation     This is a patient instruction: This test is non-fasting  Please drink two glasses of water morning of bloodwork  Standing Status:   Future     Standing Expiration Date:   9/7/2022    T3 Lab Collect     Standing Status:   Future     Standing Expiration Date:   9/7/2022         Discussed with the patient and all questioned fully answered  Negar Méndez will call me if any problems arise        Follow-up appointment in 4-6 weeks     Counseled patient on diagnostic results, prognosis, risk and benefit of treatment options, instruction for management, importance of treatment compliance, Risk  factor reduction and impressions      JIMY Jovel

## 2021-09-14 ENCOUNTER — TELEPHONE (OUTPATIENT)
Dept: ENDOCRINOLOGY | Facility: CLINIC | Age: 31
End: 2021-09-14

## 2021-09-14 NOTE — TELEPHONE ENCOUNTER
Patient is asking for a call back at [phone]  She said she has questions about thyroid surgery  Thank you

## 2021-09-16 ENCOUNTER — TELEPHONE (OUTPATIENT)
Dept: OBGYN CLINIC | Facility: CLINIC | Age: 31
End: 2021-09-16

## 2021-09-16 ENCOUNTER — TELEPHONE (OUTPATIENT)
Dept: HEMATOLOGY ONCOLOGY | Facility: CLINIC | Age: 31
End: 2021-09-16

## 2021-09-16 ENCOUNTER — OFFICE VISIT (OUTPATIENT)
Dept: OBGYN CLINIC | Facility: CLINIC | Age: 31
End: 2021-09-16
Payer: COMMERCIAL

## 2021-09-16 VITALS
DIASTOLIC BLOOD PRESSURE: 70 MMHG | TEMPERATURE: 97.9 F | SYSTOLIC BLOOD PRESSURE: 110 MMHG | WEIGHT: 187 LBS | HEART RATE: 84 BPM | BODY MASS INDEX: 30.18 KG/M2

## 2021-09-16 DIAGNOSIS — N64.4 BREAST PAIN, LEFT: Primary | ICD-10-CM

## 2021-09-16 DIAGNOSIS — N63.20 LEFT BREAST MASS: ICD-10-CM

## 2021-09-16 DIAGNOSIS — Z80.3 FAMILY HISTORY OF BREAST CANCER: ICD-10-CM

## 2021-09-16 PROCEDURE — 99213 OFFICE O/P EST LOW 20 MIN: CPT | Performed by: OBSTETRICS & GYNECOLOGY

## 2021-09-16 NOTE — TELEPHONE ENCOUNTER
New Patient Encounter    New Patient Intake Form   Patient Details:  Humberto Lanza  1990  [id number]    Background Information:  [address] starts by opening a telephone encounter and gathering the following information   Who is calling to schedule? If not self, relationship to patient? Patient   Referring Provider Uli Sylvester   What is the diagnosis? Hyperthyroidism   E05 00 (ICD-10-CM) - Graves disease   Is this Cancer or Non-Cancer? Non-Cancer   Is this diagnosis confirmed? Yes   When was the diagnosis? 9/2021   Is there a confirmed diagnosis from a biopsy/tissue reviewed by pathology? NA   Were outside slides requested? NA   Is patient aware of diagnosis? Yes   Is there a personal history and what kind? No   Is there a family history and what kind? No   Reason for visit? New Diagnosis   Have you had any imaging or labs done? If so: when, where? yes  [facility]   Are records in [facility]? yes   Are records needed form an outside facility? No   If yes, name, city, state where facility is located  If patient has a prior history of cancer were old records obtained? NA   Was the patient told to bring a disk? No   Does the patient smoke or Vape? No   If yes, how many packs or cartridges per day? Scheduling Information:   Preferred [place]:  [place]     Are there any dates/time the patient cannot be seen?     Miscellaneous: requested to be seen in [facility]

## 2021-09-16 NOTE — PATIENT INSTRUCTIONS
Fibrocystic Breast Changes   WHAT YOU NEED TO KNOW:   Fibrocystic changes are changes in your breast tissue  Your breast tissue may have small cysts, benign lumps (not cancer), or thickened areas  These breast tissue changes are common in women who have not gone through menopause  Fibrocystic breast changes do not increase your risk of breast cancer  The cause of fibrocystic breast changes is unknown  They may be related to hormonal changes that occur during your menstrual cycle  DISCHARGE INSTRUCTIONS:   Contact your healthcare provider if:   · You notice other changes in your breasts or nipples, such as dimpling or a rash  · You have bloody nipple discharge  · You have a fever  · You have questions or concerns about your condition or care  Medicines: You may need any of the following:  · NSAIDs , such as ibuprofen, help decrease swelling, pain, and fever  This medicine is available with or without a doctor's order  NSAIDs can cause stomach bleeding or kidney problems in certain people  If you take blood thinner medicine, always ask your healthcare provider if NSAIDs are safe for you  Always read the medicine label and follow directions  · Oral contraceptives  (birth control pills) may be recommended by your healthcare provider  These may help to decrease the level of hormones that worsen your signs and symptoms  · Take your medicine as directed  Contact your healthcare provider if you think your medicine is not helping or if you have side effects  Tell him of her if you are allergic to any medicine  Keep a list of the medicines, vitamins, and herbs you take  Include the amounts, and when and why you take them  Bring the list or the pill bottles to follow-up visits  Carry your medicine list with you in case of an emergency  Manage your symptoms:   · Apply heat  on your breasts for 20 to 30 minutes every 2 hours for as many days as directed   Heat helps decrease pain and muscle spasms  · Apply ice  on your breasts for 15 to 20 minutes every hour or as directed  Use an ice pack, or put crushed ice in a plastic bag  Cover it with a towel  Ice helps prevent tissue damage and decreases swelling and pain  · Wear a well-fitted, supportive bra  It may help to relieve pain and swelling  · Avoid or limit caffeine  This may help to decrease symptoms in some women  Caffeine is found in coffee, tea, sodas, and chocolate  Continue breast self-exams:  Check your breast for changes in size, shape, or feel of the breast tissue  Check under your arms and all around your breasts  If you have monthly periods, examine your breasts after your period is over  Contact your healthcare provider if you notice any changes in your breasts  If you have questions, ask for more information about how to do a breast self-exam      Follow up with your healthcare provider as directed:  Write down your questions so you remember to ask them during your visits  © Copyright [employer] 2021 Information is for End User's use only and may not be sold, redistributed or otherwise used for commercial purposes  All illustrations and images included in CareNotes® are the copyrighted property of [employer] M , Inc  or [address] [place] [address] [facility]  The above information is an  only  It is not intended as medical advice for individual conditions or treatments  Talk to your doctor, nurse or pharmacist before following any medical regimen to see if it is safe and effective for you  Fibrocystic Breast Changes   WHAT YOU NEED TO KNOW:   Fibrocystic changes are changes in your breast tissue  Your breast tissue may have small cysts, benign lumps (not cancer), or thickened areas  These breast tissue changes are common in women who have not gone through menopause  Fibrocystic breast changes do not increase your risk of breast cancer  The cause of fibrocystic breast changes is unknown   They may be related to hormonal changes that occur during your menstrual cycle  DISCHARGE INSTRUCTIONS:   Contact your healthcare provider if:   · You notice other changes in your breasts or nipples, such as dimpling or a rash  · You have bloody nipple discharge  · You have a fever  · You have questions or concerns about your condition or care  Medicines: You may need any of the following:  · NSAIDs , such as ibuprofen, help decrease swelling, pain, and fever  This medicine is available with or without a doctor's order  NSAIDs can cause stomach bleeding or kidney problems in certain people  If you take blood thinner medicine, always ask your healthcare provider if NSAIDs are safe for you  Always read the medicine label and follow directions  · Oral contraceptives  (birth control pills) may be recommended by your healthcare provider  These may help to decrease the level of hormones that worsen your signs and symptoms  · Take your medicine as directed  Contact your healthcare provider if you think your medicine is not helping or if you have side effects  Tell him of her if you are allergic to any medicine  Keep a list of the medicines, vitamins, and herbs you take  Include the amounts, and when and why you take them  Bring the list or the pill bottles to follow-up visits  Carry your medicine list with you in case of an emergency  Manage your symptoms:   · Apply heat  on your breasts for 20 to 30 minutes every 2 hours for as many days as directed  Heat helps decrease pain and muscle spasms  · Apply ice  on your breasts for 15 to 20 minutes every hour or as directed  Use an ice pack, or put crushed ice in a plastic bag  Cover it with a towel  Ice helps prevent tissue damage and decreases swelling and pain  · Wear a well-fitted, supportive bra  It may help to relieve pain and swelling  · Avoid or limit caffeine  This may help to decrease symptoms in some women   Caffeine is found in coffee, tea, sodas, and chocolate  Continue breast self-exams:  Check your breast for changes in size, shape, or feel of the breast tissue  Check under your arms and all around your breasts  If you have monthly periods, examine your breasts after your period is over  Contact your healthcare provider if you notice any changes in your breasts  If you have questions, ask for more information about how to do a breast self-exam      Follow up with your healthcare provider as directed:  Write down your questions so you remember to ask them during your visits  © Copyright [employer] 2021 Information is for End User's use only and may not be sold, redistributed or otherwise used for commercial purposes  All illustrations and images included in CareNotes® are the copyrighted property of [employer]  or Reyna Cerda  The above information is an  only  It is not intended as medical advice for individual conditions or treatments  Talk to your doctor, nurse or pharmacist before following any medical regimen to see if it is safe and effective for you

## 2021-09-16 NOTE — TELEPHONE ENCOUNTER
After todays visit, Patient called stating she found out her paternal aunt just recently  of breast cancer

## 2021-09-16 NOTE — PROGRESS NOTES
Assessment/Plan:     Diagnoses and all orders for this visit:    Breast pain, left  -     Mammo diagnostic bilateral w 3d & cad; Future  -     US breast left limited (diagnostic); Future    Left breast mass  -     Mammo diagnostic bilateral w 3d & cad; Future  -     US breast left limited (diagnostic); Future    Family history of breast cancer  -     Ambulatory Referral to Oncology Genetics; Future      Patient with left-sided breast pain  Examination exam normal except for left breast tenderness and possibly a small left breast cyst   Discussed with patient differential diagnosis including fibrocystic breast disease  Breast imaging was ordered due to family history of malignancy as well as breast problems  Patient was referred to Surgical Oncology  After her visit since she called stating that the around passed away from breast cancer  Discussed with patient use of NSAIDs and offered a prescription for ibuprofen or naproxen but she declines  Advised patient to obtain well fitting bras, divide caffeine and chocolate for possible fibrocystic breast disease  We will call her once imaging results have been obtained  Appointment was made for patient for her diagnostic breast imaging  We will coordinate care and arrange for additional referral  If imaging is abnormal or if breast pain is persistent  Subjective   Patient ID: Ranjeet Sutton is a 27 y o  adult  Patient is here for a problem visit  Chief Complaint   Patient presents with    Breast Pain     Left breast pain x1 month  Denies any discharge or bleeding from nipple, denies any personal or family hx of breast cancer     Left breast pain x 1 month  Pain is 5/10  She has no nipple discharge  She has no breast mass  No family h/o breast CA    No previous imaging        Menstrual History:  OB History        9    Para   6    Term   5       1    AB   3    Living   5       SAB   3    TAB   0    Ectopic   0    Multiple   0    Live Births   5                Menarche age: 14  Patient's last menstrual period was 2019 (within days)       H/o  hysterectomy    Past Medical History:   Diagnosis Date    Anemia, postpartum 2019    Delivery by  hysterectomy 10/14/2019    Disease of thyroid gland     History of diet-controlled gestational diabetes mellitus 2019    Pap smear abnormality of cervix with LGSIL 2016    Varicella     Had as a child       Past Surgical History:   Procedure Laterality Date     SECTION      CYSTOSCOPY N/A 10/14/2019    Procedure: CYSTOSCOPY;  Surgeon: Colt Putnam MD;  [facility];  Service: Gynecology    DILATION AND EVACUATION      Molar    HYSTERECTOMY N/A 10/14/2019    Procedure: RAY, BILATERAL SALPINGECTOMY;  Surgeon: Colt Putnam MD;  [facility];  Service: Gynecology    LAPAROSCOPY      [place]  DELIVERY ONLY N/A 10/14/2019    Procedure:  SECTION () REPEAT;  Surgeon: Colt Putnam MD;  [facility];  Service: Gynecology       Social History     Tobacco Use    Smoking status: Never Smoker    Smokeless tobacco: Never Used   Vaping Use    Vaping Use: Never used   Substance Use Topics    Alcohol use: Not Currently    Drug use: No        Allergies   Allergen Reactions    Penicillins Anaphylaxis     Anaphylaxis    Shrimp (Diagnostic) - Food Allergy     Venofer [Iron Sucrose] Itching         Current Outpatient Medications:     acetaminophen (TYLENOL) 650 mg CR tablet, Take 1 tablet (650 mg total) by mouth every 8 (eight) hours as needed for mild pain or moderate pain, Disp: 30 tablet, Rfl: 0    budesonide-formoterol (SYMBICORT) 160-4 5 mcg/act inhaler, Inhale 2 puffs 2 (two) times a day Rinse mouth after use , Disp: 10 2 g, Rfl: 1    docusate sodium (COLACE) 100 mg capsule, Take 1 capsule (100 mg total) by mouth 2 (two) times a day, Disp: 60 capsule, Rfl: 6    Magnesium 250 MG TABS, 2 pills a day for 3 days and then down to once a day, Disp: 30 tablet, Rfl: 0    methimazole (TAPAZOLE) 10 mg tablet, Take 2 5 tablets (25 mg total) by mouth daily, Disp: 75 tablet, Rfl: 3      Review of Systems   Constitutional: Negative  HENT: Negative  Eyes: Negative  Respiratory: Negative  Cardiovascular: Negative  Gastrointestinal: Negative  Endocrine: Negative  Genitourinary:        As noted in HPI   Musculoskeletal: Negative  Skin: Negative  Allergic/Immunologic: Negative  Neurological: Negative  Hematological: Negative  Psychiatric/Behavioral: Negative  /70 (BP Location: Right arm, Patient Position: Sitting, Cuff Size: Adult)   Pulse 84   Temp 97 9 °F (36 6 °C) (Temporal)   Wt 84 8 kg (187 lb)   LMP 03/23/2019 (Within Days)   BMI 30 18 kg/m²       Physical Exam  Constitutional:       Appearance: Normal appearance  HENT:      Head: Normocephalic  Nose: Nose normal    Pulmonary:      Effort: Pulmonary effort is normal    Chest:      Chest wall: No mass, lacerations, deformity, swelling, tenderness, crepitus or edema  Breasts:         Right: Normal  No swelling, bleeding, inverted nipple, mass, nipple discharge, skin change or tenderness  Left: Mass and tenderness present  No bleeding, inverted nipple, nipple discharge or skin change  Abdominal:      General: There is no distension  Palpations: Abdomen is soft  Tenderness: There is no abdominal tenderness  Lymphadenopathy:      Upper Body:      Right upper body: No supraclavicular or axillary adenopathy  Left upper body: No supraclavicular or axillary adenopathy  Neurological:      General: No focal deficit present  Mental Status: Sintia Dieter is alert and oriented to person, place, and time  Skin:     General: Skin is warm and dry  Psychiatric:         Mood and Affect: Mood normal          Behavior: Behavior normal          Thought Content:  Thought content normal    Vitals reviewed

## 2021-09-17 ENCOUNTER — TELEPHONE (OUTPATIENT)
Dept: SURGICAL ONCOLOGY | Facility: CLINIC | Age: 31
End: 2021-09-17

## 2021-09-17 NOTE — TELEPHONE ENCOUNTER
Pt called to discuss non-cancer breast appts - she is going to go have imaging done on 10/8/21 and will call us back to schedule after that is comlete

## 2021-09-20 ENCOUNTER — APPOINTMENT (OUTPATIENT)
Dept: LAB | Facility: HOSPITAL | Age: 31
End: 2021-09-20
Payer: COMMERCIAL

## 2021-09-20 DIAGNOSIS — E05.90 HYPERTHYROIDISM: ICD-10-CM

## 2021-09-20 DIAGNOSIS — E05.00 GRAVES DISEASE: ICD-10-CM

## 2021-09-20 LAB
T3 SERPL-MCNC: 2.2 NG/ML (ref 0.6–1.8)
T4 FREE SERPL-MCNC: 2.35 NG/DL (ref 0.76–1.46)
TSH SERPL DL<=0.05 MIU/L-ACNC: <0.015 UIU/ML (ref 0.47–4.68)

## 2021-09-20 PROCEDURE — 84443 ASSAY THYROID STIM HORMONE: CPT

## 2021-09-20 PROCEDURE — 84439 ASSAY OF FREE THYROXINE: CPT

## 2021-09-20 PROCEDURE — 84480 ASSAY TRIIODOTHYRONINE (T3): CPT

## 2021-09-20 PROCEDURE — 36415 COLL VENOUS BLD VENIPUNCTURE: CPT

## 2021-09-21 ENCOUNTER — TELEPHONE (OUTPATIENT)
Dept: ENDOCRINOLOGY | Facility: CLINIC | Age: 31
End: 2021-09-21

## 2021-09-21 ENCOUNTER — TELEPHONE (OUTPATIENT)
Dept: GENETICS | Facility: CLINIC | Age: 31
End: 2021-09-21

## 2021-09-21 NOTE — TELEPHONE ENCOUNTER
----- Message from [place] sent at 9/21/2021 12:28 PM EDT -----  Please confirm patient is taking Methimazole 25 mg daily   Free T4 and T3 remain elevated

## 2021-09-21 NOTE — TELEPHONE ENCOUNTER
I called Chel Ornelas to schedule a new patient appointment with the [facility]       Outcome: There was no answer and I was unable to leave a voice message  Follow-up:   At this time the referral will be closed and we will wait to hear back from the patient regarding scheduling this appointment  Reason For Visit  Barbara Richardson is a(n) established patient here today for follow-up management of RIGHT hip. Assessment   1. Arthritis of right hip (M16.11)    Plan   1. XR HIP RT 2V W PELVIS 1V; Status:Complete;   Done: [id number]    Impression    EXAM: XR Hip right 3 views    INDICATION: Right hip pain    FINDINGS: AP Pelvis and AP and lateral of right hip were reviewed today. Show advanced degenerative changes in the with joint space narrowing, subchondral sclerosis, and osteophyte formation. No fracture or dislocation is identified. IMPRESSION:    1) advanced osteoarthritis of the right hip.       Signatures Electronically signed by : Tomas Ivey MD; Oct 23 2018 10:17AM CST

## 2021-09-23 DIAGNOSIS — E05.90 HYPERTHYROIDISM: ICD-10-CM

## 2021-09-23 RX ORDER — METHIMAZOLE 10 MG/1
TABLET ORAL
Qty: 90 TABLET | Refills: 3 | Status: SHIPPED | OUTPATIENT
Start: 2021-09-23 | End: 2021-12-20

## 2021-09-23 NOTE — TELEPHONE ENCOUNTER
Pt informed of dose adjustment to methimazole, repeat labs in two weeks  She verbalized understanding  She has appt scheduled with Dr Ferguson Mean

## 2021-09-23 NOTE — TELEPHONE ENCOUNTER
Please increase Methimazole to 30 mg daily  Repeat TSH, free T4, T3 in two weeks   Referral for consultation was placed please call to schedule

## 2021-09-24 ENCOUNTER — TELEPHONE (OUTPATIENT)
Dept: OBGYN CLINIC | Facility: CLINIC | Age: 31
End: 2021-09-24

## 2021-09-24 NOTE — TELEPHONE ENCOUNTER
Tried calling patient and no response  She should try taking Tylenol or ibuprofen  Make an appointment next week for a problem visit for pelvic pain  If Schneider severe, she should go to ED

## 2021-09-24 NOTE — TELEPHONE ENCOUNTER
Pt called back  Advised of provider recommendations  Pt states pain has been persistent for 48 hours and last night she was crying it was hurting so bad  Advised pt to be seen in ED for evaluation  Pt agrees

## 2021-09-24 NOTE — TELEPHONE ENCOUNTER
Pt called has been having pain for 3 days on her left side above her pelvis  She said its sharp and hurts when she is getting up and moving and when she is trying to breath  Please advise

## 2021-10-08 ENCOUNTER — HOSPITAL ENCOUNTER (OUTPATIENT)
Dept: ULTRASOUND IMAGING | Facility: CLINIC | Age: 31
Discharge: HOME | End: 2021-10-08
Payer: COMMERCIAL

## 2021-10-08 ENCOUNTER — HOSPITAL ENCOUNTER (OUTPATIENT)
Dept: MAMMOGRAPHY | Facility: CLINIC | Age: 31
Discharge: HOME | End: 2021-10-08
Payer: COMMERCIAL

## 2021-10-08 VITALS — BODY MASS INDEX: 30.05 KG/M2 | WEIGHT: 187 LBS | HEIGHT: 66 IN

## 2021-10-08 DIAGNOSIS — N63.20 LEFT BREAST MASS: ICD-10-CM

## 2021-10-08 DIAGNOSIS — N64.4 BREAST PAIN, LEFT: ICD-10-CM

## 2021-10-08 PROCEDURE — 77066 DX MAMMO INCL CAD BI: CPT

## 2021-10-08 PROCEDURE — 76642 ULTRASOUND BREAST LIMITED: CPT

## 2021-10-08 PROCEDURE — G0279 TOMOSYNTHESIS, MAMMO: HCPCS

## 2021-10-11 ENCOUNTER — CONSULT (OUTPATIENT)
Dept: SURGICAL ONCOLOGY | Facility: CLINIC | Age: 31
End: 2021-10-11
Payer: COMMERCIAL

## 2021-10-11 VITALS
OXYGEN SATURATION: 98 % | SYSTOLIC BLOOD PRESSURE: 114 MMHG | BODY MASS INDEX: 30.7 KG/M2 | RESPIRATION RATE: 18 BRPM | HEIGHT: 66 IN | HEART RATE: 96 BPM | TEMPERATURE: 98.2 F | WEIGHT: 191 LBS | DIASTOLIC BLOOD PRESSURE: 68 MMHG

## 2021-10-11 DIAGNOSIS — E05.90 HYPERTHYROIDISM: ICD-10-CM

## 2021-10-11 DIAGNOSIS — E05.00 GRAVES DISEASE: Primary | ICD-10-CM

## 2021-10-11 PROCEDURE — 99243 OFF/OP CNSLTJ NEW/EST LOW 30: CPT | Performed by: SURGERY

## 2021-10-12 ENCOUNTER — TELEPHONE (OUTPATIENT)
Dept: OBGYN CLINIC | Facility: CLINIC | Age: 31
End: 2021-10-12

## 2021-11-16 ENCOUNTER — APPOINTMENT (EMERGENCY)
Dept: RADIOLOGY | Facility: HOSPITAL | Age: 31
End: 2021-11-16
Payer: COMMERCIAL

## 2021-11-16 ENCOUNTER — HOSPITAL ENCOUNTER (EMERGENCY)
Facility: HOSPITAL | Age: 31
Discharge: HOME | End: 2021-11-17
Attending: EMERGENCY MEDICINE | Admitting: EMERGENCY MEDICINE
Payer: COMMERCIAL

## 2021-11-16 ENCOUNTER — APPOINTMENT (EMERGENCY)
Dept: CT IMAGING | Facility: HOSPITAL | Age: 31
End: 2021-11-16
Payer: COMMERCIAL

## 2021-11-16 DIAGNOSIS — R20.0 NUMBNESS: ICD-10-CM

## 2021-11-16 DIAGNOSIS — R07.89 ATYPICAL CHEST PAIN: Primary | ICD-10-CM

## 2021-11-16 LAB
ALBUMIN SERPL BCP-MCNC: 4 G/DL (ref 3–5.2)
ALP SERPL-CCNC: 195 U/L (ref 43–122)
ALT SERPL W P-5'-P-CCNC: 24 U/L
ANION GAP SERPL CALCULATED.3IONS-SCNC: 5 MMOL/L (ref 5–14)
APTT PPP: 32 SECONDS (ref 23–37)
AST SERPL W P-5'-P-CCNC: 25 U/L (ref 14–36)
BASOPHILS # BLD AUTO: 0.1 THOUSANDS/ΜL (ref 0–0.1)
BASOPHILS NFR BLD AUTO: 1 % (ref 0–1)
BILIRUB SERPL-MCNC: 0.46 MG/DL
BUN SERPL-MCNC: 10 MG/DL (ref 5–25)
CALCIUM SERPL-MCNC: 9.1 MG/DL (ref 8.4–10.2)
CHLORIDE SERPL-SCNC: 104 MMOL/L (ref 97–108)
CO2 SERPL-SCNC: 29 MMOL/L (ref 22–30)
CREAT SERPL-MCNC: 0.53 MG/DL (ref 0.6–1.2)
EOSINOPHIL # BLD AUTO: 0.2 THOUSAND/ΜL (ref 0–0.4)
EOSINOPHIL NFR BLD AUTO: 2 % (ref 0–6)
ERYTHROCYTE [DISTWIDTH] IN BLOOD BY AUTOMATED COUNT: 15.1 %
GFR SERPL CREATININE-BSD FRML MDRD: 126 ML/MIN/1.73SQ M
GLUCOSE SERPL-MCNC: 117 MG/DL (ref 70–99)
HCT VFR BLD AUTO: 37.6 % (ref 36–46)
HGB BLD-MCNC: 12.2 G/DL (ref 12–16)
INR PPP: 1.11 (ref 0.84–1.19)
LYMPHOCYTES # BLD AUTO: 3.6 THOUSANDS/ΜL (ref 0.5–4)
LYMPHOCYTES NFR BLD AUTO: 33 % (ref 25–45)
MCH RBC QN AUTO: 25.3 PG (ref 26–34)
MCHC RBC AUTO-ENTMCNC: 32.4 G/DL (ref 31–36)
MCV RBC AUTO: 78 FL (ref 80–100)
MICROCYTES BLD QL AUTO: PRESENT
MONOCYTES # BLD AUTO: 0.8 THOUSAND/ΜL (ref 0.2–0.9)
MONOCYTES NFR BLD AUTO: 7 % (ref 1–10)
NEUTROPHILS # BLD AUTO: 6.4 THOUSANDS/ΜL (ref 1.8–7.8)
NEUTS SEG NFR BLD AUTO: 58 % (ref 45–65)
PLATELET # BLD AUTO: 345 THOUSANDS/UL (ref 150–450)
PLATELET BLD QL SMEAR: ADEQUATE
PMV BLD AUTO: 8.6 FL (ref 8.9–12.7)
POTASSIUM SERPL-SCNC: 3.5 MMOL/L (ref 3.6–5)
PROT SERPL-MCNC: 8.2 G/DL (ref 5.9–8.4)
PROTHROMBIN TIME: 13.9 SECONDS (ref 11.6–14.5)
RBC # BLD AUTO: 4.82 MILLION/UL (ref 4–5.2)
RBC MORPH BLD: NORMAL
SODIUM SERPL-SCNC: 138 MMOL/L (ref 137–147)
WBC # BLD AUTO: 11 THOUSAND/UL (ref 4.5–11)

## 2021-11-16 PROCEDURE — 85610 PROTHROMBIN TIME: CPT | Performed by: EMERGENCY MEDICINE

## 2021-11-16 PROCEDURE — 71046 X-RAY EXAM CHEST 2 VIEWS: CPT

## 2021-11-16 PROCEDURE — 85730 THROMBOPLASTIN TIME PARTIAL: CPT | Performed by: EMERGENCY MEDICINE

## 2021-11-16 PROCEDURE — 84484 ASSAY OF TROPONIN QUANT: CPT | Performed by: EMERGENCY MEDICINE

## 2021-11-16 PROCEDURE — 93005 ELECTROCARDIOGRAM TRACING: CPT

## 2021-11-16 PROCEDURE — 99285 EMERGENCY DEPT VISIT HI MDM: CPT

## 2021-11-16 PROCEDURE — 99285 EMERGENCY DEPT VISIT HI MDM: CPT | Performed by: EMERGENCY MEDICINE

## 2021-11-16 PROCEDURE — 96360 HYDRATION IV INFUSION INIT: CPT

## 2021-11-16 PROCEDURE — 70496 CT ANGIOGRAPHY HEAD: CPT

## 2021-11-16 PROCEDURE — 36415 COLL VENOUS BLD VENIPUNCTURE: CPT | Performed by: EMERGENCY MEDICINE

## 2021-11-16 PROCEDURE — 80053 COMPREHEN METABOLIC PANEL: CPT | Performed by: EMERGENCY MEDICINE

## 2021-11-16 PROCEDURE — 70498 CT ANGIOGRAPHY NECK: CPT

## 2021-11-16 PROCEDURE — 85025 COMPLETE CBC W/AUTO DIFF WBC: CPT | Performed by: EMERGENCY MEDICINE

## 2021-11-16 RX ADMIN — SODIUM CHLORIDE 1000 ML: 0.9 INJECTION, SOLUTION INTRAVENOUS at 23:29

## 2021-11-16 RX ADMIN — IOHEXOL 100 ML: 350 INJECTION, SOLUTION INTRAVENOUS at 23:38

## 2021-11-17 VITALS
SYSTOLIC BLOOD PRESSURE: 120 MMHG | BODY MASS INDEX: 32.02 KG/M2 | TEMPERATURE: 98.8 F | OXYGEN SATURATION: 98 % | WEIGHT: 198.4 LBS | DIASTOLIC BLOOD PRESSURE: 69 MMHG | HEART RATE: 104 BPM | RESPIRATION RATE: 16 BRPM

## 2021-11-17 LAB
2HR DELTA HS TROPONIN: >1 NG/L
ATRIAL RATE: 112 BPM
CARDIAC TROPONIN I PNL SERPL HS: 3 NG/L
CARDIAC TROPONIN I PNL SERPL HS: <2 NG/L
P AXIS: 45 DEGREES
PR INTERVAL: 154 MS
QRS AXIS: 23 DEGREES
QRSD INTERVAL: 78 MS
QT INTERVAL: 326 MS
QTC INTERVAL: 444 MS
T WAVE AXIS: 46 DEGREES
VENTRICULAR RATE: 112 BPM

## 2021-11-17 PROCEDURE — 96361 HYDRATE IV INFUSION ADD-ON: CPT

## 2021-11-17 PROCEDURE — 93010 ELECTROCARDIOGRAM REPORT: CPT | Performed by: INTERNAL MEDICINE

## 2021-11-17 PROCEDURE — 36415 COLL VENOUS BLD VENIPUNCTURE: CPT | Performed by: EMERGENCY MEDICINE

## 2021-11-17 PROCEDURE — 84484 ASSAY OF TROPONIN QUANT: CPT | Performed by: EMERGENCY MEDICINE

## 2021-11-18 ENCOUNTER — OFFICE VISIT (OUTPATIENT)
Dept: FAMILY MEDICINE CLINIC | Facility: CLINIC | Age: 31
End: 2021-11-18
Payer: COMMERCIAL

## 2021-11-18 VITALS
BODY MASS INDEX: 31.82 KG/M2 | SYSTOLIC BLOOD PRESSURE: 138 MMHG | WEIGHT: 198 LBS | HEART RATE: 111 BPM | TEMPERATURE: 98.2 F | DIASTOLIC BLOOD PRESSURE: 74 MMHG | OXYGEN SATURATION: 96 % | HEIGHT: 66 IN

## 2021-11-18 DIAGNOSIS — R20.0 LEFT FACIAL NUMBNESS: ICD-10-CM

## 2021-11-18 DIAGNOSIS — G47.9 SLEEP DISTURBANCE: ICD-10-CM

## 2021-11-18 DIAGNOSIS — E05.90 HYPERTHYROIDISM: ICD-10-CM

## 2021-11-18 DIAGNOSIS — G43.109 MIGRAINE WITH AURA AND WITHOUT STATUS MIGRAINOSUS, NOT INTRACTABLE: Primary | ICD-10-CM

## 2021-11-18 DIAGNOSIS — E83.42 HYPOMAGNESEMIA: ICD-10-CM

## 2021-11-18 PROCEDURE — 99214 OFFICE O/P EST MOD 30 MIN: CPT | Performed by: INTERNAL MEDICINE

## 2021-11-18 RX ORDER — CHOLECALCIFEROL (VITAMIN D3) 50 MCG
2000 TABLET ORAL DAILY
Qty: 30 TABLET | Refills: 3 | Status: SHIPPED | OUTPATIENT
Start: 2021-11-18

## 2021-11-18 RX ORDER — MULTIVITAMIN WITH IRON
TABLET ORAL
Qty: 30 TABLET | Refills: 0 | Status: SHIPPED | OUTPATIENT
Start: 2021-11-18

## 2021-12-18 DIAGNOSIS — E05.90 HYPERTHYROIDISM: ICD-10-CM

## 2021-12-20 RX ORDER — METHIMAZOLE 10 MG/1
TABLET ORAL
Qty: 60 TABLET | Refills: 1 | Status: SHIPPED | OUTPATIENT
Start: 2021-12-20 | End: 2022-02-23 | Stop reason: SDUPTHER

## 2021-12-22 ENCOUNTER — OFFICE VISIT (OUTPATIENT)
Dept: NEUROLOGY | Facility: CLINIC | Age: 31
End: 2021-12-22
Payer: COMMERCIAL

## 2021-12-22 VITALS
HEART RATE: 92 BPM | BODY MASS INDEX: 31.66 KG/M2 | DIASTOLIC BLOOD PRESSURE: 76 MMHG | HEIGHT: 66 IN | SYSTOLIC BLOOD PRESSURE: 121 MMHG | WEIGHT: 197 LBS

## 2021-12-22 DIAGNOSIS — G43.009 MIGRAINE WITHOUT AURA AND WITHOUT STATUS MIGRAINOSUS, NOT INTRACTABLE: Primary | ICD-10-CM

## 2021-12-22 DIAGNOSIS — R20.2 PARESTHESIAS: ICD-10-CM

## 2021-12-22 PROCEDURE — 99244 OFF/OP CNSLTJ NEW/EST MOD 40: CPT | Performed by: PSYCHIATRY & NEUROLOGY

## 2021-12-22 RX ORDER — RIZATRIPTAN BENZOATE 10 MG/1
10 TABLET ORAL ONCE AS NEEDED
Qty: 9 TABLET | Refills: 6 | Status: SHIPPED | OUTPATIENT
Start: 2021-12-22

## 2021-12-29 ENCOUNTER — TELEPHONE (OUTPATIENT)
Dept: FAMILY MEDICINE CLINIC | Facility: CLINIC | Age: 31
End: 2021-12-29

## 2022-01-21 ENCOUNTER — HOSPITAL ENCOUNTER (OUTPATIENT)
Dept: MRI IMAGING | Facility: HOSPITAL | Age: 32
Discharge: HOME | End: 2022-01-21
Attending: PSYCHIATRY & NEUROLOGY
Payer: COMMERCIAL

## 2022-01-21 DIAGNOSIS — R20.2 PARESTHESIAS: ICD-10-CM

## 2022-01-21 DIAGNOSIS — G43.009 MIGRAINE WITHOUT AURA AND WITHOUT STATUS MIGRAINOSUS, NOT INTRACTABLE: ICD-10-CM

## 2022-01-21 PROCEDURE — A9585 GADOBUTROL INJECTION: HCPCS | Performed by: PSYCHIATRY & NEUROLOGY

## 2022-01-21 PROCEDURE — G1004 CDSM NDSC: HCPCS

## 2022-01-21 PROCEDURE — 70553 MRI BRAIN STEM W/O & W/DYE: CPT

## 2022-01-21 RX ADMIN — GADOBUTROL 8 ML: 604.72 INJECTION INTRAVENOUS at 09:28

## 2022-02-23 ENCOUNTER — OFFICE VISIT (OUTPATIENT)
Dept: ENDOCRINOLOGY | Facility: CLINIC | Age: 32
End: 2022-02-23
Payer: COMMERCIAL

## 2022-02-23 ENCOUNTER — OFFICE VISIT (OUTPATIENT)
Dept: NEUROLOGY | Facility: CLINIC | Age: 32
End: 2022-02-23
Payer: COMMERCIAL

## 2022-02-23 VITALS
BODY MASS INDEX: 32.62 KG/M2 | SYSTOLIC BLOOD PRESSURE: 136 MMHG | HEART RATE: 94 BPM | WEIGHT: 203 LBS | HEIGHT: 66 IN | DIASTOLIC BLOOD PRESSURE: 62 MMHG

## 2022-02-23 VITALS
WEIGHT: 205.2 LBS | HEART RATE: 96 BPM | SYSTOLIC BLOOD PRESSURE: 116 MMHG | DIASTOLIC BLOOD PRESSURE: 86 MMHG | BODY MASS INDEX: 32.98 KG/M2 | HEIGHT: 66 IN

## 2022-02-23 DIAGNOSIS — E05.90 HYPERTHYROIDISM: ICD-10-CM

## 2022-02-23 DIAGNOSIS — G43.019 INTRACTABLE MIGRAINE WITHOUT AURA AND WITHOUT STATUS MIGRAINOSUS: ICD-10-CM

## 2022-02-23 DIAGNOSIS — G43.009 MIGRAINE WITHOUT AURA AND WITHOUT STATUS MIGRAINOSUS, NOT INTRACTABLE: Primary | ICD-10-CM

## 2022-02-23 DIAGNOSIS — E05.00 GRAVES DISEASE: Primary | ICD-10-CM

## 2022-02-23 PROCEDURE — 96372 THER/PROPH/DIAG INJ SC/IM: CPT | Performed by: PSYCHIATRY & NEUROLOGY

## 2022-02-23 PROCEDURE — 99214 OFFICE O/P EST MOD 30 MIN: CPT | Performed by: INTERNAL MEDICINE

## 2022-02-23 PROCEDURE — 99215 OFFICE O/P EST HI 40 MIN: CPT | Performed by: PSYCHIATRY & NEUROLOGY

## 2022-02-23 RX ORDER — PROCHLORPERAZINE MALEATE 10 MG
TABLET ORAL
Qty: 20 TABLET | Refills: 4 | Status: SHIPPED | OUTPATIENT
Start: 2022-02-23

## 2022-02-23 RX ORDER — KETOROLAC TROMETHAMINE 30 MG/ML
60 INJECTION, SOLUTION INTRAMUSCULAR; INTRAVENOUS ONCE
Status: COMPLETED | OUTPATIENT
Start: 2022-02-23 | End: 2022-02-23

## 2022-02-23 RX ORDER — TOPIRAMATE 25 MG/1
TABLET ORAL
Qty: 120 TABLET | Refills: 4 | Status: SHIPPED | OUTPATIENT
Start: 2022-02-23

## 2022-02-23 RX ORDER — METHIMAZOLE 10 MG/1
TABLET ORAL
Qty: 60 TABLET | Refills: 1 | Status: SHIPPED | OUTPATIENT
Start: 2022-02-23

## 2022-02-23 RX ADMIN — KETOROLAC TROMETHAMINE 60 MG: 30 INJECTION, SOLUTION INTRAMUSCULAR; INTRAVENOUS at 12:57

## 2022-02-23 NOTE — PROGRESS NOTES
[facility] Consult - Follow up   PATIENT:  Charu White  MRN:  [id number]  :  1990  DATE OF SERVICE:  2022  REFERRED BY: No ref  provider found  PMD: Jesus Vega MD    Assessment/Plan:   Charu White is a very pleasant 27 y o  female with a past medical history that includes hyperthyroidism (not always compliant with medications and not currently wanting surgery), elevated alk-phos, bipolar disorder, migraine, chronic back pain, B12 deficiency, iron deficiency anemia, BMI over 30, constipation, S/p hysterectomy, insomnia referred here for evaluation of headache  My initial evaluation 2021    Migraine without aura and without status migrainosus, not intractable  She reports a long history of headaches and migraines dating back to childhood  She reports a family history of migraines  Pain is typically unilateral left retro-orbital frontal temporal radiating to the apex pressure and pulsating  She denies aura and reports typical associated migrainous features except for as of initial visit in the past month 2 episodes of new features with left face and arm paresthesias  She does have some unilateral autonomic features suggesting less likely trigeminal autonomic cephalalgia  - as of 2021:  migraines for 1-2 weeks per month, she is not interested in prescription preventative, discussed headache preventative supplements and trial of rizatriptan for abortive  - as of 2022: MRI Brain reviewed together and normal  She continues to have migraines for a week or two at a time and struggles to take medications regularly for anything, trial of topiramate for prevention and if side effects trial of emgality shots once monthly  Did not like rizatriptan, trial of prochlorperazine for abortive  toradol IM today for current intractable migraine  Agree with treating thyroid and possible sleep apnea as these can impact migraines       Workup:  - MRI brain with without contrast 01/21/2022: No acute intracranial abnormality  Mild-to-moderate sinus disease with findings suggestive of acute sinusitis in right maxillary sinus, worse since prior exam   - CTA head and neck with and without contrast 11/16/21:  1  No acute intracranial hemorrhage, mass effect or extra-axial collection  2  No hemodynamically significant stenosis, dissection or occlusion of the carotid or vertebral arteries  3  No intracranial aneurysm  No hemodynamically significant stenosis or occlusion of the major vessels of the [place]  Preventative:  - we discussed headache hygiene and lifestyle factors that may improve headaches  - trial of topiramate with gradual titration up to 50 mg BID  Discussed proper use, possible side effects and risks  - Past/ failed/contraindicated: venlafaxine would be contraindicated due to history of bipolar disorder, propranolol, topiramate now  - future options:  Discussed would try emgality next  Discussed proper use, possible side effects and risks  Abortive:  - discussed not taking over-the-counter or prescription pain medications more than 3 days per week to prevent medication overuse/rebound headache  -     prochlorperazine (COMPAZINE) 10 mg tablet; Take 1 tab up to BID prn migraine or nausea, ideally no more than 3 days per week  Discussed proper use, possible side effects and risks  - Past/ failed/contraindicated:  OTC meds, rizatriptan side effects   - future options: Alternative Triptan, indomethacin trial, Toradol IM or p o , could consider trial for 5 days of Depakote or dexamethasone for prolonged migraine, ubrelvy, reyvow, nurtec      Intractable migraine without aura and without status migrainosus  -     ketorolac (TORADOL) 60 mg/2 mL IM injection 60 mg  Discussed proper use, possible side effects and risks      Patient instructions      Follow up with sleep medicine as scheduled to eval for sleep apnea  Agree treating thyroid Headache/migraine treatment:   Abortive medications (for immediate treatment of a headache): It is ok to take ibuprofen, acetaminophen or naproxen (Advil, Tylenol,  Aleve, Excedrin) if they help your headaches you should limit these to No more than 3 times a week to avoid medication overuse/rebound headaches  For your more moderate to severe migraines take this medication early     prochlorperazine (COMPAZINE) 10 mg tablet; Take 1 tab up to BID prn migraine or nausea, ideally no more than 3 days per week  Prescription preventive medications for headaches/migraines   (to take every day to help prevent headaches - not to take at the time of headache):  [x] - Topiramate 25 mg nightly for 1 week, then increase to 25 mg in a m  And 25 mg in p m  For 1 week, then take 25 mg in a m  And 50 mg in p m  For 1 week, then take 50 mg in a m  and 50 mg in p m  And continue    - generally the common side effects improve as your body gets used to the medication  If we need to spread out a more gradual increase of the medication on a longer scale we can, just call if any questions or concerns  - if necessary, if the a m  dose is causing side effects we can always have you take the full dose at night instead    If you have side effects or does not work call  --------------------------------------------------------    Emgality/Galcanezumab - the 1st dose is 240 mg loading dose of 2 consecutive 120 mg injections  Thereafter, 120 mg injections every 30 days     READ INSTRUCTIONS that come with the medication  REFRIGERATE  Keep out of direct sunlight  Prior to administration, allow to come to room temperature for 30 minutes  Do not warm using a heat source (eg, microwave or hot water)  Do not shake  Administer in preferably abdomen (avoiding 2 inches around the navel), thigh, upper arm, or buttocks avoiding areas of skin that are tender, bruised, red or hard   Deliver entire contents of single-use prefilled pen or syringe  *Typically these types of medications take time untill you see the benefit, although some may see improvement in days, often it may take weeks, especially if the medication is being titrated up to a beneficial level  Please contact us if there are any concerns or questions regarding the medication  Lifestyle Recommendations:  [x] SLEEP - Maintain a regular sleep schedule: Adults need at least 7-8 hours of uninterrupted a night  Maintain good sleep hygiene:  Going to bed and waking up at consistent times, avoiding excessive daytime naps, avoiding caffeinated beverages in the evening, avoid excessive stimulation in the evening and generally using bed primarily for sleeping  One hour before bedtime would recommend turning lights down lower, decreasing your activity (may read quietly, listen to music at a low volume)  When you get into bed, should eliminate all technology (no texting, emailing, playing with your phone, iPad or tablet in bed)  [x] HYDRATION - Maintain good hydration  Drink  2L of fluid a day (4 typical small water bottles)  [x] DIET - Maintain good nutrition  In particular don't skip meals and try and eat healthy balanced meals regularly  [x] TRIGGERS - Look for other triggers and avoid them: Limit caffeine to 1-2 cups a day or less  Avoid dietary triggers that you have noticed bring on your headaches (this could include aged cheese, peanuts, MSG, aspartame and nitrates)  [x] EXERCISE - physical exercise as we all know is good for you in many ways, and not only is good for your heart, but also is beneficial for your mental health, cognitive health and  chronic pain/headaches  I would encourage at the least 5 days of physical exercise weekly for at least 30 minutes  Education and Follow-up  [x] Please call with any questions or concerns  Of course if any new concerning symptoms go to the emergency department  [x] Follow up 3 months, contact me sooner if needed       CC:    We had the pleasure of evaluating Kitty Granados in neurological consultation today  Kitty Granados is a   right handed female who presents today for evaluation of headaches  History obtained from patient as well as available medical record review  History of Present Illness:   Interval history as of 2/23/2022  - MRI brain with without contrast 01/21/2022: No acute intracranial abnormality  Mild-to-moderate sinus disease with findings suggestive of acute sinusitis in right maxillary sinus, worse since prior exam  - we discussed can follow up with PCP regarding sinus  - followed up with endocrine today and now willing to consider starting medications for untreated Graves disease  - sleep medicine visit scheduled for 3/24/22    Headaches and migraines   - she showed up today to ask for an appointment after seeing endocrine this am  - headaches have been daily for the past week, before that minor symptoms and tried the rizatriptan, but did not like the way it makes her feel     Preventative:   -trial of headache preventative supplements    Abortive:   -trial of rizatriptan- some jaw tightness, does not like the way it makes her feel     Headaches started at what age? 89 years old  How often do the headaches occur?    - as of 12/22/2021: 1-2 weeks in month are migraine days for years   What time of the day do the headaches start? No particular time of day   How long do the headaches last?  1- 2 weeks  Are you ever headache free? Yes    Aura? without aura     Last eye exam: fall 2021, wears glasses, normal     Where is your headache located and pain quality?   - often unilateral, usually left, rarely right  - left retro-orbital and frontal temporal and then to the top   - pressure and pulsating     What is the intensity of pain?  Average: 10/10, worst 10/10  Associated symptoms:   [] Nausea       [] Vomiting       [x] Photophobia     [x]Phonophobia      [] Osmophobia   [] Blurred vision   [x] Light-headed or dizzy [] Tinnitus   [] Hands or feet tingle or feel numb/paresthesias    - last month was driving and had a sharp pain in heart and pains in her left arm into fingers and then started getting tingling in left arm and then tingling left side of face and lips  Was seen in ED, please see EMR note  CTA head and neck were negative  Had a severe headache at the time  There was concern for possible left facial weakness, although subjective and not noted on exam in ED (Happened twice last month)    [] Ptosis      [x] Lacrimation - left   [] Nasal congestion/rhinorrhea      Things that make the headache worse? No specific movements, any movement     Headache triggers:  Unknown     Have you seen someone else for headaches or pain? Yes, PCP, ED   Have you had trigger point injection performed and how often? No  Have you had Botox injection performed and how often? No   Have you had epidural injections or transforaminal injections performed? No  Are you current pregnant or planning on getting pregnant? S/p hysterectomy  Have you ever had any Brain imaging? yes CTA     What medications do you take or have you taken for your headaches?    ABORTIVE:    Ibuprofen and sometimes work      PREVENTIVE:       Riboflavin and mg takes sometimes   Propranolol takes sometimes     Past/ failed/contraindicated:  venlafaxine would be contraindicated due to history of bipolar disorder   -        LIFESTYLE  Sleep   - averages: can go a whole week without sleep, bed at 12 or 1, wakes at 6  Problems falling asleep?:   Yes  Problems staying asleep?:  Yes  - PCP referred to sleep med 11/18/21  - snores and has a sleep apmt coming up       Water: 2-3 bottles per day  Caffeine: 3 - 2L bottles soda per day    Mood: Bipolar disorder, anxiety, depression, not on meds, bipolar diagnosed by psychiatry around 2017   Never took the meds  Never counseling   Mood ok now     The following portions of the patient's history were reviewed and updated as appropriate: allergies, current medications, past family history, past medical history, past social history, past surgical history and problem list     Pertinent family history:  Family history of headaches:  migraine headaches in mother and aunt  Any family history of aneurysms - mom - recent CTA normal     Pertinent social history:  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[address], 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   Education: some college  Lives with mom, sister, 5 kids, sisters fiance and her son,  comes and goes     Illicit Drugs: denies  Alcohol/tobacco: Denies tobacco use, alcohol intake: social drinker    Past Medical History:     Past Medical History:   Diagnosis Date    Anemia, postpartum 2019    Delivery by  hysterectomy 10/14/2019    Disease of thyroid gland     History of diet-controlled gestational diabetes mellitus 2019    Pap smear abnormality of cervix with LGSIL 2016    Varicella     Had as a child       Patient Active Problem List   Diagnosis    Bipolar disorder ([facility] [address] )    Back pain    B12 deficiency    Other iron deficiency anemias    Hot flashes    Graves disease    Impaired fasting glucose    Hyperthyroidism       Medications:      Current Outpatient Medications   Medication Sig Dispense Refill    acetaminophen (TYLENOL) 650 mg CR tablet Take 1 tablet (650 mg total) by mouth every 8 (eight) hours as needed for mild pain or moderate pain 30 tablet 0    budesonide-formoterol (SYMBICORT) 160-4 5 mcg/act inhaler Inhale 2 puffs 2 (two) times a day Rinse mouth after use   10 2 g 1    Cholecalciferol (Vitamin D) 50 MCG (2000 UT) tablet Take 1 tablet (2,000 Units total) by mouth daily 30 tablet 3    docusate sodium (COLACE) 100 mg capsule Take 1 capsule (100 mg total) by mouth 2 (two) times a day 60 capsule 6    Magnesium 250 MG TABS 1 pill a day 30 tablet 0    methimazole (TAPAZOLE) 10 mg tablet TAKE 2 TABLETS BY MOUTH EVERY DAY 60 tablet 1    Riboflavin (Vitamin B-2) 100 MG TABS 2 pills a day 60 tablet 3    rizatriptan (MAXALT) 10 MG tablet Take 1 tablet (10 mg total) by mouth once as needed for migraine May repeat in 2 hours if needed  Max 2/24 hours, 9/month  9 tablet 6    prochlorperazine (COMPAZINE) 10 mg tablet Take 1 tab up to BID prn migraine or nausea, ideally no more than 3 days per week 20 tablet 4    topiramate (TOPAMAX) 25 mg tablet 1 tab PO QHS for 1 week, increase as tolerated to 1 tab BID for 1 week, then 1 tab QAM and 2 tabs QHS for 1 week and finish at 2 tabs BID  120 tablet 4     No current facility-administered medications for this visit  Allergies:       Allergies   Allergen Reactions    Penicillins Anaphylaxis     Anaphylaxis    Shrimp (Diagnostic) - Food Allergy     Venofer [Iron Sucrose] Itching       Family History:     Family History   Problem Relation Age of Onset    Hypertension Mother    Ardeth Needs Lupus Mother     Pulmonary embolism Mother    Ardeth Needs Anuerysm Mother     No Known Problems Father     Asthma Sister     No Known Problems Brother     Thyroid disease unspecified Family     No Known Problems Daughter     No Known Problems Son     Other Maternal Grandmother         Pre-diabetic    Heart murmur Brother     No Known Problems Daughter     No Known Problems Son     Asthma Son     Breast cancer Paternal Aunt        Social History:     Social History     Socioeconomic History    Marital status: /Civil Union     Spouse name: Not on file    Number of children: 3    Years of education: Not on file    Highest education level: Not on file   Occupational History    Not on file   Tobacco Use    Smoking status: Never Smoker    Smokeless tobacco: Never Used   Vaping Use    Vaping Use: Never used   Substance and Sexual Activity    Alcohol use: Not Currently    Drug use: No    Sexual activity: Yes     Partners: Male     Birth control/protection: None   Other Topics Concern    Not on file   Social History Narrative Always wears seat belts ; caffeine use     Social Determinants of Health     Financial Resource Strain: Not on file   Food Insecurity: Not on file   Transportation Needs: Not on file   Physical Activity: Not on file   Stress: Not on file   Social Connections: Not on file   Intimate Partner Violence: Not on file   Housing Stability: Not on file         Objective:       Physical Exam:                                                                 Vitals:            Constitutional:    /62 (BP Location: Left arm, Patient Position: Sitting, Cuff Size: Large)   Pulse 94   Ht 5' 6" (1 676 m)   Wt 92 1 kg (203 lb)   LMP 03/23/2019 (Within Days)   BMI 32 77 kg/m²   BP Readings from Last 3 Encounters:   02/23/22 136/62   02/23/22 116/86   12/22/21 121/76     Pulse Readings from Last 3 Encounters:   02/23/22 94   02/23/22 96   12/22/21 92         Well developed, well nourished, well groomed  No dysmorphic features  Psychiatric:  Normal behavior and appropriate affect        Neurological Examination:     Mental status/cognitive function:  Attention span and concentration as well as fund of knowledge are appropriate for age  Normal language and spontaneous speech  Cranial Nerves:  VII-facial expression symmetric  Motor Exam: symmetric bulk throughout  no atrophy, fasciculations or abnormal movements noted  Coordination:  no apparent dysmetria, ataxia or tremor noted  Gait: steady casual gait      Pertinent lab results:   See EMR for recent labs  - 11/16/2021 CMP with potassium 3 5, alk-phos 195  CBC with MCV 78  - 09/20/2021 TSH low <0 015, free T4 and T3 elevated     EKG 11/16/2021 sinus tachycardia rate 112,      Imaging: I have personally reviewed imaging and radiology read   - MRI brain with without contrast 01/21/2022: No acute intracranial abnormality    Mild-to-moderate sinus disease with findings suggestive of acute sinusitis in right maxillary sinus, worse since prior exam   - CTA head and neck with and without contrast 11/16/21:  1  No acute intracranial hemorrhage, mass effect or extra-axial collection  2  No hemodynamically significant stenosis, dissection or occlusion of the carotid or vertebral arteries  3  No intracranial aneurysm  No hemodynamically significant stenosis or occlusion of the major vessels of the [place]  Review of Systems:   ROS obtained by medical assistant Personally reviewed and updated if indicated  I recommended PCP follow up for non neurologic problems  Review of Systems   Constitutional: Negative  Negative for appetite change and fever  HENT: Negative  Negative for hearing loss, tinnitus, trouble swallowing and voice change  Eyes: Negative  Negative for photophobia and pain  Respiratory: Negative  Negative for shortness of breath  Cardiovascular: Negative  Negative for palpitations  Gastrointestinal: Negative  Negative for nausea and vomiting  Endocrine: Negative  Negative for cold intolerance  Genitourinary: Negative  Negative for dysuria, frequency and urgency  Musculoskeletal: Negative  Negative for myalgias and neck pain  Skin: Negative  Negative for rash  Neurological: Positive for headaches Negative for dizziness, tremors, seizures, syncope, facial asymmetry, speech difficulty, weakness, light-headedness and numbness  Hematological: Negative  Does not bruise/bleed easily  Psychiatric/Behavioral: Negative  Negative for confusion, hallucinations and sleep disturbance  I have spent 23 minutes with Patient  today in which greater than 50% of this time was spent in counseling/coordination of care regarding Diagnostic results, Prognosis, Risks and benefits of tx options, Intructions for management, Patient education, Importance of tx compliance, Risk factor reductions and Impressions  I also spent 20 minutes non face to face for this patient the same day         Author:  Mae Lawrence MD 2/23/2022 12:59 PM

## 2022-02-23 NOTE — PATIENT INSTRUCTIONS
Follow up with sleep medicine as scheduled to [facility] for sleep apnea  Agree treating thyroid     Headache/migraine treatment:   Abortive medications (for immediate treatment of a headache): It is ok to take ibuprofen, acetaminophen or naproxen (Advil, Tylenol,  Aleve, Excedrin) if they help your headaches you should limit these to No more than 3 times a week to avoid medication overuse/rebound headaches  For your more moderate to severe migraines take this medication early     prochlorperazine (COMPAZINE) 10 mg tablet; Take 1 tab up to BID prn migraine or nausea, ideally no more than 3 days per week  Prescription preventive medications for headaches/migraines   (to take every day to help prevent headaches - not to take at the time of headache):  [x] - Topiramate 25 mg nightly for 1 week, then increase to 25 mg in a m  And 25 mg in p m  For 1 week, then take 25 mg in a m  And 50 mg in p m  For 1 week, then take 50 mg in a m  and 50 mg in p m  And continue    - generally the common side effects improve as your body gets used to the medication  If we need to spread out a more gradual increase of the medication on a longer scale we can, just call if any questions or concerns  - if necessary, if the a m  dose is causing side effects we can always have you take the full dose at night instead    If you have side effects or does not work call  --------------------------------------------------------    Emgality/Galcanezumab - the 1st dose is 240 mg loading dose of 2 consecutive 120 mg injections  Thereafter, 120 mg injections every 30 days     READ INSTRUCTIONS that come with the medication  REFRIGERATE  Keep out of direct sunlight  Prior to administration, allow to come to room temperature for 30 minutes  Do not warm using a heat source (eg, microwave or hot water)  Do not shake   Administer in preferably abdomen (avoiding 2 inches around the navel), thigh, upper arm, or buttocks avoiding areas of skin that are tender, bruised, red or hard  Deliver entire contents of single-use prefilled pen or syringe  *Typically these types of medications take time untill you see the benefit, although some may see improvement in days, often it may take weeks, especially if the medication is being titrated up to a beneficial level  Please contact us if there are any concerns or questions regarding the medication  Lifestyle Recommendations:  [x] SLEEP - Maintain a regular sleep schedule: Adults need at least 7-8 hours of uninterrupted a night  Maintain good sleep hygiene:  Going to bed and waking up at consistent times, avoiding excessive daytime naps, avoiding caffeinated beverages in the evening, avoid excessive stimulation in the evening and generally using bed primarily for sleeping  One hour before bedtime would recommend turning lights down lower, decreasing your activity (may read quietly, listen to music at a low volume)  When you get into bed, should eliminate all technology (no texting, emailing, playing with your phone, iPad or tablet in bed)  [x] HYDRATION - Maintain good hydration  Drink  2L of fluid a day (4 typical small water bottles)  [x] DIET - Maintain good nutrition  In particular don't skip meals and try and eat healthy balanced meals regularly  [x] TRIGGERS - Look for other triggers and avoid them: Limit caffeine to 1-2 cups a day or less  Avoid dietary triggers that you have noticed bring on your headaches (this could include aged cheese, peanuts, MSG, aspartame and nitrates)  [x] EXERCISE - physical exercise as we all know is good for you in many ways, and not only is good for your heart, but also is beneficial for your mental health, cognitive health and  chronic pain/headaches  I would encourage at the least 5 days of physical exercise weekly for at least 30 minutes  Education and Follow-up  [x] Please call with any questions or concerns   Of course if any new concerning symptoms go to the emergency department    [x] Follow up 3 months, contact me sooner if needed

## 2022-02-23 NOTE — PROGRESS NOTES
Nga Woodall 27 y o  female MRN: [id number]    Encounter: [id number]      Assessment/Plan     Problem List Items Addressed This Visit        Endocrine    Graves disease - Primary     Patient again counseled complications untreated Graves disease including risk arrhythmia, bone loss, fractures, thyroid storm etc   Discussed different options of treating Graves disease including medications, radioactive iodine treatment or surgery  Now she states that she will try taking medications  Advised to have labs done 1 month after she has been on a consistent dose  Have also advised her to discuss with her primary care and seek  a 2nd opinion  She will schedule a follow up appointment if she decides to follow-up with this office         Relevant Medications    methimazole (TAPAZOLE) 10 mg tablet    Other Relevant Orders    T4, free Lab Collect    T3 Lab Collect    TSH, 3rd generation Lab Collect    Comprehensive metabolic panel Lab Collect    CBC and differential Lab Collect    Hyperthyroidism    Relevant Medications    methimazole (TAPAZOLE) 10 mg tablet    Other Relevant Orders    T4, free Lab Collect    T3 Lab Collect    TSH, 3rd generation Lab Collect          CC:   Graves disease     History of Present Illness     HPI:  25-year-old female here to follow-up Graves disease-she was initially diagnosed with Graves disease in September 2020 and started on methimazole  However she never took her medications on a regular basis  Currently admits to taking methimazole few times month-states she does not like to take medications  She saw surgeon last year and decided against thyroidectomy    Under a lot of stress in personal life   C/o Weight gain , increased appetite  Review of Systems   Constitutional: Negative for fatigue  Respiratory: Negative for cough and shortness of breath  Cardiovascular: Positive for palpitations  Gastrointestinal: Negative for constipation, diarrhea and nausea     Neurological: Positive for tremors  Psychiatric/Behavioral: Positive for sleep disturbance  The patient is nervous/anxious          Historical Information   Past Medical History:   Diagnosis Date    Anemia, postpartum 2019    Delivery by  hysterectomy 10/14/2019    Disease of thyroid gland     History of diet-controlled gestational diabetes mellitus 2019    Pap smear abnormality of cervix with LGSIL 2016    Varicella     Had as a child     Past Surgical History:   Procedure Laterality Date     SECTION      CYSTOSCOPY N/A 10/14/2019    Procedure: CYSTOSCOPY;  Surgeon: Berenice Payan MD;  [facility];  Service: Gynecology    DILATION AND EVACUATION      Molar    HYSTERECTOMY N/A 10/14/2019    Procedure: RAY, BILATERAL SALPINGECTOMY;  Surgeon: Berenice Payan MD;  [facility];  Service: Gynecology    LAPAROSCOPY      [place]  DELIVERY ONLY N/A 10/14/2019    Procedure:  SECTION () REPEAT;  Surgeon: Berenice Payan MD;  [facility];  Service: Gynecology     Social History   Social History     Substance and Sexual Activity   Alcohol Use Not Currently     Social History     Substance and Sexual Activity   Drug Use No     Social History     Tobacco Use   Smoking Status Never Smoker   Smokeless Tobacco Never Used     Family History:   Family History   Problem Relation Age of Onset    Hypertension Mother    Bri Crockett Lupus Mother     Pulmonary embolism Mother    Bri Crockett Anuerysm Mother     No Known Problems Father     Asthma Sister     No Known Problems Brother     Thyroid disease unspecified Family     No Known Problems Daughter     No Known Problems Son     Other Maternal Grandmother         Pre-diabetic    Heart murmur Brother     No Known Problems Daughter     No Known Problems Son     Asthma Son     Breast cancer Paternal Aunt        Meds/Allergies   Current Outpatient Medications   Medication Sig Dispense Refill    budesonide-formoterol (SYMBICORT) 160-4 5 mcg/act inhaler Inhale 2 puffs 2 (two) times a day Rinse mouth after use  10 2 g 1    Cholecalciferol (Vitamin D) 50 MCG (2000 UT) tablet Take 1 tablet (2,000 Units total) by mouth daily 30 tablet 3    docusate sodium (COLACE) 100 mg capsule Take 1 capsule (100 mg total) by mouth 2 (two) times a day 60 capsule 6    Magnesium 250 MG TABS 1 pill a day 30 tablet 0    methimazole (TAPAZOLE) 10 mg tablet TAKE 2 TABLETS BY MOUTH EVERY DAY 60 tablet 1    rizatriptan (MAXALT) 10 MG tablet Take 1 tablet (10 mg total) by mouth once as needed for migraine May repeat in 2 hours if needed  Max 2/24 hours, 9/month  9 tablet 6    acetaminophen (TYLENOL) 650 mg CR tablet Take 1 tablet (650 mg total) by mouth every 8 (eight) hours as needed for mild pain or moderate pain (Patient not taking: Reported on 2/23/2022 ) 30 tablet 0    Riboflavin (Vitamin B-2) 100 MG TABS 2 pills a day (Patient not taking: Reported on 2/23/2022 ) 60 tablet 3     No current facility-administered medications for this visit  Allergies   Allergen Reactions    Penicillins Anaphylaxis     Anaphylaxis    Shrimp (Diagnostic) - Food Allergy     Venofer [Iron Sucrose] Itching       Objective   Vitals: Blood pressure 116/86, pulse 96, height 5' 6" (1 676 m), weight 93 1 kg (205 lb 3 2 oz), last menstrual period 03/23/2019, not currently breastfeeding  Physical Exam  Vitals reviewed  Constitutional:       Appearance: Normal appearance  She is obese  She is not ill-appearing or diaphoretic  HENT:      Head: Normocephalic and atraumatic  Eyes:      General: No scleral icterus  Extraocular Movements: Extraocular movements intact  Neck:      Comments: + thyromegaly  Cardiovascular:      Rate and Rhythm: Normal rate and regular rhythm  Heart sounds: Normal heart sounds  No murmur heard  Pulmonary:      Effort: Pulmonary effort is normal  No respiratory distress  Breath sounds: Normal breath sounds  No wheezing  Abdominal:      General: There is no distension  Palpations: Abdomen is soft  Tenderness: There is no abdominal tenderness  Musculoskeletal:      Cervical back: Neck supple  Right lower leg: No edema  Left lower leg: No edema  Lymphadenopathy:      Cervical: No cervical adenopathy  Skin:     General: Skin is warm and dry  Neurological:      General: No focal deficit present  Mental Status: She is alert and oriented to person, place, and time  Psychiatric:         Mood and Affect: Mood normal          Behavior: Behavior normal          The history was obtained from the review of the chart, patient  Lab Results:   Lab Results   Component Value Date/Time    TSH 3RD GENERATON <0 015 (L) 09/20/2021 09:12 AM    TSH 3RD GENERATON <0 007 (L) 09/02/2021 02:29 PM    TSH 3RD GENERATON <0 015 (L) 08/17/2021 09:09 AM    Free T4 2 35 (H) 09/20/2021 09:12 AM    Free T4 2 38 (H) 09/02/2021 02:29 PM    Free T4 4 34 ([facility]) 08/17/2021 09:09 AM       Imaging Studies:           Portions of the record may have been created with voice recognition software  Occasional wrong word or "sound a like" substitutions may have occurred due to the inherent limitations of voice recognition software  Read the chart carefully and recognize, using context, where substitutions have occurred

## 2022-02-23 NOTE — PATIENT INSTRUCTIONS
Graves Disease   AMBULATORY CARE:   Graves disease  is an autoimmune disease that causes your immune system to attack your thyroid gland  This causes your body to make too much thyroid hormone and leads to hyperthyroidism  The thyroid gland is a butterfly-shaped organ that is found in the front part of your neck  Thyroid hormones regulate body temperature, heart rate, and weight  Common signs and symptoms include the following:   · Nervousness, irritability, fatigue, or muscle weakness    · Trouble sleeping or increased sensitivity to heat    · Hand tremors or increased sweating    · An irregular or fast heartbeat    · Diarrhea or more frequent bowel movements than usual    · Losing weight without trying    · Children may have a decreased attention span that leads to a drop in school grades    · Symptoms of Graves ophthalmology (GO) such as protruding eyeballs, puffy eyelids, double vision, light sensitivity, or pain or pressure in your eyes    · Reddening or thickening of skin on the shins    Thyroid storm:  A thyroid storm happens when your thyroid hormone levels get too high  Your body temperature may go very high, your heart may beat very fast, and you may have problems thinking  You may have increased sweating, vomiting, or diarrhea  You may have seizures or go into a coma  Thyroid storm may happen if you have hyperthyroidism and get an infection or stop taking your thyroid medicine  Injuries, burns, and certain medicines can also cause a thyroid storm  Call 911 or have someone call 911 for any of the following:   · You have a seizure  · You feel like you are going to faint  · You have sudden chest pain or shortness of breath  Seek care immediately if:   · You have a fever  · You have trouble thinking clearly  · You are shaking or sweating  · Your heart is beating faster than usual, or you have an irregular heartbeat      Contact your healthcare provider if:   · You have nausea, vomiting, or diarrhea  · You feel nervous, restless, confused or agitated       · You run out of thyroid medicine, or you have stopped taking it  · You have questions or concerns about your condition or care  Treatment for Graves disease  may include any of the following:  · Antithyroid medicines  decrease thyroid hormone levels and your symptoms  · Radioactive iodine  is given to damage or kill some thyroid gland cells  This may decrease the amount of thyroid hormone produced  Tell your healthcare provider if you are breastfeeding, or you know or think you are pregnant  This medicine can be harmful to your baby  · Heart medicine  may be given to control and regulate your heart rate  · Treatment for eye problems  may also be needed  Eye drops can help to relieve dry or irritated eyes  Steroids may be given to decrease eye swelling and pain  Special lenses may be needed if you have double vision  Radiation may be applied to your eyes to decrease swelling if you have severe eye problems  · Surgery  may be done to remove all or part of the thyroid gland  Manage Graves disease:   · Do not smoke or be around secondhand smoke  Cigarette smoke increases your risk of GO or can make it worse if you already have it  Nicotine and other chemicals in cigarettes and cigars can also cause lung damage  Ask your healthcare provider for information if you currently smoke and need help to quit  E-cigarettes or smokeless tobacco still contain nicotine  Talk to your healthcare provider before you use these products  · Sleep with your head elevated if you have eye symptoms  This may help to decrease swelling of your eyelids  Your healthcare provider may recommend that you sleep with your eyes taped shut  This can help to prevent dry eyes  · Sunglasses  may help decrease light sensitivity  · Take medicine as directed and go to follow-up appointments    Do not stop taking your medicine unless your healthcare provider has asked you to  This could increase your thyroid levels and lead to other health problems  You will need to go to regular follow-up appointments to have your thyroid levels checked  Follow up with your healthcare provider as directed: You may need to return for regular blood tests to check your thyroid hormone levels  This will help your healthcare provider decide if you are getting the right amount of medicine  Do not stop taking your medicines without talking to your healthcare provider first  Write down your questions so you remember to ask them during your visits  © Copyright [employer] 2021 Information is for End User's use only and may not be sold, redistributed or otherwise used for commercial purposes  All illustrations and images included in CareNotes® are the copyrighted property of A D A M , Inc  or [facility] Allison Merrill   The above information is an  only  It is not intended as medical advice for individual conditions or treatments  Talk to your doctor, nurse or pharmacist before following any medical regimen to see if it is safe and effective for you  Methimazole (By mouth)   Methimazole (meth-IM-a-zole)  Treats hyperthyroidism (too much thyroid hormone produced by the thyroid gland)  Brand Name(s): Tapazole   There may be other brand names for this medicine  When This Medicine Should Not Be Used: This medicine is not right for everyone  Do not use it if you had an allergic reaction to methimazole  How to Use This Medicine:   Tablet  · Your doctor will tell you how much medicine to use  Do not use more than directed  · Missed dose: Take a dose as soon as you remember  If it is almost time for your next dose, wait until then and take a regular dose  Do not take extra medicine to make up for a missed dose  · Store the medicine in a closed container at room temperature, away from heat, moisture, and direct light    Drugs and Foods to Avoid:   Ask your doctor or pharmacist before using any other medicine, including over-the-counter medicines, vitamins, and herbal products  · Some medicines can affect how methimazole works  Tell your doctor if you are using any of the following:  ? Digitalis, theophylline  ? Beta blockers  ? Blood thinner (including warfarin)  Warnings While Using This Medicine:   · Using this medicine during the first three months of pregnancy can harm your unborn baby  Use an effective form of birth control to keep from getting pregnant  If you think you have become pregnant while using the medicine, tell your doctor right away  · Tell your doctor if you are breastfeeding, or if you have kidney disease, liver disease, lung disease, or breathing problems  · This medicine may cause the following problems:  ? Liver problems  ? Hypothyroidism (low levels of thyroid in the blood)  ? Vasculitis (swelling of the blood vessels)  · Make sure any doctor or dentist who treats you knows that you are using this medicine  · This medicine may make you bleed, bruise, or get infections more easily  Take precautions to prevent illness and injury  Wash your hands often  · Your doctor will do lab tests at regular visits to check on the effects of this medicine  Keep all appointments  · Keep all medicine out of the reach of children  Never share your medicine with anyone    Possible Side Effects While Using This Medicine:   Call your doctor right away if you notice any of these side effects:  · Chest pain or tightness, troubled breathing, coughing up blood  · Dark or bloody urine, pale stools, loss of appetite, stomach pain, yellow skin or eyes  · Fever, chills, cough, sore throat, body aches  · Numbness, tingling, or burning in your hands or feet  · Severe skin rash, redness, or itching  · Unusual bruising, bleeding, or weakness  If you notice these less serious side effects, talk with your doctor:   · Dizziness  · Joint pain  · Mild nausea, vomiting, stomach upset  If you notice other side effects that you think are caused by this medicine, tell your doctor  Call your doctor for medical advice about side effects  You may report side effects to FDA at 4-123-PPD-1191    © Copyright [employer] 2021 Information is for End User's use only and may not be sold, redistributed or otherwise used for commercial purposes  The above information is an  only  It is not intended as medical advice for individual conditions or treatments  Talk to your doctor, nurse or pharmacist before following any medical regimen to see if it is safe and effective for you

## 2022-02-23 NOTE — PROGRESS NOTES
Review of Systems   Constitutional: Negative  Negative for appetite change and fever  HENT: Negative  Negative for hearing loss, tinnitus, trouble swallowing and voice change  Eyes: Negative  Negative for photophobia and pain  Respiratory: Negative  Negative for shortness of breath  Cardiovascular: Negative  Negative for palpitations  Gastrointestinal: Negative  Negative for nausea and vomiting  Endocrine: Negative  Negative for cold intolerance  Genitourinary: Negative  Negative for dysuria, frequency and urgency  Musculoskeletal: Negative  Negative for myalgias and neck pain  Skin: Negative  Negative for rash  Neurological: Positive for headaches (daily)  Negative for dizziness, tremors, seizures, syncope, facial asymmetry, speech difficulty, weakness, light-headedness and numbness  Hematological: Negative  Does not bruise/bleed easily  Psychiatric/Behavioral: Negative  Negative for confusion, hallucinations and sleep disturbance

## 2022-03-02 ENCOUNTER — TELEMEDICINE (OUTPATIENT)
Dept: FAMILY MEDICINE CLINIC | Facility: CLINIC | Age: 32
End: 2022-03-02
Payer: COMMERCIAL

## 2022-03-02 ENCOUNTER — TELEPHONE (OUTPATIENT)
Dept: OBGYN CLINIC | Facility: CLINIC | Age: 32
End: 2022-03-02

## 2022-03-02 VITALS — WEIGHT: 203 LBS | BODY MASS INDEX: 32.62 KG/M2 | HEIGHT: 66 IN

## 2022-03-02 DIAGNOSIS — R19.7 DIARRHEA OF PRESUMED INFECTIOUS ORIGIN: Primary | ICD-10-CM

## 2022-03-02 DIAGNOSIS — N62 LARGE BREASTS: Primary | ICD-10-CM

## 2022-03-02 PROCEDURE — G2012 BRIEF CHECK IN BY MD/QHP: HCPCS | Performed by: INTERNAL MEDICINE

## 2022-03-02 NOTE — TELEPHONE ENCOUNTER
Patient calling in regards to a referral that was placed  Patient states that Dr Zuleyka Guzman placed a referral for a plastic surgeon to the name of Nita Monique  Explained to patient I did not see a referral placed for that provider or for plastic surgery  Patient wants to go through with breast reduction surgery  Patient states she is having L breast pain  Patient is not sure of her next steps  Please advise

## 2022-03-02 NOTE — PROGRESS NOTES
Virtual Regular Visit    Verification of patient location:    Patient is located in the following state in which I hold an active license [place]      Assessment/Plan:    Problem List Items Addressed This Visit     None      Visit Diagnoses     Diarrhea of presumed infectious origin    -  Primary      Encourage hydration and Brat diet avoiding dairy salads juices and fruits  Patient encouraged to update me in a couple of days about her progress         Reason for visit is   Chief Complaint   Patient presents with   [facility]    cramping    Virtual Regular Visit        Encounter provider Danis Ballesteros MD    Provider located at [facility]  [address], [place] [address] [place] , [address]  [phone]      Recent Visits  No visits were found meeting these conditions  Showing recent visits within past 7 days and meeting all other requirements  Today's Visits  Date Type Provider Dept   03/02/22 Telemedicine Danis Ballesteros MD [facility] 93 today's visits and meeting all other requirements  Future Appointments  No visits were found meeting these conditions  Showing future appointments within next 150 days and meeting all other requirements       The patient was identified by name and date of birth  Ann Marie Barcenas was informed that this is a telemedicine visit and that the visit is being conducted through Telephone  My office door was closed  No one else was in the room  She acknowledged consent and understanding of privacy and security of the video platform  The patient has agreed to participate and understands they can discontinue the visit at any time  Patient is aware this is a billable service  Subjective  Ann Marie Barcenas is a 27 y o  female    Patient has been good tele health to discuss thyroid this started 4 days ago liquidy loose stools without mucus or blood in it no fevers or chills but abdominal cramps    Patient denies any vomiting but does feel nauseous  Patient denies any dizziness  Everyone in her family has similar symptoms  No recent travel or use of antibiotic  No new pets  HPI     Past Medical History:   Diagnosis Date    Anemia, postpartum 2019    Delivery by  hysterectomy 10/14/2019    Disease of thyroid gland     History of diet-controlled gestational diabetes mellitus 2019    Pap smear abnormality of cervix with LGSIL 2016    Varicella     Had as a child       Past Surgical History:   Procedure Laterality Date     SECTION      CYSTOSCOPY N/A 10/14/2019    Procedure: CYSTOSCOPY;  Surgeon: Sanjiv Richmond MD;  [facility];  Service: Gynecology    DILATION AND EVACUATION      Molar    HYSTERECTOMY N/A 10/14/2019    Procedure: RAY, BILATERAL SALPINGECTOMY;  Surgeon: Sanjiv Richmond MD;  [facility];  Service: Gynecology    LAPAROSCOPY      [place]  DELIVERY ONLY N/A 10/14/2019    Procedure:  SECTION () REPEAT;  Surgeon: Sanjiv Richmond MD;  [facility];  Service: Gynecology       Current Outpatient Medications   Medication Sig Dispense Refill    acetaminophen (TYLENOL) 650 mg CR tablet Take 1 tablet (650 mg total) by mouth every 8 (eight) hours as needed for mild pain or moderate pain 30 tablet 0    budesonide-formoterol (SYMBICORT) 160-4 5 mcg/act inhaler Inhale 2 puffs 2 (two) times a day Rinse mouth after use   10 2 g 1    Cholecalciferol (Vitamin D) 50 MCG (2000 UT) tablet Take 1 tablet (2,000 Units total) by mouth daily 30 tablet 3    docusate sodium (COLACE) 100 mg capsule Take 1 capsule (100 mg total) by mouth 2 (two) times a day 60 capsule 6    Magnesium 250 MG TABS 1 pill a day 30 tablet 0    methimazole (TAPAZOLE) 10 mg tablet TAKE 2 TABLETS BY MOUTH EVERY DAY 60 tablet 1    prochlorperazine (COMPAZINE) 10 mg tablet Take 1 tab up to BID prn migraine or nausea, ideally no more than 3 days per week 20 tablet 4    Riboflavin (Vitamin B-2) 100 MG TABS 2 pills a day 60 tablet 3    rizatriptan (MAXALT) 10 MG tablet Take 1 tablet (10 mg total) by mouth once as needed for migraine May repeat in 2 hours if needed  Max 2/24 hours, 9/month  9 tablet 6    topiramate (TOPAMAX) 25 mg tablet 1 tab PO QHS for 1 week, increase as tolerated to 1 tab BID for 1 week, then 1 tab QAM and 2 tabs QHS for 1 week and finish at 2 tabs BID  120 tablet 4     No current facility-administered medications for this visit  Allergies   Allergen Reactions    Penicillins Anaphylaxis     Anaphylaxis    Shrimp (Diagnostic) - Food Allergy     Venofer [Iron Sucrose] Itching       Review of Systems    Video Exam    Vitals:    03/02/22 1317   Weight: 92 1 kg (203 lb)   Height: 5' 6" (1 676 m)       Physical Exam     I spent 15 minutes with patient today in which greater than 50% of the time was spent in counseling/coordination of care regarding Plan of care    VIRTUAL VISIT DISCLAIMER      Pao Vieira verbally agrees to participate in [employer]  Pt is aware that [employer] could be limited without vital signs or the ability to perform a full hands-on physical [employer] understands she or the provider may request at any time to terminate the video visit and request the patient to seek care or treatment in person

## 2022-03-08 NOTE — DISCHARGE INSTRUCTIONS
Please go to [address] L&D floor for second dose of steroids at 9PM today (8/15) [place] Counseling: I discussed with the patient the risks of tildrakizumab including but not limited to immunosuppression, malignancy, posterior leukoencephalopathy syndrome, and serious infections.  The patient understands that monitoring is required including a PPD at baseline and must alert us or the primary physician if symptoms of infection or other concerning signs are noted.

## 2022-04-05 ENCOUNTER — OFFICE VISIT (OUTPATIENT)
Dept: OBGYN CLINIC | Facility: CLINIC | Age: 32
End: 2022-04-05
Payer: COMMERCIAL

## 2022-04-05 VITALS
TEMPERATURE: 98.3 F | HEIGHT: 66 IN | BODY MASS INDEX: 33.01 KG/M2 | DIASTOLIC BLOOD PRESSURE: 70 MMHG | SYSTOLIC BLOOD PRESSURE: 124 MMHG | WEIGHT: 205.4 LBS | HEART RATE: 84 BPM

## 2022-04-05 DIAGNOSIS — R10.2 PELVIC PAIN: Primary | ICD-10-CM

## 2022-04-05 DIAGNOSIS — R10.30 LOWER ABDOMINAL PAIN: ICD-10-CM

## 2022-04-05 DIAGNOSIS — K59.04 FUNCTIONAL CONSTIPATION: ICD-10-CM

## 2022-04-05 PROCEDURE — 99214 OFFICE O/P EST MOD 30 MIN: CPT | Performed by: OBSTETRICS & GYNECOLOGY

## 2022-04-05 RX ORDER — DOCUSATE SODIUM 100 MG/1
100 CAPSULE, LIQUID FILLED ORAL 2 TIMES DAILY
Qty: 60 CAPSULE | Refills: 6 | Status: SHIPPED | OUTPATIENT
Start: 2022-04-05

## 2022-04-05 NOTE — PROGRESS NOTES
Assessment/Plan:  Problem List Items Addressed This Visit     None      Visit Diagnoses     Pelvic pain    -  Primary    Relevant Orders    US pelvis complete w transvaginal    Functional constipation        Relevant Medications    docusate sodium (COLACE) 100 mg capsule    bisacodyl (DULCOLAX) 5 mg EC tablet    Other Relevant Orders    Ambulatory Referral to Gastroenterology    Lower abdominal pain        Relevant Orders    Ambulatory Referral to Gastroenterology         Patient with lower abdominal pain most likely GI related due to complaints of constipation  Her pelvic pain has resolved and discussed gyn causes such as ovarian cysts  Pelvic ultrasound was ordered to rule out ovarian cysts  She did have a history of hysterectomy and also discussed potential other additional causes such as intra-abdominal adhesions  Advised continued use of ibuprofen or Tylenol  She states she has prescriptions at home  She was advised Colace 100 mg daily for constipation  She declined MiraLax and so she was given the bisacodyl to use as needed    Advised referral to Gastroenterology for continued constipation as well as complaints of lower abdominal discomfort  Discussed other potential differential diagnosis such as irritable bowel syndrome  Advised follow-up in 2 weeks for her annual gyn exam   We will call patient with pelvic ultrasound results    Patient concerned about bulge in her upper vagina  Examination shows anterior vaginal wall relaxation without evidence of prolapse  She was advise Kegel's exercises  Patient information was given  Subjective   Patient ID: Olivia Bonilla is a 27 y o  female  Patient is here for a problem visit  Chief Complaint   Patient presents with    Pelvic Pain     Patient with c/o pelvic pressure x2 weeks  Denies any discharge or bleeding  Patient had pelvic pain 10/10 midline 2 weeks ago  She had constipation  Pain improved with bowel movement      She declines constipation  She has no nausea/vomiting  She is sexually active, same partner x 11 years,  She had some pain with intercourse  She has no pain now  She has no dysuria, hematuria, frequency  She is passing gas  Her last BM was yesterday  She has never seen a gastroenterologist   No family h/o polyps/colon CA  She has no h/o IBS  She used Colace BID  She has no vaginal discharge/odor/bleeding  She had a h/o  hysterectomy for placenta accreta  She had a h/o ileus postop  She has has a h/o hyperthyroidism  She is on Methimazole and follows with endocrinology      She has had no recent abdominal imaging  Menstrual History:  OB History        9    Para   6    Term   5       1    AB   3    Living   5       SAB   3    IAB   0    Ectopic   0    Multiple   0    Live Births   5                  Patient's last menstrual period was 2019 (within days)           Past Medical History:   Diagnosis Date    Anemia, postpartum 2019    Delivery by  hysterectomy 10/14/2019    Disease of thyroid gland     History of diet-controlled gestational diabetes mellitus 2019    Pap smear abnormality of cervix with LGSIL 2016    Varicella     Had as a child       Past Surgical History:   Procedure Laterality Date     SECTION      CYSTOSCOPY N/A 10/14/2019    Procedure: CYSTOSCOPY;  Surgeon: Justin Diehl MD;  [facility];  Service: Gynecology    DILATION AND EVACUATION      Molar    HYSTERECTOMY N/A 10/14/2019    Procedure: RAY, BILATERAL SALPINGECTOMY;  Surgeon: Justin Diehl MD;  [facility];  Service: Gynecology    LAPAROSCOPY      [place]  DELIVERY ONLY N/A 10/14/2019    Procedure: Chelsea Kruse () REPEAT;  Surgeon: Justin Diehl MD;  [facility];  Service: Gynecology       Social History     Tobacco Use    Smoking status: Never Smoker    Smokeless tobacco: Never Used   Vaping Use    Vaping Use: Never used   Substance Use Topics    Alcohol use: Not Currently    Drug use: No        Allergies   Allergen Reactions    Penicillins Anaphylaxis     Anaphylaxis    Shrimp (Diagnostic) - Food Allergy     Venofer [Iron Sucrose] Itching         Current Outpatient Medications:     acetaminophen (TYLENOL) 650 mg CR tablet, Take 1 tablet (650 mg total) by mouth every 8 (eight) hours as needed for mild pain or moderate pain, Disp: 30 tablet, Rfl: 0    budesonide-formoterol (SYMBICORT) 160-4 5 mcg/act inhaler, Inhale 2 puffs 2 (two) times a day Rinse mouth after use , Disp: 10 2 g, Rfl: 1    Cholecalciferol (Vitamin D) 50 MCG (2000 UT) tablet, Take 1 tablet (2,000 Units total) by mouth daily, Disp: 30 tablet, Rfl: 3    docusate sodium (COLACE) 100 mg capsule, Take 1 capsule (100 mg total) by mouth 2 (two) times a day, Disp: 60 capsule, Rfl: 6    Magnesium 250 MG TABS, 1 pill a day, Disp: 30 tablet, Rfl: 0    methimazole (TAPAZOLE) 10 mg tablet, TAKE 2 TABLETS BY MOUTH EVERY DAY, Disp: 60 tablet, Rfl: 1    prochlorperazine (COMPAZINE) 10 mg tablet, Take 1 tab up to BID prn migraine or nausea, ideally no more than 3 days per week, Disp: 20 tablet, Rfl: 4    Riboflavin (Vitamin B-2) 100 MG TABS, 2 pills a day, Disp: 60 tablet, Rfl: 3    rizatriptan (MAXALT) 10 MG tablet, Take 1 tablet (10 mg total) by mouth once as needed for migraine May repeat in 2 hours if needed  Max 2/24 hours, 9/month , Disp: 9 tablet, Rfl: 6    topiramate (TOPAMAX) 25 mg tablet, 1 tab PO QHS for 1 week, increase as tolerated to 1 tab BID for 1 week, then 1 tab QAM and 2 tabs QHS for 1 week and finish at 2 tabs BID , Disp: 120 tablet, Rfl: 4    bisacodyl (DULCOLAX) 5 mg EC tablet, Take 2 tablets (10 mg total) by mouth daily as needed for constipation, Disp: 30 tablet, Rfl: 0      Review of Systems   Constitutional: Negative  HENT: Negative  Eyes: Negative  Respiratory: Negative  Cardiovascular: Negative  Gastrointestinal: Negative  Endocrine: Negative  Genitourinary:        As noted in HPI   Musculoskeletal: Negative  Skin: Negative  Allergic/Immunologic: Negative  Neurological: Negative  Hematological: Negative  Psychiatric/Behavioral: Negative  /70 (BP Location: Right arm, Patient Position: Sitting, Cuff Size: Large)   Pulse 84   Temp 98 3 °F (36 8 °C) (Tympanic)   Ht 5' 6" (1 676 m)   Wt 93 2 kg (205 lb 6 4 oz)   LMP 03/23/2019 (Within Days)   BMI 33 15 kg/m²       Physical Exam  Constitutional:       General: She is not in acute distress  Appearance: She is well-developed  Genitourinary:      Vulva and bladder normal       No inguinal adenopathy present in the right or left side  No vaginal discharge, erythema, tenderness or bleeding  Right Adnexa: not tender, not full and no mass present  Left Adnexa: not tender, not full and no mass present  Cervix is absent  Uterus is absent  No urethral prolapse, tenderness or mass present  Pelvic exam was performed with patient in the lithotomy position  Abdominal:      General: There is no distension  Palpations: Abdomen is soft  Tenderness: There is no abdominal tenderness  Hernia: There is no hernia in the left inguinal area or right inguinal area  Lymphadenopathy:      Lower Body: No right inguinal adenopathy  No left inguinal adenopathy  Neurological:      Mental Status: She is alert and oriented to person, place, and time  Skin:     General: Skin is warm and dry     Psychiatric:         Behavior: Behavior normal

## 2022-04-05 NOTE — PATIENT INSTRUCTIONS
Kegel Exercises for Women   AMBULATORY CARE:   Kegel exercises  help strengthen your pelvic muscles  Pelvic muscles hold your pelvic organs, such as your bladder and uterus, in place  Kegel exercises help prevent or control problems with urine incontinence (leakage)  Incontinence may be caused by pregnancy, childbirth, or menopause  Contact your healthcare provider if:   · You cannot feel your muscles tighten or relax  · You continue to leak urine  · You have questions or concerns about your condition or care  Use the correct muscles:  Pelvic muscles are the muscles you use to control urine flow  To target these muscles, stop and start the flow of urine several times  This will help you become familiar with how it feels to tighten and relax these muscles  How to do Kegel exercises:   · Empty your bladder  You may lie down, stand up, or sit down to do these exercises  When you first try to do these exercises, it may be easier if you lie down  Tighten or squeeze your pelvic muscles slowly  It may feel like you are trying to hold back urine or gas  Hold this position for 3 seconds  Relax for 3 seconds  Repeat this cycle 10 times  · Do 10 sets of Kegel exercises, at least 3 times a day  Do not hold your breath when you do Kegel exercises  Keep your stomach, back, and leg muscles relaxed  · As your muscles get stronger, you will be able to hold the squeeze longer  Your healthcare provider may ask that you increase your pelvic muscle squeeze to 10 seconds  After you squeeze for 10 seconds, relax for 10 seconds  What else you should know:   · Once you know how to do Kegel exercises, use different positions  You can do these exercises while you lie on the floor, sit at your desk or watch TV, and while you stand  · You may notice improved bladder control within about 6 weeks  · Tighten your pelvic muscles before you sneeze, cough, or lift to prevent urine leakage      Follow up with your doctor as directed:  Write down your questions so you remember to ask them during your visits  © Copyright [address] 2022 Information is for End User's use only and may not be sold, redistributed or otherwise used for commercial purposes  All illustrations and images included in CareNotes® are the copyrighted property of A D A M [employer] Inc  or Reyna Cerda  The above information is an  only  It is not intended as medical advice for individual conditions or treatments  Talk to your doctor, nurse or pharmacist before following any medical regimen to see if it is safe and effective for you  Pelvic Pain   WHAT YOU NEED TO KNOW:   Pelvic pain may be caused by a number of conditions, such as irritable bowel syndrome, appendicitis, or constipation  A urinary tract infection, prostate inflammation, menstrual cramps, or kidney stones can also cause pelvic pain  You may have pain on one or both sides of your pelvis  Pelvic pain can develop if you have trauma to your pelvis or if you sit or stand for a long time  DISCHARGE INSTRUCTIONS:   Medicines: You may need any of the following:  · NSAIDs , such as ibuprofen, help decrease swelling, pain, and fever  This medicine is available with or without a doctor's order  NSAIDs can cause stomach bleeding or kidney problems in certain people  If you take blood thinner medicine, always ask your healthcare provider if NSAIDs are safe for you  Always read the medicine label and follow directions  · Prescription pain medicine  may be given  Ask how to take this medicine safely  · Birth control medicines  may help decrease pain in women  · Take your medicine as directed  Contact your healthcare provider if you think your medicine is not helping or if you have side effects  Tell him or her if you are allergic to any medicine  Keep a list of the medicines, vitamins, and herbs you take  Include the amounts, and when and why you take them   Bring the list or the pill bottles to follow-up visits  Carry your medicine list with you in case of an emergency  Call 911 for any of the following:   · You have severe chest pain and sudden trouble breathing  Contact your healthcare provider if:   · You have a fever  · You have nausea, vomiting, or diarrhea  · Your pain does not go away, or it gets worse, even after treatment  · You have numbness in your legs or toes  · You have heavy or unusual vaginal bleeding  · You have questions or concerns about your condition or care  Manage your pelvic pain:   · Keep a pain record  Write down when your pain happens and how severe it is  Include any other symptoms you have with your pain  A record will help you keep track of pain cycles  Bring the record with you to your follow-up visits  It may also help your healthcare provider find out what is causing your pain  · Learn ways to relax  Deep breathing, meditation, and relaxation techniques can help decrease your pain  When you are tense, your pain may increase  · Treat or prevent constipation  Drink liquids as directed  You may need to drink more liquid than usual  Ask your healthcare provider how much liquid to drink each day  Eat high-fiber foods  High-fiber foods include fruit, vegetables, whole-grain breads and cereals, and beans  You may need to change the foods you eat if you have irritable bowel syndrome  Follow up with your healthcare provider as directed: You may need physical therapy  You may need to see an orthopedic specialist  Write down your questions so you remember to ask them during your visits  © Copyright [employer] 2022 Information is for End User's use only and may not be sold, redistributed or otherwise used for commercial purposes  All illustrations and images included in CareNotes® are the copyrighted property of [employer]  or Reyna Merrill   The above information is an  only   It is not intended as medical advice for individual conditions or treatments  Talk to your doctor, nurse or pharmacist before following any medical regimen to see if it is safe and effective for you

## 2022-04-22 DIAGNOSIS — J40 BRONCHITIS: ICD-10-CM

## 2022-04-22 RX ORDER — BUDESONIDE AND FORMOTEROL FUMARATE DIHYDRATE 160; 4.5 UG/1; UG/1
2 AEROSOL RESPIRATORY (INHALATION) 2 TIMES DAILY
Qty: 10.2 G | Refills: 1 | Status: SHIPPED | OUTPATIENT
Start: 2022-04-22

## 2022-05-12 ENCOUNTER — HOSPITAL ENCOUNTER (OUTPATIENT)
Dept: RADIOLOGY | Facility: HOSPITAL | Age: 32
Discharge: HOME | End: 2022-05-12
Payer: COMMERCIAL

## 2022-05-12 ENCOUNTER — OFFICE VISIT (OUTPATIENT)
Dept: FAMILY MEDICINE CLINIC | Facility: CLINIC | Age: 32
End: 2022-05-12
Payer: COMMERCIAL

## 2022-05-12 VITALS
HEART RATE: 84 BPM | BODY MASS INDEX: 33.3 KG/M2 | RESPIRATION RATE: 16 BRPM | OXYGEN SATURATION: 98 % | SYSTOLIC BLOOD PRESSURE: 118 MMHG | HEIGHT: 66 IN | DIASTOLIC BLOOD PRESSURE: 62 MMHG | TEMPERATURE: 98.4 F | WEIGHT: 207.2 LBS

## 2022-05-12 DIAGNOSIS — Z00.00 ANNUAL PHYSICAL EXAM: Primary | ICD-10-CM

## 2022-05-12 DIAGNOSIS — Z82.49 FAMILY HISTORY OF DVT: ICD-10-CM

## 2022-05-12 DIAGNOSIS — Z13.220 LIPID SCREENING: ICD-10-CM

## 2022-05-12 DIAGNOSIS — R07.89 CHEST TIGHTNESS: ICD-10-CM

## 2022-05-12 DIAGNOSIS — Z13.1 SCREENING FOR DIABETES MELLITUS: ICD-10-CM

## 2022-05-12 DIAGNOSIS — I83.813 VARICOSE VEINS OF BOTH LOWER EXTREMITIES WITH PAIN: ICD-10-CM

## 2022-05-12 DIAGNOSIS — M79.661 RIGHT CALF PAIN: ICD-10-CM

## 2022-05-12 PROCEDURE — 71046 X-RAY EXAM CHEST 2 VIEWS: CPT

## 2022-05-12 PROCEDURE — 99395 PREV VISIT EST AGE 18-39: CPT | Performed by: INTERNAL MEDICINE

## 2022-05-12 PROCEDURE — 99213 OFFICE O/P EST LOW 20 MIN: CPT | Performed by: INTERNAL MEDICINE

## 2022-05-12 NOTE — PROGRESS NOTES
Assessment/Plan:           Problem List Items Addressed This Visit    None     Visit Diagnoses     Annual physical exam    -  Primary    Right calf pain        Relevant Orders    VAS lower limb venous duplex study, unilateral/limited    D-dimer, quantitative    Varicose veins of both lower extremities with pain        Relevant Orders    VAS lower limb venous duplex study, unilateral/limited    Family history of DVT        Relevant Orders    VAS lower limb venous duplex study, unilateral/limited    Antithrombin III Activity    Antithrombin III antigen    Factor 5 leiden    Homocysteine, serum    Lupus anticoagulant    MTHFR mutation    Protein C activity    Protein S activity    Prothrombin gene mutation    D-dimer, quantitative    Screening for diabetes mellitus        Relevant Orders    Comprehensive metabolic panel    Hemoglobin A1C    Lipid screening        Relevant Orders    CBC and differential    Lipid panel    Chest tightness        Relevant Orders    XR chest pa & lateral        stat venous Doppler will be ordered  If negative DVT aspirin to help with superficial thrombophlebitis  Subjective:      Patient ID: Jack Francis is a 27 y o  female  HPI  Patient is here for an acute visit complaining of left calf pain going on for about a month  She has been experiencing more popping sensation in the left leg varicose vein in lately has been experiencing more pain in the calf area  She is noticing some swelling in the end of the day  Her ankle hurts to step on it  Positive family history of DVT  Patient has been experiencing some shortness of breath as well under she had recent URI    The following portions of the patient's history were reviewed and updated as appropriate: allergies, current medications, past family history, past medical history, past social history, past surgical history and problem list     Review of Systems      Objective:      /62   Pulse 84   Temp 98 4 °F (36 9 °C) (Tympanic)   Resp 16   Ht 5' 6" (1 676 m)   Wt 94 kg (207 lb 3 2 oz)   LMP 03/23/2019 (Within Days)   SpO2 98%   BMI 33 44 kg/m²          Physical Exam  Musculoskeletal:         General: Tenderness (Positive calf tenderness positive Homans sign) present  Right lower leg: No edema  Left lower leg: No edema        Comments: Positive varicose vein mild superficial thrombophlebitis

## 2022-05-12 NOTE — PROGRESS NOTES
[address] [facility]ARE [facility]    NAME: Jason Foster  AGE: 27 y o  SEX: female  : 1990     DATE: 2022     Assessment and Plan:     Problem List Items Addressed This Visit    None     Visit Diagnoses     Annual physical exam    -  Primary          Immunizations and preventive care screenings were discussed with patient today  Appropriate education was printed on patient's after visit summary  Counseling:  · Health preventative   Please find attached acute visit note       No follow-ups on file  Chief Complaint:     Chief Complaint   Patient presents with    vein problem     Patient has vein on outer aspect of upper left leg which pops out, she also has swollen ankle on the left  History of Present Illness:     Adult Annual Physical   Patient here for a comprehensive physical exam  The patient reports problems - Left calf pain see attached note  Diet and Physical Activity  · Diet/Nutrition: limited fruits/vegetables  · Exercise: walking  Depression Screening  PHQ-2/9 Depression Screening         General Health  · Sleep: sleeps well  · Hearing: normal - bilateral   · Vision: wears glasses  · Dental: no dental visits for >1 year  /GYN Health  · Last menstrual period:  Status post hysterectomy  · Contraceptive method: Status post hysterectomy    · History of STDs?: no      Review of Systems:     Review of Systems   Past Medical History:     Past Medical History:   Diagnosis Date    Anemia, postpartum 2019    Delivery by  hysterectomy 10/14/2019    Disease of thyroid gland     History of diet-controlled gestational diabetes mellitus 2019    Pap smear abnormality of cervix with LGSIL 2016    Varicella     Had as a child      Past Surgical History:     Past Surgical History:   Procedure Laterality Date     SECTION      CYSTOSCOPY N/A 10/14/2019 Procedure: CYSTOSCOPY;  Surgeon: Ministerio Motley MD;  [facility];  Service: Gynecology    DILATION AND EVACUATION      Molar    HYSTERECTOMY N/A 10/14/2019    Procedure: RAY, BILATERAL SALPINGECTOMY;  Surgeon: Ministerio Motley MD;  [facility];  Service: Gynecology    LAPAROSCOPY      [place]  DELIVERY ONLY N/A 10/14/2019    Procedure:  SECTION () REPEAT;  Surgeon: Ministerio Motley MD;  [facility];  Service: Gynecology      Social History:     Social History     Socioeconomic History    Marital status: /Civil Union     Spouse name: None    Number of children: 4    Years of education: None    Highest education level: None   Occupational History    None   Tobacco Use    Smoking status: Never Smoker    Smokeless tobacco: Never Used   Vaping Use    Vaping Use: Never used   Substance and Sexual Activity    Alcohol use: Not Currently    Drug use: No    Sexual activity: Yes     Partners: Male     Birth control/protection: None   Other Topics Concern    None   Social History Narrative    Always wears seat belts ; caffeine use     Social Determinants of Health     Financial Resource Strain: Not on file   Food Insecurity: Not on file   Transportation Needs: Not on file   Physical Activity: Not on file   Stress: Not on file   Social Connections: Not on file   Intimate Partner Violence: Not on file   Housing Stability: Not on file      Family History:     Family History   Problem Relation Age of Onset    Hypertension Mother     Lupus Mother     Pulmonary embolism Mother    Osker Ok Anuerysm Mother     No Known Problems Father     Asthma Sister     No Known Problems Brother     Thyroid disease unspecified Family     No Known Problems Daughter     No Known Problems Son     Other Maternal Grandmother         Pre-diabetic    Heart murmur Brother     No Known Problems Daughter     No Known Problems Son     Asthma Son     Breast cancer Paternal Aunt       Current Medications:     Current Outpatient Medications   Medication Sig Dispense Refill    acetaminophen (TYLENOL) 650 mg CR tablet Take 1 tablet (650 mg total) by mouth every 8 (eight) hours as needed for mild pain or moderate pain 30 tablet 0    budesonide-formoterol (SYMBICORT) 160-4 5 mcg/act inhaler Inhale 2 puffs 2 (two) times a day Rinse mouth after use  10 2 g 1    Cholecalciferol (Vitamin D) 50 MCG (2000 UT) tablet Take 1 tablet (2,000 Units total) by mouth daily 30 tablet 3    docusate sodium (COLACE) 100 mg capsule Take 1 capsule (100 mg total) by mouth 2 (two) times a day 60 capsule 6    Magnesium 250 MG TABS 1 pill a day 30 tablet 0    methimazole (TAPAZOLE) 10 mg tablet TAKE 2 TABLETS BY MOUTH EVERY DAY 60 tablet 1    prochlorperazine (COMPAZINE) 10 mg tablet Take 1 tab up to BID prn migraine or nausea, ideally no more than 3 days per week 20 tablet 4    rizatriptan (MAXALT) 10 MG tablet Take 1 tablet (10 mg total) by mouth once as needed for migraine May repeat in 2 hours if needed  Max 2/24 hours, 9/month  9 tablet 6    topiramate (TOPAMAX) 25 mg tablet 1 tab PO QHS for 1 week, increase as tolerated to 1 tab BID for 1 week, then 1 tab QAM and 2 tabs QHS for 1 week and finish at 2 tabs BID  (Patient taking differently: 50 mg  in the morning  1 tab PO QHS for 1 week, increase as tolerated to 1 tab BID for 1 week, then 1 tab QAM and 2 tabs QHS for 1 week and finish at 2 tabs BID  [facility] ) 120 tablet 4    bisacodyl (DULCOLAX) 5 mg EC tablet Take 2 tablets (10 mg total) by mouth daily as needed for constipation (Patient not taking: Reported on 5/12/2022) 30 tablet 0    Riboflavin (Vitamin B-2) 100 MG TABS 2 pills a day (Patient not taking: Reported on 5/12/2022) 60 tablet 3     No current facility-administered medications for this visit  Allergies:      Allergies   Allergen Reactions    Penicillins Anaphylaxis     Anaphylaxis    Shrimp (Diagnostic) - Food Allergy     Venofer [Iron Sucrose] Itching Physical Exam:     /62   Pulse 84   Temp 98 4 °F (36 9 °C) (Tympanic)   Resp 16   Ht 5' 6" (1 676 m)   Wt 94 kg (207 lb 3 2 oz)   LMP 03/23/2019 (Within Days)   SpO2 98%   BMI 33 44 kg/m²     Physical Exam  Constitutional:       General: She is not in acute distress  Appearance: She is not diaphoretic  Eyes:      General: No scleral icterus  Extraocular Movements: Extraocular movements intact  Conjunctiva/sclera: Conjunctivae normal    Neck:      Thyroid: No thyromegaly  Comments: Goiter  Cardiovascular:      Rate and Rhythm: Normal rate and regular rhythm  Heart sounds: Normal heart sounds  No murmur heard  Pulmonary:      Effort: Pulmonary effort is normal  No respiratory distress  Breath sounds: Normal breath sounds  No stridor  No wheezing or rales  Abdominal:      General: Bowel sounds are normal  There is no distension  Palpations: Abdomen is soft  There is no mass  Tenderness: There is no abdominal tenderness  There is no guarding  Musculoskeletal:         General: Tenderness present  Right lower leg: No edema  Left lower leg: No edema  Lymphadenopathy:      Cervical: No cervical adenopathy  Skin:     General: Skin is warm  Neurological:      Mental Status: She is alert and oriented to person, place, and time  Cranial Nerves: No cranial nerve deficit        Coordination: Coordination normal    Psychiatric:         Mood and Affect: Mood normal          Behavior: Behavior normal           Keira Mcfadden MD   [address]

## 2022-05-12 NOTE — PATIENT INSTRUCTIONS

## 2022-05-16 ENCOUNTER — HOSPITAL ENCOUNTER (OUTPATIENT)
Dept: ULTRASOUND IMAGING | Facility: CLINIC | Age: 32
Discharge: HOME | End: 2022-05-16
Payer: COMMERCIAL

## 2022-05-16 ENCOUNTER — HOSPITAL ENCOUNTER (OUTPATIENT)
Dept: MAMMOGRAPHY | Facility: CLINIC | Age: 32
Discharge: HOME | End: 2022-05-16
Payer: COMMERCIAL

## 2022-05-16 VITALS — HEIGHT: 66 IN | WEIGHT: 207 LBS | BODY MASS INDEX: 33.27 KG/M2

## 2022-05-16 DIAGNOSIS — R92.8 ABNORMAL MAMMOGRAM: ICD-10-CM

## 2022-05-16 PROCEDURE — 77065 DX MAMMO INCL CAD UNI: CPT

## 2022-05-16 PROCEDURE — 76642 ULTRASOUND BREAST LIMITED: CPT

## 2022-05-16 PROCEDURE — G0279 TOMOSYNTHESIS, MAMMO: HCPCS

## 2022-05-18 ENCOUNTER — TELEPHONE (OUTPATIENT)
Dept: NEUROLOGY | Facility: CLINIC | Age: 32
End: 2022-05-18

## 2022-05-18 NOTE — TELEPHONE ENCOUNTER
Called and spoke to patient confirming their upcoming appointment with Dr Fredirick Bamberger on 5/24/22

## 2022-05-23 ENCOUNTER — TELEPHONE (OUTPATIENT)
Dept: GASTROENTEROLOGY | Facility: MEDICAL CENTER | Age: 32
End: 2022-05-23

## 2022-06-22 ENCOUNTER — APPOINTMENT (EMERGENCY)
Dept: RADIOLOGY | Facility: HOSPITAL | Age: 32
End: 2022-06-22
Payer: COMMERCIAL

## 2022-06-22 ENCOUNTER — APPOINTMENT (EMERGENCY)
Dept: MRI IMAGING | Facility: HOSPITAL | Age: 32
End: 2022-06-22
Payer: COMMERCIAL

## 2022-06-22 ENCOUNTER — APPOINTMENT (EMERGENCY)
Dept: CT IMAGING | Facility: HOSPITAL | Age: 32
End: 2022-06-22
Payer: COMMERCIAL

## 2022-06-22 ENCOUNTER — HOSPITAL ENCOUNTER (EMERGENCY)
Facility: HOSPITAL | Age: 32
Discharge: HOME | End: 2022-06-22
Attending: EMERGENCY MEDICINE
Payer: COMMERCIAL

## 2022-06-22 VITALS
OXYGEN SATURATION: 98 % | SYSTOLIC BLOOD PRESSURE: 109 MMHG | TEMPERATURE: 98.6 F | HEART RATE: 82 BPM | RESPIRATION RATE: 18 BRPM | DIASTOLIC BLOOD PRESSURE: 63 MMHG

## 2022-06-22 DIAGNOSIS — R07.89 NON-CARDIAC CHEST PAIN: ICD-10-CM

## 2022-06-22 DIAGNOSIS — G44.59 COMPLICATED HEADACHE SYNDROMES: Primary | ICD-10-CM

## 2022-06-22 LAB
ALBUMIN SERPL BCP-MCNC: 3.4 G/DL (ref 3.5–5)
ALP SERPL-CCNC: 151 U/L (ref 46–116)
ALT SERPL W P-5'-P-CCNC: 27 U/L (ref 12–78)
ANION GAP SERPL CALCULATED.3IONS-SCNC: 6 MMOL/L (ref 4–13)
AST SERPL W P-5'-P-CCNC: 15 U/L (ref 5–45)
ATRIAL RATE: 85 BPM
BASOPHILS # BLD AUTO: 0.04 THOUSANDS/ΜL (ref 0–0.1)
BASOPHILS NFR BLD AUTO: 0 % (ref 0–1)
BILIRUB SERPL-MCNC: 0.56 MG/DL (ref 0.2–1)
BUN SERPL-MCNC: 13 MG/DL (ref 5–25)
CALCIUM ALBUM COR SERPL-MCNC: 9.2 MG/DL (ref 8.3–10.1)
CALCIUM SERPL-MCNC: 8.7 MG/DL (ref 8.3–10.1)
CARDIAC TROPONIN I PNL SERPL HS: <2 NG/L
CARDIAC TROPONIN I PNL SERPL HS: <2 NG/L
CHLORIDE SERPL-SCNC: 105 MMOL/L (ref 100–108)
CO2 SERPL-SCNC: 27 MMOL/L (ref 21–32)
CREAT SERPL-MCNC: 0.56 MG/DL (ref 0.6–1.3)
EOSINOPHIL # BLD AUTO: 0.21 THOUSAND/ΜL (ref 0–0.61)
EOSINOPHIL NFR BLD AUTO: 2 % (ref 0–6)
ERYTHROCYTE [DISTWIDTH] IN BLOOD BY AUTOMATED COUNT: 15.5 % (ref 11.6–15.1)
GFR SERPL CREATININE-BSD FRML MDRD: 122 ML/MIN/1.73SQ M
GLUCOSE SERPL-MCNC: 113 MG/DL (ref 65–140)
HCT VFR BLD AUTO: 39 % (ref 34.8–46.1)
HGB BLD-MCNC: 12 G/DL (ref 11.5–15.4)
IMM GRANULOCYTES # BLD AUTO: 0.03 THOUSAND/UL (ref 0–0.2)
IMM GRANULOCYTES NFR BLD AUTO: 0 % (ref 0–2)
LYMPHOCYTES # BLD AUTO: 3.6 THOUSANDS/ΜL (ref 0.6–4.47)
LYMPHOCYTES NFR BLD AUTO: 35 % (ref 14–44)
MCH RBC QN AUTO: 25.3 PG (ref 26.8–34.3)
MCHC RBC AUTO-ENTMCNC: 30.8 G/DL (ref 31.4–37.4)
MCV RBC AUTO: 82 FL (ref 82–98)
MONOCYTES # BLD AUTO: 0.91 THOUSAND/ΜL (ref 0.17–1.22)
MONOCYTES NFR BLD AUTO: 9 % (ref 4–12)
NEUTROPHILS # BLD AUTO: 5.41 THOUSANDS/ΜL (ref 1.85–7.62)
NEUTS SEG NFR BLD AUTO: 54 % (ref 43–75)
NRBC BLD AUTO-RTO: 0 /100 WBCS
P AXIS: 47 DEGREES
PLATELET # BLD AUTO: 395 THOUSANDS/UL (ref 149–390)
PMV BLD AUTO: 10.2 FL (ref 8.9–12.7)
POTASSIUM SERPL-SCNC: 4.1 MMOL/L (ref 3.5–5.3)
PR INTERVAL: 148 MS
PROT SERPL-MCNC: 7.7 G/DL (ref 6.4–8.2)
QRS AXIS: 70 DEGREES
QRSD INTERVAL: 80 MS
QT INTERVAL: 372 MS
QTC INTERVAL: 442 MS
RBC # BLD AUTO: 4.75 MILLION/UL (ref 3.81–5.12)
SODIUM SERPL-SCNC: 138 MMOL/L (ref 136–145)
T WAVE AXIS: 49 DEGREES
T4 FREE SERPL-MCNC: 1.35 NG/DL (ref 0.76–1.46)
TSH SERPL DL<=0.05 MIU/L-ACNC: <0.007 UIU/ML (ref 0.45–4.5)
VENTRICULAR RATE: 85 BPM
WBC # BLD AUTO: 10.2 THOUSAND/UL (ref 4.31–10.16)

## 2022-06-22 PROCEDURE — G1004 CDSM NDSC: HCPCS

## 2022-06-22 PROCEDURE — 80053 COMPREHEN METABOLIC PANEL: CPT | Performed by: EMERGENCY MEDICINE

## 2022-06-22 PROCEDURE — 84439 ASSAY OF FREE THYROXINE: CPT | Performed by: EMERGENCY MEDICINE

## 2022-06-22 PROCEDURE — 71046 X-RAY EXAM CHEST 2 VIEWS: CPT

## 2022-06-22 PROCEDURE — 93005 ELECTROCARDIOGRAM TRACING: CPT

## 2022-06-22 PROCEDURE — 84443 ASSAY THYROID STIM HORMONE: CPT | Performed by: EMERGENCY MEDICINE

## 2022-06-22 PROCEDURE — 84484 ASSAY OF TROPONIN QUANT: CPT | Performed by: EMERGENCY MEDICINE

## 2022-06-22 PROCEDURE — 99285 EMERGENCY DEPT VISIT HI MDM: CPT | Performed by: EMERGENCY MEDICINE

## 2022-06-22 PROCEDURE — 36415 COLL VENOUS BLD VENIPUNCTURE: CPT | Performed by: EMERGENCY MEDICINE

## 2022-06-22 PROCEDURE — 70551 MRI BRAIN STEM W/O DYE: CPT

## 2022-06-22 PROCEDURE — 85025 COMPLETE CBC W/AUTO DIFF WBC: CPT | Performed by: EMERGENCY MEDICINE

## 2022-06-22 PROCEDURE — 70450 CT HEAD/BRAIN W/O DYE: CPT

## 2022-06-22 PROCEDURE — 96375 TX/PRO/DX INJ NEW DRUG ADDON: CPT

## 2022-06-22 PROCEDURE — 96365 THER/PROPH/DIAG IV INF INIT: CPT

## 2022-06-22 PROCEDURE — 93010 ELECTROCARDIOGRAM REPORT: CPT | Performed by: INTERNAL MEDICINE

## 2022-06-22 PROCEDURE — 99285 EMERGENCY DEPT VISIT HI MDM: CPT

## 2022-06-22 RX ORDER — DIPHENHYDRAMINE HYDROCHLORIDE 50 MG/ML
25 INJECTION INTRAMUSCULAR; INTRAVENOUS ONCE
Status: COMPLETED | OUTPATIENT
Start: 2022-06-22 | End: 2022-06-22

## 2022-06-22 RX ORDER — KETOROLAC TROMETHAMINE 30 MG/ML
30 INJECTION, SOLUTION INTRAMUSCULAR; INTRAVENOUS ONCE
Status: COMPLETED | OUTPATIENT
Start: 2022-06-22 | End: 2022-06-22

## 2022-06-22 RX ORDER — MAGNESIUM SULFATE HEPTAHYDRATE 40 MG/ML
2 INJECTION, SOLUTION INTRAVENOUS ONCE
Status: COMPLETED | OUTPATIENT
Start: 2022-06-22 | End: 2022-06-22

## 2022-06-22 RX ORDER — METOCLOPRAMIDE HYDROCHLORIDE 5 MG/ML
10 INJECTION INTRAMUSCULAR; INTRAVENOUS ONCE
Status: COMPLETED | OUTPATIENT
Start: 2022-06-22 | End: 2022-06-22

## 2022-06-22 RX ADMIN — DIPHENHYDRAMINE HYDROCHLORIDE 25 MG: 50 INJECTION, SOLUTION INTRAMUSCULAR; INTRAVENOUS at 13:37

## 2022-06-22 RX ADMIN — MAGNESIUM SULFATE HEPTAHYDRATE 2 G: 40 INJECTION, SOLUTION INTRAVENOUS at 14:48

## 2022-06-22 RX ADMIN — KETOROLAC TROMETHAMINE 30 MG: 30 INJECTION, SOLUTION INTRAMUSCULAR; INTRAVENOUS at 14:42

## 2022-06-22 RX ADMIN — METOCLOPRAMIDE 10 MG: 5 INJECTION, SOLUTION INTRAMUSCULAR; INTRAVENOUS at 14:40

## 2022-06-22 RX ADMIN — SODIUM CHLORIDE 1000 ML: 0.9 INJECTION, SOLUTION INTRAVENOUS at 14:46

## 2022-06-22 NOTE — Clinical Note
Gilda Castle was seen and treated in our emergency department on 6/22/2022  Diagnosis:     Wenceslao Delong  may return to work on return date  She may return on this date: 06/24/2022         If you have any questions or concerns, please don't hesitate to call        Gucci Mcdonough DO    ______________________________           _______________          _______________  Hospital Representative                              Date                                Time

## 2022-06-22 NOTE — Clinical Note
Kathy Morgan was seen and treated in our emergency department on 6/22/2022  Diagnosis:     Dotty Fernandez  may return to work on return date  She may return on this date: 06/24/2022         If you have any questions or concerns, please don't hesitate to call        Tello Calderon DO    ______________________________           _______________          _______________  Hospital Representative                              Date                                Time

## 2022-06-22 NOTE — ED PROVIDER NOTES
History  Chief Complaint   Patient presents with    Chest Pain     Pt reports left sided chest pain x 2 days  Pt reports left sided heaviness x 4 days  Pt also reports headache  Pt reports nausea  Pt reports pain increases with movement   Pt denies sob/fevers      32y F here with multiple complaints  Reports left sided chest pain, sharp/stabbing that will last for a few seconds  Will have repetitive sharp pains for 2-3 minutes and will resolve but will recur every 5-10 minutes some times  In between these episodes, she notes a heavy pain "in my heart" that radiates into the left side of the neck and into the left shoulder / arm  Chest symptoms assoc w/ nausea and diaphoresis  Currently having the "heavy" feeling  Additionally, has been having left occipital HA/pain that also radiates down into the left side of the neck and into the left shoulder arm  States that she has been having left sided facial numbness, left arm numbness off/on since yesterday - currently left side of face feels a little numb  Had similar episode about 1 5 - 2 wks ago that lasted 2 days and resolved  This seemed more intense so came in for evaluation  Pt reports the left side of her body (face, neck, chest, arm) feel "like they're dead, they have no energy or life" when compared to the right  Currently denies f/c/s, no cough/congestion, no recent illness, no recent falls or injuries  +FH of DVT/PE - mom w/ recurrent clots  Pt was tested and told she's "okay"  No FH of early CAD/MI/CVA  Pt w/ hx of "thyroid problem" - she thinks hyperthyroid  Notes she's supposed to take medications for it but she forgets a lot and also doesn't like taking medications  Hx of migraines - those are frontal and uses ibuprofen for that         History provided by:  Patient   used: No    Chest Pain  Pain location:  L chest  Pain quality: stabbing    Pain quality comment:  Heavy  Pain radiates to:  L jaw, L shoulder and L arm  Pain radiates to the back: no    Pain severity:  Severe  Onset quality:  Gradual  Duration:  2 days  Timing:  Intermittent  Progression:  Waxing and waning  Chronicity:  Recurrent  Context: movement and stress    Relieved by:  Nothing  Worsened by:  Nothing tried  Ineffective treatments:  None tried  Associated symptoms: diaphoresis, dizziness, fatigue, headache, nausea and palpitations (intermittent)    Associated symptoms: no abdominal pain, no anorexia, no cough, no fever and no shortness of breath        Prior to Admission Medications   Prescriptions Last Dose Informant Patient Reported? Taking? Cholecalciferol (Vitamin D) 50 MCG (2000 UT) tablet  Self No No   Sig: Take 1 tablet (2,000 Units total) by mouth daily   Magnesium 250 MG TABS  Self No No   Si pill a day   Riboflavin (Vitamin B-2) 100 MG TABS  Self No No   Si pills a day   Patient not taking: Reported on 2022   acetaminophen (TYLENOL) 650 mg CR tablet  Self No No   Sig: Take 1 tablet (650 mg total) by mouth every 8 (eight) hours as needed for mild pain or moderate pain   bisacodyl (DULCOLAX) 5 mg EC tablet   No No   Sig: Take 2 tablets (10 mg total) by mouth daily as needed for constipation   Patient not taking: Reported on 2022   budesonide-formoterol (SYMBICORT) 160-4 5 mcg/act inhaler   No No   Sig: Inhale 2 puffs 2 (two) times a day Rinse mouth after use  docusate sodium (COLACE) 100 mg capsule   No No   Sig: Take 1 capsule (100 mg total) by mouth 2 (two) times a day   methimazole (TAPAZOLE) 10 mg tablet  Self No No   Sig: TAKE 2 TABLETS BY MOUTH EVERY DAY   prochlorperazine (COMPAZINE) 10 mg tablet   No No   Sig: Take 1 tab up to BID prn migraine or nausea, ideally no more than 3 days per week   rizatriptan (MAXALT) 10 MG tablet  Self No No   Sig: Take 1 tablet (10 mg total) by mouth once as needed for migraine May repeat in 2 hours if needed  Max 2/24 hours, 9/month     topiramate (TOPAMAX) 25 mg tablet   No No   Si tab PO QHS for 1 week, increase as tolerated to 1 tab BID for 1 week, then 1 tab QAM and 2 tabs QHS for 1 week and finish at 2 tabs BID  Patient taking differently: 50 mg  in the morning  1 tab PO QHS for 1 week, increase as tolerated to 1 tab BID for 1 week, then 1 tab QAM and 2 tabs QHS for 1 week and finish at 2 tabs BID  Deng Mijares Facility-Administered Medications: None       Past Medical History:   Diagnosis Date    Anemia, postpartum 2019    Delivery by  hysterectomy 10/14/2019    Disease of thyroid gland     History of diet-controlled gestational diabetes mellitus 2019    Pap smear abnormality of cervix with LGSIL 2016    Varicella     Had as a child       Past Surgical History:   Procedure Laterality Date     SECTION      CYSTOSCOPY N/A 10/14/2019    Procedure: Anshu Barkleyk;  Surgeon: Ethan Costa MD;  [facility];  Service: Gynecology    DILATION AND EVACUATION      Molar    HYSTERECTOMY N/A 10/14/2019    Procedure: RAY, BILATERAL SALPINGECTOMY;  Surgeon: Ethan Costa MD;  [facility];  Service: Gynecology    LAPAROSCOPY      [address] N/A 10/14/2019    Procedure:  SECTION () REPEAT;  Surgeon: Ethan Costa MD;  [facility];  Service: Gynecology       Family History   Problem Relation Age of Onset    Hypertension Mother    Lisa Jersey Lupus Mother     Pulmonary embolism Mother    Lisa Jersey Anuerysm Mother     No Known Problems Father     Asthma Sister     No Known Problems Daughter     No Known Problems Daughter     Other Maternal Grandmother         Pre-diabetic    No Known Problems Brother     Heart murmur Brother     No Known Problems Son     No Known Problems Son     Asthma Son     No Known Problems Maternal Aunt     Breast cancer Paternal Aunt         age dx unknown    Thyroid disease unspecified Family      I have reviewed and agree with the history as documented      E-Cigarette/Vaping    E-Cigarette Use Never User      E-Cigarette/Vaping Substances    Nicotine No     THC No     CBD No     Flavoring No     Other No     Unknown No      Social History     Tobacco Use    Smoking status: Never Smoker    Smokeless tobacco: Never Used   Vaping Use    Vaping Use: Never used   Substance Use Topics    Alcohol use: Not Currently    Drug use: No       Review of Systems   Constitutional: Positive for diaphoresis and fatigue  Negative for fever  Respiratory: Negative for cough and shortness of breath  Cardiovascular: Positive for chest pain and palpitations (intermittent)  Gastrointestinal: Positive for nausea  Negative for abdominal pain and anorexia  Neurological: Positive for dizziness and headaches  All other systems reviewed and are negative  Physical Exam  Physical Exam  Vitals and nursing note reviewed  HENT:      Nose: Nose normal       Mouth/Throat:      Mouth: Mucous membranes are moist    Eyes:      Conjunctiva/sclera: Conjunctivae normal    Cardiovascular:      Rate and Rhythm: Normal rate and regular rhythm  Heart sounds: No murmur heard  Pulmonary:      Effort: Pulmonary effort is normal       Breath sounds: Normal breath sounds  Abdominal:      Palpations: Abdomen is soft  Tenderness: There is no abdominal tenderness  Musculoskeletal:         General: No swelling or tenderness  Cervical back: Normal range of motion  Skin:     General: Skin is warm  Neurological:      General: No focal deficit present  Mental Status: She is alert and oriented to person, place, and time     Psychiatric:         Mood and Affect: Mood normal          Vital Signs  ED Triage Vitals   Temperature Pulse Respirations Blood Pressure SpO2   06/22/22 1153 06/22/22 1153 06/22/22 1153 06/22/22 1153 06/22/22 1153   98 6 °F (37 °C) 87 18 135/80 98 %      Temp Source Heart Rate Source Patient Position - Orthostatic VS BP Location FiO2 (%)   06/22/22 1153 06/22/22 1153 06/22/22 1153 06/22/22 1153 --   Oral Monitor Sitting Right arm       Pain Score       06/22/22 1442       10 - Worst Possible Pain           Vitals:    06/22/22 1153 06/22/22 1500 06/22/22 1702   BP: 135/80 124/71 109/63   Pulse: 87 88 82   Patient Position - Orthostatic VS: Sitting Lying Sitting         Visual Acuity      ED Medications  Medications   diphenhydrAMINE (BENADRYL) injection 25 mg (25 mg Intravenous Given 6/22/22 1337)   metoclopramide (REGLAN) injection 10 mg (10 mg Intravenous Given 6/22/22 1440)   ketorolac (TORADOL) injection 30 mg (30 mg Intravenous Given 6/22/22 1442)   magnesium sulfate 2 g/50 mL IVPB (premix) 2 g (0 g Intravenous Stopped 6/22/22 1504)   sodium chloride 0 9 % bolus 1,000 mL (0 mL Intravenous Stopped 6/22/22 1504)       Diagnostic Studies  Results Reviewed     Procedure Component Value Units Date/Time    T4, free [[id number]]  (Normal) Collected: 06/22/22 1202    Lab Status: Final result Specimen: Blood from Arm, Left Updated: 06/22/22 1958     Free T4 1 35 ng/dL     HS Troponin I 2hr [[id number]] Collected: 06/22/22 1455    Lab Status: Final result Specimen: Blood from Arm, Left Updated: 06/22/22 1526     hs TnI 2hr <2 ng/L      Delta 2hr hsTnI --    TSH, 3rd generation with Free T4 reflex [[id number]]  (Abnormal) Collected: 06/22/22 1202    Lab Status: Final result Specimen: Blood from Arm, Left Updated: 06/22/22 1454     TSH 3RD GENERATON <0 007 uIU/mL     Narrative:      Patients undergoing fluorescein dye angiography may retain small amounts of fluorescein in the body for 48-72 hours post procedure  Samples containing fluorescein can produce falsely depressed TSH values  If the patient had this procedure,a specimen should be resubmitted post fluorescein clearance        HS Troponin 0hr (reflex protocol) [[id number]]  (Normal) Collected: 06/22/22 1202    Lab Status: Final result Specimen: Blood from Arm, Left Updated: 06/22/22 1238     hs TnI 0hr <2 ng/L     Comprehensive metabolic panel [[id number]]  (Abnormal) Collected: 06/22/22 1202    Lab Status: Final result Specimen: Blood from Arm, Left Updated: 06/22/22 1232     Sodium 138 mmol/L      Potassium 4 1 mmol/L      Chloride 105 mmol/L      CO2 27 mmol/L      ANION GAP 6 mmol/L      BUN 13 mg/dL      Creatinine 0 56 mg/dL      Glucose 113 mg/dL      Calcium 8 7 mg/dL      Corrected Calcium 9 2 mg/dL      AST 15 U/L      ALT 27 U/L      Alkaline Phosphatase 151 U/L      Total Protein 7 7 g/dL      Albumin 3 4 g/dL      Total Bilirubin 0 56 mg/dL      eGFR 122 ml/min/1 73sq m     Narrative:      National Kidney Disease Foundation guidelines for Chronic Kidney Disease (CKD):     Stage 1 with normal or high GFR (GFR > 90 mL/min/1 73 square meters)    Stage 2 Mild CKD (GFR = 60-89 mL/min/1 73 square meters)    Stage 3A Moderate CKD (GFR = 45-59 mL/min/1 73 square meters)    Stage 3B Moderate CKD (GFR = 30-44 mL/min/1 73 square meters)    Stage 4 Severe CKD (GFR = 15-29 mL/min/1 73 square meters)    Stage 5 End Stage CKD (GFR <15 mL/min/1 73 square meters)  Note: GFR calculation is accurate only with a steady state creatinine    CBC and differential [[id number]]  (Abnormal) Collected: 06/22/22 1202    Lab Status: Final result Specimen: Blood from Arm, Left Updated: 06/22/22 1211     WBC 10 20 Thousand/uL      RBC 4 75 Million/uL      Hemoglobin 12 0 g/dL      Hematocrit 39 0 %      MCV 82 fL      MCH 25 3 pg      MCHC 30 8 g/dL      RDW 15 5 %      MPV 10 2 fL      Platelets 975 Thousands/uL      nRBC 0 /100 WBCs      Neutrophils Relative 54 %      Immat GRANS % 0 %      Lymphocytes Relative 35 %      Monocytes Relative 9 %      Eosinophils Relative 2 %      Basophils Relative 0 %      Neutrophils Absolute 5 41 Thousands/µL      Immature Grans Absolute 0 03 Thousand/uL      Lymphocytes Absolute 3 60 Thousands/µL      Monocytes Absolute 0 91 Thousand/µL      Eosinophils Absolute 0 21 Thousand/µL      Basophils Absolute 0 04 Thousands/µL MRI brain wo contrast   ED Interpretation by Jere Valencia DO (06/22 1702)   See below      Final Result by Elfego Ferrari MD (06/22 1614)      No intracranial pathology  Workstation performed: [id number]         CT head without contrast   ED Interpretation by Jere Valencia DO (06/22 1420)   See below      Final Result by Fantasma Grover MD (06/22 1415)      No acute intracranial abnormality  Workstation performed: [id number]         XR chest pa & lateral   ED Interpretation by Jere Valencia DO (06/22 1335)   No acute findings      Final Result by Joslyn Montiel MD (06/22 1313)      No acute cardiopulmonary disease  Workstation performed: [id number]                    Procedures  ECG 12 Lead Documentation Only    Date/Time: 6/22/2022 12:34 PM  Performed by: Jere Valencia DO  Authorized by: Jere Valencia DO     Indications / Diagnosis:  Pain  ECG reviewed by me, the ED Provider: yes    Patient location:  ED  Previous ECG:     Previous ECG:  Unavailable  Interpretation:     Interpretation: normal    Rate:     ECG rate:  85    ECG rate assessment: normal    Rhythm:     Rhythm: sinus rhythm    Ectopy:     Ectopy: none    QRS:     QRS axis:  Normal  ST segments:     ST segments:  Normal  T waves:     T waves: normal               ED Course  ED Course as of 06/22/22 2057 Wed Jun 22, 2022   1332 TT to Neuro regarding pt   1420 Discussed with Dr Briana Yee - recommend stat non-con MRI brain in ED    Will contact radiology     1430 Left message for MRI tech regarding order   [phone] D/w pt recommended MRI - pt agreeable  No metal implants, no claustrophobia  Will have nurse complete screening form   1555 Pain down to 0-1 for chest and head  All symptoms resolved  Awaiting MRI results   9867 Pts symptoms resolved w/ HA  MRI unremarkable  ?complicated migraine                                   SBIRT 22yo+    Flowsheet Row Most Recent Value   SBIRT (22 yo +)    In order to provide better care to our patients, we are screening all of our patients for alcohol and drug use  Would it be okay to ask you these screening questions? Unable to answer at this time Filed at: 06/22/2022 1239                    MDM  Number of Diagnoses or Management Options  Complicated headache syndromes: new and requires workup  Non-cardiac chest pain: new and requires workup     Amount and/or Complexity of Data Reviewed  Clinical lab tests: reviewed and ordered  Tests in the radiology section of CPT®: reviewed and ordered  Tests in the medicine section of CPT®: reviewed and ordered  Decide to obtain previous medical records or to obtain history from someone other than the patient: yes  Discuss the patient with other providers: yes  Independent visualization of images, tracings, or specimens: yes        Disposition  Final diagnoses:   Complicated headache syndromes   Non-cardiac chest pain     Time reflects when diagnosis was documented in both MDM as applicable and the Disposition within this note     Time User Action Codes Description Comment    6/22/2022  5:04 PM Glen MOREJON Add [S89 93] Complicated headache syndromes     6/22/2022  5:04 PM Kate Rivera Add [R07 89] Non-cardiac chest pain       ED Disposition     ED Disposition   Discharge    Condition   Stable    Date/Time   Wed Jun 22, 2022  5:04 PM    Comment   Yara Lara discharge to home/self care                 Follow-up Information     Follow up With Specialties Details Why Contact Info    Alan Rosales MD Neurology Schedule an appointment as soon as possible for a visit  for further evaluation and treatment [address]      Yrn Cordova MD Internal Medicine Schedule an appointment as soon as possible for a visit  As needed [address] [place] [address]  Felix   [phone]  [phone]            Discharge Medication List as of 6/22/2022  5:43 PM CONTINUE these medications which have NOT CHANGED    Details   acetaminophen (TYLENOL) 650 mg CR tablet Take 1 tablet (650 mg total) by mouth every 8 (eight) hours as needed for mild pain or moderate pain, Starting Thu 9/10/2020, Print      bisacodyl (DULCOLAX) 5 mg EC tablet Take 2 tablets (10 mg total) by mouth daily as needed for constipation, Starting Tue 4/5/2022, Normal      budesonide-formoterol (SYMBICORT) 160-4 5 mcg/act inhaler Inhale 2 puffs 2 (two) times a day Rinse mouth after use , Starting Fri 4/22/2022, Normal      Cholecalciferol (Vitamin D) 50 MCG (2000 UT) tablet Take 1 tablet (2,000 Units total) by mouth daily, Starting Thu 11/18/2021, Normal      docusate sodium (COLACE) 100 mg capsule Take 1 capsule (100 mg total) by mouth 2 (two) times a day, Starting Tue 4/5/2022, Normal      Magnesium 250 MG TABS 1 pill a day, Normal      methimazole (TAPAZOLE) 10 mg tablet TAKE 2 TABLETS BY MOUTH EVERY DAY, Normal      prochlorperazine (COMPAZINE) 10 mg tablet Take 1 tab up to BID prn migraine or nausea, ideally no more than 3 days per week, Normal      Riboflavin (Vitamin B-2) 100 MG TABS 2 pills a day, Normal      rizatriptan (MAXALT) 10 MG tablet Take 1 tablet (10 mg total) by mouth once as needed for migraine May repeat in 2 hours if needed  Max 2/24 hours, 9/month , Starting Wed 12/22/2021, Normal      topiramate (TOPAMAX) 25 mg tablet 1 tab PO QHS for 1 week, increase as tolerated to 1 tab BID for 1 week, then 1 tab QAM and 2 tabs QHS for 1 week and finish at 2 tabs BID , Normal             No discharge procedures on file      PDMP Review     None          ED Provider  Electronically Signed by           Jere Valencia DO  06/22/22 2057

## 2022-06-22 NOTE — DISCHARGE INSTRUCTIONS
Your symptoms may have been due to what's called a complicated migraine  The CT of your head and the MRI of your brain were both negative  Additionally, the evaluation of your chest pain was normal - there was no evidence of a heart attack  You should follow up with your Neurologist to discuss your symptoms and the possibility this was a complicated migraine  Return for any trouble breathing, persistent vomiting, fever more than 101, worsening symptoms or or any concerns

## 2022-07-27 DIAGNOSIS — K59.04 FUNCTIONAL CONSTIPATION: ICD-10-CM

## 2022-07-27 RX ORDER — BISACODYL 5 MG
TABLET, DELAYED RELEASE (ENTERIC COATED) ORAL
Qty: 30 TABLET | Refills: 0 | Status: SHIPPED | OUTPATIENT
Start: 2022-07-27

## 2022-08-23 DIAGNOSIS — J40 BRONCHITIS: ICD-10-CM

## 2022-08-23 RX ORDER — BUDESONIDE AND FORMOTEROL FUMARATE DIHYDRATE 160; 4.5 UG/1; UG/1
AEROSOL RESPIRATORY (INHALATION)
Qty: 10.2 G | Refills: 1 | Status: SHIPPED | OUTPATIENT
Start: 2022-08-23

## 2022-08-23 NOTE — ASSESSMENT & PLAN NOTE
Lab testing consistent with Graves disease-she does not need to have  thyroid uptake and scan  I have counseled her on complications of uncontrolled did Graves disease including risk of arrhythmia, congestive heart failure, bone loss and thyroid storm  For now will continue beta blockers, start methimazole 15 mg daily, side effects discussed  She is to call the office if she has any fever, rashes, sore throat    Will repeat thyroid function test in the next month Infliximab Counseling:  I discussed with the patient the risks of infliximab including but not limited to myelosuppression, immunosuppression, autoimmune hepatitis, demyelinating diseases, lymphoma, and serious infections.  The patient understands that monitoring is required including a PPD at baseline and must alert us or the primary physician if symptoms of infection or other concerning signs are noted.

## 2022-09-19 ENCOUNTER — OFFICE VISIT (OUTPATIENT)
Dept: FAMILY MEDICINE CLINIC | Facility: CLINIC | Age: 32
End: 2022-09-19
Payer: COMMERCIAL

## 2022-09-19 VITALS
TEMPERATURE: 97.4 F | HEIGHT: 66 IN | HEART RATE: 90 BPM | SYSTOLIC BLOOD PRESSURE: 112 MMHG | RESPIRATION RATE: 16 BRPM | DIASTOLIC BLOOD PRESSURE: 76 MMHG | WEIGHT: 212 LBS | BODY MASS INDEX: 34.07 KG/M2 | OXYGEN SATURATION: 98 %

## 2022-09-19 DIAGNOSIS — R10.32 LEFT INGUINAL PAIN: Primary | ICD-10-CM

## 2022-09-19 DIAGNOSIS — M54.50 ACUTE LEFT-SIDED LOW BACK PAIN WITHOUT SCIATICA: ICD-10-CM

## 2022-09-19 PROCEDURE — 99214 OFFICE O/P EST MOD 30 MIN: CPT | Performed by: PHYSICIAN ASSISTANT

## 2022-09-19 RX ORDER — NAPROXEN 500 MG/1
500 TABLET ORAL 2 TIMES DAILY WITH MEALS
Qty: 60 TABLET | Refills: 1 | Status: SHIPPED | OUTPATIENT
Start: 2022-09-19

## 2022-09-19 NOTE — PROGRESS NOTES
Name: Tiana Mosher      : 1990      MRN: [id number]  Encounter Provider: Breana Arana PA-C  Encounter Date: 2022   Encounter department: [address] [place] [address]  Left inguinal pain  -     US groin/inguinal area; Future; Expected date: 2022  -     naproxen (NAPROSYN) 500 mg tablet; Take 1 tablet (500 mg total) by mouth 2 (two) times a day with meals    2  Acute left-sided low back pain without sciatica  -     Ambulatory Referral to Physical Therapy; Future  -     naproxen (NAPROSYN) 500 mg tablet; Take 1 tablet (500 mg total) by mouth 2 (two) times a day with meals       -left inguinal ultrasound ordered  -avoid lifting greater than 10 lb  -go to the ER if any symptoms increase  -apply moist heat to low back 3 times daily for 10 minutes as needed  -naproxen 500 mg twice daily with food, no alcohol  -she can still continue the Tylenol in between the naproxen doses  -I did recommend physical therapy if her left inguinal ultrasound is normal without hernia  -recommend follow-up if there is no improvement with the above recommendations    [employer] software was used to dictate this note  It may contain errors with dictating incorrect words/spelling  Please contact provider directly for any questions  Subjective      Patient presents today for acute visit for evaluation of left-sided low back pain that started over the past week  She states that she had similar pain in  but it resolved on its own over 2 weeks  She denies any specific injury  She is also having pain in the left inguinal region  She is not sure if the pain is radiating to that area  She does have pain especially with lifting in the inguinal area  She denies any numbness or tingling or radiation of pain down her lower extremities or dysuria, hematuria, loss of bladder or bowel control  She has been taking Tylenol 325 mg 3 tablets as needed    She was not able to go to work today because of her pain  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  Review of Systems   Constitutional: Negative for chills and fever  Gastrointestinal: Negative for abdominal pain  Genitourinary:        As stated in HPI   Musculoskeletal:        As stated in HPI       Current Outpatient Medications on File Prior to Visit   Medication Sig    acetaminophen (TYLENOL) 650 mg CR tablet Take 1 tablet (650 mg total) by mouth every 8 (eight) hours as needed for mild pain or moderate pain    bisacodyl 5 MG EC tablet TAKE 2 TABLETS BY MOUTH DAILY AS NEEDED FOR CONSTIPATION   Cholecalciferol (Vitamin D) 50 MCG (2000 UT) tablet Take 1 tablet (2,000 Units total) by mouth daily    docusate sodium (COLACE) 100 mg capsule Take 1 capsule (100 mg total) by mouth 2 (two) times a day    Magnesium 250 MG TABS 1 pill a day    methimazole (TAPAZOLE) 10 mg tablet TAKE 2 TABLETS BY MOUTH EVERY DAY    prochlorperazine (COMPAZINE) 10 mg tablet Take 1 tab up to BID prn migraine or nausea, ideally no more than 3 days per week    rizatriptan (MAXALT) 10 MG tablet Take 1 tablet (10 mg total) by mouth once as needed for migraine May repeat in 2 hours if needed  Max 2/24 hours, 9/month   Symbicort 160-4 5 MCG/ACT inhaler INHALE 2 PUFFS BY MOUTH 2 TIMES A DAY RINSE MOUTH AFTER USE   topiramate (TOPAMAX) 25 mg tablet 1 tab PO QHS for 1 week, increase as tolerated to 1 tab BID for 1 week, then 1 tab QAM and 2 tabs QHS for 1 week and finish at 2 tabs BID   (Patient taking differently: 50 mg daily 1 tab PO QHS for 1 week, increase as tolerated to 1 tab BID for 1 week, then 1 tab QAM and 2 tabs QHS for 1 week and finish at 2 tabs BID )    Riboflavin (Vitamin B-2) 100 MG TABS 2 pills a day (Patient not taking: No sig reported)       Objective     /76 (BP Location: Left arm, Patient Position: Sitting, Cuff Size: Standard)   Pulse 90   Temp (!) 97 4 °F (36 3 °C) (Tympanic)   Resp 16   Ht 5' 6" (1 676 m)   Wt 96 2 kg (212 lb)   LMP 03/23/2019 (Within Days)   SpO2 98%   BMI 34 22 kg/m²     Physical Exam  Vitals reviewed  Constitutional:       General: She is not in acute distress  Appearance: Normal appearance  She is not ill-appearing, toxic-appearing or diaphoretic  HENT:      Head: Normocephalic and atraumatic  Cardiovascular:      Rate and Rhythm: Normal rate and regular rhythm  Heart sounds: No murmur heard  Pulmonary:      Effort: Pulmonary effort is normal  No respiratory distress  Breath sounds: Normal breath sounds  No wheezing, rhonchi or rales  Abdominal:      General: Abdomen is flat  Bowel sounds are normal       Palpations: Abdomen is soft  Tenderness: There is no abdominal tenderness  There is no right CVA tenderness or left CVA tenderness  Genitourinary:     Comments: She does have tenderness and possibly some mild swelling in the left inguinal region  Musculoskeletal:      Cervical back: Neck supple  Comments: Lumbar:  She does have some mild tenderness of the left paraspinal upper lumbar region  She does have tightness of hamstrings with forward flexion  Negative straight leg raise bilaterally  Skin:     General: Skin is warm  Neurological:      General: No focal deficit present  Mental Status: She is alert  Psychiatric:         Mood and Affect: Mood normal          Behavior: Behavior normal          Thought Content:  Thought content normal          Judgment: Judgment normal        Susan Delacruz PA-C

## 2022-09-19 NOTE — LETTER
September 19, 2022     Patient: Nga Woodall  YOB: 1990  Date of Visit: 9/19/2022      To Whom it May Concern:    Nga Woodall is under my professional care  Juma Chung was seen in my office on 9/19/2022  Juma Chung is able to RTW 9/20/22  If you have any questions or concerns, please don't hesitate to call           Sincerely,          Susan Delacruz PA-C        CC: No Recipients

## 2022-11-03 ENCOUNTER — HOSPITAL ENCOUNTER (EMERGENCY)
Facility: HOSPITAL | Age: 32
Discharge: HOME | End: 2022-11-03
Attending: EMERGENCY MEDICINE

## 2022-11-03 VITALS
SYSTOLIC BLOOD PRESSURE: 114 MMHG | RESPIRATION RATE: 16 BRPM | BODY MASS INDEX: 34.55 KG/M2 | WEIGHT: 214.07 LBS | DIASTOLIC BLOOD PRESSURE: 68 MMHG | OXYGEN SATURATION: 98 % | TEMPERATURE: 98.5 F | HEART RATE: 107 BPM

## 2022-11-03 DIAGNOSIS — H66.92: ICD-10-CM

## 2022-11-03 DIAGNOSIS — R68.89 FLU-LIKE SYMPTOMS: Primary | ICD-10-CM

## 2022-11-03 RX ORDER — FLUTICASONE PROPIONATE 50 MCG
1 SPRAY, SUSPENSION (ML) NASAL DAILY
Qty: 16 G | Refills: 0 | Status: SHIPPED | OUTPATIENT
Start: 2022-11-03

## 2022-11-03 RX ORDER — GUAIFENESIN/DEXTROMETHORPHAN 100-10MG/5
5 SYRUP ORAL 3 TIMES DAILY PRN
Qty: 118 ML | Refills: 0 | Status: SHIPPED | OUTPATIENT
Start: 2022-11-03 | End: 2022-11-13

## 2022-11-03 RX ORDER — LORATADINE 10 MG/1
10 TABLET ORAL DAILY
Qty: 30 TABLET | Refills: 0 | Status: SHIPPED | OUTPATIENT
Start: 2022-11-03

## 2022-11-03 RX ORDER — IBUPROFEN 400 MG/1
400 TABLET ORAL EVERY 6 HOURS PRN
Qty: 12 TABLET | Refills: 0 | Status: SHIPPED | OUTPATIENT
Start: 2022-11-03

## 2022-11-03 RX ORDER — ONDANSETRON 4 MG/1
4 TABLET, ORALLY DISINTEGRATING ORAL ONCE
Status: COMPLETED | OUTPATIENT
Start: 2022-11-03 | End: 2022-11-03

## 2022-11-03 RX ORDER — AZITHROMYCIN 250 MG/1
TABLET, FILM COATED ORAL
Qty: 6 TABLET | Refills: 0 | Status: SHIPPED | OUTPATIENT
Start: 2022-11-03 | End: 2022-11-07

## 2022-11-03 RX ORDER — LIDOCAINE 50 MG/G
1 PATCH TOPICAL ONCE
Status: DISCONTINUED | OUTPATIENT
Start: 2022-11-03 | End: 2022-11-03 | Stop reason: HOSPADM

## 2022-11-03 RX ORDER — ONDANSETRON 4 MG/1
4 TABLET, ORALLY DISINTEGRATING ORAL EVERY 8 HOURS PRN
Qty: 20 TABLET | Refills: 0 | Status: SHIPPED | OUTPATIENT
Start: 2022-11-03 | End: 2022-11-13

## 2022-11-03 RX ORDER — ACETAMINOPHEN 500 MG
500 TABLET ORAL EVERY 6 HOURS PRN
Qty: 30 TABLET | Refills: 0 | Status: SHIPPED | OUTPATIENT
Start: 2022-11-03

## 2022-11-03 RX ADMIN — LIDOCAINE 1 PATCH: 50 PATCH TOPICAL at 19:41

## 2022-11-03 RX ADMIN — ONDANSETRON 4 MG: 4 TABLET, ORALLY DISINTEGRATING ORAL at 19:41

## 2022-11-03 NOTE — Clinical Note
Franki Tucker was seen and treated in our emergency department on 11/3/2022  Diagnosis:     Jorge Mobley       She may return on this date: 11/07/2022         If you have any questions or concerns, please don't hesitate to call        Juanita Yuan PA-C    ______________________________           _______________          _______________  Hospital Representative                              Date                                Time

## 2022-11-03 NOTE — ED PROVIDER NOTES
HPI: Patient is a 34 y o  female who presents with 2 days of chills, cough, headache, sore throat, fatigue, nausea, vomiting and ear pain which the patient describes at moderate The patient has had contact with people with similar symptoms  The patient without relief of symptoms     Allergies   Allergen Reactions   • Penicillins Anaphylaxis     Anaphylaxis   • Shrimp (Diagnostic) - Food Allergy    • Venofer [Iron Sucrose] Itching       Past Medical History:   Diagnosis Date   • Anemia, postpartum 2019   • Delivery by  hysterectomy 10/14/2019   • Disease of thyroid gland    • History of diet-controlled gestational diabetes mellitus 2019   • Pap smear abnormality of cervix with LGSIL 2016   • Varicella     Had as a child      Past Surgical History:   Procedure Laterality Date   •  SECTION     • CYSTOSCOPY N/A 10/14/2019    Procedure: CYSTOSCOPY;  Surgeon: Vijay Colon MD;  [facility];  Service: Gynecology   • DILATION AND EVACUATION      Molar   • HYSTERECTOMY N/A 10/14/2019    Procedure: RAY, BILATERAL SALPINGECTOMY;  Surgeon: Vijay Colon MD;  [facility];  Service: Gynecology   • LAPAROSCOPY     • SC  DELIVERY ONLY N/A 10/14/2019    Procedure:  SECTION () REPEAT;  Surgeon: Vijay Colon MD;  [facility];  Service: Gynecology     Social History     Tobacco Use   • Smoking status: Never Smoker   • Smokeless tobacco: Never Used   Vaping Use   • Vaping Use: Never used   Substance Use Topics   • Alcohol use: Not Currently   • Drug use: No       Nursing notes reviewed  Physical Exam:  ED Triage Vitals [22]   Temperature Pulse Respirations Blood Pressure SpO2   98 5 °F (36 9 °C) (!) 107 16 114/68 98 %      Temp Source Heart Rate Source Patient Position - Orthostatic VS BP Location FiO2 (%)   Oral Monitor Sitting Left arm --      Pain Score       10 - Worst Possible Pain           ROS: Positive for cough, congestion, sinus pressure, headache, fatigue, nausea, vomiting, the remainder of a 10 organ system ROS was otherwise unremarkable  General: awake, alert, no acute distress  Head: normocephalic, atraumatic  Eyes: no scleral icterus  Ears: external ears normal, hearing grossly intact TM on right normal, TM on left bulging and erythematous  Nose: external exam grossly normal, positive nasal discharge  Neck: symmetric, No JVD noted, trachea midline  Pulmonary: no respiratory distress, no tachypnea noted, lungs CTAB  Cardiovascular: appears well perfused  Abdomen: no distention noted  Musculoskeletal: no deformities noted, tone normal  Neuro: grossly non-focal  Psych: mood and affect appropriate    The patient is stable and has a history and physical exam consistent with a viral illness and an acute ear infection  Due to the potential for bacterial ear infection will cover with azithromycin (pt is penicillin allergic) COVID19 testing has been performed  I considered the patient's other medical conditions as applicable/noted above in my medical decision making  The patient is stable upon discharge  The plan is for supportive care at home  The patient (and any family present) verbalized understanding of the discharge instructions and warnings that would necessitate return to the Emergency Department  All questions were answered prior to discharge      Medications   ondansetron (ZOFRAN-ODT) dispersible tablet 4 mg (has no administration in time range)   lidocaine (LIDODERM) 5 % patch 1 patch (has no administration in time range)     Final diagnoses:   Flu-like symptoms   Left middle ear infection     Time reflects when diagnosis was documented in both MDM as applicable and the Disposition within this note     Time User Action Codes Description Comment    11/3/2022  7:36 PM Kent Overall Add [R68 89] Flu-like symptoms     11/3/2022  7:36 PM Kent Overall Add [H66 92] Left middle ear infection       ED Disposition     ED Disposition   Discharge    Condition   Good    Date/Time   Thu Nov 3, 2022  7:36 PM    Comment   Rena Lund discharge to home/self care  Follow-up Information     Follow up With Specialties Details Why Contact Nic Pelayo DO Family Medicine Schedule an appointment as soon as possible for a visit  As needed [phone]          Patient's Medications   Discharge Prescriptions    ACETAMINOPHEN (TYLENOL) 500 MG TABLET    Take 1 tablet (500 mg total) by mouth every 6 (six) hours as needed for mild pain       Start Date: 11/3/2022 End Date: --       Order Dose: 500 mg       Quantity: 30 tablet    Refills: 0    AZITHROMYCIN (ZITHROMAX) 250 MG TABLET    Take 2 tablets today then 1 tablet daily x 4 days       Start Date: 11/3/2022 End Date: 11/7/2022       Order Dose: --       Quantity: 6 tablet    Refills: 0    DEXTROMETHORPHAN-GUAIFENESIN (ROBITUSSIN DM)  MG/5 ML SYRUP    Take 5 mL by mouth 3 (three) times a day as needed (cough) for up to 10 days       Start Date: 11/3/2022 End Date: 11/13/2022       Order Dose: 5 mL       Quantity: 118 mL    Refills: 0    FLUTICASONE (FLONASE) 50 MCG/ACT NASAL SPRAY    1 spray into each nostril daily       Start Date: 11/3/2022 End Date: --       Order Dose: 1 spray       Quantity: 16 g    Refills: 0    IBUPROFEN (MOTRIN) 400 MG TABLET    Take 1 tablet (400 mg total) by mouth every 6 (six) hours as needed for mild pain       Start Date: 11/3/2022 End Date: --       Order Dose: 400 mg       Quantity: 12 tablet    Refills: 0    LORATADINE (CLARITIN) 10 MG TABLET    Take 1 tablet (10 mg total) by mouth daily       Start Date: 11/3/2022 End Date: --       Order Dose: 10 mg       Quantity: 30 tablet    Refills: 0    MENTHOL-CETYLPYRIDINIUM (CEPACOL) 3 MG LOZENGE    Take 1 lozenge (3 mg total) by mouth as needed for sore throat       Start Date: 11/3/2022 End Date: --       Order Dose: 3 mg       Quantity: 30 lozenge    Refills: 0    ONDANSETRON (ZOFRAN-ODT) 4 MG DISINTEGRATING TABLET    Take 1 tablet (4 mg total) by mouth every 8 (eight) hours as needed for nausea for up to 10 days       Start Date: 11/3/2022 End Date: 11/13/2022       Order Dose: 4 mg       Quantity: 20 tablet    Refills: 0     No discharge procedures on file      Electronically Signed by       Katia Ma PA-C  11/03/22 [address] [facility]  11/03/22 1851

## 2022-11-04 LAB
FLUAV RNA RESP QL NAA+PROBE: NEGATIVE
FLUBV RNA RESP QL NAA+PROBE: NEGATIVE
SARS-COV-2 RNA RESP QL NAA+PROBE: NEGATIVE

## 2022-11-10 ENCOUNTER — OFFICE VISIT (OUTPATIENT)
Dept: FAMILY MEDICINE CLINIC | Facility: CLINIC | Age: 32
End: 2022-11-10

## 2022-11-10 VITALS
OXYGEN SATURATION: 98 % | DIASTOLIC BLOOD PRESSURE: 82 MMHG | HEART RATE: 85 BPM | WEIGHT: 219 LBS | BODY MASS INDEX: 35.2 KG/M2 | HEIGHT: 66 IN | RESPIRATION RATE: 16 BRPM | TEMPERATURE: 98.6 F | SYSTOLIC BLOOD PRESSURE: 118 MMHG

## 2022-11-10 DIAGNOSIS — R09.81 NASAL CONGESTION: Primary | ICD-10-CM

## 2022-11-10 DIAGNOSIS — H93.8X3 PRESSURE SENSATION IN BOTH EARS: ICD-10-CM

## 2022-11-10 RX ORDER — PREDNISONE 20 MG/1
20 TABLET ORAL DAILY
Qty: 5 TABLET | Refills: 0 | Status: SHIPPED | OUTPATIENT
Start: 2022-11-10 | End: 2022-11-15

## 2022-11-10 NOTE — PROGRESS NOTES
Assessment/Plan:           Problem List Items Addressed This Visit    None     Visit Diagnoses     Nasal congestion    -  Primary    Relevant Medications    predniSONE 20 mg tablet    Pressure sensation in both ears        Relevant Medications    predniSONE 20 mg tablet        saline gargles recommended  I will start patient on prednisone  Patient encouraged to take antihistamine over-the-counter  I do not see me for another round of antibiotic at this time  Patient update me about her progress early next week  Subjective:      Patient ID: Lul Cash is a 34 y o  female  HPI   Patient is here to follow-up recent ER visit with upper respiratory infection was tested negative for COVID and influenza  Patient was given Zithromax is here she was found to have ear infection  She has finished antibiotic reports ongoing nasal congestion  Her daughter was sick with similar symptoms as well  She reports no fever chills now but still feels congestion ear pressure  She reports no further vertigo  To note MRI and CT of the brain in June was negative  The following portions of the patient's history were reviewed and updated as appropriate: allergies, current medications, past medical history, past social history and problem list     Review of Systems      Objective:      /82 (BP Location: Left arm, Patient Position: Sitting, Cuff Size: Standard)   Pulse 85   Temp 98 6 °F (37 °C) (Tympanic)   Resp 16   Ht 5' 6" (1 676 m)   Wt 99 3 kg (219 lb)   LMP 03/23/2019 (Within Days)   SpO2 98%   BMI 35 35 kg/m²          Physical Exam  Constitutional:       Comments: Sounds congested   HENT:      Head:      Comments: Negative tenderness on palpation maxillary sinus     Right Ear: Tympanic membrane normal       Left Ear: Tympanic membrane normal       Mouth/Throat:      Comments: Postnasal drip  Eyes:      Extraocular Movements: Extraocular movements intact

## 2022-11-30 DIAGNOSIS — J40 BRONCHITIS: ICD-10-CM

## 2022-11-30 RX ORDER — BUDESONIDE AND FORMOTEROL FUMARATE DIHYDRATE 160; 4.5 UG/1; UG/1
AEROSOL RESPIRATORY (INHALATION)
Qty: 6 G | Refills: 1 | Status: SHIPPED | OUTPATIENT
Start: 2022-11-30

## 2023-01-05 ENCOUNTER — APPOINTMENT (OUTPATIENT)
Dept: LAB | Facility: HOSPITAL | Age: 33
End: 2023-01-05

## 2023-01-05 ENCOUNTER — OFFICE VISIT (OUTPATIENT)
Dept: FAMILY MEDICINE CLINIC | Facility: CLINIC | Age: 33
End: 2023-01-05

## 2023-01-05 ENCOUNTER — TELEPHONE (OUTPATIENT)
Dept: FAMILY MEDICINE CLINIC | Facility: CLINIC | Age: 33
End: 2023-01-05

## 2023-01-05 VITALS
WEIGHT: 229 LBS | HEIGHT: 66 IN | DIASTOLIC BLOOD PRESSURE: 72 MMHG | TEMPERATURE: 97.6 F | BODY MASS INDEX: 36.8 KG/M2 | SYSTOLIC BLOOD PRESSURE: 118 MMHG | OXYGEN SATURATION: 96 % | RESPIRATION RATE: 16 BRPM | HEART RATE: 93 BPM

## 2023-01-05 DIAGNOSIS — E53.8 B12 DEFICIENCY: Primary | ICD-10-CM

## 2023-01-05 DIAGNOSIS — Z11.1 SCREENING FOR TUBERCULOSIS: ICD-10-CM

## 2023-01-05 DIAGNOSIS — E05.00 GRAVES DISEASE: ICD-10-CM

## 2023-01-05 DIAGNOSIS — Z11.1 SCREENING FOR TUBERCULOSIS: Primary | ICD-10-CM

## 2023-01-05 DIAGNOSIS — J30.2 SEASONAL ALLERGIES: ICD-10-CM

## 2023-01-05 DIAGNOSIS — H69.92 EUSTACHIAN TUBE DISORDER, LEFT: Primary | ICD-10-CM

## 2023-01-05 DIAGNOSIS — E05.90 HYPERTHYROIDISM: ICD-10-CM

## 2023-01-05 DIAGNOSIS — Z82.49 FAMILY HISTORY OF BRAIN ANEURYSM: ICD-10-CM

## 2023-01-05 DIAGNOSIS — E55.9 VITAMIN D DEFICIENCY: ICD-10-CM

## 2023-01-05 LAB
T3 SERPL-MCNC: 1.3 NG/ML (ref 0.6–1.8)
T4 FREE SERPL-MCNC: 0.97 NG/DL (ref 0.76–1.46)
TSH SERPL DL<=0.05 MIU/L-ACNC: 0.3 UIU/ML (ref 0.45–4.5)

## 2023-01-05 RX ORDER — PREDNISONE 20 MG/1
20 TABLET ORAL DAILY
Qty: 5 TABLET | Refills: 0 | Status: SHIPPED | OUTPATIENT
Start: 2023-01-05 | End: 2023-01-10

## 2023-01-05 NOTE — PROGRESS NOTES
Assessment/Plan:           Problem List Items Addressed This Visit    None  Visit Diagnoses     Eustachian tube disorder, left    -  Primary    Relevant Medications    predniSONE 20 mg tablet    Family history of brain aneurysm        Relevant Orders    MRI brain wo contrast mra head wo    Seasonal allergies              Patient will start and continue with Claritin with prednisone  Showed up to me about her progress in 5 days  I would give Flonase at that point  SUbjective:      Patient ID: Oswald Mccoy is a 34 y o  female  HPI  Patient is here for an acute visit complaining of left ear discomfort going on for about 2 weeks now  She recently had a COVID infection which did not mean the matter is better  She reports no fevers or chills sore throat but has been having decreased hearing from the left ear  Does not smoke  She brings to my attention 2 brain aneurysms in her mother  Her grandmother had brain aneurysms as well  MRI of the brain last year was unremarkable  Patient has been experiencing headaches  MRA would be ordered  The following portions of the patient's history were reviewed and updated as appropriate: allergies, current medications, past family history, past medical history, past social history, past surgical history and problem list     Review of Systems      Objective:      /72 (BP Location: Left arm, Patient Position: Sitting, Cuff Size: Large)   Pulse 93   Temp 97 6 °F (36 4 °C) (Temporal)   Resp 16   Ht 5' 6" (1 676 m)   Wt 104 kg (229 lb)   LMP 03/23/2019 (Within Days)   SpO2 96%   BMI 36 96 kg/m²          Physical Exam  HENT:      Right Ear: Tympanic membrane normal       Ears:      Comments: Clear fluid left ear     Mouth/Throat:      Pharynx: No posterior oropharyngeal erythema  Comments: Postnasal drip  Pulmonary:      Effort: Pulmonary effort is normal  No respiratory distress  Breath sounds: No wheezing or rales     Neurological:      Mental Status: She is alert

## 2023-01-05 NOTE — TELEPHONE ENCOUNTER
Patient called and is requesting to have an order placed for tb through blood work because she will get the results sooner   Please assist     Thank you

## 2023-01-06 ENCOUNTER — TELEPHONE (OUTPATIENT)
Dept: ENDOCRINOLOGY | Facility: CLINIC | Age: 33
End: 2023-01-06

## 2023-01-06 LAB
DEPRECATED AT III PPP: 96 % OF NORMAL (ref 92–136)
ENDOMYSIUM IGA SER QL: NEGATIVE
ENDOMYSIUM IGA SER QL: NEGATIVE
GLIADIN PEPTIDE IGA SER-ACNC: 11 UNITS (ref 0–19)
GLIADIN PEPTIDE IGA SER-ACNC: 7 UNITS (ref 0–19)
GLIADIN PEPTIDE IGG SER-ACNC: 10 UNITS (ref 0–19)
GLIADIN PEPTIDE IGG SER-ACNC: 11 UNITS (ref 0–19)
HCYS SERPL-SCNC: 9.9 UMOL/L (ref 3.7–11.2)
IGA SERPL-MCNC: 319 MG/DL (ref 87–352)
IGA SERPL-MCNC: 337 MG/DL (ref 87–352)
TTG IGA SER-ACNC: <2 U/ML (ref 0–3)
TTG IGA SER-ACNC: <2 U/ML (ref 0–3)
TTG IGG SER-ACNC: 4 U/ML (ref 0–5)
TTG IGG SER-ACNC: <2 U/ML (ref 0–5)
VIT B12 SERPL-MCNC: 175 PG/ML (ref 100–900)

## 2023-01-07 LAB
APTT SCREEN TO CONFIRM RATIO: 1.13 RATIO (ref 0–1.34)
AT III AG ACT/NOR PPP IA: 86 % (ref 72–124)
CONFIRM APTT/NORMAL: 38.9 SEC (ref 0–47.6)
LA PPP-IMP: NORMAL
SCREEN APTT: 42.6 SEC (ref 0–51.9)
SCREEN DRVVT: 44.8 SEC (ref 0–47)
THROMBIN TIME: 14.9 SEC (ref 0–23)

## 2023-01-09 LAB
GAMMA INTERFERON BACKGROUND BLD IA-ACNC: 0.03 IU/ML
M TB IFN-G BLD-IMP: NEGATIVE
M TB IFN-G CD4+ BCKGRND COR BLD-ACNC: -0.01 IU/ML
M TB IFN-G CD4+ BCKGRND COR BLD-ACNC: 0 IU/ML
MITOGEN IGNF BCKGRD COR BLD-ACNC: >10 IU/ML
PROT C AG ACT/NOR PPP IA: 131 % OF NORMAL (ref 60–150)
PROT S ACT/NOR PPP: 68 % (ref 68–108)

## 2023-01-10 LAB — F5 GENE MUT ANL BLD/T: NORMAL

## 2023-01-11 LAB — F2 GENE MUT ANL BLD/T: NORMAL

## 2023-01-12 LAB — MTHFR GENE MUT ANL BLD/T: NORMAL

## 2023-01-13 ENCOUNTER — HOSPITAL ENCOUNTER (EMERGENCY)
Facility: HOSPITAL | Age: 33
Discharge: HOME | End: 2023-01-13
Attending: EMERGENCY MEDICINE

## 2023-01-13 ENCOUNTER — TELEMEDICINE (OUTPATIENT)
Dept: FAMILY MEDICINE CLINIC | Facility: CLINIC | Age: 33
End: 2023-01-13

## 2023-01-13 ENCOUNTER — TELEPHONE (OUTPATIENT)
Dept: FAMILY MEDICINE CLINIC | Facility: CLINIC | Age: 33
End: 2023-01-13

## 2023-01-13 VITALS
HEART RATE: 93 BPM | WEIGHT: 232 LBS | TEMPERATURE: 99.1 F | RESPIRATION RATE: 15 BRPM | DIASTOLIC BLOOD PRESSURE: 50 MMHG | OXYGEN SATURATION: 100 % | BODY MASS INDEX: 37.45 KG/M2 | SYSTOLIC BLOOD PRESSURE: 105 MMHG

## 2023-01-13 DIAGNOSIS — I83.90 VARICOSE VEIN OF LEG: Primary | ICD-10-CM

## 2023-01-13 DIAGNOSIS — I83.892 VARICOSE VEINS OF LEFT LEG WITH EDEMA: Primary | ICD-10-CM

## 2023-01-13 DIAGNOSIS — E78.2 MODERATE MIXED HYPERLIPIDEMIA NOT REQUIRING STATIN THERAPY: ICD-10-CM

## 2023-01-13 DIAGNOSIS — Z11.59 NEED FOR HEPATITIS C SCREENING TEST: ICD-10-CM

## 2023-01-13 DIAGNOSIS — R73.9 HYPERGLYCEMIA: ICD-10-CM

## 2023-01-13 DIAGNOSIS — E53.8 B12 DEFICIENCY: ICD-10-CM

## 2023-01-13 DIAGNOSIS — E55.9 VITAMIN D DEFICIENCY: ICD-10-CM

## 2023-01-13 RX ORDER — ERGOCALCIFEROL 1.25 MG/1
50000 CAPSULE ORAL WEEKLY
Qty: 8 CAPSULE | Refills: 0 | Status: SHIPPED | OUTPATIENT
Start: 2023-01-13

## 2023-01-13 RX ORDER — LANOLIN ALCOHOL/MO/W.PET/CERES
1000 CREAM (GRAM) TOPICAL DAILY
Qty: 90 TABLET | Refills: 1 | Status: SHIPPED | OUTPATIENT
Start: 2023-01-13

## 2023-01-13 RX ORDER — FOLIC ACID 1 MG/1
1 TABLET ORAL DAILY
Qty: 90 TABLET | Refills: 1 | Status: SHIPPED | OUTPATIENT
Start: 2023-01-13

## 2023-01-13 NOTE — PROGRESS NOTES
Virtual Regular Visit    Verification of patient location:    Patient is located in the following state in which I hold an active license [place]      Assessment/Plan:    Problem List Items Addressed This Visit        Other    B12 deficiency    Relevant Medications    folic acid ( Folic Acid) 1 mg tablet    vitamin B-12 (VITAMIN B-12) 1,000 mcg tablet    Other Relevant Orders    Vitamin B12   Other Visit Diagnoses     Varicose veins of left leg with edema    -  Primary    Relevant Orders    VAS reflux lower limb venous duplex study with reflux assesment, unilateral    Vitamin D deficiency        Relevant Medications    ergocalciferol (ERGOCALCIFEROL) 1 25 MG (16485 UT) capsule    Hyperglycemia        Relevant Orders    Comprehensive metabolic panel    Moderate mixed hyperlipidemia not requiring statin therapy        Need for hepatitis C screening test        Relevant Orders    Hepatitis C Antibody ([facility])      Follow-up lab studies in 3 months  She is due for physical in May and would follow-up at that time  Reason for visit is   Chief Complaint   Patient presents with   • Virtual Regular Visit        Encounter provider Urbano Mejia MD    Provider located at [address] [place] [address], [facility] , [address]  [facility] [phone]      Recent Visits  No visits were found meeting these conditions  Showing recent visits within past 7 days and meeting all other requirements  Today's Visits  Date Type Provider Dept   01/13/23 Telemedicine Urbano Mejia MD [facility]   01/13/23 Telephone Urbano Mejia MD [facility] 93 today's visits and meeting all other requirements  Future Appointments  No visits were found meeting these conditions  Showing future appointments within next 150 days and meeting all other requirements       The patient was identified by name and date of birth   Stella Dangelo was informed that this is a telemedicine visit and that the visit is being conducted through the [employer]  She agrees to proceed     My office door was closed  No one else was in the room  She acknowledged consent and understanding of privacy and security of the video platform  The patient has agreed to participate and understands they can discontinue the visit at any time  Patient is aware this is a billable service  Subjective  Ann Marie Barcenas is a 34 y o  female    Patient is being managed telehealth to discuss her recent lab studies for glucose level was elevated at 104 with hemoglobin A1c 5 7  LDL was also elevated with low HDL  Diet modification discussed  Return levels are low  1000 MCG over-the-counter would be started  Vitamin D was low  Supplementation would be called in  Hypercoagulable work-up revealed no significant finding  MTHFR mutation that is not associated with homocystinemia was detected has been experiencing left swelling and discomfort  Doppler with reflux would be ordered to wait for varicose veins as well as blood clot  He understands the plan  She is scheduled for a brain MRA soon  Also states she has an appointment coming up with endocrinology to discuss hyperthyroidism      HPI     Past Medical History:   Diagnosis Date   • Anemia, postpartum 2019   • Delivery by  hysterectomy 10/14/2019   • Disease of thyroid gland    • History of diet-controlled gestational diabetes mellitus 2019   • Obesity    • Pap smear abnormality of cervix with LGSIL 2016   • Varicella     Had as a child       Past Surgical History:   Procedure Laterality Date   •  SECTION     • CYSTOSCOPY N/A 10/14/2019    Procedure: CYSTOSCOPY;  Surgeon: Emily Cunningham MD;  [facility];  Service: Gynecology   • DILATION AND EVACUATION      Molar   • HYSTERECTOMY N/A 10/14/2019    Procedure: RAY, BILATERAL SALPINGECTOMY;  Surgeon: Emily Cunningham MD;  [facility]; Service: Gynecology   • LAPAROSCOPY     • MS  DELIVERY ONLY N/A 10/14/2019    Procedure:  SECTION () REPEAT;  Surgeon: Emily Cunningham MD;  [facility];  Service: Gynecology       Current Outpatient Medications   Medication Sig Dispense Refill   • ergocalciferol (ERGOCALCIFEROL) 1 25 MG (12728 UT) capsule Take 1 capsule (50,000 Units total) by mouth once a week 8 capsule 0   • folic acid (KP Folic Acid) 1 mg tablet Take 1 tablet (1 mg total) by mouth daily 90 tablet 1   • vitamin B-12 (VITAMIN B-12) 1,000 mcg tablet Take 1 tablet (1,000 mcg total) by mouth daily 90 tablet 1   • acetaminophen (TYLENOL) 500 mg tablet Take 1 tablet (500 mg total) by mouth every 6 (six) hours as needed for mild pain (Patient not taking: Reported on 2023) 30 tablet 0   • acetaminophen (TYLENOL) 650 mg CR tablet Take 1 tablet (650 mg total) by mouth every 8 (eight) hours as needed for mild pain or moderate pain 30 tablet 0   • bisacodyl 5 MG EC tablet TAKE 2 TABLETS BY MOUTH DAILY AS NEEDED FOR CONSTIPATION   (Patient not taking: Reported on 11/10/2022) 30 tablet 0   • Cholecalciferol (Vitamin D) 50 MCG (2000 UT) tablet Take 1 tablet (2,000 Units total) by mouth daily (Patient not taking: Reported on 11/10/2022) 30 tablet 3   • docusate sodium (COLACE) 100 mg capsule Take 1 capsule (100 mg total) by mouth 2 (two) times a day (Patient not taking: Reported on 11/10/2022) 60 capsule 6   • fluticasone (FLONASE) 50 mcg/act nasal spray 1 spray into each nostril daily (Patient not taking: Reported on 11/10/2022) 16 g 0   • ibuprofen (MOTRIN) 400 mg tablet Take 1 tablet (400 mg total) by mouth every 6 (six) hours as needed for mild pain (Patient not taking: Reported on 11/10/2022) 12 tablet 0   • loratadine (CLARITIN) 10 mg tablet Take 1 tablet (10 mg total) by mouth daily (Patient not taking: Reported on 11/10/2022) 30 tablet 0   • Magnesium 250 MG TABS 1 pill a day (Patient not taking: Reported on 11/10/2022) 30 tablet 0   • menthol-cetylpyridinium (CEPACOL) 3 MG lozenge Take 1 lozenge (3 mg total) by mouth as needed for sore throat (Patient not taking: Reported on 11/10/2022) 30 lozenge 0   • methimazole (TAPAZOLE) 10 mg tablet TAKE 2 TABLETS BY MOUTH EVERY DAY (Patient not taking: Reported on 11/10/2022) 60 tablet 1   • naproxen (NAPROSYN) 500 mg tablet Take 1 tablet (500 mg total) by mouth 2 (two) times a day with meals (Patient not taking: Reported on 11/10/2022) 60 tablet 1   • ondansetron (ZOFRAN-ODT) 4 mg disintegrating tablet Take 1 tablet (4 mg total) by mouth every 8 (eight) hours as needed for nausea for up to 10 days (Patient not taking: Reported on 11/10/2022) 20 tablet 0   • prochlorperazine (COMPAZINE) 10 mg tablet Take 1 tab up to BID prn migraine or nausea, ideally no more than 3 days per week (Patient not taking: Reported on 11/10/2022) 20 tablet 4   • Riboflavin (Vitamin B-2) 100 MG TABS 2 pills a day (Patient not taking: Reported on 5/12/2022) 60 tablet 3   • rizatriptan (MAXALT) 10 MG tablet Take 1 tablet (10 mg total) by mouth once as needed for migraine May repeat in 2 hours if needed  Max 2/24 hours, 9/month  (Patient not taking: Reported on 11/10/2022) 9 tablet 6   • Symbicort 160-4 5 MCG/ACT inhaler INHALE 2 PUFFS BY MOUTH 2 TIMES A DAY RINSE MOUTH AFTER USE  6 g 1   • topiramate (TOPAMAX) 25 mg tablet 1 tab PO QHS for 1 week, increase as tolerated to 1 tab BID for 1 week, then 1 tab QAM and 2 tabs QHS for 1 week and finish at 2 tabs BID  (Patient not taking: Reported on 11/10/2022) 120 tablet 4     No current facility-administered medications for this visit  Allergies   Allergen Reactions   • Penicillins Anaphylaxis     Anaphylaxis   • Shrimp (Diagnostic) - Food Allergy    • Venofer [Iron Sucrose] Itching       Review of Systems    Video Exam    There were no vitals filed for this visit      Physical Exam     I spent 10 minutes with patient today in which greater than 50% of the time was spent in counseling/coordination of care regarding Lab discussion and plan of care

## 2023-01-13 NOTE — Clinical Note
Lamar Rodriguez was seen and treated in our emergency department on 1/13/2023  No restrictions            Diagnosis:     Jason Staff       She may return on this date: 01/16/2023         If you have any questions or concerns, please don't hesitate to call        Trina Douglass PA-C    ______________________________           _______________          _______________  Hospital Representative                              Date                                Time

## 2023-01-13 NOTE — ED PROVIDER NOTES
History  Chief Complaint   Patient presents with   • Leg Pain     Pt reports chronic left leg pain due to varicose veins     Patient is a 78-year-old female coming in for evaluation of left leg pain  Patient states that when she is walking, her veins pop, craniostenosis  Per chart review, patient is seeing her family practice doctor, who is working her up for this issue  Patient does not wear compression socks at this time  Is in no acute distress      History provided by:  Patient   used: No    Leg Pain  Location:  Leg  Injury: no    Leg location:  L leg  Chronicity:  Chronic  Associated symptoms: no decreased ROM, no fatigue, no numbness, no stiffness and no swelling        Prior to Admission Medications   Prescriptions Last Dose Informant Patient Reported? Taking? Cholecalciferol (Vitamin D) 50 MCG (2000 UT) tablet   No No   Sig: Take 1 tablet (2,000 Units total) by mouth daily   Patient not taking: Reported on 11/10/2022   Magnesium 250 MG TABS   No No   Si pill a day   Patient not taking: Reported on 11/10/2022   Riboflavin (Vitamin B-2) 100 MG TABS   No No   Si pills a day   Patient not taking: Reported on 2022   Symbicort 160-4 5 MCG/ACT inhaler   No No   Sig: INHALE 2 PUFFS BY MOUTH 2 TIMES A DAY RINSE MOUTH AFTER USE    acetaminophen (TYLENOL) 500 mg tablet   No No   Sig: Take 1 tablet (500 mg total) by mouth every 6 (six) hours as needed for mild pain   Patient not taking: Reported on 2023   acetaminophen (TYLENOL) 650 mg CR tablet   No No   Sig: Take 1 tablet (650 mg total) by mouth every 8 (eight) hours as needed for mild pain or moderate pain   bisacodyl 5 MG EC tablet   No No   Sig: TAKE 2 TABLETS BY MOUTH DAILY AS NEEDED FOR CONSTIPATION     Patient not taking: Reported on 11/10/2022   docusate sodium (COLACE) 100 mg capsule   No No   Sig: Take 1 capsule (100 mg total) by mouth 2 (two) times a day   Patient not taking: Reported on 11/10/2022   ergocalciferol (ERGOCALCIFEROL) 1 25 MG (54475 UT) capsule   No No   Sig: Take 1 capsule (50,000 Units total) by mouth once a week   fluticasone (FLONASE) 50 mcg/act nasal spray   No No   Si spray into each nostril daily   Patient not taking: Reported on    folic acid (KP Folic Acid) 1 mg tablet   No No   Sig: Take 1 tablet (1 mg total) by mouth daily   ibuprofen (MOTRIN) 400 mg tablet   No No   Sig: Take 1 tablet (400 mg total) by mouth every 6 (six) hours as needed for mild pain   Patient not taking: Reported on 11/10/2022   loratadine (CLARITIN) 10 mg tablet   No No   Sig: Take 1 tablet (10 mg total) by mouth daily   Patient not taking: Reported on 11/10/2022   menthol-cetylpyridinium (CEPACOL) 3 MG lozenge   No No   Sig: Take 1 lozenge (3 mg total) by mouth as needed for sore throat   Patient not taking: Reported on 11/10/2022   methimazole (TAPAZOLE) 10 mg tablet   No No   Sig: TAKE 2 TABLETS BY MOUTH EVERY DAY   Patient not taking: Reported on 11/10/2022   naproxen (NAPROSYN) 500 mg tablet   No No   Sig: Take 1 tablet (500 mg total) by mouth 2 (two) times a day with meals   Patient not taking: Reported on 11/10/2022   ondansetron (ZOFRAN-ODT) 4 mg disintegrating tablet   No No   Sig: Take 1 tablet (4 mg total) by mouth every 8 (eight) hours as needed for nausea for up to 10 days   Patient not taking: Reported on 11/10/2022   prochlorperazine (COMPAZINE) 10 mg tablet   No No   Sig: Take 1 tab up to BID prn migraine or nausea, ideally no more than 3 days per week   Patient not taking: Reported on 11/10/2022   rizatriptan (MAXALT) 10 MG tablet   No No   Sig: Take 1 tablet (10 mg total) by mouth once as needed for migraine May repeat in 2 hours if needed  Max 2/24 hours, 9/month  Patient not taking: Reported on 11/10/2022   topiramate (TOPAMAX) 25 mg tablet   No No   Si tab PO QHS for 1 week, increase as tolerated to 1 tab BID for 1 week, then 1 tab QAM and 2 tabs QHS for 1 week and finish at 2 tabs BID  Patient not taking: Reported on 11/10/2022   vitamin B-12 (VITAMIN B-12) 1,000 mcg tablet   No No   Sig: Take 1 tablet (1,000 mcg total) by mouth daily      Facility-Administered Medications: None       Past Medical History:   Diagnosis Date   • Anemia, postpartum 2019   • Delivery by  hysterectomy 10/14/2019   • Disease of thyroid gland    • History of diet-controlled gestational diabetes mellitus 2019   • Obesity    • Pap smear abnormality of cervix with LGSIL 2016   • Varicella     Had as a child   • Varicose vein of leg        Past Surgical History:   Procedure Laterality Date   •  SECTION     • CYSTOSCOPY N/A 10/14/2019    Procedure: Cheyrl Come;  Surgeon: Quinn Garzon MD;  Location: [facility];  Service: Gynecology   • DILATION AND EVACUATION      Molar   • HYSTERECTOMY N/A 10/14/2019    Procedure: RAY, BILATERAL SALPINGECTOMY;  Surgeon: Quinn Garzon MD;  Location: [facility];  Service: Gynecology   • LAPAROSCOPY     • NM  DELIVERY ONLY N/A 10/14/2019    Procedure:  SECTION () REPEAT;  Surgeon: Quinn Garzon MD;  Location: [facility];  Service: Gynecology       Family History   Problem Relation Age of Onset   • Hypertension Mother    • Lupus Mother    • Pulmonary embolism Mother    • Anuerysm Mother    • No Known Problems Father    • Asthma Sister    • No Known Problems Daughter    • No Known Problems Daughter    • Other Maternal Grandmother         Pre-diabetic   • No Known Problems Brother    • Heart murmur Brother    • No Known Problems Son    • No Known Problems Son    • Asthma Son    • No Known Problems Maternal Aunt    • Breast cancer Paternal Aunt         age dx unknown   • Thyroid disease unspecified Family      I have reviewed and agree with the history as documented      E-Cigarette/Vaping   • E-Cigarette Use Never User      E-Cigarette/Vaping Substances   • Nicotine No    • THC No    • CBD No    • Flavoring No    • Other No    • Unknown No Social History     Tobacco Use   • Smoking status: Never   • Smokeless tobacco: Never   Vaping Use   • Vaping Use: Never used   Substance Use Topics   • Alcohol use: Not Currently   • Drug use: No       Review of Systems   Constitutional: Negative for fatigue  Musculoskeletal: Negative for arthralgias, gait problem, joint swelling and stiffness  Skin: Negative for wound  Physical Exam  Physical Exam  Vitals reviewed  Constitutional:       Appearance: Normal appearance  She is normal weight  HENT:      Head: Normocephalic and atraumatic  Right Ear: External ear normal       Left Ear: External ear normal       Nose: Nose normal    Eyes:      Conjunctiva/sclera: Conjunctivae normal    Cardiovascular:      Rate and Rhythm: Normal rate  Pulmonary:      Effort: Pulmonary effort is normal    Musculoskeletal:         General: Tenderness present  No swelling  Normal range of motion  Cervical back: Normal range of motion  Comments: No tenderness behind the knee  No significant swelling of the calf, or erythema  Patient does appear to have varicose veins throughout her entire left leg  2+ pedal dorsalis pulse  Normal sensation   Skin:     General: Skin is warm and dry  Neurological:      Mental Status: She is alert           Vital Signs  ED Triage Vitals [01/13/23 1731]   Temperature Pulse Respirations Blood Pressure SpO2   99 1 °F (37 3 °C) 93 15 105/50 100 %      Temp Source Heart Rate Source Patient Position - Orthostatic VS BP Location FiO2 (%)   Tympanic Monitor Sitting Left arm --      Pain Score       --           Vitals:    01/13/23 1731   BP: 105/50   Pulse: 93   Patient Position - Orthostatic VS: Sitting         Visual Acuity      ED Medications  Medications - No data to display    Diagnostic Studies  Results Reviewed     None                 No orders to display              Procedures  Procedures         ED Course                                             Medical Decision Making  Patient is in no acute distress this time, comes in for left leg pain  Patient is supposed to be getting a ultrasound reflux for evaluation of this issue  Patient is encouraged to wear compression stockings, and follow-up with her PCP for further evaluation    Varicose vein of leg: chronic illness or injury      Disposition  Final diagnoses:   Varicose vein of leg     Time reflects when diagnosis was documented in both MDM as applicable and the Disposition within this note     Time User Action Codes Description Comment    1/13/2023  5:46 PM Miguel Angel Mullen [I83 [id number]] Varicose vein of leg       ED Disposition     ED Disposition   Discharge    Condition   Stable    Date/Time   Fri Jan 13, 2023  5:45 PM    Comment   [facility] discharge to home/self care                 Follow-up Information     Follow up With Specialties Details Why Contact Info Additional Via Brielle Vieyra MD Internal Medicine   [facility] [address]  [address] [facility] [id number]  [address] [place] [address] Emergency Department Emergency Medicine  As needed, If symptoms worsen [address] [facility] [id number] [address] [facility] Emergency Department          Discharge Medication List as of 1/13/2023  5:46 PM      CONTINUE these medications which have NOT CHANGED    Details   acetaminophen (TYLENOL) 500 mg tablet Take 1 tablet (500 mg total) by mouth every 6 (six) hours as needed for mild pain, Starting Thu 11/3/2022, Normal      acetaminophen (TYLENOL) 650 mg CR tablet Take 1 tablet (650 mg total) by mouth every 8 (eight) hours as needed for mild pain or moderate pain, Starting Thu 9/10/2020, Print      bisacodyl 5 MG EC tablet TAKE 2 TABLETS BY MOUTH DAILY AS NEEDED FOR CONSTIPATION , Normal      Cholecalciferol (Vitamin D) 50 MCG (2000 UT) tablet Take 1 tablet (2,000 Units total) by mouth daily, Starting Thu 11/18/2021, Normal      docusate sodium (COLACE) 100 mg capsule Take 1 capsule (100 mg total) by mouth 2 (two) times a day, Starting Tue 4/5/2022, Normal      ergocalciferol (ERGOCALCIFEROL) 1 25 MG (10640 UT) capsule Take 1 capsule (50,000 Units total) by mouth once a week, Starting Fri 1/13/2023, Normal      fluticasone (FLONASE) 50 mcg/act nasal spray 1 spray into each nostril daily, Starting Thu 88/7/1022, Normal      folic acid (KP Folic Acid) 1 mg tablet Take 1 tablet (1 mg total) by mouth daily, Starting Fri 1/13/2023, Normal      ibuprofen (MOTRIN) 400 mg tablet Take 1 tablet (400 mg total) by mouth every 6 (six) hours as needed for mild pain, Starting Thu 11/3/2022, Normal      loratadine (CLARITIN) 10 mg tablet Take 1 tablet (10 mg total) by mouth daily, Starting Thu 11/3/2022, Normal      Magnesium 250 MG TABS 1 pill a day, Normal      menthol-cetylpyridinium (CEPACOL) 3 MG lozenge Take 1 lozenge (3 mg total) by mouth as needed for sore throat, Starting Thu 11/3/2022, Normal      methimazole (TAPAZOLE) 10 mg tablet TAKE 2 TABLETS BY MOUTH EVERY DAY, Normal      naproxen (NAPROSYN) 500 mg tablet Take 1 tablet (500 mg total) by mouth 2 (two) times a day with meals, Starting Mon 9/19/2022, Normal      ondansetron (ZOFRAN-ODT) 4 mg disintegrating tablet Take 1 tablet (4 mg total) by mouth every 8 (eight) hours as needed for nausea for up to 10 days, Starting Thu 11/3/2022, Until Sun 11/13/2022 at 2359, Normal      prochlorperazine (COMPAZINE) 10 mg tablet Take 1 tab up to BID prn migraine or nausea, ideally no more than 3 days per week, Normal      Riboflavin (Vitamin B-2) 100 MG TABS 2 pills a day, Normal      rizatriptan (MAXALT) 10 MG tablet Take 1 tablet (10 mg total) by mouth once as needed for migraine May repeat in 2 hours if needed   Max 2/24 hours, 9/month , Starting Wed 12/22/2021, Normal      Symbicort 160-4 5 MCG/ACT inhaler INHALE 2 PUFFS BY MOUTH 2 TIMES A DAY RINSE MOUTH AFTER USE , Normal      topiramate (TOPAMAX) 25 mg tablet 1 tab PO QHS for 1 week, increase as tolerated to 1 tab BID for 1 week, then 1 tab QAM and 2 tabs QHS for 1 week and finish at 2 tabs BID , Normal      vitamin B-12 (VITAMIN B-12) 1,000 mcg tablet Take 1 tablet (1,000 mcg total) by mouth daily, Starting Fri 1/13/2023, Normal             No discharge procedures on file      PDMP Review     None          ED Provider  Electronically Signed by           Emigdio Kumar PA-C  01/13/23 3670

## 2023-01-19 ENCOUNTER — OFFICE VISIT (OUTPATIENT)
Dept: FAMILY MEDICINE CLINIC | Facility: CLINIC | Age: 33
End: 2023-01-19

## 2023-01-19 VITALS
HEART RATE: 68 BPM | TEMPERATURE: 98.9 F | OXYGEN SATURATION: 100 % | DIASTOLIC BLOOD PRESSURE: 72 MMHG | SYSTOLIC BLOOD PRESSURE: 110 MMHG | BODY MASS INDEX: 36.96 KG/M2 | HEIGHT: 66 IN | WEIGHT: 230 LBS

## 2023-01-19 DIAGNOSIS — R06.83 LOUD SNORING: Primary | ICD-10-CM

## 2023-01-19 DIAGNOSIS — H69.82 EUSTACHIAN TUBE DYSFUNCTION, LEFT: ICD-10-CM

## 2023-01-19 RX ORDER — FLUTICASONE PROPIONATE 50 MCG
1 SPRAY, SUSPENSION (ML) NASAL DAILY
Qty: 16 G | Refills: 0 | Status: SHIPPED | OUTPATIENT
Start: 2023-01-19

## 2023-01-19 NOTE — PROGRESS NOTES
Assessment/Plan:           Problem List Items Addressed This Visit    None  Visit Diagnoses     Loud snoring    -  Primary    Relevant Orders    Ambulatory Referral to Sleep Medicine    Eustachian tube dysfunction, left        Relevant Medications    fluticasone (FLONASE) 50 mcg/act nasal spray        Instructed to rinse her mouth after use  Proper use of the nasal spray was shown to the patient  Subjective:      Patient ID: Evelin Kelly is a 34 y o  female  HPI  Patient is here to follow-up on recent lab studies  B12 was found to be quite low  Patient was advised to start taking B12 supplementation over-the-counter and would repeat lab studies in the future  Patient had mention family history of aneurysms and MRA was ordered it was approved after peer to peer  Blood pressure is good today  Patient continues to complain of left ear discomfort  I had given her prednisone and asked her to take over-the-counter Claritin  She did see an improvement when she was taking prednisone  She is diabetic and Claritin as well  Encouraged her to continue with Claritin and I will start her on Flonase  She has very crowded oropharynx  Her  who accompanies her today reports that she snores very loud  This likely is contributing to her symptoms as well as all health      The following portions of the patient's history were reviewed and updated as appropriate: allergies, current medications, past family history, past medical history, past social history, past surgical history and problem list     Review of Systems      Objective:      /72 (BP Location: Left arm, Patient Position: Sitting, Cuff Size: Large)   Pulse 68   Temp 98 9 °F (37 2 °C) (Tympanic)   Ht 5' 6" (1 676 m)   Wt 104 kg (230 lb)   LMP 03/23/2019 (Within Days)   SpO2 100%   BMI 37 12 kg/m²          Physical Exam  HENT:      Left Ear: Tympanic membrane normal       Mouth/Throat:      Comments: Crowded oropharynx

## 2023-01-20 ENCOUNTER — HOSPITAL ENCOUNTER (OUTPATIENT)
Dept: NON INVASIVE DIAGNOSTICS | Facility: HOSPITAL | Age: 33
Discharge: HOME | End: 2023-01-20

## 2023-01-20 DIAGNOSIS — I83.892 VARICOSE VEINS OF LEFT LEG WITH EDEMA: ICD-10-CM

## 2023-01-24 ENCOUNTER — OFFICE VISIT (OUTPATIENT)
Dept: ENDOCRINOLOGY | Facility: CLINIC | Age: 33
End: 2023-01-24

## 2023-01-24 ENCOUNTER — TELEPHONE (OUTPATIENT)
Dept: FAMILY MEDICINE CLINIC | Facility: CLINIC | Age: 33
End: 2023-01-24

## 2023-01-24 VITALS
OXYGEN SATURATION: 98 % | HEIGHT: 66 IN | DIASTOLIC BLOOD PRESSURE: 60 MMHG | SYSTOLIC BLOOD PRESSURE: 108 MMHG | HEART RATE: 88 BPM | BODY MASS INDEX: 37.77 KG/M2 | WEIGHT: 235 LBS

## 2023-01-24 DIAGNOSIS — R73.03 PREDIABETES: Primary | ICD-10-CM

## 2023-01-24 DIAGNOSIS — E05.90 HYPERTHYROIDISM: ICD-10-CM

## 2023-01-24 RX ORDER — METHIMAZOLE 5 MG/1
5 TABLET ORAL DAILY
Qty: 90 TABLET | Refills: 2 | Status: SHIPPED | OUTPATIENT
Start: 2023-01-24

## 2023-01-24 RX ORDER — METHIMAZOLE 5 MG/1
5 TABLET ORAL 3 TIMES DAILY
Qty: 90 TABLET | Refills: 2 | Status: SHIPPED | OUTPATIENT
Start: 2023-01-24 | End: 2023-01-24

## 2023-01-24 NOTE — PATIENT INSTRUCTIONS
Www diabetes  org and myplate gov -> meal recommendations    Metformin Instructions:  1 tab daily with supper x 1 week  1 tab twice per day with food x 1 week  1 tab breakfast, 2 tabs dinner x 1 week  2 tabs with breakfast and supper x 2 weeks  Take with food to minimize side effects  If you do get any side effects as you increase dose, go back to the tolerated dose

## 2023-01-24 NOTE — ASSESSMENT & PLAN NOTE
Re-counseled patient on the dangers of untreated hyperthyroidism  Patient understands the risks  Recent T3 and T4 are normal, TSH is low  Recommend patient restart methimazole 5 mg daily and repeat lab work in 4 weeks  Patient counseled on stopping medication and notifying office if rash, sore throat, yellowing of the skin or fever were to develop  Counseled patient that worsening anxiety could be a result of untreated hyperthyroidism  Discussed definitive therapy via MEADE or thyroidectomy  Patient prefers to continue with anti-thyroid medication

## 2023-01-24 NOTE — PROGRESS NOTES
Established Patient Progress Note    CC: Follow up for Grave's Disease    History of Present Illness:    Darius Freeman is a 34 y o  female with a history of Grave's disease and prediabetes  She stopped taking her methimazole in 2022  She was counseled on the effects of untreated hyperthyroidism at her last visit  She reports feeling hot and cold, gaining weight, defecating more frequently throughout the day (not diarrhea), having mood swings and anxiety  She also reports chest pain with shortness of breath  This occurs at rest and with activity  She reports it improves with rest  She states she has been under a lot of stress  She had a cardiac work up in 2022 for similar symptoms which were unrevealing  For the prediabetes, patient states she keeps on eating throughout the day and is always hungry         Patient Active Problem List   Diagnosis   • Bipolar disorder ([facility] [address] )   • Back pain   • B12 deficiency   • Other iron deficiency anemias   • Hot flashes   • Graves disease   • Impaired fasting glucose   • Hyperthyroidism   • Left inguinal pain   • Acute left-sided low back pain without sciatica   • Prediabetes      Past Medical History:   Diagnosis Date   • Anemia, postpartum 2019   • Delivery by  hysterectomy 10/14/2019   • Disease of thyroid gland    • History of diet-controlled gestational diabetes mellitus 2019   • Obesity    • Pap smear abnormality of cervix with LGSIL 2016   • Varicella     Had as a child   • Varicose vein of leg       Past Surgical History:   Procedure Laterality Date   •  SECTION     • CYSTOSCOPY N/A 10/14/2019    Procedure: CYSTOSCOPY;  Surgeon: Bird Martin MD;  Location: [facility];  Service: Gynecology   • DILATION AND EVACUATION      Molar   • HYSTERECTOMY N/A 10/14/2019    Procedure: RAY, BILATERAL SALPINGECTOMY;  Surgeon: Bird Martin MD;  Location: [facility];  Service: Gynecology   • LAPAROSCOPY     • MT  DELIVERY ONLY N/A 10/14/2019    Procedure:  SECTION () REPEAT;  Surgeon: Jarad Raymundo MD;  Location: [facility];  Service: Gynecology      Family History   Problem Relation Age of Onset   • Hypertension Mother    • Lupus Mother    • Pulmonary embolism Mother    • Anuerysm Mother    • No Known Problems Father    • Asthma Sister    • No Known Problems Daughter    • No Known Problems Daughter    • Other Maternal Grandmother         Pre-diabetic   • No Known Problems Brother    • Heart murmur Brother    • No Known Problems Son    • No Known Problems Son    • Asthma Son    • No Known Problems Maternal Aunt    • Breast cancer Paternal Aunt         age dx unknown   • Thyroid disease unspecified Family      Social History     Tobacco Use   • Smoking status: Never   • Smokeless tobacco: Never   Substance Use Topics   • Alcohol use: Not Currently     Allergies   Allergen Reactions   • Penicillins Anaphylaxis     Anaphylaxis   • Shrimp (Diagnostic) - Food Allergy    • Venofer [Iron Sucrose] Itching         Current Outpatient Medications:   •  acetaminophen (TYLENOL) 650 mg CR tablet, Take 1 tablet (650 mg total) by mouth every 8 (eight) hours as needed for mild pain or moderate pain, Disp: 30 tablet, Rfl: 0  •  ergocalciferol (ERGOCALCIFEROL) 1 25 MG (19705 UT) capsule, Take 1 capsule (50,000 Units total) by mouth once a week, Disp: 8 capsule, Rfl: 0  •  fluticasone (FLONASE) 50 mcg/act nasal spray, 1 spray into each nostril daily, Disp: 16 g, Rfl: 0  •  folic acid (KP Folic Acid) 1 mg tablet, Take 1 tablet (1 mg total) by mouth daily, Disp: 90 tablet, Rfl: 1  •  metFORMIN (GLUCOPHAGE) 500 mg tablet, Take 1 tablet (500 mg total) by mouth 2 (two) times a day with meals, Disp: 90 tablet, Rfl: 2  •  methimazole (TAPAZOLE) 5 mg tablet, Take 1 tablet (5 mg total) by mouth daily, Disp: 90 tablet, Rfl: 2  •  Symbicort 160-4 5 MCG/ACT inhaler, INHALE 2 PUFFS BY MOUTH 2 TIMES A DAY RINSE MOUTH AFTER USE , Disp: 6 g, Rfl: 1  •  vitamin B-12 (VITAMIN B-12) 1,000 mcg tablet, Take 1 tablet (1,000 mcg total) by mouth daily, Disp: 90 tablet, Rfl: 1  •  acetaminophen (TYLENOL) 500 mg tablet, Take 1 tablet (500 mg total) by mouth every 6 (six) hours as needed for mild pain (Patient not taking: Reported on 1/5/2023), Disp: 30 tablet, Rfl: 0  •  bisacodyl 5 MG EC tablet, TAKE 2 TABLETS BY MOUTH DAILY AS NEEDED FOR CONSTIPATION   (Patient not taking: Reported on 11/10/2022), Disp: 30 tablet, Rfl: 0  •  Cholecalciferol (Vitamin D) 50 MCG (2000 UT) tablet, Take 1 tablet (2,000 Units total) by mouth daily (Patient not taking: Reported on 11/10/2022), Disp: 30 tablet, Rfl: 3  •  docusate sodium (COLACE) 100 mg capsule, Take 1 capsule (100 mg total) by mouth 2 (two) times a day (Patient not taking: Reported on 11/10/2022), Disp: 60 capsule, Rfl: 6  •  ibuprofen (MOTRIN) 400 mg tablet, Take 1 tablet (400 mg total) by mouth every 6 (six) hours as needed for mild pain (Patient not taking: Reported on 11/10/2022), Disp: 12 tablet, Rfl: 0  •  loratadine (CLARITIN) 10 mg tablet, Take 1 tablet (10 mg total) by mouth daily (Patient not taking: Reported on 11/10/2022), Disp: 30 tablet, Rfl: 0  •  Magnesium 250 MG TABS, 1 pill a day (Patient not taking: Reported on 11/10/2022), Disp: 30 tablet, Rfl: 0  •  menthol-cetylpyridinium (CEPACOL) 3 MG lozenge, Take 1 lozenge (3 mg total) by mouth as needed for sore throat (Patient not taking: Reported on 11/10/2022), Disp: 30 lozenge, Rfl: 0  •  naproxen (NAPROSYN) 500 mg tablet, Take 1 tablet (500 mg total) by mouth 2 (two) times a day with meals (Patient not taking: Reported on 11/10/2022), Disp: 60 tablet, Rfl: 1  •  ondansetron (ZOFRAN-ODT) 4 mg disintegrating tablet, Take 1 tablet (4 mg total) by mouth every 8 (eight) hours as needed for nausea for up to 10 days (Patient not taking: Reported on 11/10/2022), Disp: 20 tablet, Rfl: 0  •  prochlorperazine (COMPAZINE) 10 mg tablet, Take 1 tab up to BID prn migraine or nausea, ideally no more than 3 days per week (Patient not taking: Reported on 11/10/2022), Disp: 20 tablet, Rfl: 4  •  Riboflavin (Vitamin B-2) 100 MG TABS, 2 pills a day (Patient not taking: Reported on 5/12/2022), Disp: 60 tablet, Rfl: 3  •  rizatriptan (MAXALT) 10 MG tablet, Take 1 tablet (10 mg total) by mouth once as needed for migraine May repeat in 2 hours if needed  Max 2/24 hours, 9/month  (Patient not taking: Reported on 11/10/2022), Disp: 9 tablet, Rfl: 6  •  topiramate (TOPAMAX) 25 mg tablet, 1 tab PO QHS for 1 week, increase as tolerated to 1 tab BID for 1 week, then 1 tab QAM and 2 tabs QHS for 1 week and finish at 2 tabs BID  (Patient not taking: Reported on 11/10/2022), Disp: 120 tablet, Rfl: 4    Review of Systems   Constitutional: Positive for unexpected weight change  Negative for chills, diaphoresis and fever  HENT: Negative for ear pain, sore throat, trouble swallowing and voice change  Eyes: Negative for pain and visual disturbance  Respiratory: Positive for shortness of breath  Negative for cough  Cardiovascular: Positive for chest pain  Negative for palpitations  Gastrointestinal: Negative for abdominal pain and vomiting  Endocrine: Positive for cold intolerance, heat intolerance and polyphagia  Genitourinary: Negative for dysuria and hematuria  Musculoskeletal: Negative for arthralgias and back pain  Skin: Negative for color change and rash  Neurological: Negative for tremors, seizures and syncope  Psychiatric/Behavioral: Positive for dysphoric mood  Negative for sleep disturbance  The patient is nervous/anxious  All other systems reviewed and are negative  Physical Exam:  Body mass index is 37 93 kg/m²    /60 (BP Location: Left arm, Patient Position: Sitting, Cuff Size: Extra-Large)   Pulse 88   Ht 5' 6" (1 676 m)   Wt 107 kg (235 lb)   LMP 03/23/2019 (Within Days)   SpO2 98%   BMI 37 93 kg/m²    Wt Readings from Last 3 Encounters:   01/24/23 107 kg (235 lb)   01/19/23 104 kg (230 lb)   01/13/23 105 kg (232 lb)       Physical Exam  Vitals reviewed  Constitutional:       Appearance: Normal appearance  HENT:      Head: Normocephalic and atraumatic  Neck:      Thyroid: Thyromegaly present  Trachea: Trachea normal    Cardiovascular:      Rate and Rhythm: Normal rate and regular rhythm  Heart sounds: Normal heart sounds  Pulmonary:      Effort: Pulmonary effort is normal       Breath sounds: Normal breath sounds  Musculoskeletal:      Cervical back: Neck supple  No tenderness  Lymphadenopathy:      Cervical: No cervical adenopathy  Right cervical: No superficial cervical adenopathy  Left cervical: No superficial cervical adenopathy  Neurological:      Mental Status: She is alert and oriented to person, place, and time  Psychiatric:         Mood and Affect: Mood normal          Behavior: Behavior normal          Labs:   Lab Results   Component Value Date    HGBA1C 5 7 (H) 01/05/2023    HGBA1C 5 6 04/27/2021     Lab Results   Component Value Date    CREATININE 0 59 (L) 01/05/2023    CREATININE 0 56 (L) 06/22/2022    CREATININE 0 53 (L) 11/16/2021    BUN 15 01/05/2023    K 4 0 01/05/2023     01/05/2023    CO2 26 01/05/2023     eGFR   Date Value Ref Range Status   01/05/2023 120 ml/min/1 73sq m Final   10/05/2016 >60 0 ml/min/1 73sq m Final     Lab Results   Component Value Date    HDL 37 (L) 01/05/2023    TRIG 118 01/05/2023     Lab Results   Component Value Date    ALT 21 01/05/2023    AST 23 01/05/2023    ALKPHOS 119 01/05/2023     Lab Results   Component Value Date    PMR4MLAGZNAT 0 296 (L) 01/05/2023    QFI8VHZBVJTI <0 007 (L) 06/22/2022    MGF4LDRGPSJD <0 015 (L) 09/20/2021     Lab Results   Component Value Date    FREET4 0 97 01/05/2023       Impression & Plan:    Problem List Items Addressed This Visit        Endocrine    Hyperthyroidism     Re-counseled patient on the dangers of untreated hyperthyroidism   Patient understands the risks  Recent T3 and T4 are normal, TSH is low  Recommend patient restart methimazole 5 mg daily and repeat lab work in 4 weeks  Patient counseled on stopping medication and notifying office if rash, sore throat, yellowing of the skin or fever were to develop  Counseled patient that worsening anxiety could be a result of untreated hyperthyroidism  Discussed definitive therapy via MEADE or thyroidectomy  Patient prefers to continue with anti-thyroid medication  Relevant Medications    methimazole (TAPAZOLE) 5 mg tablet    Other Relevant Orders    TSH, 3rd generation Lab Collect    T4, free Lab Collect    T3 Lab Collect       Other    Prediabetes - Primary     Patient counseled on diet and exercise- recommend 30 min/day 5 times per week of moderate intensity  Provided patient with information to diabetes  org and [employer] gov for advice on meal planning  Recommend patient see dietician, however she is unsure of cost  Referral to dietician placed  Also recommend starting metformin today with instructions to titrate if tolerating  Side effects discussed including diarrhea  Repeat hgbA1C in 3 months  She is also due for CMP at that time- will also evaluate fasting BG then  Relevant Medications    metFORMIN (GLUCOPHAGE) 500 mg tablet    Other Relevant Orders    Ambulatory referral to Diabetic Education    HEMOGLOBIN A1C W/ EAG ESTIMATION Lab Collect       Orders Placed This Encounter   Procedures   • TSH, 3rd generation Lab Collect     This is a patient instruction: This test is non-fasting  Please drink two glasses of water morning of bloodwork          Standing Status:   Future     Standing Expiration Date:   1/24/2024   • T4, free Lab Collect     Standing Status:   Future     Standing Expiration Date:   1/24/2024   • T3 Lab Collect     Standing Status:   Future     Standing Expiration Date:   1/24/2024   • HEMOGLOBIN A1C W/ EAG ESTIMATION Lab Collect     Standing Status:   Future     Standing Expiration Date:   1/24/2024   • Ambulatory referral to Diabetic Education     Standing Status:   Future     Standing Expiration Date:   1/24/2024     Referral Priority:   Routine     Referral Type:   Consult - AMB     Referral Reason:   Specialty Services Required     Requested Specialty:   Diabetes Services     Number of Visits Requested:   1     Expiration Date:   1/24/2024       Patient Instructions   Www diabetes  org and [employer] -> meal recommendations    Metformin Instructions:  1 tab daily with supper x 1 week  1 tab twice per day with food x 1 week  1 tab breakfast, 2 tabs dinner x 1 week  2 tabs with breakfast and supper x 2 weeks  Take with food to minimize side effects  If you do get any side effects as you increase dose, go back to the tolerated dose  Discussed with the patient and all questioned fully answered  She will call me if any problems arise

## 2023-01-24 NOTE — TELEPHONE ENCOUNTER
Patient called and states she forgot to ask you for a handicap placard for her car  Please advise        Thank you

## 2023-03-17 DIAGNOSIS — H69.82 EUSTACHIAN TUBE DYSFUNCTION, LEFT: ICD-10-CM

## 2023-03-17 RX ORDER — FLUTICASONE PROPIONATE 50 MCG
SPRAY, SUSPENSION (ML) NASAL
Qty: 16 ML | Refills: 3 | Status: SHIPPED | OUTPATIENT
Start: 2023-03-17

## 2023-03-30 ENCOUNTER — OFFICE VISIT (OUTPATIENT)
Dept: FAMILY MEDICINE CLINIC | Facility: CLINIC | Age: 33
End: 2023-03-30

## 2023-03-30 VITALS
RESPIRATION RATE: 18 BRPM | OXYGEN SATURATION: 97 % | WEIGHT: 235 LBS | TEMPERATURE: 97.8 F | HEIGHT: 66 IN | DIASTOLIC BLOOD PRESSURE: 78 MMHG | SYSTOLIC BLOOD PRESSURE: 122 MMHG | HEART RATE: 97 BPM | BODY MASS INDEX: 37.77 KG/M2

## 2023-03-30 DIAGNOSIS — G43.101 MIGRAINE WITH AURA AND WITH STATUS MIGRAINOSUS, NOT INTRACTABLE: Primary | ICD-10-CM

## 2023-03-30 DIAGNOSIS — R11.0 NAUSEA: ICD-10-CM

## 2023-03-30 RX ORDER — DEXAMETHASONE 2 MG/1
2 TABLET ORAL 2 TIMES DAILY WITH MEALS
Qty: 3 TABLET | Refills: 0 | Status: SHIPPED | OUTPATIENT
Start: 2023-03-30

## 2023-03-30 RX ORDER — ONDANSETRON 4 MG/1
4 TABLET, ORALLY DISINTEGRATING ORAL EVERY 8 HOURS PRN
Qty: 20 TABLET | Refills: 0 | Status: SHIPPED | OUTPATIENT
Start: 2023-03-30 | End: 2023-04-09

## 2023-03-30 RX ORDER — RIMEGEPANT SULFATE 75 MG/75MG
TABLET, ORALLY DISINTEGRATING ORAL
Qty: 15 TABLET | Refills: 0 | Status: SHIPPED | OUTPATIENT
Start: 2023-03-30

## 2023-03-30 NOTE — PROGRESS NOTES
"Assessment/Plan:           Problem List Items Addressed This Visit    None  Visit Diagnoses     Migraine with aura and with status migrainosus, not intractable    -  Primary    Relevant Medications    Rimegepant Sulfate (Nurtec) 75 MG TBDP    dexamethasone (DECADRON) 2 mg tablet    Nausea        Relevant Medications    ondansetron (ZOFRAN-ODT) 4 mg disintegrating tablet            Subjective:      Patient ID: Hamlet Bah is a 34 y o  female  HPI  Patient is seen for an acute visit complaining of migraine headaches that been going on for the last 3 days without any break  Patient has been experiencing sensitive to light noise nausea  She cannot identify any stressors  Last neurology consultation noted  Patient reports Maxalt not effective  Denies any fevers and chills  Her ears have been hurting however exam is unremarkable  She reports no focal weakness  Encourage her to follow-up with neurology  The following portions of the patient's history were reviewed and updated as appropriate: allergies, current medications, past medical history, past social history and problem list     Review of Systems      Objective:      /78   Pulse 97   Temp 97 8 °F (36 6 °C)   Resp 18   Ht 5' 6\" (1 676 m)   Wt 107 kg (235 lb)   LMP 03/23/2019 (Within Days)   SpO2 97%   BMI 37 93 kg/m²          Physical Exam  Cardiovascular:      Rate and Rhythm: Normal rate and regular rhythm  Heart sounds: Normal heart sounds  No murmur heard  Pulmonary:      Effort: Pulmonary effort is normal  No respiratory distress  Breath sounds: Normal breath sounds  No wheezing or rales  Neurological:      General: No focal deficit present  Mental Status: She is alert and oriented to person, place, and time  Cranial Nerves: No cranial nerve deficit  Motor: No weakness                   "

## 2023-04-24 ENCOUNTER — APPOINTMENT (OUTPATIENT)
Dept: LAB | Facility: HOSPITAL | Age: 33
End: 2023-04-24

## 2023-04-24 DIAGNOSIS — E05.90 HYPERTHYROIDISM: ICD-10-CM

## 2023-04-24 DIAGNOSIS — R73.9 HYPERGLYCEMIA: ICD-10-CM

## 2023-04-24 DIAGNOSIS — Z11.59 NEED FOR HEPATITIS C SCREENING TEST: ICD-10-CM

## 2023-04-24 DIAGNOSIS — E53.8 B12 DEFICIENCY: ICD-10-CM

## 2023-04-24 DIAGNOSIS — R73.03 PREDIABETES: ICD-10-CM

## 2023-04-24 LAB
ALBUMIN SERPL BCP-MCNC: 4.1 G/DL (ref 3.5–5)
ALP SERPL-CCNC: 82 U/L (ref 34–104)
ALT SERPL W P-5'-P-CCNC: 15 U/L (ref 7–52)
ANION GAP SERPL CALCULATED.3IONS-SCNC: 9 MMOL/L (ref 4–13)
AST SERPL W P-5'-P-CCNC: 16 U/L (ref 13–39)
BILIRUB SERPL-MCNC: 0.68 MG/DL (ref 0.2–1)
BUN SERPL-MCNC: 17 MG/DL (ref 5–25)
CALCIUM SERPL-MCNC: 9 MG/DL (ref 8.4–10.2)
CHLORIDE SERPL-SCNC: 103 MMOL/L (ref 96–108)
CO2 SERPL-SCNC: 27 MMOL/L (ref 21–32)
CREAT SERPL-MCNC: 0.67 MG/DL (ref 0.6–1.3)
EST. AVERAGE GLUCOSE BLD GHB EST-MCNC: 123 MG/DL
GFR SERPL CREATININE-BSD FRML MDRD: 115 ML/MIN/1.73SQ M
GLUCOSE P FAST SERPL-MCNC: 107 MG/DL (ref 65–99)
HBA1C MFR BLD: 5.9 %
HCV AB SER QL: NORMAL
POTASSIUM SERPL-SCNC: 4.3 MMOL/L (ref 3.5–5.3)
PROT SERPL-MCNC: 7.4 G/DL (ref 6.4–8.4)
SODIUM SERPL-SCNC: 139 MMOL/L (ref 135–147)
T3 SERPL-MCNC: 1 NG/ML (ref 0.6–1.8)
T4 FREE SERPL-MCNC: 1.08 NG/DL (ref 0.76–1.46)
TSH SERPL DL<=0.05 MIU/L-ACNC: 0.84 UIU/ML (ref 0.45–4.5)
VIT B12 SERPL-MCNC: 227 PG/ML (ref 100–900)

## 2023-04-25 ENCOUNTER — OFFICE VISIT (OUTPATIENT)
Dept: ENDOCRINOLOGY | Facility: CLINIC | Age: 33
End: 2023-04-25

## 2023-04-25 VITALS
HEIGHT: 66 IN | SYSTOLIC BLOOD PRESSURE: 114 MMHG | WEIGHT: 233.4 LBS | BODY MASS INDEX: 37.51 KG/M2 | DIASTOLIC BLOOD PRESSURE: 78 MMHG | HEART RATE: 79 BPM

## 2023-04-25 DIAGNOSIS — E05.90 HYPERTHYROIDISM: Primary | ICD-10-CM

## 2023-04-25 DIAGNOSIS — R73.03 PREDIABETES: ICD-10-CM

## 2023-04-25 RX ORDER — METHIMAZOLE 5 MG/1
5 TABLET ORAL DAILY
Qty: 90 TABLET | Refills: 2 | Status: SHIPPED | OUTPATIENT
Start: 2023-04-25

## 2023-04-25 NOTE — PROGRESS NOTES
Established Patient Progress Note    CC: Follow up for hyperthyroidism and prediabetes    Impression & Plan:    Problem List Items Addressed This Visit        Endocrine    Hyperthyroidism - Primary     Stable  - continue current regimen  - repeat thyroid function tests in 6 months   - advised patient to notify for any new or worsening symptoms          Relevant Medications    methimazole (TAPAZOLE) 5 mg tablet    Other Relevant Orders    TSH, 3rd generation Lab Collect    T4, free Lab Collect    T3 Lab Collect       Other    Prediabetes     hgba1c has worsened  - increase metformin to 1,000 mg BID  - repeat hgbA1C in 6 months  - continue to work on lifestyle modifications including diet and exercise         Relevant Medications    metFORMIN (GLUCOPHAGE) 1000 MG tablet    Other Relevant Orders    HEMOGLOBIN A1C W/ EAG ESTIMATION Lab Collect       Orders Placed This Encounter   Procedures   • TSH, 3rd generation Lab Collect     This is a patient instruction: This test is non-fasting  Please drink two glasses of water morning of bloodwork  Standing Status:   Future     Standing Expiration Date:   4/25/2024   • T4, free Lab Collect     Standing Status:   Future     Standing Expiration Date:   4/25/2024   • T3 Lab Collect     Standing Status:   Future     Standing Expiration Date:   4/25/2024   • HEMOGLOBIN A1C W/ EAG ESTIMATION Lab Collect     Standing Status:   Future     Standing Expiration Date:   4/25/2024       History of Present Illness:   Devan Burger is a 34 y o  female with hyperthyroidism due to Grave's disease and prediabetes  For the Grave's disease, she is taking 5 mg of methimazole daily  She denies any sore throat, fever or rash  She denies any symptoms of hyperthyroidism  For the prediabetes, she is taking 500 mg of metformin twice daily  She reports working on diet and exercise      Patient Active Problem List   Diagnosis   • Bipolar disorder ([facility] [address] )   • Back pain   • B12 deficiency   • Other iron deficiency anemias   • Hot flashes   • Graves disease   • Impaired fasting glucose   • Hyperthyroidism   • Left inguinal pain   • Acute left-sided low back pain without sciatica   • Prediabetes      Past Medical History:   Diagnosis Date   • Anemia, postpartum 2019   • Delivery by  hysterectomy 10/14/2019   • Disease of thyroid gland    • History of diet-controlled gestational diabetes mellitus 2019   • Obesity    • Pap smear abnormality of cervix with LGSIL 2016   • Varicella     Had as a child   • Varicose vein of leg       Past Surgical History:   Procedure Laterality Date   •  SECTION     • CYSTOSCOPY N/A 10/14/2019    Procedure: CYSTOSCOPY;  Surgeon: Stacey Lin MD;  Location: [facility];  Service: Gynecology   • DILATION AND EVACUATION      Molar   • HYSTERECTOMY N/A 10/14/2019    Procedure: RAY, BILATERAL SALPINGECTOMY;  Surgeon: Stacey Lin MD;  Location: [facility];  Service: Gynecology   • LAPAROSCOPY     • SD  DELIVERY ONLY N/A 10/14/2019    Procedure:  SECTION () REPEAT;  Surgeon: Stacey Lin MD;  Location: [facility];  Service: Gynecology      Family History   Problem Relation Age of Onset   • Hypertension Mother    • Lupus Mother    • Pulmonary embolism Mother    • Anuerysm Mother    • No Known Problems Father    • Asthma Sister    • No Known Problems Daughter    • No Known Problems Daughter    • Other Maternal Grandmother         Pre-diabetic   • No Known Problems Brother    • Heart murmur Brother    • No Known Problems Son    • No Known Problems Son    • Asthma Son    • No Known Problems Maternal Aunt    • Breast cancer Paternal Aunt         age dx unknown   • Thyroid disease unspecified Family         Cousin     Social History     Tobacco Use   • Smoking status: Never   • Smokeless tobacco: Never   Substance Use Topics   • Alcohol use: Never     Allergies   Allergen Reactions   • Penicillins Anaphylaxis     Anaphylaxis   • Shrimp (Diagnostic) - Food Allergy    • Venofer [Iron Sucrose] Itching         Current Outpatient Medications:   •  acetaminophen (TYLENOL) 650 mg CR tablet, Take 1 tablet (650 mg total) by mouth every 8 (eight) hours as needed for mild pain or moderate pain, Disp: 30 tablet, Rfl: 0  •  Cholecalciferol (Vitamin D) 50 MCG (2000 UT) tablet, Take 1 tablet (2,000 Units total) by mouth daily, Disp: 30 tablet, Rfl: 3  •  dexamethasone (DECADRON) 2 mg tablet, Take 1 tablet (2 mg total) by mouth 2 (two) times a day with meals, Disp: 3 tablet, Rfl: 0  •  ergocalciferol (ERGOCALCIFEROL) 1 25 MG (06276 UT) capsule, Take 1 capsule (50,000 Units total) by mouth once a week, Disp: 8 capsule, Rfl: 0  •  fluticasone (FLONASE) 50 mcg/act nasal spray, SPRAY 1 SPRAY INTO EACH NOSTRIL EVERY DAY, Disp: 16 mL, Rfl: 3  •  folic acid (KP Folic Acid) 1 mg tablet, Take 1 tablet (1 mg total) by mouth daily, Disp: 90 tablet, Rfl: 1  •  ibuprofen (MOTRIN) 400 mg tablet, Take 1 tablet (400 mg total) by mouth every 6 (six) hours as needed for mild pain, Disp: 12 tablet, Rfl: 0  •  loratadine (CLARITIN) 10 mg tablet, Take 1 tablet (10 mg total) by mouth daily, Disp: 30 tablet, Rfl: 0  •  Magnesium 250 MG TABS, 1 pill a day, Disp: 30 tablet, Rfl: 0  •  metFORMIN (GLUCOPHAGE) 1000 MG tablet, Take 1 tablet (1,000 mg total) by mouth 2 (two) times a day with meals, Disp: 90 tablet, Rfl: 2  •  methimazole (TAPAZOLE) 5 mg tablet, Take 1 tablet (5 mg total) by mouth daily, Disp: 90 tablet, Rfl: 2  •  naproxen (NAPROSYN) 500 mg tablet, Take 1 tablet (500 mg total) by mouth 2 (two) times a day with meals, Disp: 60 tablet, Rfl: 1  •  ondansetron (ZOFRAN-ODT) 4 mg disintegrating tablet, Take 1 tablet (4 mg total) by mouth every 8 (eight) hours as needed for nausea for up to 10 days, Disp: 20 tablet, Rfl: 0  •  prochlorperazine (COMPAZINE) 10 mg tablet, Take 1 tab up to BID prn migraine or nausea, ideally no more than 3 days per week, Disp: 20 tablet, Rfl: 4  • "Riboflavin (Vitamin B-2) 100 MG TABS, 2 pills a day, Disp: 60 tablet, Rfl: 3  •  Symbicort 160-4 5 MCG/ACT inhaler, INHALE 2 PUFFS BY MOUTH 2 TIMES A DAY RINSE MOUTH AFTER USE , Disp: 6 g, Rfl: 1  •  topiramate (TOPAMAX) 25 mg tablet, 1 tab PO QHS for 1 week, increase as tolerated to 1 tab BID for 1 week, then 1 tab QAM and 2 tabs QHS for 1 week and finish at 2 tabs BID , Disp: 120 tablet, Rfl: 4  •  vitamin B-12 (VITAMIN B-12) 1,000 mcg tablet, Take 1 tablet (1,000 mcg total) by mouth daily, Disp: 90 tablet, Rfl: 1  •  acetaminophen (TYLENOL) 500 mg tablet, Take 1 tablet (500 mg total) by mouth every 6 (six) hours as needed for mild pain (Patient not taking: Reported on 1/5/2023), Disp: 30 tablet, Rfl: 0  •  bisacodyl 5 MG EC tablet, TAKE 2 TABLETS BY MOUTH DAILY AS NEEDED FOR CONSTIPATION  (Patient not taking: Reported on 11/10/2022), Disp: 30 tablet, Rfl: 0  •  docusate sodium (COLACE) 100 mg capsule, Take 1 capsule (100 mg total) by mouth 2 (two) times a day (Patient not taking: Reported on 11/10/2022), Disp: 60 capsule, Rfl: 6  •  Rimegepant Sulfate (Nurtec) 75 MG TBDP, Take 1 pill at the onset of headache (Patient not taking: Reported on 4/25/2023), Disp: 15 tablet, Rfl: 0    Review of Systems   Constitutional: Negative for chills and fever  HENT: Negative for ear pain, sore throat and trouble swallowing  Eyes: Negative for pain and visual disturbance  Respiratory: Negative for cough, choking and shortness of breath  Cardiovascular: Negative for chest pain and palpitations  Gastrointestinal: Negative for abdominal pain and vomiting  Genitourinary: Negative for dysuria and hematuria  Musculoskeletal: Negative for arthralgias and back pain  Skin: Negative for color change and rash  Neurological: Negative for seizures and syncope  All other systems reviewed and are negative  Physical Exam:  Body mass index is 37 67 kg/m²    /78   Pulse 79   Ht 5' 6\" (1 676 m)   Wt 106 kg (233 lb " 6 4 oz)   LMP 03/23/2019 (Within Days)   BMI 37 67 kg/m²    Wt Readings from Last 3 Encounters:   04/25/23 106 kg (233 lb 6 4 oz)   03/30/23 107 kg (235 lb)   01/24/23 107 kg (235 lb)       Physical Exam  Vitals reviewed  Constitutional:       Appearance: Normal appearance  HENT:      Head: Normocephalic and atraumatic  Neck:      Thyroid: Thyromegaly present  No thyroid mass or thyroid tenderness  Cardiovascular:      Rate and Rhythm: Normal rate  Heart sounds: Normal heart sounds  Pulmonary:      Effort: Pulmonary effort is normal       Breath sounds: Normal breath sounds  Musculoskeletal:      Cervical back: Neck supple  No rigidity  No pain with movement  Lymphadenopathy:      Cervical: No cervical adenopathy  Right cervical: No superficial or deep cervical adenopathy  Left cervical: No superficial or deep cervical adenopathy  Skin:     General: Skin is warm and dry  Neurological:      Mental Status: She is alert and oriented to person, place, and time  Psychiatric:         Mood and Affect: Mood normal          Behavior: Behavior normal          Thought Content:  Thought content normal          Labs:   Lab Results   Component Value Date    HGBA1C 5 9 (H) 04/24/2023    HGBA1C 5 7 (H) 01/05/2023    HGBA1C 5 6 04/27/2021     Lab Results   Component Value Date    CREATININE 0 67 04/24/2023    CREATININE 0 59 (L) 01/05/2023    CREATININE 0 56 (L) 06/22/2022    BUN 17 04/24/2023    K 4 3 04/24/2023     04/24/2023    CO2 27 04/24/2023     eGFR   Date Value Ref Range Status   04/24/2023 115 ml/min/1 73sq m Final   10/05/2016 >60 0 ml/min/1 73sq m Final     Lab Results   Component Value Date    HDL 37 (L) 01/05/2023    TRIG 118 01/05/2023     Lab Results   Component Value Date    ALT 15 04/24/2023    AST 16 04/24/2023    ALKPHOS 82 04/24/2023     Lab Results   Component Value Date    IAE1YRYVVQUQ 0 836 04/24/2023    PRN1TYKSKPDT 0 296 (L) 01/05/2023    BZZ2DNEVORKL <0 007 (L) 06/22/2022     Lab Results   Component Value Date    FREET4 1 08 04/24/2023         There are no Patient Instructions on file for this visit  Discussed with the patient and all questioned fully answered  She will call me if any problems arise

## 2023-04-25 NOTE — ASSESSMENT & PLAN NOTE
hgba1c has worsened  - increase metformin to 1,000 mg BID  - repeat hgbA1C in 6 months  - continue to work on lifestyle modifications including diet and exercise

## 2023-04-25 NOTE — ASSESSMENT & PLAN NOTE
Stable  - continue current regimen  - repeat thyroid function tests in 6 months   - advised patient to notify for any new or worsening symptoms

## 2023-04-26 ENCOUNTER — OFFICE VISIT (OUTPATIENT)
Dept: SLEEP CENTER | Facility: CLINIC | Age: 33
End: 2023-04-26

## 2023-04-26 VITALS
HEART RATE: 80 BPM | WEIGHT: 235 LBS | SYSTOLIC BLOOD PRESSURE: 106 MMHG | OXYGEN SATURATION: 98 % | HEIGHT: 66 IN | DIASTOLIC BLOOD PRESSURE: 68 MMHG | BODY MASS INDEX: 37.77 KG/M2

## 2023-04-26 DIAGNOSIS — R06.83 LOUD SNORING: ICD-10-CM

## 2023-04-26 DIAGNOSIS — G47.09 OTHER INSOMNIA: ICD-10-CM

## 2023-04-26 DIAGNOSIS — E05.00 GRAVES DISEASE: ICD-10-CM

## 2023-04-26 DIAGNOSIS — E66.2 OBESITY HYPOVENTILATION SYNDROME (HCC): ICD-10-CM

## 2023-04-26 DIAGNOSIS — E66.9 OBESITY (BMI 30-39.9): ICD-10-CM

## 2023-04-26 DIAGNOSIS — F43.9 STRESS AT HOME: ICD-10-CM

## 2023-04-26 DIAGNOSIS — R40.0 DAYTIME SLEEPINESS: ICD-10-CM

## 2023-04-26 DIAGNOSIS — G47.33 OSA (OBSTRUCTIVE SLEEP APNEA): Primary | ICD-10-CM

## 2023-04-26 RX ORDER — ZOLPIDEM TARTRATE 5 MG/1
5 TABLET ORAL AS NEEDED
Qty: 2 TABLET | Refills: 0 | Status: SHIPPED | OUTPATIENT
Start: 2023-04-26 | End: 2023-04-28

## 2023-04-26 NOTE — PROGRESS NOTES
" Consultation - Sleep Center   Adore Brian  34 y o  female  CMT:[id number]  PPQ:[id number]  DOS:2023    Physician Requesting Consult: Melani Alex MD             Reason for Consult : At your kind request I saw Adore Brian for initial sleep evaluation today  The patient is here to evaluate for suspected Obstructive Sleep Apnea  PFSH, Problem List, Medications & Allergies were reviewed in EMR  Cheryl Royal  has a past medical history of Anemia, postpartum (2019), Delivery by  hysterectomy (10/14/2019), Disease of thyroid gland, History of diet-controlled gestational diabetes mellitus (2019), Obesity, Pap smear abnormality of cervix with LGSIL (2016), Varicella, and Varicose vein of leg  She has a current medication list which includes the following prescription(s): acetaminophen, vitamin d, dexamethasone, ergocalciferol, fluticasone, folic acid, ibuprofen, loratadine, magnesium, metformin, methimazole, naproxen, prochlorperazine, vitamin b-2, symbicort, topiramate, vitamin b-12, zolpidem, acetaminophen, bisacodyl, docusate sodium, ondansetron, and nurtec  HPI: She is here for 's complaints of loud snoring and witnessed apneas of several years duration  At times she awakens himself with snoring but is not aware of breathing difficulties during sleep  She is not aware of modifying factors  Other Complaints: None  Restless Leg Syndrome: reports no suggestive symptoms  Parasomnia: no features reported    Sleep Routine (on average): Typical Bedtime: 10 PM gets OOB: 6:30 AM TIB:8 5 hrs  Sleep latency: several hours he attributes to mind racing due to stress at home  Sleep Interruptions:3-4/nite \"to eat or walk around \"and struggles to fall back asleep    Awakens: needing multiple alarms   Upon awakening: never feels rested  [She estimates getting 4 hrs sleep ] Cheryl Royal reports excessive daytime sleepiness [dozes off when sedentary at home]   She rated [herself] at " "Total score: 9 /24 on the [place] Sleepiness Scale  Habits:   reports that she has never smoked  She has never used smokeless tobacco ;  reports no history of alcohol use ;[ reports no history of drug use  ];[  E-Cigarette/Vaping   • E-Cigarette Use Never User    ]; Caffeine use:rarely; Exercise routine: none  Family History: Mother has insomnia  ROS: Significant for more than 30 pounds weight gain in the past month  He has nasal symptoms due to allergies and reports shortness of breath for which she is on Symbicort  She denies coughing or wheezing  She has frequent migraines  Renaee Majestic EXAM:  /68   Pulse 80   Ht 5' 6\" (1 676 m)   Wt 107 kg (235 lb)   LMP 03/23/2019 (Within Days)   SpO2 98%   BMI 37 93 kg/m²    General: Well groomed female, well appearing, in no apparent distress  Neurological: Alert and orientated; cooperative; Cranial nerves intact;    Psychiatric: Speech: Clear and coherent; appears anxious, otherwise normal mood, affect & thought   Skin: Warm and dry; Color& Hydration good; no facial rashes or lesions   HEENT:  Craniofacial anatomy: normal Sinuses: Non-tender  TMJ: Normal   Eyes: EOM's intact; conjunctiva/corneas clear   Ears: Externally appear normal     Nasal Airway: is patent Septum: Intact; Mucosa: Normal; Turbinates: Hypertrophy; Rhinorrhea: None  Mouth: Lips: Normal posture; Dentition: normal   Mucosa: Moist; Hard Palate:normal    Oropharryx: crowdedTongue: Mallampati:Class III, Mobile and ScallopedSoft Palate:  redundant  Tonsils: absent  Neck:[is thick;] [Neck Circumference: 16 75 \";] Supple; no abnormal masses; Thyroid: Normal  Trachea: Central     Lymph: No cervical or submandibular Lymhadenopathy  Heart: S1,S2 normal; RRR; no gallop; no murmur s  Lungs: Respiratory Effort: Normal  Air entry good bilaterally  No wheezes  No rales  Abdomen: Obese, soft & non-tender    Extremities: No pedal edema  No clubbing or cyanosis      Musculoskeletal:  Motor normal; Gait: " "Normal        IMPRESSION: Primary/Secondary Sleep Diagnoses (to Medical or Psych conditions) & Comorbidities   1  RIC (obstructive sleep apnea)  Diagnostic Sleep Study    CPAP Study      2  Obesity hypoventilation syndrome (HCC)        3  Other insomnia  zolpidem (AMBIEN) 5 mg tablet      4  Stress at home        5  Daytime sleepiness        6  Loud snoring  Ambulatory Referral to Sleep Medicine      7  Graves disease        8  Obesity (BMI 30-39  9)             PLAN:  1  With respect to above conditions, comprehensive counseling provided on pathophysiology; effects on symptoms and comorbidities, diagnostic strategies & limitations; treatment options; risks or no treament; risks & benefits of the various therapeutic options; costs and insurance aspects  2  I advised weight reduction, avoiding sleeping supine, using alcohol or sedating medications close to bed time and on safe driving practices  3  Nocturnal polysomnography is indicated and a diagnostic study will be scheduled  4   Patient is willing to try Positive airway pressure therapy and will be scheduled for a titration study if indicated  5   Multi component Cognitive behavioral therapy for Insomnia undertaken - Sleep Restriction, Stimulus control, Relaxation techniques and Sleep hygiene were discussed  6   She was instructed to come sleep deprived and I gave a prescription for Ambien for the study nights,   6  Follow-up to be scheduled after the studies to review results, further details of treatment options and to initiate/adjust therapy  Sincerely,      Authenticated electronically on 34/31/47   Board Certified Specialist     Portions of the record may have been created with voice recognition software  Occasional wrong word or \"sound a like\" substitutions may have occurred due to the inherent limitations of voice recognition software  There may also be notations and random deletions of words or characters from malfunctioning software   Read the " chart carefully and recognize, using context, where substitutions/deletions have occurred

## 2023-04-28 ENCOUNTER — HOSPITAL ENCOUNTER (OUTPATIENT)
Dept: SLEEP CENTER | Facility: CLINIC | Age: 33
Discharge: HOME | End: 2023-04-28

## 2023-04-28 DIAGNOSIS — G47.33 OSA (OBSTRUCTIVE SLEEP APNEA): ICD-10-CM

## 2023-04-29 NOTE — PROGRESS NOTES
Sleep Study Documentation    Pre-Sleep Study       Sleep testing procedure explained to patient:YES    Patient napped prior to study:NO    Caffeine:Dayshift worker after 12PM   Caffeine use:YES- ice tea  12 to 26 ounces    Alcohol:Dayshift workers after 5PM: Alcohol use:NO    Typical day for patient:YES       Study Documentation    Sleep Study Indications: Snoring, unrefreshing sleep and witnesed apneas  Sleep Study: Diagnostic   Snore: Moderate  Supplemental O2: no    Minimum SaO2 83%  Baseline SaO2 95%            EKG abnormalities: no     EEG abnormalities: no    Sleep Study Recorded < 2 hours: N/A    Sleep Study Recorded > 2 hours but incomplete study: N/A    Sleep Study Recorded 6 hours but no sleep obtained: NO    Patient classification: unemployed       Post-Sleep Study    Medication used at bedtime or during sleep study:YES prescription sleep aid and other prescription medications    Patient reports time it took to fall asleep:less than 20 minutes    Patient reports waking up during study:3 or more times  Patient reports returning to sleep without difficulty  Patient reports sleeping 4 to 6 hours without dreaming  Patient reports sleep during study:typical    Patient rated sleepiness: Somewhat sleepy or tired    PAP treatment:no

## 2023-05-15 ENCOUNTER — TELEPHONE (OUTPATIENT)
Dept: SLEEP CENTER | Facility: CLINIC | Age: 33
End: 2023-05-15

## 2023-05-15 NOTE — TELEPHONE ENCOUNTER
Per Dr Geno Chopra patient has mild RIC and recommends CPAP study     Spoke to the patient advised results, patient already scheduled for CPAP study   Placed on wait list

## 2023-06-28 ENCOUNTER — HOSPITAL ENCOUNTER (OUTPATIENT)
Dept: SLEEP CENTER | Facility: CLINIC | Age: 33
Discharge: HOME | End: 2023-06-28
Payer: COMMERCIAL

## 2023-06-28 DIAGNOSIS — G47.33 OSA (OBSTRUCTIVE SLEEP APNEA): ICD-10-CM

## 2023-06-28 PROCEDURE — 95811 POLYSOM 6/>YRS CPAP 4/> PARM: CPT

## 2023-06-29 ENCOUNTER — TELEPHONE (OUTPATIENT)
Dept: SLEEP CENTER | Facility: CLINIC | Age: 33
End: 2023-06-29

## 2023-06-29 DIAGNOSIS — G47.33 OSA (OBSTRUCTIVE SLEEP APNEA): Primary | ICD-10-CM

## 2023-06-29 NOTE — PROGRESS NOTES
Sleep Study Documentation    Pre-Sleep Study       Sleep testing procedure explained to patient:YES    Patient napped prior to study:NO    Caffeine:Dayshift worker after 12PM   Caffeine use:YES- ice tea  12 ounces    Alcohol:Dayshift workers after 5PM: Alcohol use:NO    Typical day for patient:YES       Study Documentation    Sleep Study Indications: RIC-diagnosed in-lab study    Sleep Study: Treatment   Optimal PAP pressure: +10cm H2o  Leak:None  Snore:Eliminated  REM Obtained:yes  Supplemental O2: no    Minimum SaO2 93%  Baseline SaO2 96%  PAP mask choice (final)ResMed AirTouch F20  PAP mask type:full face  PAP pressure at which snoring was eliminated +10cm H2o  Minimum SaO2 at final PAP pressure 92%  Mode of Therapy:CPAP  ETCO2:No  CPAP changed to BiPAP:No    Mode of Therapy:CPAP    EKG abnormalities: no     EEG abnormalities: no    Sleep Study Recorded < 2 hours: N/A    Sleep Study Recorded > 2 hours but incomplete study: N/A    Sleep Study Recorded 6 hours but no sleep obtained: NO    Patient classification: employed       Post-Sleep Study    Medication used at bedtime or during sleep study:NO    Patient reports time it took to fall asleep:20 to 30 minutes    Patient reports waking up during study:1 to 2 times  Patient reports returning to sleep without difficulty  Patient reports sleeping 6 to 8 hours without dreaming  Patient reports sleep during study:typical    Patient rated sleepiness: Somewhat sleepy or tired    PAP treatment:yes: Post PAP treatment patient reports feeling unsure if a change is noted and  would wear PAP mask at home

## 2023-06-29 NOTE — TELEPHONE ENCOUNTER
Called patient and advised CPAP study resulted and CPAP ordered  Scheduled DME set up 7/13/23 in [facility] for CPAP and clinical information sent to [address] [place] [address] via [employer]  Scheduled compliance follow up 2/19/24    Added to wait list

## 2023-06-30 LAB
DME PARACHUTE DELIVERY DATE REQUESTED: NORMAL
DME PARACHUTE ITEM DESCRIPTION: NORMAL
DME PARACHUTE ORDER STATUS: NORMAL
DME PARACHUTE SUPPLIER NAME: NORMAL
DME PARACHUTE SUPPLIER PHONE: NORMAL

## 2023-07-05 ENCOUNTER — APPOINTMENT (EMERGENCY)
Dept: RADIOLOGY | Facility: HOSPITAL | Age: 33
End: 2023-07-05
Payer: COMMERCIAL

## 2023-07-05 ENCOUNTER — HOSPITAL ENCOUNTER (EMERGENCY)
Facility: HOSPITAL | Age: 33
Discharge: HOME | End: 2023-07-05
Attending: EMERGENCY MEDICINE
Payer: COMMERCIAL

## 2023-07-05 VITALS
HEART RATE: 78 BPM | WEIGHT: 228.9 LBS | SYSTOLIC BLOOD PRESSURE: 133 MMHG | DIASTOLIC BLOOD PRESSURE: 78 MMHG | TEMPERATURE: 97.8 F | RESPIRATION RATE: 18 BRPM | BODY MASS INDEX: 36.95 KG/M2 | OXYGEN SATURATION: 99 %

## 2023-07-05 DIAGNOSIS — M79.672 LEFT FOOT PAIN: Primary | ICD-10-CM

## 2023-07-05 PROCEDURE — 73610 X-RAY EXAM OF ANKLE: CPT

## 2023-07-05 PROCEDURE — 73630 X-RAY EXAM OF FOOT: CPT

## 2023-07-05 RX ORDER — NAPROXEN 500 MG/1
500 TABLET ORAL 2 TIMES DAILY WITH MEALS
Qty: 10 TABLET | Refills: 0 | Status: SHIPPED | OUTPATIENT
Start: 2023-07-05 | End: 2024-07-04

## 2023-07-05 NOTE — ED PROVIDER NOTES
History  Chief Complaint   Patient presents with   • Foot Pain     Pt c/o left foot pain started yesterday. Denies trauma. 28 y.o. F presents to ED c/o L foot pain x 1 day. Denies trauma. Denies previous fracture, reports previous ankle sprain. Worse with weight bearing. Denies F, chills, redness, swelling, streaking, decreased ROM, ecchymosis, color change, stiffness, numbness, tingling, weakness. History provided by:  Patient      Prior to Admission Medications   Prescriptions Last Dose Informant Patient Reported? Taking? Cholecalciferol (Vitamin D) 50 MCG (2000 UT) tablet  Self No No   Sig: Take 1 tablet (2,000 Units total) by mouth daily   Magnesium 250 MG TABS  Self No No   Si pill a day   Riboflavin (Vitamin B-2) 100 MG TABS  Self No No   Si pills a day   Rimegepant Sulfate (Nurtec) 75 MG TBDP  Self No No   Sig: Take 1 pill at the onset of headache   Patient not taking: Reported on 2023   Symbicort 160-4.5 MCG/ACT inhaler  Self No No   Sig: INHALE 2 PUFFS BY MOUTH 2 TIMES A DAY RINSE MOUTH AFTER USE.   acetaminophen (TYLENOL) 500 mg tablet  Self No No   Sig: Take 1 tablet (500 mg total) by mouth every 6 (six) hours as needed for mild pain   Patient not taking: Reported on 2023   acetaminophen (TYLENOL) 650 mg CR tablet  Self No No   Sig: Take 1 tablet (650 mg total) by mouth every 8 (eight) hours as needed for mild pain or moderate pain   bisacodyl 5 MG EC tablet  Self No No   Sig: TAKE 2 TABLETS BY MOUTH DAILY AS NEEDED FOR CONSTIPATION.    Patient not taking: Reported on 11/10/2022   dexamethasone (DECADRON) 2 mg tablet  Self No No   Sig: Take 1 tablet (2 mg total) by mouth 2 (two) times a day with meals   docusate sodium (COLACE) 100 mg capsule  Self No No   Sig: Take 1 capsule (100 mg total) by mouth 2 (two) times a day   Patient not taking: Reported on 11/10/2022   ergocalciferol (ERGOCALCIFEROL) 1.25 MG (51918 UT) capsule  Self No No   Sig: Take 1 capsule (50,000 Units total) by mouth once a week   fluticasone (FLONASE) 50 mcg/act nasal spray  Self No No   Sig: SPRAY 1 SPRAY INTO EACH NOSTRIL EVERY DAY   folic acid (KP Folic Acid) 1 mg tablet  Self No No   Sig: Take 1 tablet (1 mg total) by mouth daily   ibuprofen (MOTRIN) 400 mg tablet  Self No No   Sig: Take 1 tablet (400 mg total) by mouth every 6 (six) hours as needed for mild pain   loratadine (CLARITIN) 10 mg tablet  Self No No   Sig: Take 1 tablet (10 mg total) by mouth daily   metFORMIN (GLUCOPHAGE) 1000 MG tablet   No No   Sig: Take 1 tablet (1,000 mg total) by mouth 2 (two) times a day with meals   methimazole (TAPAZOLE) 5 mg tablet   No No   Sig: Take 1 tablet (5 mg total) by mouth daily   naproxen (NAPROSYN) 500 mg tablet  Self No No   Sig: Take 1 tablet (500 mg total) by mouth 2 (two) times a day with meals   ondansetron (ZOFRAN-ODT) 4 mg disintegrating tablet  Self No No   Sig: Take 1 tablet (4 mg total) by mouth every 8 (eight) hours as needed for nausea for up to 10 days   prochlorperazine (COMPAZINE) 10 mg tablet  Self No No   Sig: Take 1 tab up to BID prn migraine or nausea, ideally no more than 3 days per week   topiramate (TOPAMAX) 25 mg tablet  Self No No   Si tab PO QHS for 1 week, increase as tolerated to 1 tab BID for 1 week, then 1 tab QAM and 2 tabs QHS for 1 week and finish at 2 tabs BID.   vitamin B-12 (VITAMIN B-12) 1,000 mcg tablet  Self No No   Sig: Take 1 tablet (1,000 mcg total) by mouth daily   zolpidem (AMBIEN) 5 mg tablet   No No   Sig: Take 1 tablet (5 mg total) by mouth as needed for sleep (for use during sleep study) for up to 2 days      Facility-Administered Medications: None       Past Medical History:   Diagnosis Date   • Anemia, postpartum 2019   • Delivery by  hysterectomy 10/14/2019   • Disease of thyroid gland    • History of diet-controlled gestational diabetes mellitus 2019   • Obesity    • Pap smear abnormality of cervix with LGSIL 2016   • Varicella     Had as a child   • Varicose vein of leg        Past Surgical History:   Procedure Laterality Date   •  SECTION     • CYSTOSCOPY N/A 10/14/2019    Procedure: Maureen Leaven;  Surgeon: Rafael Mo MD;  Location: [facility];  Service: Gynecology   • DILATION AND EVACUATION      Molar   • HYSTERECTOMY N/A 10/14/2019    Procedure: RAY, BILATERAL SALPINGECTOMY;  Surgeon: Rafael Mo MD;  Location: [facility];  Service: Gynecology   • LAPAROSCOPY     • SD  DELIVERY ONLY N/A 10/14/2019    Procedure:  SECTION () REPEAT;  Surgeon: Rafael Mo MD;  Location: [facility];  Service: Gynecology       Family History   Problem Relation Age of Onset   • Hypertension Mother    • Lupus Mother    • Pulmonary embolism Mother    • Anuerysm Mother    • No Known Problems Father    • Asthma Sister    • No Known Problems Daughter    • No Known Problems Daughter    • Other Maternal Grandmother         Pre-diabetic   • No Known Problems Brother    • Heart murmur Brother    • No Known Problems Son    • No Known Problems Son    • Asthma Son    • No Known Problems Maternal Aunt    • Breast cancer Paternal Aunt         age dx unknown   • Thyroid disease unspecified Family         Cousin     I have reviewed and agree with the history as documented. E-Cigarette/Vaping   • E-Cigarette Use Never User      E-Cigarette/Vaping Substances   • Nicotine No    • THC No    • CBD No    • Flavoring No    • Other No    • Unknown No      Social History     Tobacco Use   • Smoking status: Never   • Smokeless tobacco: Never   Vaping Use   • Vaping Use: Never used   Substance Use Topics   • Alcohol use: Not Currently     Comment: occ. • Drug use: Never       Review of Systems   Constitutional: Negative for chills and fever. Cardiovascular: Negative for leg swelling. Musculoskeletal: Positive for arthralgias. Negative for gait problem and joint swelling. Skin: Negative for color change, rash and wound.    Neurological: Negative for weakness and numbness. All other systems reviewed and are negative. Physical Exam  Physical Exam  Vitals and nursing note reviewed. Constitutional:       General: She is not in acute distress. Appearance: Normal appearance. She is not ill-appearing. HENT:      Head: Normocephalic and atraumatic. Cardiovascular:      Rate and Rhythm: Normal rate and regular rhythm. Pulses:           Dorsalis pedis pulses are 2+ on the left side. Posterior tibial pulses are 2+ on the left side. Pulmonary:      Effort: Pulmonary effort is normal.   Musculoskeletal:      Cervical back: Neck supple. Left ankle: No swelling, deformity or ecchymosis. Tenderness present. Normal range of motion. Normal pulse. Left Achilles Tendon: No tenderness. Amor's test negative. Left foot: Normal range of motion and normal capillary refill. Tenderness and bony tenderness present. No swelling, deformity or crepitus. Normal pulse. Feet:    Feet:      Left foot:      Skin integrity: Skin integrity normal.   Skin:     General: Skin is warm and dry. Capillary Refill: Capillary refill takes less than 2 seconds. Findings: No bruising, erythema or rash. Neurological:      Mental Status: She is alert.       Gait: Gait normal.   Psychiatric:         Mood and Affect: Mood normal.         Behavior: Behavior normal.         Vital Signs  ED Triage Vitals [07/05/23 1017]   Temperature Pulse Respirations Blood Pressure SpO2   97.8 °F (36.6 °C) 78 18 133/78 99 %      Temp Source Heart Rate Source Patient Position - Orthostatic VS BP Location FiO2 (%)   Tympanic Monitor Sitting Left arm --      Pain Score       --           Vitals:    07/05/23 1017   BP: 133/78   Pulse: 78   Patient Position - Orthostatic VS: Sitting         Visual Acuity      ED Medications  Medications - No data to display    Diagnostic Studies  Results Reviewed     None                 XR foot 3+ views LEFT   ED Interpretation by Keena Duran PA-C (07/05 1059)   No acute fx         Final Result by Tone Emery MD (07/05 1108)      No acute osseous abnormality. Workstation performed: [id number]         XR ankle 3+ views LEFT   ED Interpretation by Keena Duran PA-C (07/05 1059)   No acute fx       Final Result by Tone Eemry MD (07/05 1108)      No acute osseous abnormality. Workstation performed: [id number]                    Procedures  Procedures         ED Course  ED Course as of 07/06/23 1400   Wed Jul 05, 2023 1059 Imaging review done by myself and preliminary results were discussed with the patient and family members. Discussion was had with patient and family members that this read is preliminary and therefore discrepancies between this read and the final read by the radiologist are possible. Patient and family members were informed that any discrepancies found by would be relayed by phone call. SBIRT 22yo+    Flowsheet Row Most Recent Value   Initial Alcohol Screen: US AUDIT-C     1. How often do you have a drink containing alcohol? 0 Filed at: 07/05/2023 1020   2. How many drinks containing alcohol do you have on a typical day you are drinking? 0 Filed at: 07/05/2023 1020   3a. Male UNDER 65: How often do you have five or more drinks on one occasion? 0 Filed at: 07/05/2023 1020   3b. FEMALE Any Age, or MALE 65+: How often do you have 4 or more drinks on one occassion? 0 Filed at: 07/05/2023 1020   Audit-C Score 0 Filed at: 07/05/2023 1020   BAILEY: How many times in the past year have you. .. Used an illegal drug or used a prescription medication for non-medical reasons? Never Filed at: 07/05/2023 1020                    Medical Decision Making  ddx includes but is not limited to fx, strain, sprain, contusion, tendonitis, fasciitis Afebrile. no2 skin changes. No signs of infection. no swelling or effusion. intact ROM. Strength intact.  Distal perfusion intact. Sensation intact. is able to bear weight, ambulate. Xray ordered. no acute fx visualized on wet reads. Plan for supportive care, NSAIDs. Ortho follow up given. All imaging and/or lab testing discussed with patient, strict return to ED precautions discussed. Patient recommended to follow up promptly with appropriate outpatient provider. Patient and/or family members verbalizes understanding and agrees with plan. Patient and/or family members were given opportunity to ask questions, all questions were answered at this time. Patient is stable for discharge.     Portions of the record may have been created with voice recognition software. Occasional wrong word or "sound a like" substitutions may have occurred due to the inherent limitations of voice recognition software. Read the chart carefully and recognize, using context, where substitutions have occurred. Amount and/or Complexity of Data Reviewed  Radiology: ordered and independent interpretation performed. Risk  Prescription drug management. Disposition  Final diagnoses:   Left foot pain     Time reflects when diagnosis was documented in both MDM as applicable and the Disposition within this note     Time User Action Codes Description Comment    7/5/2023 11:00 AM Efren Saba Add [C10.283] Left foot pain       ED Disposition     ED Disposition   Discharge    Condition   Stable    Date/Time   Wed Jul 5, 2023 11:00 AM    Comment   Verdis Challenger discharge to home/self care.                Follow-up Information     Follow up With Specialties Details Why Contact Info Additional 815 Leonidas May MD Internal Medicine   [address] [place] [address] [phone]       [address] [place] [id number]  [address] [place] [address], [address] [place] [address]          Discharge Medication List as of 7/5/2023 11:03 AM      START taking these medications    Details   Diclofenac Sodium (VOLTAREN) 1 % Apply 2 g topically 4 (four) times a day, Starting Wed 7/5/2023, Normal      naproxen (EC NAPROSYN) 500 MG EC tablet Take 1 tablet (500 mg total) by mouth 2 (two) times a day with meals, Starting Wed 7/5/2023, Until Thu 7/4/2024, Normal         CONTINUE these medications which have NOT CHANGED    Details   acetaminophen (TYLENOL) 500 mg tablet Take 1 tablet (500 mg total) by mouth every 6 (six) hours as needed for mild pain, Starting Thu 11/3/2022, Normal      acetaminophen (TYLENOL) 650 mg CR tablet Take 1 tablet (650 mg total) by mouth every 8 (eight) hours as needed for mild pain or moderate pain, Starting Thu 9/10/2020, Print      bisacodyl 5 MG EC tablet TAKE 2 TABLETS BY MOUTH DAILY AS NEEDED FOR CONSTIPATION., Normal      Cholecalciferol (Vitamin D) 50 MCG (2000 UT) tablet Take 1 tablet (2,000 Units total) by mouth daily, Starting Thu 11/18/2021, Normal      dexamethasone (DECADRON) 2 mg tablet Take 1 tablet (2 mg total) by mouth 2 (two) times a day with meals, Starting Thu 3/30/2023, Normal      docusate sodium (COLACE) 100 mg capsule Take 1 capsule (100 mg total) by mouth 2 (two) times a day, Starting Tue 4/5/2022, Normal      ergocalciferol (ERGOCALCIFEROL) 1.25 MG (71406 UT) capsule Take 1 capsule (50,000 Units total) by mouth once a week, Starting Fri 1/13/2023, Normal      fluticasone (FLONASE) 50 mcg/act nasal spray SPRAY 1 SPRAY INTO EACH NOSTRIL EVERY DAY, Normal      folic acid (KP Folic Acid) 1 mg tablet Take 1 tablet (1 mg total) by mouth daily, Starting Fri 1/13/2023, Normal      loratadine (CLARITIN) 10 mg tablet Take 1 tablet (10 mg total) by mouth daily, Starting Thu 11/3/2022, Normal      Magnesium 250 MG TABS 1 pill a day, Normal      metFORMIN (GLUCOPHAGE) 1000 MG tablet Take 1 tablet (1,000 mg total) by mouth 2 (two) times a day with meals, Starting Tue 4/25/2023, Normal methimazole (TAPAZOLE) 5 mg tablet Take 1 tablet (5 mg total) by mouth daily, Starting Tue 4/25/2023, Normal      ondansetron (ZOFRAN-ODT) 4 mg disintegrating tablet Take 1 tablet (4 mg total) by mouth every 8 (eight) hours as needed for nausea for up to 10 days, Starting Thu 3/30/2023, Until Tue 4/25/2023 at 2359, Normal      prochlorperazine (COMPAZINE) 10 mg tablet Take 1 tab up to BID prn migraine or nausea, ideally no more than 3 days per week, Normal      Riboflavin (Vitamin B-2) 100 MG TABS 2 pills a day, Normal      Rimegepant Sulfate (Nurtec) 75 MG TBDP Take 1 pill at the onset of headache, Normal      Symbicort 160-4.5 MCG/ACT inhaler INHALE 2 PUFFS BY MOUTH 2 TIMES A DAY RINSE MOUTH AFTER USE., Normal      topiramate (TOPAMAX) 25 mg tablet 1 tab PO QHS for 1 week, increase as tolerated to 1 tab BID for 1 week, then 1 tab QAM and 2 tabs QHS for 1 week and finish at 2 tabs BID., Normal      vitamin B-12 (VITAMIN B-12) 1,000 mcg tablet Take 1 tablet (1,000 mcg total) by mouth daily, Starting Fri 1/13/2023, Normal      zolpidem (AMBIEN) 5 mg tablet Take 1 tablet (5 mg total) by mouth as needed for sleep (for use during sleep study) for up to 2 days, Starting Wed 4/26/2023, Until Fri 4/28/2023 at 2359, Normal         STOP taking these medications       ibuprofen (MOTRIN) 400 mg tablet Comments:   Reason for Stopping:         naproxen (NAPROSYN) 500 mg tablet Comments:   Reason for Stopping:               No discharge procedures on file.     PDMP Review       Value Time User    PDMP Reviewed  Yes 4/26/2023 10:29 AM Avery Pierce MD          ED Provider  Electronically Signed by           Chelsie Reese PA-C  07/06/23 1400

## 2023-07-12 DIAGNOSIS — E53.8 B12 DEFICIENCY: ICD-10-CM

## 2023-07-12 RX ORDER — FOLIC ACID 1 MG/1
TABLET ORAL
Qty: 90 TABLET | Refills: 1 | Status: SHIPPED | OUTPATIENT
Start: 2023-07-12

## 2023-07-15 LAB

## 2023-07-22 DIAGNOSIS — E53.8 B12 DEFICIENCY: ICD-10-CM

## 2023-07-24 RX ORDER — LANOLIN ALCOHOL/MO/W.PET/CERES
1000 CREAM (GRAM) TOPICAL DAILY
Qty: 90 TABLET | Refills: 1 | Status: SHIPPED | OUTPATIENT
Start: 2023-07-24

## 2023-08-09 ENCOUNTER — OFFICE VISIT (OUTPATIENT)
Dept: FAMILY MEDICINE CLINIC | Facility: CLINIC | Age: 33
End: 2023-08-09
Payer: COMMERCIAL

## 2023-08-09 VITALS
BODY MASS INDEX: 36.96 KG/M2 | DIASTOLIC BLOOD PRESSURE: 72 MMHG | SYSTOLIC BLOOD PRESSURE: 114 MMHG | WEIGHT: 230 LBS | HEIGHT: 66 IN | OXYGEN SATURATION: 99 % | TEMPERATURE: 98.3 F | HEART RATE: 85 BPM

## 2023-08-09 DIAGNOSIS — J30.2 SEASONAL ALLERGIES: ICD-10-CM

## 2023-08-09 DIAGNOSIS — Z00.00 ANNUAL PHYSICAL EXAM: Primary | ICD-10-CM

## 2023-08-09 DIAGNOSIS — H93.13 TINNITUS OF BOTH EARS: ICD-10-CM

## 2023-08-09 DIAGNOSIS — E61.1 IRON DEFICIENCY: ICD-10-CM

## 2023-08-09 PROCEDURE — 99395 PREV VISIT EST AGE 18-39: CPT | Performed by: INTERNAL MEDICINE

## 2023-08-09 NOTE — PROGRESS NOTES
[id number] [facility] PRIMARY CARE [facility]    NAME: Nahum Sneed  AGE: 28 y.o. SEX: female  : 1990     DATE: 2023     Assessment and Plan:     Problem List Items Addressed This Visit    None  Visit Diagnoses     Annual physical exam    -  Primary    Seasonal allergies        Relevant Orders    Ambulatory Referral to Allergy    Tinnitus of both ears        Relevant Orders    Ambulatory Referral to Otolaryngology    Iron deficiency        Relevant Orders    CBC and differential    Iron Panel (Includes Ferritin, Iron Sat%, Iron, and TIBC)      I will see patient back in 6 months    Immunizations and preventive care screenings were discussed with patient today. Appropriate education was printed on patient's after visit summary. Counseling:  · As below  Patient is here for annual physical.  Blood work in January showed LDL of 118. Slightly went up. Glucose was also slightly low. Sugar levels slightly on the higher side 104 hemoglobin A1c 5.7. Hemoglobin is good. Platelet count slightly elevated. I will check her iron levels again as she had iron deficiency in the past.  MND was found to be quite low beginning of the year. She has been advised to take high-dose of vitamin D as well as not taking 2000 a day. She is also found to have very low vitamin B12. She will continue with B12 over-the-counter. TSH has been slightly down with Tapazole. She is on metformin for prediabetes in the care of endocrinology. Complains of ringing in the ear. Reports no upper respiratory infection. Denies any dizziness or hearing deficit. She has been dealing with allergies despite taking antihistamine. Does have a dog in the house. Encouraged her to use saline nasal irrigation. Allergy consultation. MRI of the brain without contrast last year shows maxillary and ethmoid mucosal thickening. ENT consultation for ongoing tinnitus. No follow-ups on file. Chief Complaint:     Chief Complaint   Patient presents with   • Physical Exam     Pt is here for yearly physical. BMI f/u due. Pt c/o ringing in both ears and pain. Pt has no other concerns. LS   • Tinnitus      History of Present Illness:     Adult Annual Physical   Patient here for a comprehensive physical exam. The patient reports problems - As below. Diet and Physical Activity  · Diet/Nutrition: Increased appetite. · Exercise: walking. Depression Screening  PHQ-2/9 Depression Screening         General Health  · Sleep: Using CPAP machine. · Hearing: normal - bilateral.  · Vision: no vision problems. · Dental: brushes teeth twice daily. /GYN Health  · Last menstrual period:   · Contraceptive method: RAY, BILATERAL SALPINGECTOMY.   · History of STDs?: no.     Review of Systems:     Review of Systems   Past Medical History:     Past Medical History:   Diagnosis Date   • Anemia, postpartum 2019   • Delivery by  hysterectomy 10/14/2019   • Disease of thyroid gland    • History of diet-controlled gestational diabetes mellitus 2019   • Obesity    • Pap smear abnormality of cervix with LGSIL 2016   • Varicella     Had as a child   • Varicose vein of leg       Past Surgical History:     Past Surgical History:   Procedure Laterality Date   •  SECTION     • CYSTOSCOPY N/A 10/14/2019    Procedure: CYSTOSCOPY;  Surgeon: Iona Fox MD;  Location: [facility];  Service: Gynecology   • DILATION AND EVACUATION      Molar   • HYSTERECTOMY N/A 10/14/2019    Procedure: RAY, BILATERAL SALPINGECTOMY;  Surgeon: Iona Fox MD;  Location: [facility];  Service: Gynecology   • LAPAROSCOPY     • TX  DELIVERY ONLY N/A 10/14/2019    Procedure: [address] () REPEAT;  Surgeon: Iona Fox MD;  Location: [facility];  Service: Gynecology      Social History:     Social History     Socioeconomic History   • Marital status: /Civil [employer]     Spouse name: None   • Number of children: 4   • Years of education: None   • Highest education level: None   Occupational History   • None   Tobacco Use   • Smoking status: Never   • Smokeless tobacco: Never   Vaping Use   • Vaping Use: Never used   Substance and Sexual Activity   • Alcohol use: Not Currently     Comment: occ.    • Drug use: Never   • Sexual activity: Not Currently     Partners: Male     Birth control/protection: None   Other Topics Concern   • None   Social History Narrative    Always wears seat belts ; caffeine use     Social Determinants of Health     Financial Resource Strain: Not on file   Food Insecurity: Not on file   Transportation Needs: Not on file   Physical Activity: Not on file   Stress: Not on file   Social Connections: Not on file   Intimate Partner Violence: Not on file   Housing Stability: Not on file      Family History:     Family History   Problem Relation Age of Onset   • Hypertension Mother    • Lupus Mother    • Pulmonary embolism Mother    • Anuerysm Mother    • No Known Problems Father    • Asthma Sister    • No Known Problems Daughter    • No Known Problems Daughter    • Other Maternal Grandmother         Pre-diabetic   • No Known Problems Brother    • Heart murmur Brother    • No Known Problems Son    • No Known Problems Son    • Asthma Son    • No Known Problems Maternal Aunt    • Breast cancer Paternal Aunt         age dx unknown   • Thyroid disease unspecified Family         Cousin      Current Medications:     Current Outpatient Medications   Medication Sig Dispense Refill   • acetaminophen (TYLENOL) 650 mg CR tablet Take 1 tablet (650 mg total) by mouth every 8 (eight) hours as needed for mild pain or moderate pain 30 tablet 0   • Cholecalciferol (Vitamin D) 50 MCG (2000 UT) tablet Take 1 tablet (2,000 Units total) by mouth daily 30 tablet 3   • dexamethasone (DECADRON) 2 mg tablet Take 1 tablet (2 mg total) by mouth 2 (two) times a day with meals 3 tablet 0   • Diclofenac Sodium (VOLTAREN) 1 % Apply 2 g topically 4 (four) times a day 100 g 0   • ergocalciferol (ERGOCALCIFEROL) 1.25 MG (91954 UT) capsule Take 1 capsule (50,000 Units total) by mouth once a week 8 capsule 0   • fluticasone (FLONASE) 50 mcg/act nasal spray SPRAY 1 SPRAY INTO EACH NOSTRIL EVERY DAY 16 mL 3   • folic acid (FOLVITE) 1 mg tablet TAKE 1 TABLET BY MOUTH EVERY DAY 90 tablet 1   • loratadine (CLARITIN) 10 mg tablet Take 1 tablet (10 mg total) by mouth daily 30 tablet 0   • Magnesium 250 MG TABS 1 pill a day 30 tablet 0   • metFORMIN (GLUCOPHAGE) 1000 MG tablet Take 1 tablet (1,000 mg total) by mouth 2 (two) times a day with meals 90 tablet 2   • methimazole (TAPAZOLE) 5 mg tablet Take 1 tablet (5 mg total) by mouth daily 90 tablet 2   • naproxen (EC NAPROSYN) 500 MG EC tablet Take 1 tablet (500 mg total) by mouth 2 (two) times a day with meals 10 tablet 0   • prochlorperazine (COMPAZINE) 10 mg tablet Take 1 tab up to BID prn migraine or nausea, ideally no more than 3 days per week 20 tablet 4   • Riboflavin (Vitamin B-2) 100 MG TABS 2 pills a day 60 tablet 3   • Symbicort 160-4.5 MCG/ACT inhaler INHALE 2 PUFFS BY MOUTH 2 TIMES A DAY RINSE MOUTH AFTER USE. 6 g 1   • topiramate (TOPAMAX) 25 mg tablet 1 tab PO QHS for 1 week, increase as tolerated to 1 tab BID for 1 week, then 1 tab QAM and 2 tabs QHS for 1 week and finish at 2 tabs BID. 120 tablet 4   • vitamin B-12 (VITAMIN B-12) 1,000 mcg tablet TAKE 1 TABLET BY MOUTH EVERY DAY 90 tablet 1   • zolpidem (AMBIEN) 5 mg tablet Take 1 tablet (5 mg total) by mouth as needed for sleep (for use during sleep study) for up to 2 days 2 tablet 0   • acetaminophen (TYLENOL) 500 mg tablet Take 1 tablet (500 mg total) by mouth every 6 (six) hours as needed for mild pain (Patient not taking: Reported on 1/5/2023) 30 tablet 0   • bisacodyl 5 MG EC tablet TAKE 2 TABLETS BY MOUTH DAILY AS NEEDED FOR CONSTIPATION.  (Patient not taking: Reported on 11/10/2022) 30 tablet 0   • docusate sodium (COLACE) 100 mg capsule Take 1 capsule (100 mg total) by mouth 2 (two) times a day (Patient not taking: Reported on 11/10/2022) 60 capsule 6   • ondansetron (ZOFRAN-ODT) 4 mg disintegrating tablet Take 1 tablet (4 mg total) by mouth every 8 (eight) hours as needed for nausea for up to 10 days 20 tablet 0   • Rimegepant Sulfate (Nurtec) 75 MG TBDP Take 1 pill at the onset of headache (Patient not taking: Reported on 4/25/2023) 15 tablet 0     No current facility-administered medications for this visit. Allergies: Allergies   Allergen Reactions   • Penicillins Anaphylaxis     Anaphylaxis   • Shrimp (Diagnostic) - Food Allergy    • Venofer [Iron Sucrose] Itching      Physical Exam:     /72 (BP Location: Left arm, Patient Position: Sitting, Cuff Size: Large)   Pulse 85   Temp 98.3 °F (36.8 °C) (Tympanic)   Ht 5' 6" (1.676 m)   Wt 104 kg (230 lb)   LMP 03/23/2019 (Within Days)   SpO2 99%   BMI 37.12 kg/m²     Physical Exam  Vitals and nursing note reviewed. Constitutional:       General: She is not in acute distress. Appearance: She is well-developed. HENT:      Head: Normocephalic and atraumatic. Ears:      Comments: Fluid behind the ears bilaterally right more than the left  Eyes:      Conjunctiva/sclera: Conjunctivae normal.   Neck:      Vascular: No carotid bruit. Comments: Goiter  Cardiovascular:      Rate and Rhythm: Normal rate and regular rhythm. Heart sounds: Normal heart sounds. No murmur heard. Pulmonary:      Effort: Pulmonary effort is normal. No respiratory distress. Breath sounds: Normal breath sounds. No wheezing or rales. Abdominal:      General: There is no distension. Palpations: Abdomen is soft. Tenderness: There is no abdominal tenderness. There is no right CVA tenderness, left CVA tenderness or guarding. Musculoskeletal:         General: No swelling. Cervical back: Neck supple. Right lower leg: No edema. Left lower leg: No edema. Skin:     General: Skin is warm and dry. Capillary Refill: Capillary refill takes less than 2 seconds. Neurological:      Mental Status: She is alert.    Psychiatric:         Mood and Affect: Mood normal.         Behavior: Behavior normal.          Olga Lidia Perry MD   [facility]

## 2023-08-10 DIAGNOSIS — J30.2 SEASONAL ALLERGIES: Primary | ICD-10-CM

## 2023-08-14 ENCOUNTER — OFFICE VISIT (OUTPATIENT)
Dept: SLEEP CENTER | Facility: CLINIC | Age: 33
End: 2023-08-14
Payer: COMMERCIAL

## 2023-08-14 VITALS
WEIGHT: 231 LBS | BODY MASS INDEX: 37.12 KG/M2 | SYSTOLIC BLOOD PRESSURE: 110 MMHG | DIASTOLIC BLOOD PRESSURE: 70 MMHG | HEIGHT: 66 IN

## 2023-08-14 DIAGNOSIS — G47.33 OSA (OBSTRUCTIVE SLEEP APNEA): Primary | ICD-10-CM

## 2023-08-14 DIAGNOSIS — E66.9 OBESITY (BMI 30-39.9): ICD-10-CM

## 2023-08-14 DIAGNOSIS — Z78.9 DIFFICULTY USING CONTINUOUS POSITIVE AIRWAY PRESSURE (CPAP) DEVICE: ICD-10-CM

## 2023-08-14 DIAGNOSIS — F43.9 STRESS AT HOME: ICD-10-CM

## 2023-08-14 DIAGNOSIS — F40.240 CLAUSTROPHOBIA: ICD-10-CM

## 2023-08-14 DIAGNOSIS — R40.0 DAYTIME SLEEPINESS: ICD-10-CM

## 2023-08-14 PROCEDURE — 99214 OFFICE O/P EST MOD 30 MIN: CPT | Performed by: INTERNAL MEDICINE

## 2023-08-14 NOTE — PATIENT INSTRUCTIONS

## 2023-08-14 NOTE — PROGRESS NOTES
Follow-Up Note - Sleep Center   Grabiel Meneses  28 y.o. female  KRO:[id number]  EXX:[id number]  DOS:2023    CC: I saw this patient for follow-up in clinic today for Sleep disordered breathing, Coexisting Sleep and Medical Problems. Interval changes: Treatment was initiated using a ResMed machine. The patient had both diagnostic and therapeutic sleep studies and is here to review results and to initiate/adjust therapy. The diagnostic study confirmed obstructive sleep apnea:  AHI (AHI) of 13.8 events per hour of sleep. The AHI in the supine position was 43.1. The AHI during REM sleep was 20.7. Moderate to loud intensity snoring was noted. There were also 5 respiratory effort related arousals resulting in an RDI of 14.5/h. The lowest oxygen saturation was 83.0%. The amount of sleep time below 90% was 1.2 minutes. During the subsequent therapeutic study, sleep disordered breathing was successfully remediated with PAP at 10 H2O. PFSH, Problem List, Medications & Allergies were reviewed in EMR. She  has a past medical history of Anemia, postpartum (2019), Delivery by  hysterectomy (10/14/2019), Disease of thyroid gland, History of diet-controlled gestational diabetes mellitus (2019), Obesity, Pap smear abnormality of cervix with LGSIL (2016), Varicella, and Varicose vein of leg. She has a current medication list which includes the following prescription(s): acetaminophen, vitamin d, dexamethasone, diclofenac sodium, ergocalciferol, fluticasone, folic acid, loratadine, magnesium, metformin, methimazole, naproxen, prochlorperazine, vitamin b-2, symbicort, topiramate, vitamin b-12, acetaminophen, bisacodyl, docusate sodium, ondansetron, and nurtec. PHYSIOLOGICAL DATA REVIEW : Device has been used 16/30 days and average on days used is 4 hours and 5 minutes. She has not been able to use the device consistently because of claustrophobia.   Using PAP > 4 hours/night 40%. Estimated BECKY 1.7/hour with pressure of 10cm H2O ; patient has not been using non FDA approved devices to sanitize the machine. INTERPRETATION: Compliance is fair; Pressure setting is:optimal; ;   SUBJECTIVE: With respect to use of PAP, Janel Helms  is experiencing some adverse effects:mask leaks  and mask causes redness / facial marks . She derives benefit. Is satisfied with sleep and daytime function. Sleep Routine: Janel Helms reports getting 7-8 hrs sleep; she has no difficulty initiating or maintaining sleep . She arises spontaneously and feels more refreshed since on Rx. Janel Helms reports improved excessive daytime sleepiness, [feels like napping but does not have the opportunity.] She rated [herself] at Total score: 13 /24 on the [place] Sleepiness Scale. Other issues: None reported. Habits:[ reports that she has never smoked. She has never used smokeless tobacco.], [ reports that she does not currently use alcohol.], [ reports no history of drug use.], Caffeine use:very little; Exercise routine: regular. ROS: Significant for weight has been stable. She is on treatment for allergies and asthma and is due to see an allergist.  She reports stress at home and has racing thoughts. Cheryl Gomes EXAM: /70   Ht 5' 6" (1.676 m)   Wt 105 kg (231 lb)   LMP 03/23/2019 (Within Days)   BMI 37.28 kg/m²     Wt Readings from Last 3 Encounters:   08/14/23 105 kg (231 lb)   08/09/23 104 kg (230 lb)   07/05/23 104 kg (228 lb 14.4 oz)      Patient is well groomed; well appearing. CNS: Alert, orientated, speech clear & coherent  Psych: cooperative and in no distress. Mental state: Appears depressed. H&N: EOMI; NC/AT: No facial pressure marks, no rashes. Skin/Extrem: col & hydration normal; no edema  Resp: Respiratory effort is normal  Physical findings otherwise essentially unchanged from previous. IMPRESSION: Problem List Items & Comorbidities Addressed this Visit    1.  RIC (obstructive sleep apnea)  PAP DME Resupply/Reorder      2. Difficulty using continuous positive airway pressure (CPAP) device        3. Claustrophobia        4. Daytime sleepiness        5. Stress at home        6. Obesity (BMI 30-39. 9)            PLAN:  1. I reviewed results of prior studies and physiologic data with the patient. 2. I discussed treatment options with risks and benefits. 3. Treatment with  PAP is medically necessary and Anh Ponce is agreable to continue use. 4. Care of equipment, methods to improve comfort using PAP and importance of compliance with therapy were discussed. 5. Pressure setting: continue 10 cmH2O.    6. Rx provided to replace supplies and Care coordinated with DME provider. 7. Discussed strategies for improving claustrophobia and weight and stress reduction. 8. She may need further evaluation for mood disturbance. 9. Follow-up is advised in 1 year or sooner if needed to monitor progress, compliance and to adjust therapy. Thank you for allowing me to participate in the care of this patient. Sincerely,     Authenticated electronically on 78/07/53   Board Certified Specialist     Portions of the record may have been created with voice recognition software. Occasional wrong word or "sound a like" substitutions may have occurred due to the inherent limitations of voice recognition software. There may also be notations and random deletions of words or characters from malfunctioning software. Read the chart carefully and recognize, using context, where substitutions/deletions have occurred.

## 2023-08-15 ENCOUNTER — TELEPHONE (OUTPATIENT)
Dept: SLEEP CENTER | Facility: CLINIC | Age: 33
End: 2023-08-15

## 2023-08-17 LAB

## 2023-09-05 ENCOUNTER — HOSPITAL ENCOUNTER (EMERGENCY)
Facility: HOSPITAL | Age: 33
Discharge: HOME | End: 2023-09-05
Attending: EMERGENCY MEDICINE
Payer: COMMERCIAL

## 2023-09-05 VITALS
RESPIRATION RATE: 18 BRPM | OXYGEN SATURATION: 99 % | WEIGHT: 229.5 LBS | BODY MASS INDEX: 37.04 KG/M2 | TEMPERATURE: 97.9 F | HEART RATE: 96 BPM | SYSTOLIC BLOOD PRESSURE: 135 MMHG | DIASTOLIC BLOOD PRESSURE: 71 MMHG

## 2023-09-05 DIAGNOSIS — H66.92 LEFT OTITIS MEDIA: Primary | ICD-10-CM

## 2023-09-05 PROCEDURE — 96372 THER/PROPH/DIAG INJ SC/IM: CPT

## 2023-09-05 PROCEDURE — 99282 EMERGENCY DEPT VISIT SF MDM: CPT

## 2023-09-05 PROCEDURE — 99284 EMERGENCY DEPT VISIT MOD MDM: CPT | Performed by: EMERGENCY MEDICINE

## 2023-09-05 RX ORDER — AZITHROMYCIN 250 MG/1
500 TABLET, FILM COATED ORAL ONCE
Status: COMPLETED | OUTPATIENT
Start: 2023-09-05 | End: 2023-09-05

## 2023-09-05 RX ORDER — AZITHROMYCIN 250 MG/1
TABLET, FILM COATED ORAL
Qty: 6 TABLET | Refills: 0 | Status: SHIPPED | OUTPATIENT
Start: 2023-09-05 | End: 2023-09-09

## 2023-09-05 RX ORDER — IBUPROFEN 600 MG/1
600 TABLET ORAL EVERY 6 HOURS PRN
Qty: 30 TABLET | Refills: 0 | Status: SHIPPED | OUTPATIENT
Start: 2023-09-05

## 2023-09-05 RX ORDER — DEXAMETHASONE 4 MG/1
12 TABLET ORAL ONCE
Qty: 3 TABLET | Refills: 0 | Status: SHIPPED | OUTPATIENT
Start: 2023-09-08 | End: 2023-09-08

## 2023-09-05 RX ORDER — ACETAMINOPHEN 325 MG/1
975 TABLET ORAL EVERY 6 HOURS PRN
Qty: 30 TABLET | Refills: 0 | Status: SHIPPED | OUTPATIENT
Start: 2023-09-05

## 2023-09-05 RX ORDER — PSEUDOEPHEDRINE HCL 30 MG
30 TABLET ORAL EVERY 4 HOURS PRN
Qty: 30 TABLET | Refills: 0 | Status: SHIPPED | OUTPATIENT
Start: 2023-09-05

## 2023-09-05 RX ORDER — KETOROLAC TROMETHAMINE 30 MG/ML
15 INJECTION, SOLUTION INTRAMUSCULAR; INTRAVENOUS ONCE
Status: COMPLETED | OUTPATIENT
Start: 2023-09-05 | End: 2023-09-05

## 2023-09-05 RX ORDER — ACETAMINOPHEN 325 MG/1
975 TABLET ORAL ONCE
Status: COMPLETED | OUTPATIENT
Start: 2023-09-05 | End: 2023-09-05

## 2023-09-05 RX ADMIN — DEXAMETHASONE SODIUM PHOSPHATE 10 MG: 10 INJECTION, SOLUTION INTRAMUSCULAR; INTRAVENOUS at 10:20

## 2023-09-05 RX ADMIN — ACETAMINOPHEN 975 MG: 325 TABLET ORAL at 10:20

## 2023-09-05 RX ADMIN — AZITHROMYCIN MONOHYDRATE 500 MG: 250 TABLET ORAL at 10:20

## 2023-09-05 RX ADMIN — KETOROLAC TROMETHAMINE 15 MG: 30 INJECTION, SOLUTION INTRAMUSCULAR; INTRAVENOUS at 10:20

## 2023-09-05 NOTE — Clinical Note
Juan Carlos Pollock was seen and treated in our emergency department on 9/5/2023. Diagnosis:     Susanne Ratliff  .    She may return on this date: 09/08/2023         If you have any questions or concerns, please don't hesitate to call.       Obie Bermeo, DO    ______________________________           _______________          _______________  Hospital Representative                              Date                                Time

## 2023-09-07 NOTE — ED PROVIDER NOTES
History  Chief Complaint   Patient presents with   • Earache     Has seen PCP sent to a specialist appt is next week. Pain bilat ears. Tried Teraflu this AM, had headache and a cough       28year old female, returns for right ear pain, states pain returned after ear wick fell out at home. Requesting new ear wick placement. Pain had been improvign, using oral and otic drops as directed. No hearing change. Prior to Admission Medications   Prescriptions Last Dose Informant Patient Reported? Taking? Cholecalciferol (Vitamin D) 50 MCG (2000 UT) tablet Past Week Self No Yes   Sig: Take 1 tablet (2,000 Units total) by mouth daily   Diclofenac Sodium (VOLTAREN) 1 % Not Taking  No No   Sig: Apply 2 g topically 4 (four) times a day   Patient not taking: Reported on 2023   Magnesium 250 MG TABS Past Week Self No Yes   Si pill a day   Riboflavin (Vitamin B-2) 100 MG TABS Past Week Self No Yes   Si pills a day   Rimegepant Sulfate (Nurtec) 75 MG TBDP  Self No No   Sig: Take 1 pill at the onset of headache   Patient not taking: Reported on 2023   Symbicort 160-4.5 MCG/ACT inhaler Past Month Self No Yes   Sig: INHALE 2 PUFFS BY MOUTH 2 TIMES A DAY RINSE MOUTH AFTER USE.   acetaminophen (TYLENOL) 500 mg tablet  Self No No   Sig: Take 1 tablet (500 mg total) by mouth every 6 (six) hours as needed for mild pain   Patient not taking: Reported on 2023   acetaminophen (TYLENOL) 650 mg CR tablet Past Month Self No Yes   Sig: Take 1 tablet (650 mg total) by mouth every 8 (eight) hours as needed for mild pain or moderate pain   bisacodyl 5 MG EC tablet  Self No No   Sig: TAKE 2 TABLETS BY MOUTH DAILY AS NEEDED FOR CONSTIPATION.    Patient not taking: Reported on 11/10/2022   dexamethasone (DECADRON) 2 mg tablet Past Week Self No Yes   Sig: Take 1 tablet (2 mg total) by mouth 2 (two) times a day with meals   docusate sodium (COLACE) 100 mg capsule  Self No No   Sig: Take 1 capsule (100 mg total) by mouth 2 (two) times a day   Patient not taking: Reported on 11/10/2022   ergocalciferol (ERGOCALCIFEROL) 1.25 MG (52699 UT) capsule Past Week Self No Yes   Sig: Take 1 capsule (50,000 Units total) by mouth once a week   fluticasone (FLONASE) 50 mcg/act nasal spray Past Week Self No Yes   Sig: SPRAY 1 SPRAY INTO EACH NOSTRIL EVERY DAY   folic acid (FOLVITE) 1 mg tablet Past Week  No Yes   Sig: TAKE 1 TABLET BY MOUTH EVERY DAY   loratadine (CLARITIN) 10 mg tablet Past Week Self No Yes   Sig: Take 1 tablet (10 mg total) by mouth daily   metFORMIN (GLUCOPHAGE) 1000 MG tablet Past Week  No Yes   Sig: Take 1 tablet (1,000 mg total) by mouth 2 (two) times a day with meals   methimazole (TAPAZOLE) 5 mg tablet Past Week  No Yes   Sig: Take 1 tablet (5 mg total) by mouth daily   naproxen (EC NAPROSYN) 500 MG EC tablet Past Month  No Yes   Sig: Take 1 tablet (500 mg total) by mouth 2 (two) times a day with meals   ondansetron (ZOFRAN-ODT) 4 mg disintegrating tablet  Self No No   Sig: Take 1 tablet (4 mg total) by mouth every 8 (eight) hours as needed for nausea for up to 10 days   prochlorperazine (COMPAZINE) 10 mg tablet Not Taking Self No No   Sig: Take 1 tab up to BID prn migraine or nausea, ideally no more than 3 days per week   Patient not taking: Reported on 2023   topiramate (TOPAMAX) 25 mg tablet Past Week Self No Yes   Si tab PO QHS for 1 week, increase as tolerated to 1 tab BID for 1 week, then 1 tab QAM and 2 tabs QHS for 1 week and finish at 2 tabs BID.   vitamin B-12 (VITAMIN B-12) 1,000 mcg tablet Past Week  No Yes   Sig: TAKE 1 TABLET BY MOUTH EVERY DAY      Facility-Administered Medications: None       Past Medical History:   Diagnosis Date   • Anemia, postpartum 2019   • Delivery by  hysterectomy 10/14/2019   • Disease of thyroid gland    • History of diet-controlled gestational diabetes mellitus 2019   • Obesity    • Pap smear abnormality of cervix with LGSIL 2016   • Varicella Had as a child   • Varicose vein of leg        Past Surgical History:   Procedure Laterality Date   •  SECTION     • CYSTOSCOPY N/A 10/14/2019    Procedure: Eulis Gitelman;  Surgeon: John Santos MD;  Location: [facility];  Service: Gynecology   • DILATION AND EVACUATION      Molar   • HYSTERECTOMY N/A 10/14/2019    Procedure: RAY, BILATERAL SALPINGECTOMY;  Surgeon: John Santos MD;  Location: [facility];  Service: Gynecology   • LAPAROSCOPY     • OK  DELIVERY ONLY N/A 10/14/2019    Procedure:  SECTION () REPEAT;  Surgeon: John Santos MD;  Location: [facility];  Service: Gynecology       Family History   Problem Relation Age of Onset   • Hypertension Mother    • Lupus Mother    • Pulmonary embolism Mother    • Anuerysm Mother    • No Known Problems Father    • Asthma Sister    • No Known Problems Daughter    • No Known Problems Daughter    • Other Maternal Grandmother         Pre-diabetic   • No Known Problems Brother    • Heart murmur Brother    • No Known Problems Son    • No Known Problems Son    • Asthma Son    • No Known Problems Maternal Aunt    • Breast cancer Paternal Aunt         age dx unknown   • Thyroid disease unspecified Family         Cousin     I have reviewed and agree with the history as documented. E-Cigarette/Vaping   • E-Cigarette Use Never User      E-Cigarette/Vaping Substances   • Nicotine No    • THC No    • CBD No    • Flavoring No    • Other No    • Unknown No      Social History     Tobacco Use   • Smoking status: Never   • Smokeless tobacco: Never   Vaping Use   • Vaping Use: Never used   Substance Use Topics   • Alcohol use: Not Currently     Comment: occ. • Drug use: Never       Review of Systems   Constitutional: Negative. Negative for chills and fever. HENT: Positive for ear pain. Negative for rhinorrhea, sore throat, trouble swallowing and voice change. Eyes: Negative. Negative for pain and visual disturbance. Respiratory: Negative. Negative for cough, shortness of breath and wheezing. Cardiovascular: Negative. Negative for chest pain and palpitations. Gastrointestinal: Negative for abdominal pain, diarrhea, nausea and vomiting. Genitourinary: Negative. Negative for dysuria and frequency. Musculoskeletal: Negative. Negative for neck pain and neck stiffness. Skin: Negative. Negative for rash. Neurological: Negative. Negative for dizziness, speech difficulty, weakness, light-headedness and numbness. Physical Exam  Physical Exam  Vitals and nursing note reviewed. Constitutional:       General: She is not in acute distress. Appearance: She is well-developed. HENT:      Head: Normocephalic and atraumatic. Right Ear: Hearing normal. No foreign body. No mastoid tenderness. No hemotympanum. Tympanic membrane is injected. Left Ear: Hearing, tympanic membrane, ear canal and external ear normal.   Eyes:      Conjunctiva/sclera: Conjunctivae normal.      Pupils: Pupils are equal, round, and reactive to light. Neck:      Trachea: No tracheal deviation. Cardiovascular:      Rate and Rhythm: Normal rate and regular rhythm. Pulmonary:      Effort: Pulmonary effort is normal. No respiratory distress. Breath sounds: Normal breath sounds. No wheezing or rales. Abdominal:      General: Bowel sounds are normal. There is no distension. Palpations: Abdomen is soft. Tenderness: There is no abdominal tenderness. There is no guarding or rebound. Musculoskeletal:         General: No tenderness or deformity. Normal range of motion. Cervical back: Normal range of motion and neck supple. Skin:     General: Skin is warm and dry. Capillary Refill: Capillary refill takes less than 2 seconds. Findings: No rash. Neurological:      Mental Status: She is alert and oriented to person, place, and time.    Psychiatric:         Behavior: Behavior normal.         Vital Signs  ED Triage Vitals Temperature Pulse Respirations Blood Pressure SpO2   09/05/23 0930 09/05/23 0930 09/05/23 0930 09/05/23 0930 09/05/23 0930   97.9 °F (36.6 °C) 96 18 135/71 99 %      Temp Source Heart Rate Source Patient Position - Orthostatic VS BP Location FiO2 (%)   09/05/23 0930 09/05/23 0930 09/05/23 0930 09/05/23 0930 --   Tympanic Monitor Sitting Left arm       Pain Score       09/05/23 1020       10 - Worst Possible Pain           Vitals:    09/05/23 0930   BP: 135/71   Pulse: 96   Patient Position - Orthostatic VS: Sitting         Visual Acuity      ED Medications  Medications   ketorolac (TORADOL) injection 15 mg (15 mg Intramuscular Given 9/5/23 1020)   dexamethasone oral liquid 10 mg 1 mL (10 mg Oral Given 9/5/23 1020)   acetaminophen (TYLENOL) tablet 975 mg (975 mg Oral Given 9/5/23 1020)   azithromycin (ZITHROMAX) tablet 500 mg (500 mg Oral Given 9/5/23 1020)       Diagnostic Studies  Results Reviewed     None                 No orders to display              Procedures  Procedures         ED Course                               SBIRT 22yo+    Flowsheet Row Most Recent Value   Initial Alcohol Screen: US AUDIT-C     1. How often do you have a drink containing alcohol? 0 Filed at: 09/05/2023 0935   2. How many drinks containing alcohol do you have on a typical day you are drinking? 0 Filed at: 09/05/2023 0935   3a. Male UNDER 65: How often do you have five or more drinks on one occasion? 0 Filed at: 09/05/2023 0935   3b. FEMALE Any Age, or MALE 65+: How often do you have 4 or more drinks on one occassion? 0 Filed at: 09/05/2023 0935   Audit-C Score 0 Filed at: 09/05/2023 0267   BAILEY: How many times in the past year have you. .. Used an illegal drug or used a prescription medication for non-medical reasons? Never Filed at: 09/05/2023 1075                    Medical Decision Making  59-year-old female who returns for right ear pain after you fell out. Requesting replacement. New ear wick placed by me.   Ear appears to be healing and better state in terms of swelling edematous and discharge compared to yesterday's visit. No discharge noted today. Still some mild swelling. He replaced without difficulty. Patient states she has resolution of her pain with ear wick being placed. Does not appear to have tympanic membrane rupture. Continue to advised to follow-up with ENT and PCP. Risk  OTC drugs. Prescription drug management. Disposition  Final diagnoses:   Left otitis media     Time reflects when diagnosis was documented in both MDM as applicable and the Disposition within this note     Time User Action Codes Description Comment    9/5/2023 10:10 AM Stevo Needle Add [H66.92] Left otitis media       ED Disposition     ED Disposition   Discharge    Condition   Stable    Date/Time   Tue Sep 5, 2023 [facility] discharge to home/self care.                Follow-up Information     Follow up With Specialties Details Why Contact Info Additional 815 Leonidas May MD Internal Medicine   [address] [place] [id number] [facility] ENT Otolaryngology   [address] [place] [phone]  [address] [place] [address] Dr Gore ENT, [address] [place] [address], [id number] [phone]          Discharge Medication List as of 9/5/2023 10:12 AM      START taking these medications    Details   !! acetaminophen (TYLENOL) 325 mg tablet Take 3 tablets (975 mg total) by mouth every 6 (six) hours as needed for mild pain, Starting Tue 9/5/2023, Normal      azithromycin (ZITHROMAX) 250 mg tablet Take 2 tablets today then 1 tablet daily x 4 days, Normal      !! dexamethasone (DECADRON) 4 mg tablet Take 3 tablets (12 mg total) by mouth 1 (one) time for 1 dose Do not start before September 8, 2023., Starting Fri 9/8/2023, Normal      ibuprofen (MOTRIN) 600 mg tablet Take 1 tablet (600 mg total) by mouth every 6 (six) hours as needed for mild pain, Starting Tue 9/5/2023, Normal      pseudoephedrine (SUDAFED) 30 mg tablet Take 1 tablet (30 mg total) by mouth every 4 (four) hours as needed for congestion, Starting Tue 9/5/2023, Normal       !! - Potential duplicate medications found. Please discuss with provider.       CONTINUE these medications which have NOT CHANGED    Details   acetaminophen (TYLENOL) 650 mg CR tablet Take 1 tablet (650 mg total) by mouth every 8 (eight) hours as needed for mild pain or moderate pain, Starting Thu 9/10/2020, Print      Cholecalciferol (Vitamin D) 50 MCG (2000 UT) tablet Take 1 tablet (2,000 Units total) by mouth daily, Starting Thu 11/18/2021, Normal      !! dexamethasone (DECADRON) 2 mg tablet Take 1 tablet (2 mg total) by mouth 2 (two) times a day with meals, Starting Thu 3/30/2023, Normal      ergocalciferol (ERGOCALCIFEROL) 1.25 MG (75968 UT) capsule Take 1 capsule (50,000 Units total) by mouth once a week, Starting Fri 1/13/2023, Normal      fluticasone (FLONASE) 50 mcg/act nasal spray SPRAY 1 SPRAY INTO EACH NOSTRIL EVERY DAY, Normal      folic acid (FOLVITE) 1 mg tablet TAKE 1 TABLET BY MOUTH EVERY DAY, Normal      loratadine (CLARITIN) 10 mg tablet Take 1 tablet (10 mg total) by mouth daily, Starting Thu 11/3/2022, Normal      Magnesium 250 MG TABS 1 pill a day, Normal      metFORMIN (GLUCOPHAGE) 1000 MG tablet Take 1 tablet (1,000 mg total) by mouth 2 (two) times a day with meals, Starting Tue 4/25/2023, Normal      methimazole (TAPAZOLE) 5 mg tablet Take 1 tablet (5 mg total) by mouth daily, Starting Tue 4/25/2023, Normal      naproxen (EC NAPROSYN) 500 MG EC tablet Take 1 tablet (500 mg total) by mouth 2 (two) times a day with meals, Starting Wed 7/5/2023, Until Thu 7/4/2024, Normal      Riboflavin (Vitamin B-2) 100 MG TABS 2 pills a day, Normal      Symbicort 160-4.5 MCG/ACT inhaler INHALE 2 PUFFS BY MOUTH 2 TIMES A DAY RINSE MOUTH AFTER USE., Normal topiramate (TOPAMAX) 25 mg tablet 1 tab PO QHS for 1 week, increase as tolerated to 1 tab BID for 1 week, then 1 tab QAM and 2 tabs QHS for 1 week and finish at 2 tabs BID., Normal      vitamin B-12 (VITAMIN B-12) 1,000 mcg tablet TAKE 1 TABLET BY MOUTH EVERY DAY, Starting Mon 7/24/2023, Normal      !! acetaminophen (TYLENOL) 500 mg tablet Take 1 tablet (500 mg total) by mouth every 6 (six) hours as needed for mild pain, Starting Thu 11/3/2022, Normal      bisacodyl 5 MG EC tablet TAKE 2 TABLETS BY MOUTH DAILY AS NEEDED FOR CONSTIPATION., Normal      Diclofenac Sodium (VOLTAREN) 1 % Apply 2 g topically 4 (four) times a day, Starting Wed 7/5/2023, Normal      docusate sodium (COLACE) 100 mg capsule Take 1 capsule (100 mg total) by mouth 2 (two) times a day, Starting Tue 4/5/2022, Normal      ondansetron (ZOFRAN-ODT) 4 mg disintegrating tablet Take 1 tablet (4 mg total) by mouth every 8 (eight) hours as needed for nausea for up to 10 days, Starting Thu 3/30/2023, Until Tue 4/25/2023 at 2359, Normal      prochlorperazine (COMPAZINE) 10 mg tablet Take 1 tab up to BID prn migraine or nausea, ideally no more than 3 days per week, Normal      Rimegepant Sulfate (Nurtec) 75 MG TBDP Take 1 pill at the onset of headache, Normal       !! - Potential duplicate medications found. Please discuss with provider. No discharge procedures on file.     PDMP Review       Value Time User    PDMP Reviewed  Yes 4/26/2023 10:29 AM Kayli Humphreys MD          ED Provider  Electronically Signed by           Ashanti Ceballos DO  09/07/23 [id number]

## 2023-09-19 ENCOUNTER — APPOINTMENT (EMERGENCY)
Dept: RADIOLOGY | Facility: HOSPITAL | Age: 33
End: 2023-09-19
Payer: COMMERCIAL

## 2023-09-19 ENCOUNTER — HOSPITAL ENCOUNTER (EMERGENCY)
Facility: HOSPITAL | Age: 33
Discharge: HOME | End: 2023-09-19
Attending: EMERGENCY MEDICINE
Payer: COMMERCIAL

## 2023-09-19 VITALS
RESPIRATION RATE: 18 BRPM | BODY MASS INDEX: 36.77 KG/M2 | SYSTOLIC BLOOD PRESSURE: 138 MMHG | WEIGHT: 227.8 LBS | HEART RATE: 93 BPM | OXYGEN SATURATION: 100 % | DIASTOLIC BLOOD PRESSURE: 79 MMHG | TEMPERATURE: 99 F

## 2023-09-19 DIAGNOSIS — M54.9 BACK PAIN: ICD-10-CM
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
## 2023-09-19 DIAGNOSIS — S80.819A LEG ABRASION: ICD-10-CM

## 2023-09-19 PROCEDURE — 99284 EMERGENCY DEPT VISIT MOD MDM: CPT

## 2023-09-19 PROCEDURE — 71046 X-RAY EXAM CHEST 2 VIEWS: CPT

## 2023-09-19 RX ORDER — IBUPROFEN 400 MG/1
400 TABLET ORAL EVERY 6 HOURS PRN
Qty: 30 TABLET | Refills: 0 | Status: SHIPPED | OUTPATIENT
Start: 2023-09-19 | End: 2023-09-29

## 2023-09-19 RX ORDER — METHOCARBAMOL 500 MG/1
500 TABLET, FILM COATED ORAL 2 TIMES DAILY
Qty: 20 TABLET | Refills: 0 | Status: SHIPPED | OUTPATIENT
Start: 2023-09-19

## 2023-09-19 RX ORDER — IBUPROFEN 400 MG/1
400 TABLET ORAL ONCE
Status: COMPLETED | OUTPATIENT
Start: 2023-09-19 | End: 2023-09-19

## 2023-09-19 RX ADMIN — IBUPROFEN 400 MG: 400 TABLET ORAL at 11:08
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
## 2023-11-13 ENCOUNTER — TELEPHONE (OUTPATIENT)
Dept: OBGYN CLINIC | Facility: CLINIC | Age: 33
End: 2023-11-13

## 2023-11-24 NOTE — ANESTHESIA PROCEDURE NOTES
Arterial Line Insertion  Performed by: Francy Carter CRNA  Authorized by: Deb Ledesma MD   Consent: Verbal consent obtained  Written consent obtained  Risks and benefits: risks, benefits and alternatives were discussed  Consent given by: patient  Patient understanding: patient states understanding of the procedure being performed  Patient consent: the patient's understanding of the procedure matches consent given  Procedure consent: procedure consent matches procedure scheduled  Relevant documents: relevant documents present and verified  Required items: required blood products, implants, devices, and special equipment available  Patient identity confirmed: verbally with patient, arm band, provided demographic data and hospital-assigned identification number  Time out: Immediately prior to procedure a "time out" was called to verify the correct patient, procedure, equipment, support staff and site/side marked as required  Preparation: Patient was prepped and draped in the usual sterile fashion    Indications: hemodynamic monitoring  Orientation:  Right  Location: radial artery  Procedure Details:  Needle gauge: 20  Seldinger technique: Seldinger technique used  Number of attempts: 1    Post-procedure:  Post-procedure: dressing applied  Waveform: good waveform and waveform confirmed  Post-procedure CNS: normal  Patient tolerance: Patient tolerated the procedure well with no immediate complications Follow up with your regular doctor as discussed.

## 2023-11-27 ENCOUNTER — HOSPITAL ENCOUNTER (EMERGENCY)
Facility: HOSPITAL | Age: 33
Discharge: HOME | End: 2023-11-28
Attending: EMERGENCY MEDICINE
Payer: COMMERCIAL

## 2023-11-27 DIAGNOSIS — K52.9 GASTROENTERITIS: ICD-10-CM

## 2023-11-27 DIAGNOSIS — R11.2 NAUSEA AND VOMITING, UNSPECIFIED VOMITING TYPE: Primary | ICD-10-CM

## 2023-11-27 DIAGNOSIS — R52 BODY ACHES: ICD-10-CM

## 2023-11-27 LAB
ALBUMIN SERPL BCP-MCNC: 4 G/DL (ref 3.5–5)
ALP SERPL-CCNC: 72 U/L (ref 34–104)
ALT SERPL W P-5'-P-CCNC: 13 U/L (ref 7–52)
ANION GAP SERPL CALCULATED.3IONS-SCNC: 10 MMOL/L
AST SERPL W P-5'-P-CCNC: 17 U/L (ref 13–39)
BACTERIA UR QL AUTO: ABNORMAL /HPF
BASOPHILS # BLD AUTO: 0.02 THOUSANDS/ÂΜL (ref 0–0.1)
BASOPHILS NFR BLD AUTO: 0 % (ref 0–1)
BILIRUB SERPL-MCNC: 0.71 MG/DL (ref 0.2–1)
BILIRUB UR QL STRIP: NEGATIVE
BUN SERPL-MCNC: 8 MG/DL (ref 5–25)
CALCIUM SERPL-MCNC: 8.9 MG/DL (ref 8.4–10.2)
CHLORIDE SERPL-SCNC: 98 MMOL/L (ref 96–108)
CLARITY UR: CLEAR
CO2 SERPL-SCNC: 25 MMOL/L (ref 21–32)
COLOR UR: ABNORMAL
CREAT SERPL-MCNC: 0.6 MG/DL (ref 0.6–1.3)
EOSINOPHIL # BLD AUTO: 0.01 THOUSAND/ÂΜL (ref 0–0.61)
EOSINOPHIL NFR BLD AUTO: 0 % (ref 0–6)
ERYTHROCYTE [DISTWIDTH] IN BLOOD BY AUTOMATED COUNT: 14.6 % (ref 11.6–15.1)
FLUAV RNA RESP QL NAA+PROBE: NEGATIVE
FLUBV RNA RESP QL NAA+PROBE: NEGATIVE
GFR SERPL CREATININE-BSD FRML MDRD: 119 ML/MIN/1.73SQ M
GLUCOSE SERPL-MCNC: 135 MG/DL (ref 65–140)
GLUCOSE UR STRIP-MCNC: NEGATIVE MG/DL
HCT VFR BLD AUTO: 38.6 % (ref 34.8–46.1)
HGB BLD-MCNC: 12.2 G/DL (ref 11.5–15.4)
HGB UR QL STRIP.AUTO: 10
IMM GRANULOCYTES # BLD AUTO: 0.04 THOUSAND/UL (ref 0–0.2)
IMM GRANULOCYTES NFR BLD AUTO: 0 % (ref 0–2)
KETONES UR STRIP-MCNC: NEGATIVE MG/DL
LEUKOCYTE ESTERASE UR QL STRIP: NEGATIVE
LIPASE SERPL-CCNC: 12 U/L (ref 11–82)
LYMPHOCYTES # BLD AUTO: 1.45 THOUSANDS/ÂΜL (ref 0.6–4.47)
LYMPHOCYTES NFR BLD AUTO: 15 % (ref 14–44)
MCH RBC QN AUTO: 25.3 PG (ref 26.8–34.3)
MCHC RBC AUTO-ENTMCNC: 31.6 G/DL (ref 31.4–37.4)
MCV RBC AUTO: 80 FL (ref 82–98)
MONOCYTES # BLD AUTO: 0.79 THOUSAND/ÂΜL (ref 0.17–1.22)
MONOCYTES NFR BLD AUTO: 8 % (ref 4–12)
MUCOUS THREADS UR QL AUTO: ABNORMAL
NEUTROPHILS # BLD AUTO: 7.21 THOUSANDS/ÂΜL (ref 1.85–7.62)
NEUTS SEG NFR BLD AUTO: 77 % (ref 43–75)
NITRITE UR QL STRIP: NEGATIVE
NON-SQ EPI CELLS URNS QL MICRO: ABNORMAL /HPF
NRBC BLD AUTO-RTO: 0 /100 WBCS
PH UR STRIP.AUTO: 6 [PH]
PLATELET # BLD AUTO: 343 THOUSANDS/UL (ref 149–390)
PMV BLD AUTO: 9.8 FL (ref 8.9–12.7)
POTASSIUM SERPL-SCNC: 3.1 MMOL/L (ref 3.5–5.3)
PROT SERPL-MCNC: 7.5 G/DL (ref 6.4–8.4)
PROT UR STRIP-MCNC: ABNORMAL MG/DL
RBC # BLD AUTO: 4.83 MILLION/UL (ref 3.81–5.12)
RBC #/AREA URNS AUTO: ABNORMAL /HPF
RSV RNA RESP QL NAA+PROBE: NEGATIVE
SARS-COV-2 RNA RESP QL NAA+PROBE: NEGATIVE
SODIUM SERPL-SCNC: 133 MMOL/L (ref 135–147)
SP GR UR STRIP.AUTO: 1.02 (ref 1–1.04)
UROBILINOGEN UA: NEGATIVE MG/DL
WBC # BLD AUTO: 9.52 THOUSAND/UL (ref 4.31–10.16)
WBC #/AREA URNS AUTO: ABNORMAL /HPF

## 2023-11-27 PROCEDURE — 96374 THER/PROPH/DIAG INJ IV PUSH: CPT

## 2023-11-27 PROCEDURE — 99284 EMERGENCY DEPT VISIT MOD MDM: CPT | Performed by: EMERGENCY MEDICINE

## 2023-11-27 PROCEDURE — 96361 HYDRATE IV INFUSION ADD-ON: CPT

## 2023-11-27 PROCEDURE — 99284 EMERGENCY DEPT VISIT MOD MDM: CPT

## 2023-11-27 PROCEDURE — 96375 TX/PRO/DX INJ NEW DRUG ADDON: CPT

## 2023-11-27 PROCEDURE — 36415 COLL VENOUS BLD VENIPUNCTURE: CPT | Performed by: EMERGENCY MEDICINE

## 2023-11-27 PROCEDURE — 83690 ASSAY OF LIPASE: CPT | Performed by: EMERGENCY MEDICINE

## 2023-11-27 PROCEDURE — 81001 URINALYSIS AUTO W/SCOPE: CPT | Performed by: EMERGENCY MEDICINE

## 2023-11-27 PROCEDURE — 85025 COMPLETE CBC W/AUTO DIFF WBC: CPT | Performed by: EMERGENCY MEDICINE

## 2023-11-27 PROCEDURE — 80053 COMPREHEN METABOLIC PANEL: CPT | Performed by: EMERGENCY MEDICINE

## 2023-11-27 PROCEDURE — 0241U HB NFCT DS VIR RESP RNA 4 TRGT: CPT | Performed by: EMERGENCY MEDICINE

## 2023-11-27 RX ORDER — ONDANSETRON 2 MG/ML
4 INJECTION INTRAMUSCULAR; INTRAVENOUS ONCE
Status: COMPLETED | OUTPATIENT
Start: 2023-11-27 | End: 2023-11-27

## 2023-11-27 RX ORDER — ACETAMINOPHEN 325 MG/1
975 TABLET ORAL ONCE
Status: COMPLETED | OUTPATIENT
Start: 2023-11-27 | End: 2023-11-27

## 2023-11-27 RX ORDER — POTASSIUM CHLORIDE 750 MG/1
40 TABLET, EXTENDED RELEASE ORAL ONCE
Status: COMPLETED | OUTPATIENT
Start: 2023-11-27 | End: 2023-11-27

## 2023-11-27 RX ORDER — KETOROLAC TROMETHAMINE 30 MG/ML
15 INJECTION, SOLUTION INTRAMUSCULAR; INTRAVENOUS ONCE
Status: COMPLETED | OUTPATIENT
Start: 2023-11-27 | End: 2023-11-27

## 2023-11-27 RX ADMIN — ONDANSETRON 4 MG: 2 INJECTION INTRAMUSCULAR; INTRAVENOUS at 22:39

## 2023-11-27 RX ADMIN — SODIUM CHLORIDE 1000 ML: 0.9 INJECTION, SOLUTION INTRAVENOUS at 22:38

## 2023-11-27 RX ADMIN — ACETAMINOPHEN 975 MG: 325 TABLET ORAL at 22:28

## 2023-11-27 RX ADMIN — KETOROLAC TROMETHAMINE 15 MG: 30 INJECTION, SOLUTION INTRAMUSCULAR; INTRAVENOUS at 22:34

## 2023-11-27 RX ADMIN — POTASSIUM CHLORIDE 40 MEQ: 750 TABLET, EXTENDED RELEASE ORAL at 23:33

## 2023-11-27 NOTE — Clinical Note
Sam Ladmarcela was seen and treated in our emergency department on 11/27/2023. Diagnosis: confidenital    Marrisanjay Bassam  may return to work on return date. She may return on this date: 11/30/2023         If you have any questions or concerns, please don't hesitate to call.       Chloé Summers MD    ______________________________           _______________          _______________  [facility] Representative                              Date                                Time

## 2023-11-28 VITALS
HEART RATE: 80 BPM | RESPIRATION RATE: 18 BRPM | TEMPERATURE: 98.3 F | BODY MASS INDEX: 36.44 KG/M2 | DIASTOLIC BLOOD PRESSURE: 68 MMHG | WEIGHT: 225.75 LBS | OXYGEN SATURATION: 95 % | SYSTOLIC BLOOD PRESSURE: 107 MMHG

## 2023-11-28 RX ORDER — ONDANSETRON 4 MG/1
4 TABLET, ORALLY DISINTEGRATING ORAL EVERY 6 HOURS PRN
Qty: 10 TABLET | Refills: 0 | Status: SHIPPED | OUTPATIENT
Start: 2023-11-28

## 2023-11-28 NOTE — ED PROVIDER NOTES
History  Chief Complaint   Patient presents with    Vomiting     Vomiting and diarrhea since last night , c.o body aches and chills       HPI    29 yo F hx of anemia, obesity, DM presents to ed for eval of n/v/d chills body aches. Started yesterday. No fevers. No sick contacts as far as she knows. No cough congestion. Not vaccinated for covid flu. Theraflu, last taken at 1 pm. No abd pain, no chest pain. No blood in vomit or stool. LMP: hysterectomy. Prior to Admission Medications   Prescriptions Last Dose Informant Patient Reported? Taking? Cholecalciferol (Vitamin D) 50 MCG ( UT) tablet  Self No No   Sig: Take 1 tablet (2,000 Units total) by mouth daily   Diclofenac Sodium (VOLTAREN) 1 %   No No   Sig: Apply 2 g topically 4 (four) times a day   Patient not taking: Reported on 2023   Magnesium 250 MG TABS  Self No No   Si pill a day   Riboflavin (Vitamin B-2) 100 MG TABS  Self No No   Si pills a day   Rimegepant Sulfate (Nurtec) 75 MG TBDP  Self No No   Sig: Take 1 pill at the onset of headache   Patient not taking: Reported on 2023   Symbicort 160-4.5 MCG/ACT inhaler  Self No No   Sig: INHALE 2 PUFFS BY MOUTH 2 TIMES A DAY RINSE MOUTH AFTER USE.   acetaminophen (TYLENOL) 325 mg tablet   No No   Sig: Take 3 tablets (975 mg total) by mouth every 6 (six) hours as needed for mild pain   acetaminophen (TYLENOL) 500 mg tablet  Self No No   Sig: Take 1 tablet (500 mg total) by mouth every 6 (six) hours as needed for mild pain   Patient not taking: Reported on 2023   acetaminophen (TYLENOL) 650 mg CR tablet  Self No No   Sig: Take 1 tablet (650 mg total) by mouth every 8 (eight) hours as needed for mild pain or moderate pain   bisacodyl 5 MG EC tablet  Self No No   Sig: TAKE 2 TABLETS BY MOUTH DAILY AS NEEDED FOR CONSTIPATION.    Patient not taking: Reported on 11/10/2022   dexamethasone (DECADRON) 2 mg tablet  Self No No   Sig: Take 1 tablet (2 mg total) by mouth 2 (two) times a day with meals   docusate sodium (COLACE) 100 mg capsule  Self No No   Sig: Take 1 capsule (100 mg total) by mouth 2 (two) times a day   Patient not taking: Reported on 11/10/2022   ergocalciferol (ERGOCALCIFEROL) 1.25 MG (52767 UT) capsule  Self No No   Sig: Take 1 capsule (50,000 Units total) by mouth once a week   fluticasone (FLONASE) 50 mcg/act nasal spray  Self No No   Sig: SPRAY 1 SPRAY INTO EACH NOSTRIL EVERY DAY   folic acid (FOLVITE) 1 mg tablet   No No   Sig: TAKE 1 TABLET BY MOUTH EVERY DAY   ibuprofen (MOTRIN) 400 mg tablet   No No   Sig: Take 1 tablet (400 mg total) by mouth every 6 (six) hours as needed for mild pain for up to 10 days   ibuprofen (MOTRIN) 600 mg tablet   No No   Sig: Take 1 tablet (600 mg total) by mouth every 6 (six) hours as needed for mild pain   loratadine (CLARITIN) 10 mg tablet  Self No No   Sig: Take 1 tablet (10 mg total) by mouth daily   metFORMIN (GLUCOPHAGE) 1000 MG tablet   No No   Sig: Take 1 tablet (1,000 mg total) by mouth 2 (two) times a day with meals   methimazole (TAPAZOLE) 5 mg tablet   No No   Sig: Take 1 tablet (5 mg total) by mouth daily   methocarbamol (ROBAXIN) 500 mg tablet   No No   Sig: Take 1 tablet (500 mg total) by mouth 2 (two) times a day   naproxen (EC NAPROSYN) 500 MG EC tablet   No No   Sig: Take 1 tablet (500 mg total) by mouth 2 (two) times a day with meals   ondansetron (ZOFRAN-ODT) 4 mg disintegrating tablet  Self No No   Sig: Take 1 tablet (4 mg total) by mouth every 8 (eight) hours as needed for nausea for up to 10 days   prochlorperazine (COMPAZINE) 10 mg tablet  Self No No   Sig: Take 1 tab up to BID prn migraine or nausea, ideally no more than 3 days per week   Patient not taking: Reported on 2023   pseudoephedrine (SUDAFED) 30 mg tablet   No No   Sig: Take 1 tablet (30 mg total) by mouth every 4 (four) hours as needed for congestion   topiramate (TOPAMAX) 25 mg tablet  Self No No   Si tab PO QHS for 1 week, increase as tolerated to 1 tab BID for 1 week, then 1 tab QAM and 2 tabs QHS for 1 week and finish at 2 tabs BID.   vitamin B-12 (VITAMIN B-12) 1,000 mcg tablet   No No   Sig: TAKE 1 TABLET BY MOUTH EVERY DAY      Facility-Administered Medications: None       Past Medical History:   Diagnosis Date    Anemia, postpartum 2019    Delivery by  hysterectomy 10/14/2019    Disease of thyroid gland     History of diet-controlled gestational diabetes mellitus 2019    Obesity     Pap smear abnormality of cervix with LGSIL 2016    Varicella     Had as a child    Varicose vein of leg        Past Surgical History:   Procedure Laterality Date     SECTION      CYSTOSCOPY N/A 10/14/2019    Procedure: Juanda Maid;  Surgeon: Saniya Ramos MD;  Location: [facility];  Service: Gynecology    DILATION AND EVACUATION      Molar    HYSTERECTOMY N/A 10/14/2019    Procedure: RAY, BILATERAL SALPINGECTOMY;  Surgeon: Saniya Ramos MD;  Location: [facility];  Service: Gynecology    LAPAROSCOPY      VT  DELIVERY ONLY N/A 10/14/2019    Procedure: Jero Shaw () REPEAT;  Surgeon: Saniya Ramos MD;  Location: [facility];  Service: Gynecology       Family History   Problem Relation Age of Onset    Hypertension Mother     Lupus Mother     Pulmonary embolism Mother     Anuerysm Mother     No Known Problems Father     Asthma Sister     No Known Problems Daughter     No Known Problems Daughter     Other Maternal Grandmother         Pre-diabetic    No Known Problems Brother     Heart murmur Brother     No Known Problems Son     No Known Problems Son     Asthma Son     No Known Problems Maternal Aunt     Breast cancer Paternal Aunt         age dx unknown    Thyroid disease unspecified Family         Cousin     I have reviewed and agree with the history as documented.     E-Cigarette/Vaping    E-Cigarette Use Never User      E-Cigarette/Vaping Substances    Nicotine No     THC No     CBD No     Flavoring No     Other No Unknown No      Social History     Tobacco Use    Smoking status: Never    Smokeless tobacco: Never   Vaping Use    Vaping Use: Never used   Substance Use Topics    Alcohol use: Not Currently     Comment: occ. Drug use: Never       Review of Systems   Constitutional:  Positive for chills. Negative for fatigue and fever. HENT:  Negative for sore throat. Eyes:  Negative for redness and visual disturbance. Respiratory:  Negative for cough and shortness of breath. Cardiovascular:  Negative for chest pain. Gastrointestinal:  Positive for diarrhea, nausea and vomiting. Negative for abdominal pain. Genitourinary:  Negative for difficulty urinating, dysuria and pelvic pain. Musculoskeletal:  Negative for back pain. Skin:  Negative for rash. Neurological:  Negative for syncope, weakness and headaches. All other systems reviewed and are negative. Physical Exam  Physical Exam  Vitals and nursing note reviewed. Constitutional:       General: She is not in acute distress. HENT:      Head: Normocephalic and atraumatic. Right Ear: External ear normal.      Left Ear: External ear normal.   Eyes:      Extraocular Movements: Extraocular movements intact. Conjunctiva/sclera: Conjunctivae normal.   Cardiovascular:      Rate and Rhythm: Normal rate and regular rhythm. Heart sounds: Normal heart sounds. Pulmonary:      Effort: Pulmonary effort is normal. No respiratory distress. Breath sounds: Normal breath sounds. Abdominal:      General: Abdomen is flat. Tenderness: There is no abdominal tenderness. Musculoskeletal:         General: Normal range of motion. Cervical back: Normal range of motion. Skin:     General: Skin is warm and dry. Neurological:      Mental Status: She is alert and oriented to person, place, and time. Cranial Nerves: No cranial nerve deficit. Motor: No abnormal muscle tone.       Coordination: Coordination normal.         Vital Signs  ED Triage Vitals   Temperature Pulse Respirations Blood Pressure SpO2   11/27/23 2156 11/27/23 2156 11/27/23 2156 11/27/23 2156 11/27/23 2156   98.3 °F (36.8 °C) 104 18 133/72 95 %      Temp Source Heart Rate Source Patient Position - Orthostatic VS BP Location FiO2 (%)   11/27/23 2156 11/27/23 2156 11/27/23 2156 11/27/23 2156 --   Oral Monitor Sitting Left arm       Pain Score       11/27/23 2228       7           Vitals:    11/27/23 2156 11/28/23 0052   BP: 133/72 107/68   Pulse: 104 80   Patient Position - Orthostatic VS: Sitting Sitting         Visual Acuity      ED Medications  Medications   sodium chloride 0.9 % bolus 1,000 mL (0 mL Intravenous Stopped 11/28/23 0051)   ketorolac (TORADOL) injection 15 mg (15 mg Intravenous Given 11/27/23 2234)   acetaminophen (TYLENOL) tablet 975 mg (975 mg Oral Given 11/27/23 2228)   ondansetron (ZOFRAN) injection 4 mg (4 mg Intravenous Given 11/27/23 2239)   potassium chloride (K-DUR,KLOR-CON) CR tablet 40 mEq (40 mEq Oral Given 11/27/23 2333)       Diagnostic Studies  Results Reviewed       Procedure Component Value Units Date/Time    Urine Microscopic [[id number]]  (Abnormal) Collected: 11/27/23 2328    Lab Status: Final result Specimen: Urine, Other Updated: 11/27/23 2338     RBC, UA 0-1 /hpf      WBC, UA 0-1 /hpf      Epithelial Cells Occasional /hpf      Bacteria, UA Moderate /hpf      MUCUS THREADS Occasional    UA (URINE) with reflex to Scope [[id number]]  (Abnormal) Collected: 11/27/23 2328    Lab Status: Final result Specimen: Urine, Other Updated: 11/27/23 2335     Color, UA Kylee     Clarity, UA Clear     Specific Gravity, UA 1.020     pH, UA 6.0     Leukocytes, UA Negative     Nitrite, UA Negative     Protein, UA 30 (1+) mg/dl      Glucose, UA Negative mg/dl      Ketones, UA Negative mg/dl      Bilirubin, UA Negative     Occult Blood, UA 10.0     UROBILINOGEN UA Negative mg/dL     FLU/RSV/COVID - if FLU/RSV clinically relevant [[id number]]  (Normal) Collected: 11/27/23 2241    Lab Status: Final result Specimen: Nares from Nose Updated: 11/27/23 3764     SARS-CoV-2 Negative     INFLUENZA A PCR Negative     INFLUENZA B PCR Negative     RSV PCR Negative    Narrative:      FOR PEDIATRIC PATIENTS - copy/paste COVID Guidelines URL to browser: [email or web address]    SARS-CoV-2 assay is a Nucleic Acid Amplification assay intended for the  qualitative detection of nucleic acid from SARS-CoV-2 in nasopharyngeal  swabs. Results are for the presumptive identification of SARS-CoV-2 RNA. Positive results are indicative of infection with SARS-CoV-2, the virus  causing COVID-19, but do not rule out bacterial infection or co-infection  with other viruses. Laboratories within the [facility] and its  territories are required to report all positive results to the appropriate  public health authorities. Negative results do not preclude SARS-CoV-2  infection and should not be used as the sole basis for treatment or other  patient management decisions. Negative results must be combined with  clinical observations, patient history, and epidemiological information. This test has not been FDA cleared or approved. This test has been authorized by FDA under an Emergency Use Authorization  (EUA). This test is only authorized for the duration of time the  declaration that circumstances exist justifying the authorization of the  emergency use of an in vitro diagnostic tests for detection of SARS-CoV-2  virus and/or diagnosis of COVID-19 infection under section 564(b)(1) of  the Act, 21 U. S.C. 614THC-8(F)(9), unless the authorization is terminated  or revoked sooner. The test has been validated but independent review by FDA  and CLIA is pending. Test performed using [employer]: This RT-PCR assay targets N2,  a region unique to SARS-CoV-2.  A conserved region in the E-gene was chosen  for pan-Sarbecovirus detection which includes SARS-CoV-2. According to CMS-2020-01-R, this platform meets the definition of high-throughput technology.     Comprehensive metabolic panel [[id number]]  (Abnormal) Collected: 11/27/23 2238    Lab Status: Final result Specimen: Blood from Arm, Left Updated: 11/27/23 2306     Sodium 133 mmol/L      Potassium 3.1 mmol/L      Chloride 98 mmol/L      CO2 25 mmol/L      ANION GAP 10 mmol/L      BUN 8 mg/dL      Creatinine 0.60 mg/dL      Glucose 135 mg/dL      Calcium 8.9 mg/dL      AST 17 U/L      ALT 13 U/L      Alkaline Phosphatase 72 U/L      Total Protein 7.5 g/dL      Albumin 4.0 g/dL      Total Bilirubin 0.71 mg/dL      eGFR 119 ml/min/1.73sq m     Narrative:      [place] guidelines for Chronic Kidney Disease (CKD):     Stage 1 with normal or high GFR (GFR > 90 mL/min/1.73 square meters)    Stage 2 Mild CKD (GFR = 60-89 mL/min/1.73 square meters)    Stage 3A Moderate CKD (GFR = 45-59 mL/min/1.73 square meters)    Stage 3B Moderate CKD (GFR = 30-44 mL/min/1.73 square meters)    Stage 4 Severe CKD (GFR = 15-29 mL/min/1.73 square meters)    Stage 5 End Stage CKD (GFR <15 mL/min/1.73 square meters)  Note: GFR calculation is accurate only with a steady state creatinine    Lipase [[id number]]  (Normal) Collected: 11/27/23 2238    Lab Status: Final result Specimen: Blood from Arm, Left Updated: 11/27/23 2306     Lipase 12 u/L     CBC and differential [[id number]]  (Abnormal) Collected: 11/27/23 2238    Lab Status: Final result Specimen: Blood from Arm, Left Updated: 11/27/23 2250     WBC 9.52 Thousand/uL      RBC 4.83 Million/uL      Hemoglobin 12.2 g/dL      Hematocrit 38.6 %      MCV 80 fL      MCH 25.3 pg      MCHC 31.6 g/dL      RDW 14.6 %      MPV 9.8 fL      Platelets 249 Thousands/uL      nRBC 0 /100 WBCs      Neutrophils Relative 77 %      Immat GRANS % 0 %      Lymphocytes Relative 15 %      Monocytes Relative 8 %      Eosinophils Relative 0 %      Basophils Relative 0 % Neutrophils Absolute 7.21 Thousands/µL      Immature Grans Absolute 0.04 Thousand/uL      Lymphocytes Absolute 1.45 Thousands/µL      Monocytes Absolute 0.79 Thousand/µL      Eosinophils Absolute 0.01 Thousand/µL      Basophils Absolute 0.02 Thousands/µL                    No orders to display              Procedures  Procedures         ED Course  ED Course as of 11/28/23 0529   Mon Nov 27, 2023   2347 Leukocytes, UA: Negative   2347 Nitrite, UA: Negative   2347 Bacteria, UA(!): Moderate  Patient has no leuks or nitrites, she denies urinary symptoms as well. Per [employer] guidelines, will not treat asymptomatic bacteriuria in non pregnant female. 2348 Color, UA(!): Kylee                               SBIRT 22yo+      Flowsheet Row Most Recent Value   Initial Alcohol Screen: US AUDIT-C     1. How often do you have a drink containing alcohol? 0 Filed at: 11/27/2023 2156   2. How many drinks containing alcohol do you have on a typical day you are drinking? 0 Filed at: 11/27/2023 2156   3a. Male UNDER 65: How often do you have five or more drinks on one occasion? 0 Filed at: 11/27/2023 2156   3b. FEMALE Any Age, or MALE 65+: How often do you have 4 or more drinks on one occassion? 0 Filed at: 11/27/2023 2156   Audit-C Score 0 Filed at: 11/27/2023 2156   BAILEY: How many times in the past year have you. .. Used an illegal drug or used a prescription medication for non-medical reasons? Never Filed at: 11/27/2023 2156                      Medical Decision Making  Amount and/or Complexity of Data Reviewed  Labs: ordered. Decision-making details documented in ED Course. Risk  OTC drugs. Prescription drug management. History Provided by patient     Differential considered a  Upper abd pain: GERD, at risk for cholecystitis cholelithiasis pancreatitis gastroenteritis     Labs looking for any acute changes to wbc count or any acute electrolyte abnormalities.  LFTs for acute liver pathology, lipase for pancreatitis. Symptomatic treatments provided. Labs within normal limits except for mild hypokalemia, repleted. No signs of acute infection. No leukocytosis or LFT elevation. UA for ketones / infection. Will not treat, see ed course. Lipase wnl. No signs of acute infection. Symptomatic relief after treatments on re eval. Passed PO intake. PCP follow up. The patient was instructed to follow up as documented. Strict return precautions were discussed with the patient and the patient was instructed to return to the emergency department immediately if symptoms worsen. The patient/patient family member acknowledged and were in agreement with plan. Disposition  Final diagnoses:   Nausea and vomiting, unspecified vomiting type   Body aches   Gastroenteritis     Time reflects when diagnosis was documented in both MDM as applicable and the Disposition within this note       Time User Action Codes Description Comment    11/28/2023 12:29 AM Bearl Blew Add [R11.2] Nausea and vomiting, unspecified vomiting type     11/28/2023 12:29 AM Bearl Blew Add [R52] Body aches     11/28/2023 12:30 AM Bearl Blew Add [K52.9] Gastroenteritis           ED Disposition       ED Disposition   Discharge    Condition   Stable    Date/Time   Tue Nov 28, 2023 0029    [address] discharge to home/self care.                    Follow-up Information       Follow up With Specialties Details Why Contact Info    Jamari Neil MD Internal Medicine Schedule an appointment as soon as possible for a visit in 3 days For follow up regarding your symptoms and recheck [address] [phone]              Discharge Medication List as of 11/28/2023 12:31 AM        CONTINUE these medications which have CHANGED    Details   ondansetron (ZOFRAN-ODT) 4 mg disintegrating tablet Take 1 tablet (4 mg total) by mouth every 6 (six) hours as needed for nausea or vomiting, Starting Tue 11/28/2023, Normal           CONTINUE these medications which have NOT CHANGED    Details   !! acetaminophen (TYLENOL) 325 mg tablet Take 3 tablets (975 mg total) by mouth every 6 (six) hours as needed for mild pain, Starting Tue 9/5/2023, Normal      !! acetaminophen (TYLENOL) 500 mg tablet Take 1 tablet (500 mg total) by mouth every 6 (six) hours as needed for mild pain, Starting Thu 11/3/2022, Normal      acetaminophen (TYLENOL) 650 mg CR tablet Take 1 tablet (650 mg total) by mouth every 8 (eight) hours as needed for mild pain or moderate pain, Starting Thu 9/10/2020, Print      bisacodyl 5 MG EC tablet TAKE 2 TABLETS BY MOUTH DAILY AS NEEDED FOR CONSTIPATION., Normal      Cholecalciferol (Vitamin D) 50 MCG (2000 UT) tablet Take 1 tablet (2,000 Units total) by mouth daily, Starting Thu 11/18/2021, Normal      dexamethasone (DECADRON) 2 mg tablet Take 1 tablet (2 mg total) by mouth 2 (two) times a day with meals, Starting Thu 3/30/2023, Normal      Diclofenac Sodium (VOLTAREN) 1 % Apply 2 g topically 4 (four) times a day, Starting Wed 7/5/2023, Normal      docusate sodium (COLACE) 100 mg capsule Take 1 capsule (100 mg total) by mouth 2 (two) times a day, Starting Tue 4/5/2022, Normal      ergocalciferol (ERGOCALCIFEROL) 1.25 MG (35421 UT) capsule Take 1 capsule (50,000 Units total) by mouth once a week, Starting Fri 1/13/2023, Normal      fluticasone (FLONASE) 50 mcg/act nasal spray SPRAY 1 SPRAY INTO EACH NOSTRIL EVERY DAY, Normal      folic acid (FOLVITE) 1 mg tablet TAKE 1 TABLET BY MOUTH EVERY DAY, Normal      ibuprofen (MOTRIN) 600 mg tablet Take 1 tablet (600 mg total) by mouth every 6 (six) hours as needed for mild pain, Starting Tue 9/5/2023, Normal      loratadine (CLARITIN) 10 mg tablet Take 1 tablet (10 mg total) by mouth daily, Starting Thu 11/3/2022, Normal      Magnesium 250 MG TABS 1 pill a day, Normal      metFORMIN (GLUCOPHAGE) 1000 MG tablet Take 1 tablet (1,000 mg total) by mouth 2 (two) times a day with meals, Starting Tue 4/25/2023, Normal      methimazole (TAPAZOLE) 5 mg tablet Take 1 tablet (5 mg total) by mouth daily, Starting Tue 4/25/2023, Normal      methocarbamol (ROBAXIN) 500 mg tablet Take 1 tablet (500 mg total) by mouth 2 (two) times a day, Starting Tue 9/19/2023, Normal      naproxen (EC NAPROSYN) 500 MG EC tablet Take 1 tablet (500 mg total) by mouth 2 (two) times a day with meals, Starting Wed 7/5/2023, Until Thu 7/4/2024, Normal      prochlorperazine (COMPAZINE) 10 mg tablet Take 1 tab up to BID prn migraine or nausea, ideally no more than 3 days per week, Normal      pseudoephedrine (SUDAFED) 30 mg tablet Take 1 tablet (30 mg total) by mouth every 4 (four) hours as needed for congestion, Starting Tue 9/5/2023, Normal      Riboflavin (Vitamin B-2) 100 MG TABS 2 pills a day, Normal      Rimegepant Sulfate (Nurtec) 75 MG TBDP Take 1 pill at the onset of headache, Normal      Symbicort 160-4.5 MCG/ACT inhaler INHALE 2 PUFFS BY MOUTH 2 TIMES A DAY RINSE MOUTH AFTER USE., Normal      topiramate (TOPAMAX) 25 mg tablet 1 tab PO QHS for 1 week, increase as tolerated to 1 tab BID for 1 week, then 1 tab QAM and 2 tabs QHS for 1 week and finish at 2 tabs BID., Normal      vitamin B-12 (VITAMIN B-12) 1,000 mcg tablet TAKE 1 TABLET BY MOUTH EVERY DAY, Starting Mon 7/24/2023, Normal       !! - Potential duplicate medications found. Please discuss with provider. No discharge procedures on file.     PDMP Review         Value Time User    PDMP Reviewed  Yes 4/26/2023 10:29 AM Anna Gonzales MD            ED Provider  Electronically Signed by             Joanna Power MD  11/28/23 4081

## 2024-04-01 LAB

## 2024-05-24 NOTE — PLAN OF CARE
Problem: ANTEPARTUM  Goal: Maintain pregnancy as long as maternal and/or fetal condition is stable  Description  INTERVENTIONS:  - Maternal surveillance  - Fetal surveillance  - Monitor uterine activity  - Medications as ordered  - Bedrest  Outcome: Progressing     Problem: PAIN - ADULT  Goal: Verbalizes/displays adequate comfort level or baseline comfort level  Description  Interventions:  - Encourage patient to monitor pain and request assistance  - Assess pain using appropriate pain scale  - Administer analgesics based on type and severity of pain and evaluate response  - Implement non-pharmacological measures as appropriate and evaluate response  - Consider cultural and social influences on pain and pain management  - Notify physician/advanced practitioner if interventions unsuccessful or patient reports new pain  Outcome: Progressing     Problem: INFECTION - ADULT  Goal: Absence or prevention of progression during hospitalization  Description  INTERVENTIONS:  - Assess and monitor for signs and symptoms of infection  - Monitor lab/diagnostic results  - Monitor all insertion sites, i e  indwelling lines, tubes, and drains  - Monitor endotracheal if appropriate and nasal secretions for changes in amount and color  - [place] appropriate cooling/warming therapies per order  - Administer medications as ordered  - Instruct and encourage patient and family to use good hand hygiene technique  - Identify and instruct in appropriate isolation precautions for identified infection/condition  Outcome: Progressing  Goal: Absence of fever/infection during neutropenic period  Description  INTERVENTIONS:  - Monitor WBC    Outcome: Progressing     Problem: SAFETY ADULT  Goal: Patient will remain free of falls  Description  INTERVENTIONS:  - Assess patient frequently for physical needs  -  Identify cognitive and physical deficits and behaviors that affect risk of falls    -  [place] fall precautions as indicated by assessment   - Educate patient/family on patient safety including physical limitations  - Instruct patient to call for assistance with activity based on assessment  - Modify environment to reduce risk of injury  - Consider OT/PT consult to assist with strengthening/mobility  Outcome: Progressing  Goal: Maintain or return to baseline ADL function  Description  INTERVENTIONS:  -  Assess patient's ability to carry out ADLs; assess patient's baseline for ADL function and identify physical deficits which impact ability to perform ADLs (bathing, care of mouth/teeth, toileting, grooming, dressing, etc )  - Assess/evaluate cause of self-care deficits   - Assess range of motion  - Assess patient's mobility; develop plan if impaired  - Assess patient's need for assistive devices and provide as appropriate  - Encourage maximum independence but intervene and supervise when necessary  - Involve family in performance of ADLs  - Assess for home care needs following discharge   - Consider OT consult to assist with ADL evaluation and planning for discharge  - Provide patient education as appropriate  Outcome: Progressing  Goal: Maintain or return mobility status to optimal level  Description  INTERVENTIONS:  - Assess patient's baseline mobility status (ambulation, transfers, stairs, etc )    - Identify cognitive and physical deficits and behaviors that affect mobility  - Identify mobility aids required to assist with transfers and/or ambulation (gait belt, sit-to-stand, lift, walker, cane, etc )  - [place] fall precautions as indicated by assessment  - Record patient progress and toleration of activity level on Mobility SBAR; progress patient to next Phase/Stage  - Instruct patient to call for assistance with activity based on assessment  - Consider rehabilitation consult to assist with strengthening/weightbearing, etc   Outcome: Progressing     Problem: DISCHARGE PLANNING  Goal: Discharge to home or other facility with appropriate resources  Description  INTERVENTIONS:  - Identify barriers to discharge w/patient and caregiver  - Arrange for needed discharge resources and transportation as appropriate  - Identify discharge learning needs (meds, wound care, etc )  - Arrange for interpretive services to assist at discharge as needed  - Refer to Case Management Department for coordinating discharge planning if the patient needs post-hospital services based on physician/advanced practitioner order or complex needs related to functional status, cognitive ability, or social support system  Outcome: Progressing     Problem: Knowledge Deficit  Goal: Patient/family/caregiver demonstrates understanding of disease process, treatment plan, medications, and discharge instructions  Description  Complete learning assessment and assess knowledge base    Interventions:  - Provide teaching at level of understanding  - Provide teaching via preferred learning methods  Outcome: Progressing 2 = A lot of assistance

## 2024-10-01 ENCOUNTER — HOSPITAL ENCOUNTER (EMERGENCY)
Facility: HOSPITAL | Age: 34
Discharge: HOME | End: 2024-10-01
Payer: COMMERCIAL

## 2024-10-01 VITALS
HEART RATE: 78 BPM | WEIGHT: 239.7 LBS | DIASTOLIC BLOOD PRESSURE: 81 MMHG | RESPIRATION RATE: 20 BRPM | OXYGEN SATURATION: 100 % | BODY MASS INDEX: 38.69 KG/M2 | TEMPERATURE: 97.9 F | SYSTOLIC BLOOD PRESSURE: 157 MMHG

## 2024-10-01 DIAGNOSIS — M25.561 RIGHT KNEE PAIN: Primary | ICD-10-CM

## 2024-10-01 PROCEDURE — 99283 EMERGENCY DEPT VISIT LOW MDM: CPT

## 2024-10-01 PROCEDURE — 99284 EMERGENCY DEPT VISIT MOD MDM: CPT

## 2024-10-01 PROCEDURE — 96372 THER/PROPH/DIAG INJ SC/IM: CPT

## 2024-10-01 RX ORDER — KETOROLAC TROMETHAMINE 30 MG/ML
15 INJECTION, SOLUTION INTRAMUSCULAR; INTRAVENOUS ONCE
Status: COMPLETED | OUTPATIENT
Start: 2024-10-01 | End: 2024-10-01

## 2024-10-01 RX ORDER — NAPROXEN 500 MG/1
500 TABLET ORAL 2 TIMES DAILY WITH MEALS
Qty: 30 TABLET | Refills: 0 | Status: SHIPPED | OUTPATIENT
Start: 2024-10-01

## 2024-10-01 RX ADMIN — KETOROLAC TROMETHAMINE 15 MG: 30 INJECTION, SOLUTION INTRAMUSCULAR; INTRAVENOUS at 19:55

## 2024-10-01 RX ADMIN — DICLOFENAC SODIUM 2 G: 10 GEL TOPICAL at 19:55

## 2024-10-01 NOTE — Clinical Note
Shauna Rubio was seen and treated in our emergency department on 10/1/2024.                Diagnosis: Right knee pain    Shauna  may return to work on return date.    She may return on this date: 10/03/2024         If you have any questions or concerns, please don't hesitate to call.      Kevin Diaz MD    ______________________________           _______________          _______________  Hospital Representative                              Date                                Time

## 2024-10-01 NOTE — ED PROVIDER NOTES
Final diagnoses:   Right knee pain     ED Disposition       ED Disposition   Discharge    Condition   Stable    Date/Time   Tue Oct 1, 2024  7:50 PM    Comment   Shauna Rubio discharge to home/self care.                   Assessment & Plan       Medical Decision Making  Risk  Prescription drug management.    33 y/o F presenting with acute knee pain. VSS. No evidence of septic joint on exam. Likely IT band vs MSK pain vs osteoarthritis. No effusion to tap. XR unlikely to be diagnostically useful w/o hx of trauma. Will treat symptomatically. Pt prefers knee immobilizer for comfort, declines crutches. Recommend ortho f/u with strict RTED precautions.          Medications   ketorolac (TORADOL) injection 15 mg (15 mg Intramuscular Given 10/1/24 1955)   Diclofenac Sodium (VOLTAREN) 1 % topical gel 2 g (2 g Topical Given 10/1/24 1955)       ED Risk Strat Scores                           SBIRT 22yo+      Flowsheet Row Most Recent Value   Initial Alcohol Screen: US AUDIT-C     1. How often do you have a drink containing alcohol? 1 Filed at: 10/01/2024 1921   2. How many drinks containing alcohol do you have on a typical day you are drinking?  1 Filed at: 10/01/2024 1921   3b. FEMALE Any Age, or MALE 65+: How often do you have 4 or more drinks on one occassion? 1 Filed at: 10/01/2024 1921   Audit-C Score 3 Filed at: 10/01/2024 1921   [place]: How many times in the past year have you...    Used an illegal drug or used a prescription medication for non-medical reasons? Never Filed at: 10/01/2024 1921                            History of Present Illness       Chief Complaint   Patient presents with    Leg Pain     R thigh pain that happened suddenly while driving today. This happened before but never went to the doctors because it resolved on it's own. No medications PTA. Feels like it evan when she walks. Family hx of blood clots.       Past Medical History:   Diagnosis Date    Anemia, postpartum 11/11/2019    Delivery by   hysterectomy 10/14/2019    Disease of thyroid gland     History of diet-controlled gestational diabetes mellitus 2019    Obesity     Pap smear abnormality of cervix with LGSIL 2016    Varicella     Had as a child    Varicose vein of leg       Past Surgical History:   Procedure Laterality Date     SECTION      CYSTOSCOPY N/A 10/14/2019    Procedure: CYSTOSCOPY;  Surgeon: Chana Kam MD;  Location: [facility];  Service: Gynecology    DILATION AND EVACUATION      [place]    HYSTERECTOMY N/A 10/14/2019    Procedure: RAY, BILATERAL SALPINGECTOMY;  Surgeon: Chana Kam MD;  Location: [facility];  Service: Gynecology    LAPAROSCOPY      [place]  DELIVERY ONLY N/A 10/14/2019    Procedure:  SECTION () REPEAT;  Surgeon: Chana Kam MD;  Location: [facility];  Service: Gynecology      Family History   Problem Relation Age of Onset    Hypertension Mother     Lupus Mother     Pulmonary embolism Mother     Anuerysm Mother     No Known Problems Father     Asthma Sister     No Known Problems Daughter     No Known Problems Daughter     Other Maternal Grandmother         Pre-diabetic    No Known Problems Brother     Heart murmur Brother     No Known Problems Son     No Known Problems Son     Asthma Son     No Known Problems Maternal Aunt     Breast cancer Paternal Aunt         age dx unknown    Thyroid disease unspecified Family         Cousin      Social History     Tobacco Use    Smoking status: Never    Smokeless tobacco: Never   Vaping Use    Vaping status: Never Used   Substance Use Topics    Alcohol use: Yes     Comment: occ.    Drug use: Never      E-Cigarette/Vaping    E-Cigarette Use Never User       E-Cigarette/Vaping Substances    Nicotine No     THC No     CBD No     Flavoring No     Other No     Unknown No       I have reviewed and agree with the history as documented.     HPI    35 y/o F presenting with acute R knee pain. No known injury. Pain suddenly got worse today  while in the car. Feels pain lateral aspect of knee shooting upwards. No fever. No other joint pain. States something similar happened before but was short lived. Feels like knee is swollen.     Review of Systems   Constitutional:  Negative for chills and fever.   HENT:  Negative for ear pain and sore throat.    Eyes:  Negative for pain and visual disturbance.   Respiratory:  Negative for cough and shortness of breath.    Cardiovascular:  Negative for chest pain and palpitations.   Gastrointestinal:  Negative for abdominal pain and vomiting.   Genitourinary:  Negative for dysuria and hematuria.   Musculoskeletal:  Negative for arthralgias and back pain.   Skin:  Negative for color change and rash.   Neurological:  Negative for seizures and syncope.   All other systems reviewed and are negative.          Objective       ED Triage Vitals   Temperature Pulse Blood Pressure Respirations SpO2 Patient Position - Orthostatic VS   10/01/24 1916 10/01/24 1916 10/01/24 1916 10/01/24 1916 10/01/24 1916 10/01/24 1916   97.9 °F (36.6 °C) 78 157/81 20 100 % Sitting      Temp Source Heart Rate Source BP Location FiO2 (%) Pain Score    10/01/24 1916 10/01/24 1916 10/01/24 1916 -- 10/01/24 1955    Oral Monitor Left arm  5      Vitals      Date and Time Temp Pulse SpO2 Resp BP Pain Score FACES Pain Rating User   10/01/24 1955 -- -- -- -- -- 5 -- ST   10/01/24 1916 97.9 °F (36.6 °C) 78 100 % 20 157/81 -- -- MR            Physical Exam  Vitals and nursing note reviewed.   Constitutional:       General: She is not in acute distress.  HENT:      Head: Normocephalic and atraumatic.      Right Ear: External ear normal.      Left Ear: External ear normal.      Nose: Nose normal.      Mouth/Throat:      Pharynx: Oropharynx is clear.   Eyes:      Extraocular Movements: Extraocular movements intact.      Pupils: Pupils are equal, round, and reactive to light.   Cardiovascular:      Rate and Rhythm: Normal rate and regular rhythm.      Pulses:  Normal pulses.      Heart sounds: Normal heart sounds. No murmur heard.     No friction rub. No gallop.   Pulmonary:      Effort: Pulmonary effort is normal. No respiratory distress.      Breath sounds: Normal breath sounds. No wheezing, rhonchi or rales.   Abdominal:      General: Abdomen is flat. There is no distension.      Palpations: Abdomen is soft.      Tenderness: There is no abdominal tenderness. There is no guarding or rebound.   Musculoskeletal:         General: Tenderness present. No deformity.      Cervical back: Normal range of motion.      Right lower leg: No edema.      Left lower leg: No edema.      Comments: Increased pain with R knee flexion. No knee effusion. No redness/warmth. No Baker's cyst palpated. Tenderness at superior lateral aspect of knee joint extending into lateral thigh along IT band. NVI distally.    Skin:     General: Skin is warm and dry.      Capillary Refill: Capillary refill takes less than 2 seconds.      Findings: No rash.   Neurological:      General: No focal deficit present.      Mental Status: She is alert and oriented to person, place, and time.   Psychiatric:         Mood and Affect: Mood normal.         Results Reviewed       None            No orders to display       Procedures    ED Medication and Procedure Management   Prior to Admission Medications   Prescriptions Last Dose Informant Patient Reported? Taking?   Cholecalciferol (Vitamin D) 50 MCG ( UT) tablet  Self No No   Sig: Take 1 tablet (2,000 Units total) by mouth daily   Diclofenac Sodium (VOLTAREN) 1 %   No No   Sig: Apply 2 g topically 4 (four) times a day   Patient not taking: Reported on 2023   Magnesium 250 MG TABS  Self No No   Si pill a day   Riboflavin (Vitamin B-2) 100 MG TABS  Self No No   Si pills a day   Rimegepant Sulfate (Nurtec) 75 MG TBDP  Self No No   Sig: Take 1 pill at the onset of headache   Patient not taking: Reported on 2023   Symbicort 160-4.5 MCG/ACT inhaler  Self  No No   Sig: INHALE 2 PUFFS BY MOUTH 2 TIMES A DAY RINSE MOUTH AFTER USE.   acetaminophen (TYLENOL) 325 mg tablet   No No   Sig: Take 3 tablets (975 mg total) by mouth every 6 (six) hours as needed for mild pain   acetaminophen (TYLENOL) 500 mg tablet  Self No No   Sig: Take 1 tablet (500 mg total) by mouth every 6 (six) hours as needed for mild pain   Patient not taking: Reported on 1/5/2023   acetaminophen (TYLENOL) 650 mg CR tablet  Self No No   Sig: Take 1 tablet (650 mg total) by mouth every 8 (eight) hours as needed for mild pain or moderate pain   bisacodyl 5 MG EC tablet  Self No No   Sig: TAKE 2 TABLETS BY MOUTH DAILY AS NEEDED FOR CONSTIPATION.   Patient not taking: Reported on 11/10/2022   dexamethasone (DECADRON) 2 mg tablet  Self No No   Sig: Take 1 tablet (2 mg total) by mouth 2 (two) times a day with meals   docusate sodium (COLACE) 100 mg capsule  Self No No   Sig: Take 1 capsule (100 mg total) by mouth 2 (two) times a day   Patient not taking: Reported on 11/10/2022   ergocalciferol (ERGOCALCIFEROL) 1.25 MG (20831 UT) capsule  Self No No   Sig: Take 1 capsule (50,000 Units total) by mouth once a week   fluticasone (FLONASE) 50 mcg/act nasal spray  Self No No   Sig: SPRAY 1 SPRAY INTO EACH NOSTRIL EVERY DAY   folic acid (FOLVITE) 1 mg tablet   No No   Sig: TAKE 1 TABLET BY MOUTH EVERY DAY   ibuprofen (MOTRIN) 600 mg tablet   No No   Sig: Take 1 tablet (600 mg total) by mouth every 6 (six) hours as needed for mild pain   loratadine (CLARITIN) 10 mg tablet  Self No No   Sig: Take 1 tablet (10 mg total) by mouth daily   metFORMIN (GLUCOPHAGE) 1000 MG tablet   No No   Sig: Take 1 tablet (1,000 mg total) by mouth 2 (two) times a day with meals   methimazole (TAPAZOLE) 5 mg tablet   No No   Sig: Take 1 tablet (5 mg total) by mouth daily   methocarbamol (ROBAXIN) 500 mg tablet   No No   Sig: Take 1 tablet (500 mg total) by mouth 2 (two) times a day   naproxen (EC NAPROSYN) 500 MG EC tablet   No No   Sig:  Take 1 tablet (500 mg total) by mouth 2 (two) times a day with meals   ondansetron (ZOFRAN-ODT) 4 mg disintegrating tablet   No No   Sig: Take 1 tablet (4 mg total) by mouth every 6 (six) hours as needed for nausea or vomiting   prochlorperazine (COMPAZINE) 10 mg tablet  Self No No   Sig: Take 1 tab up to BID prn migraine or nausea, ideally no more than 3 days per week   Patient not taking: Reported on 2023   pseudoephedrine (SUDAFED) 30 mg tablet   No No   Sig: Take 1 tablet (30 mg total) by mouth every 4 (four) hours as needed for congestion   topiramate (TOPAMAX) 25 mg tablet  Self No No   Si tab PO QHS for 1 week, increase as tolerated to 1 tab BID for 1 week, then 1 tab QAM and 2 tabs QHS for 1 week and finish at 2 tabs BID.   vitamin B-12 (VITAMIN B-12) 1,000 mcg tablet   No No   Sig: TAKE 1 TABLET BY MOUTH EVERY DAY      Facility-Administered Medications: None     Discharge Medication List as of 10/1/2024  8:29 PM        START taking these medications    Details   naproxen (Naprosyn) 500 mg tablet Take 1 tablet (500 mg total) by mouth 2 (two) times a day with meals, Starting Tue 10/1/2024, Normal           CONTINUE these medications which have NOT CHANGED    Details   !! acetaminophen (TYLENOL) 325 mg tablet Take 3 tablets (975 mg total) by mouth every 6 (six) hours as needed for mild pain, Starting 2023, Normal      !! acetaminophen (TYLENOL) 500 mg tablet Take 1 tablet (500 mg total) by mouth every 6 (six) hours as needed for mild pain, Starting u 11/3/2022, Normal      acetaminophen (TYLENOL) 650 mg CR tablet Take 1 tablet (650 mg total) by mouth every 8 (eight) hours as needed for mild pain or moderate pain, Starting Thu 9/10/2020, Print      bisacodyl 5 MG EC tablet TAKE 2 TABLETS BY MOUTH DAILY AS NEEDED FOR CONSTIPATION., Normal      Cholecalciferol (Vitamin D) 50 MCG (2000 UT) tablet Take 1 tablet (2,000 Units total) by mouth daily, Starting 2021, Normal      dexamethasone  (DECADRON) 2 mg tablet Take 1 tablet (2 mg total) by mouth 2 (two) times a day with meals, Starting Thu 3/30/2023, Normal      Diclofenac Sodium (VOLTAREN) 1 % Apply 2 g topically 4 (four) times a day, Starting Wed 7/5/2023, Normal      docusate sodium (COLACE) 100 mg capsule Take 1 capsule (100 mg total) by mouth 2 (two) times a day, Starting Tue 4/5/2022, Normal      ergocalciferol (ERGOCALCIFEROL) 1.25 MG (09113 UT) capsule Take 1 capsule (50,000 Units total) by mouth once a week, Starting Fri 1/13/2023, Normal      fluticasone (FLONASE) 50 mcg/act nasal spray SPRAY 1 SPRAY INTO EACH NOSTRIL EVERY DAY, Normal      folic acid (FOLVITE) 1 mg tablet TAKE 1 TABLET BY MOUTH EVERY DAY, Normal      ibuprofen (MOTRIN) 600 mg tablet Take 1 tablet (600 mg total) by mouth every 6 (six) hours as needed for mild pain, Starting Tue 9/5/2023, Normal      loratadine (CLARITIN) 10 mg tablet Take 1 tablet (10 mg total) by mouth daily, Starting Thu 11/3/2022, Normal      Magnesium 250 MG TABS 1 pill a day, Normal      metFORMIN (GLUCOPHAGE) 1000 MG tablet Take 1 tablet (1,000 mg total) by mouth 2 (two) times a day with meals, Starting Tue 4/25/2023, Normal      methimazole (TAPAZOLE) 5 mg tablet Take 1 tablet (5 mg total) by mouth daily, Starting Tue 4/25/2023, Normal      methocarbamol (ROBAXIN) 500 mg tablet Take 1 tablet (500 mg total) by mouth 2 (two) times a day, Starting Tue 9/19/2023, Normal      naproxen (EC NAPROSYN) 500 MG EC tablet Take 1 tablet (500 mg total) by mouth 2 (two) times a day with meals, Starting Wed 7/5/2023, Until Thu 7/4/2024, Normal      ondansetron (ZOFRAN-ODT) 4 mg disintegrating tablet Take 1 tablet (4 mg total) by mouth every 6 (six) hours as needed for nausea or vomiting, Starting Tue 11/28/2023, Normal      prochlorperazine (COMPAZINE) 10 mg tablet Take 1 tab up to BID prn migraine or nausea, ideally no more than 3 days per week, Normal      pseudoephedrine (SUDAFED) 30 mg tablet Take 1 tablet (30  mg total) by mouth every 4 (four) hours as needed for congestion, Starting Tue 9/5/2023, Normal      Riboflavin (Vitamin B-2) 100 MG TABS 2 pills a day, Normal      Rimegepant Sulfate (Nurtec) 75 MG TBDP Take 1 pill at the onset of headache, Normal      Symbicort 160-4.5 MCG/ACT inhaler INHALE 2 PUFFS BY MOUTH 2 TIMES A DAY RINSE MOUTH AFTER USE., Normal      topiramate (TOPAMAX) 25 mg tablet 1 tab PO QHS for 1 week, increase as tolerated to 1 tab BID for 1 week, then 1 tab QAM and 2 tabs QHS for 1 week and finish at 2 tabs BID., Normal      vitamin B-12 (VITAMIN B-12) 1,000 mcg tablet TAKE 1 TABLET BY MOUTH EVERY DAY, Starting Mon 7/24/2023, Normal       !! - Potential duplicate medications found. Please discuss with provider.          ED SEPSIS DOCUMENTATION   Time reflects when diagnosis was documented in both MDM as applicable and the Disposition within this note       Time User Action Codes Description Comment    10/1/2024  7:50 PM Kevin Diaz Add [M25.561] Right knee pain                  Kevin Diaz MD  10/01/24 2552

## 2024-10-09 ENCOUNTER — APPOINTMENT (OUTPATIENT)
Dept: RADIOLOGY | Facility: MEDICAL CENTER | Age: 34
End: 2024-10-09
Payer: COMMERCIAL

## 2024-10-09 ENCOUNTER — OFFICE VISIT (OUTPATIENT)
Dept: OBGYN CLINIC | Facility: MEDICAL CENTER | Age: 34
End: 2024-10-09
Payer: COMMERCIAL

## 2024-10-09 VITALS
DIASTOLIC BLOOD PRESSURE: 68 MMHG | WEIGHT: 236 LBS | BODY MASS INDEX: 37.93 KG/M2 | SYSTOLIC BLOOD PRESSURE: 104 MMHG | HEIGHT: 66 IN | HEART RATE: 75 BPM

## 2024-10-09 DIAGNOSIS — Z01.89 ENCOUNTER FOR LOWER EXTREMITY COMPARISON IMAGING STUDY: ICD-10-CM

## 2024-10-09 DIAGNOSIS — M25.561 RIGHT KNEE PAIN: ICD-10-CM

## 2024-10-09 DIAGNOSIS — M25.561 RIGHT KNEE PAIN, UNSPECIFIED CHRONICITY: ICD-10-CM

## 2024-10-09 DIAGNOSIS — M25.561 RIGHT KNEE PAIN, UNSPECIFIED CHRONICITY: Primary | ICD-10-CM

## 2024-10-09 PROCEDURE — 99203 OFFICE O/P NEW LOW 30 MIN: CPT | Performed by: ORTHOPAEDIC SURGERY

## 2024-10-09 PROCEDURE — 73564 X-RAY EXAM KNEE 4 OR MORE: CPT

## 2024-10-09 PROCEDURE — 73562 X-RAY EXAM OF KNEE 3: CPT

## 2024-10-09 NOTE — PROGRESS NOTES
Ortho Sports Medicine Knee New Patient Visit     Assesment:   34 y.o. female {right/left/bilateral:60023} knee {kneediagnosis:75574}    Plan:    ***    Conservative treatment:    {kneeconservative:46253}    Imaging:    {imaging knee:15305}      Injection:    {injection knee:15399}      Surgery:     {surgery knee:29711}      Follow up:    No follow-ups on file.        Chief Complaint   Patient presents with    Right Knee - Pain       History of Present Illness:    The patient is a 34 y.o. female whose occupation is {Occupation social history md:86911}, referred to me by {PCPERURGENT:48454}, seen in clinic for {consultation evalulation:19414} of {RIGHT/LEFT:} knee pain.      Pain is located {Desc; anterior/posterior:09857}.  The patient rates the pain as a {gen number 0-10:201708}/10.  The pain has been present for {1-10,15,20,30:99776} {length of pain:43743}.      The patient sustained an injury on ***.  The mechanism of injury was {knee Mechanism:41446}. The pain is characterized as {Desc; dull;sharp;burning;achy:43335}.  The pain is present {pain present time:89669}.      Pain is improved by {resticemedication:93628}.  Pain is aggravated by {aggravated by:27119}.    Symptoms include {knee symptoms:19007}.     The patient has tried {resticemedication:10120}.          Knee Surgical History:  {none surgery:77199}    Past Medical, Social and Family History:  Past Medical History:   Diagnosis Date    Anemia, postpartum 2019    Delivery by  hysterectomy 10/14/2019    Disease of thyroid gland     History of diet-controlled gestational diabetes mellitus 2019    Obesity     Pap smear abnormality of cervix with LGSIL 2016    Varicella     Had as a child    Varicose vein of leg      Past Surgical History:   Procedure Laterality Date     SECTION      CYSTOSCOPY N/A 10/14/2019    Procedure: CYSTOSCOPY;  Surgeon: Chana Kam MD;  [facility];  Service: Gynecology    DILATION AND  EVACUATION      Molar    HYSTERECTOMY N/A 10/14/2019    Procedure: RAY, BILATERAL SALPINGECTOMY;  Surgeon: Chana Kam MD;  [facility];  Service: Gynecology    LAPAROSCOPY      GA  DELIVERY ONLY N/A 10/14/2019    Procedure:  SECTION () REPEAT;  Surgeon: Chana Kam MD;  [facility];  Service: Gynecology     Allergies   Allergen Reactions    Penicillins Anaphylaxis     Anaphylaxis    Shrimp (Diagnostic) - Food Allergy     Venofer [Iron Sucrose] Itching     Current Outpatient Medications on File Prior to Visit   Medication Sig Dispense Refill    acetaminophen (TYLENOL) 325 mg tablet Take 3 tablets (975 mg total) by mouth every 6 (six) hours as needed for mild pain 30 tablet 0    acetaminophen (TYLENOL) 500 mg tablet Take 1 tablet (500 mg total) by mouth every 6 (six) hours as needed for mild pain (Patient not taking: Reported on 2023) 30 tablet 0    acetaminophen (TYLENOL) 650 mg CR tablet Take 1 tablet (650 mg total) by mouth every 8 (eight) hours as needed for mild pain or moderate pain 30 tablet 0    bisacodyl 5 MG EC tablet TAKE 2 TABLETS BY MOUTH DAILY AS NEEDED FOR CONSTIPATION. (Patient not taking: Reported on 11/10/2022) 30 tablet 0    Cholecalciferol (Vitamin D) 50 MCG (2000 UT) tablet Take 1 tablet (2,000 Units total) by mouth daily 30 tablet 3    dexamethasone (DECADRON) 2 mg tablet Take 1 tablet (2 mg total) by mouth 2 (two) times a day with meals 3 tablet 0    Diclofenac Sodium (VOLTAREN) 1 % Apply 2 g topically 4 (four) times a day (Patient not taking: Reported on 2023) 100 g 0    docusate sodium (COLACE) 100 mg capsule Take 1 capsule (100 mg total) by mouth 2 (two) times a day (Patient not taking: Reported on 11/10/2022) 60 capsule 6    ergocalciferol (ERGOCALCIFEROL) 1.25 MG (76902 UT) capsule Take 1 capsule (50,000 Units total) by mouth once a week 8 capsule 0    fluticasone (FLONASE) 50 mcg/act nasal spray SPRAY 1 SPRAY INTO EACH NOSTRIL EVERY  DAY 16 mL 3    folic acid (FOLVITE) 1 mg tablet TAKE 1 TABLET BY MOUTH EVERY DAY 90 tablet 1    ibuprofen (MOTRIN) 600 mg tablet Take 1 tablet (600 mg total) by mouth every 6 (six) hours as needed for mild pain 30 tablet 0    loratadine (CLARITIN) 10 mg tablet Take 1 tablet (10 mg total) by mouth daily 30 tablet 0    Magnesium 250 MG TABS 1 pill a day 30 tablet 0    metFORMIN (GLUCOPHAGE) 1000 MG tablet Take 1 tablet (1,000 mg total) by mouth 2 (two) times a day with meals 90 tablet 2    methimazole (TAPAZOLE) 5 mg tablet Take 1 tablet (5 mg total) by mouth daily 90 tablet 2    methocarbamol (ROBAXIN) 500 mg tablet Take 1 tablet (500 mg total) by mouth 2 (two) times a day 20 tablet 0    naproxen (EC NAPROSYN) 500 MG EC tablet Take 1 tablet (500 mg total) by mouth 2 (two) times a day with meals 10 tablet 0    naproxen (Naprosyn) 500 mg tablet Take 1 tablet (500 mg total) by mouth 2 (two) times a day with meals 30 tablet 0    ondansetron (ZOFRAN-ODT) 4 mg disintegrating tablet Take 1 tablet (4 mg total) by mouth every 6 (six) hours as needed for nausea or vomiting 10 tablet 0    prochlorperazine (COMPAZINE) 10 mg tablet Take 1 tab up to BID prn migraine or nausea, ideally no more than 3 days per week (Patient not taking: Reported on 9/5/2023) 20 tablet 4    pseudoephedrine (SUDAFED) 30 mg tablet Take 1 tablet (30 mg total) by mouth every 4 (four) hours as needed for congestion 30 tablet 0    Riboflavin (Vitamin B-2) 100 MG TABS 2 pills a day 60 tablet 3    Rimegepant Sulfate (Nurtec) 75 MG TBDP Take 1 pill at the onset of headache (Patient not taking: Reported on 4/25/2023) 15 tablet 0    Symbicort 160-4.5 MCG/ACT inhaler INHALE 2 PUFFS BY MOUTH 2 TIMES A DAY RINSE MOUTH AFTER USE. 6 g 1    topiramate (TOPAMAX) 25 mg tablet 1 tab PO QHS for 1 week, increase as tolerated to 1 tab BID for 1 week, then 1 tab QAM and 2 tabs QHS for 1 week and finish at 2 tabs BID. 120 tablet 4    vitamin B-12 (VITAMIN B-12) 1,000 mcg  tablet TAKE 1 TABLET BY MOUTH EVERY DAY 90 tablet 1     No current facility-administered medications on file prior to visit.     Social History     Socioeconomic History    Marital status: /Civil Union     Spouse name: Not on file    Number of children: 4    Years of education: Not on file    Highest education level: Not on file   Occupational History    Not on file   Tobacco Use    Smoking status: Never    Smokeless tobacco: Never   Vaping Use    Vaping status: Never Used   Substance and Sexual Activity    Alcohol use: Yes     Comment: occ.    Drug use: Never    Sexual activity: Not Currently     Partners: Male     Birth control/protection: None   Other Topics Concern    Not on file   Social History Narrative    Always wears seat belts ; caffeine use     Social Determinants of Health     Financial Resource Strain: Not on file   Food Insecurity: Not on file   Transportation Needs: Not on file   Physical Activity: Not on file   Stress: Not on file   Social Connections: Not on file   Intimate Partner Violence: Not on file   Housing Stability: Not on file         I have reviewed the past medical, surgical, social and family history, medications and allergies as documented in the EMR.    Review of systems: ROS is negative other than that noted in the HPI.  Constitutional: Negative for fatigue and fever.   HENT: Negative for sore throat.    Respiratory: Negative for shortness of breath.    Cardiovascular: Negative for chest pain.   Gastrointestinal: Negative for abdominal pain.   Endocrine: Negative for cold intolerance and heat intolerance.   Genitourinary: Negative for flank pain.   Musculoskeletal: Negative for back pain.   Skin: Negative for rash.   Allergic/Immunologic: Negative for immunocompromised state.   Neurological: Negative for dizziness.   Psychiatric/Behavioral: Negative for agitation.      Physical Exam:    Last menstrual period 03/23/2019, not currently breastfeeding.    General/Constitutional:  "NAD, well developed, well nourished  HENT: Normocephalic, atraumatic  CV: Intact distal pulses, regular rate  Resp: No respiratory distress or labored breathing  GI: Soft and non-tender   Lymphatic: No lymphadenopathy palpated  Neuro: Alert and Oriented x 3, no focal deficits  Psych: Normal mood, normal affect, normal judgement, normal behavior  Skin: Warm, dry, no rashes, no erythema      Knee Exam (focused):                RIGHT LEFT   ROM:   {knee rom:65052} {knee rom:85943}   Palpation: Effusion {effusion:61858} {effusion:79546}     MJL tenderness {positive negative:58875::\"Negative\"} {positive negative:42518::\"Negative\"}     LJL tenderness {positive negative:06695::\"Negative\"} {positive negative:20187::\"Negative\"}   Meniscus: Brian {positive negative:41860::\"Negative\"} {positive negative:40458::\"Negative\"}    Apley's Compression {positive negative:36104::\"Negative\"} {positive negative:40193::\"Negative\"}   Instability: Varus stable stable     Valgus stable stable   Special Tests: Lachman {positive negative:84954::\"Negative\"} {positive negative:39223::\"Negative\"}     Posterior drawer {positive negative:82531::\"Negative\"} {positive negative:98412::\"Negative\"}     Anterior drawer {positive negative:53671::\"Negative\"} {positive negative:97846::\"Negative\"}     Pivot shift {Pos/neg/not tested:14514} {Pos/neg/not tested:49003}     Dial {Pos/neg/not tested:14020} {Pos/neg/not tested:49995}   Patella: Palpation {patellar pain:67562} {patellar pain:23091}     Mobility {patellar quadrant:11721} {patellar quadrant:47067}     Apprehension {positive negative:63988::\"Negative\"} {positive negative:60343::\"Negative\"}   Other: Single leg 1/4 squat {quarter squat:19245} {quarter squat:60003}      LE NV Exam: +2 DP/PT pulses bilaterally  Sensation intact to light touch L2-S1 bilaterally     Bilateral hip ROM demonstrates no pain actively or passively    No calf tenderness to palpation bilaterally    Knee Imaging    X-rays of the " {right/left/bilateral:21223} knee were reviewed, which demonstrate ***.  I have reviewed the radiology report and {radiology report:97785}.    MRI of the {RIGHT/LEFT:20294} knee were reviewed, which demonstrate ***.  I have reviewed the radiology report and {radiology report:82900}.      Scribe Attestation      I,:  Jesse Morales am acting as a scribe while in the presence of the attending physician.:       I,:  Son Tatum DO personally performed the services described in this documentation    as scribed in my presence.:

## 2024-10-09 NOTE — PROGRESS NOTES
Ortho Sports Medicine Knee New Patient Visit     Assesment:     34 y.o. female right knee patellofemoral syndrome possible medial and lateral meniscus tear    Plan:    Shauna is a 34 year old female with atraumatic right knee pain. She has medial and lateral joint line tenderness however she feels her pain is mostly anterolaterally. She has full range of motion and a mild effusion. Xrays demonstrate no fracture, dislocation, OCD lesions, or osteophytosis. She was recommended for physical therapy and activity modification. She will follow up in 6-8 weeks for re-evaluation of her right knee pain.     Conservative treatment:    Ice to knee for 20 minutes at least 1-2 times daily.  PT for ROM/strengthening to knee, hip and core.  OTC NSAIDS prn for pain.  Tylenol for pain.    Imaging:    All imaging from today was reviewed by myself and explained to the patient.       Injection:    No Injection planned at this time.      Surgery:     No surgery is recommended at this point, continue with conservative treatment plan as noted.      Follow up:    No follow-ups on file.        Chief Complaint   Patient presents with    Right Knee - Pain       History of Present Illness:    The patient is a 34 y.o. female whose occupation is unknown, referred to me by the emergency room, seen in clinic for consultation of right knee pain.      Pain is located anterior, lateral.  The patient rates the pain as a 5/10.  The pain has been present for a few days.      The patient did not sustain an injury but reports her right knee began hurting a few days ago while getting into a car.  The mechanism of injury was uknown. The pain is characterized as sharp, stabbing.  The pain is present at all times.      Pain is improved by rest.  Pain is aggravated by stairs, squatting, weight bearing, and walking. She denies clicking, catching, and locking. She reports swelling of the right knee     The patient has tried rest.          Knee Surgical  History:  None    Past Medical, Social and Family History:  Past Medical History:   Diagnosis Date    Anemia, postpartum 2019    Delivery by  hysterectomy 10/14/2019    Disease of thyroid gland     History of diet-controlled gestational diabetes mellitus 2019    Obesity     Pap smear abnormality of cervix with LGSIL 2016    Varicella     Had as a child    Varicose vein of leg      Past Surgical History:   Procedure Laterality Date     SECTION      CYSTOSCOPY N/A 10/14/2019    Procedure: CYSTOSCOPY;  Surgeon: Chana Kam MD;  [facility];  Service: Gynecology    DILATION AND EVACUATION      Molar    HYSTERECTOMY N/A 10/14/2019    Procedure: RAY, BILATERAL SALPINGECTOMY;  Surgeon: Chana Kam MD;  [facility];  Service: Gynecology    LAPAROSCOPY      [place]  DELIVERY ONLY N/A 10/14/2019    Procedure:  SECTION () REPEAT;  Surgeon: Chana Kam MD;  [facility];  Service: Gynecology     Allergies   Allergen Reactions    Penicillins Anaphylaxis     Anaphylaxis    Shrimp (Diagnostic) - Food Allergy     Venofer [Iron Sucrose] Itching     Current Outpatient Medications on File Prior to Visit   Medication Sig Dispense Refill    acetaminophen (TYLENOL) 650 mg CR tablet Take 1 tablet (650 mg total) by mouth every 8 (eight) hours as needed for mild pain or moderate pain 30 tablet 0    ibuprofen (MOTRIN) 600 mg tablet Take 1 tablet (600 mg total) by mouth every 6 (six) hours as needed for mild pain 30 tablet 0    loratadine (CLARITIN) 10 mg tablet Take 1 tablet (10 mg total) by mouth daily 30 tablet 0    acetaminophen (TYLENOL) 325 mg tablet Take 3 tablets (975 mg total) by mouth every 6 (six) hours as needed for mild pain (Patient not taking: Reported on 10/9/2024) 30 tablet 0    acetaminophen (TYLENOL) 500 mg tablet Take 1 tablet (500 mg total) by mouth every 6 (six) hours as needed for mild pain (Patient not taking: Reported on 2023) 30  tablet 0    bisacodyl 5 MG EC tablet TAKE 2 TABLETS BY MOUTH DAILY AS NEEDED FOR CONSTIPATION. (Patient not taking: Reported on 11/10/2022) 30 tablet 0    Cholecalciferol (Vitamin D) 50 MCG (2000 UT) tablet Take 1 tablet (2,000 Units total) by mouth daily (Patient not taking: Reported on 10/9/2024) 30 tablet 3    dexamethasone (DECADRON) 2 mg tablet Take 1 tablet (2 mg total) by mouth 2 (two) times a day with meals (Patient not taking: Reported on 10/9/2024) 3 tablet 0    Diclofenac Sodium (VOLTAREN) 1 % Apply 2 g topically 4 (four) times a day (Patient not taking: Reported on 9/5/2023) 100 g 0    docusate sodium (COLACE) 100 mg capsule Take 1 capsule (100 mg total) by mouth 2 (two) times a day (Patient not taking: Reported on 11/10/2022) 60 capsule 6    ergocalciferol (ERGOCALCIFEROL) 1.25 MG (48671 UT) capsule Take 1 capsule (50,000 Units total) by mouth once a week (Patient not taking: Reported on 10/9/2024) 8 capsule 0    fluticasone (FLONASE) 50 mcg/act nasal spray SPRAY 1 SPRAY INTO EACH NOSTRIL EVERY DAY (Patient not taking: Reported on 10/9/2024) 16 mL 3    folic acid (FOLVITE) 1 mg tablet TAKE 1 TABLET BY MOUTH EVERY DAY (Patient not taking: Reported on 10/9/2024) 90 tablet 1    Magnesium 250 MG TABS 1 pill a day (Patient not taking: Reported on 10/9/2024) 30 tablet 0    metFORMIN (GLUCOPHAGE) 1000 MG tablet Take 1 tablet (1,000 mg total) by mouth 2 (two) times a day with meals (Patient not taking: Reported on 10/9/2024) 90 tablet 2    methimazole (TAPAZOLE) 5 mg tablet Take 1 tablet (5 mg total) by mouth daily (Patient not taking: Reported on 10/9/2024) 90 tablet 2    methocarbamol (ROBAXIN) 500 mg tablet Take 1 tablet (500 mg total) by mouth 2 (two) times a day (Patient not taking: Reported on 10/9/2024) 20 tablet 0    naproxen (EC NAPROSYN) 500 MG EC tablet Take 1 tablet (500 mg total) by mouth 2 (two) times a day with meals 10 tablet 0    naproxen (Naprosyn) 500 mg tablet Take 1 tablet (500 mg total) by  mouth 2 (two) times a day with meals (Patient not taking: Reported on 10/9/2024) 30 tablet 0    ondansetron (ZOFRAN-ODT) 4 mg disintegrating tablet Take 1 tablet (4 mg total) by mouth every 6 (six) hours as needed for nausea or vomiting (Patient not taking: Reported on 10/9/2024) 10 tablet 0    prochlorperazine (COMPAZINE) 10 mg tablet Take 1 tab up to BID prn migraine or nausea, ideally no more than 3 days per week (Patient not taking: Reported on 9/5/2023) 20 tablet 4    pseudoephedrine (SUDAFED) 30 mg tablet Take 1 tablet (30 mg total) by mouth every 4 (four) hours as needed for congestion (Patient not taking: Reported on 10/9/2024) 30 tablet 0    Riboflavin (Vitamin B-2) 100 MG TABS 2 pills a day (Patient not taking: Reported on 10/9/2024) 60 tablet 3    Rimegepant Sulfate (Nurtec) 75 MG TBDP Take 1 pill at the onset of headache (Patient not taking: Reported on 4/25/2023) 15 tablet 0    Symbicort 160-4.5 MCG/ACT inhaler INHALE 2 PUFFS BY MOUTH 2 TIMES A DAY RINSE MOUTH AFTER USE. (Patient not taking: Reported on 10/9/2024) 6 g 1    topiramate (TOPAMAX) 25 mg tablet 1 tab PO QHS for 1 week, increase as tolerated to 1 tab BID for 1 week, then 1 tab QAM and 2 tabs QHS for 1 week and finish at 2 tabs BID. (Patient not taking: Reported on 10/9/2024) 120 tablet 4    vitamin B-12 (VITAMIN B-12) 1,000 mcg tablet TAKE 1 TABLET BY MOUTH EVERY DAY (Patient not taking: Reported on 10/9/2024) 90 tablet 1     No current facility-administered medications on file prior to visit.     Social History     Socioeconomic History    Marital status: /Civil Union     Spouse name: Not on file    Number of children: 4    Years of education: Not on file    Highest education level: Not on file   Occupational History    Not on file   Tobacco Use    Smoking status: Never    Smokeless tobacco: Never   Vaping Use    Vaping status: Never Used   Substance and Sexual Activity    Alcohol use: Yes     Comment: occ.    Drug use: Never     "Sexual activity: Not Currently     Partners: Male     Birth control/protection: None   Other Topics Concern    Not on file   Social History Narrative    Always wears seat belts ; caffeine use     Social Determinants of Health     Financial Resource Strain: Not on file   Food Insecurity: Not on file   Transportation Needs: Not on file   Physical Activity: Not on file   Stress: Not on file   Social Connections: Not on file   Intimate Partner Violence: Not on file   Housing Stability: Not on file         I have reviewed the past medical, surgical, social and family history, medications and allergies as documented in the EMR.    Review of systems: ROS is negative other than that noted in the HPI.  Constitutional: Negative for fatigue and fever.   HENT: Negative for sore throat.    Respiratory: Negative for shortness of breath.    Cardiovascular: Negative for chest pain.   Gastrointestinal: Negative for abdominal pain.   Endocrine: Negative for cold intolerance and heat intolerance.   Genitourinary: Negative for flank pain.   Musculoskeletal: Negative for back pain.   Skin: Negative for rash.   Allergic/Immunologic: Negative for immunocompromised state.   Neurological: Negative for dizziness.   Psychiatric/Behavioral: Negative for agitation.      Physical Exam:    Blood pressure 104/68, pulse 75, height 5' 6\" (1.676 m), weight 107 kg (236 lb), last menstrual period 03/23/2019, not currently breastfeeding.    General/Constitutional: NAD, well developed, well nourished  HENT: Normocephalic, atraumatic  CV: Intact distal pulses, regular rate  Resp: No respiratory distress or labored breathing  GI: Soft and non-tender   Lymphatic: No lymphadenopathy palpated  Neuro: Alert and Oriented x 3, no focal deficits  Psych: Normal mood, normal affect, normal judgement, normal behavior  Skin: Warm, dry, no rashes, no erythema      Knee Exam (focused):                RIGHT LEFT   ROM:   0-130 0-130   Palpation: Effusion minimal " negative     MJL tenderness Positive Negative     LJL tenderness Positive Negative   Meniscus: Brian Not tested Not tested    Apley's Compression Negative Negative   Instability: Varus stable stable     Valgus stable stable   Special Tests: Lachman Negative Negative     Posterior drawer Negative Negative     Anterior drawer Negative Negative     Pivot shift not tested not tested     Dial not tested not tested   Patella: Palpation no tenderness no tenderness     Mobility 1/4 1/4     Apprehension Negative Negative   Other: Single leg 1/4 squat not tested not tested      LE NV Exam: +2 DP/PT pulses bilaterally  Sensation intact to light touch L2-S1 bilaterally     Bilateral hip ROM demonstrates no pain actively or passively    No calf tenderness to palpation bilaterally    Knee Imaging    X-rays of the bilateral knee were reviewed, which demonstrate no fracture or dislocation. No OCD lesions. No osteophytosis and joint spaces are preserved.  I have reviewed the radiology report and do not currently have a radiology reading from [facility], but will check the result once the reading is performed.      Scribe Attestation      I,:   am acting as a scribe while in the presence of the attending physician.:       I,:   personally performed the services described in this documentation    as scribed in my presence.:

## 2024-10-25 NOTE — MISCELLANEOUS
Provider Comments  Provider Comments:   PT NO SHOW 05/11/2016 LM ON PT VM      Signatures   Electronically signed by :  [address] [place] [address], ; May 11 2016  4:23PM EST                       (Author)    Electronically signed by : VANDANA Porter ; May 12 2016  7:01AM EST                       (Author)
Nicolas placement in the left subclavian vein via fluoroscopic guidance

## 2024-11-20 ENCOUNTER — OFFICE VISIT (OUTPATIENT)
Dept: OBGYN CLINIC | Facility: MEDICAL CENTER | Age: 34
End: 2024-11-20
Payer: COMMERCIAL

## 2024-11-20 VITALS
DIASTOLIC BLOOD PRESSURE: 74 MMHG | BODY MASS INDEX: 38.25 KG/M2 | SYSTOLIC BLOOD PRESSURE: 109 MMHG | HEART RATE: 80 BPM | HEIGHT: 66 IN | WEIGHT: 238 LBS

## 2024-11-20 DIAGNOSIS — M25.561 RIGHT KNEE PAIN, UNSPECIFIED CHRONICITY: Primary | ICD-10-CM

## 2024-11-20 PROCEDURE — 99213 OFFICE O/P EST LOW 20 MIN: CPT | Performed by: ORTHOPAEDIC SURGERY

## 2024-11-20 NOTE — PROGRESS NOTES
Ortho Sports Medicine Knee New Patient Visit     Assesment:     34 y.o. female right knee patellofemoral syndrome possible medial meniscus tear    Plan:      Conservative treatment:    Ice to knee for 20 minutes at least 1-2 times daily.  PT for ROM/strengthening to knee, hip and core.  OTC NSAIDS prn for pain.  Tylenol for pain.    Imaging:    MRI right knee ordered to rule out meniscus tear      Injection:    No Injection planned at this time.      Surgery:     No surgery is recommended at this point, continue with conservative treatment plan as noted.      Follow up:    No follow-ups on file.        Chief Complaint   Patient presents with    Right Knee - Follow-up       History of Present Illness:    The patient is a 34 y.o. female whose occupation is unknown, referred to me by the emergency room, seen in clinic for consultation of right knee pain.      Pain is located anterior, lateral.  The patient rates the pain as a 5/10.  The pain has been present for a few days.      The patient did not sustain an injury but reports her right knee began hurting a few days ago while getting into a car.  The mechanism of injury was uknown. The pain is characterized as sharp, stabbing.  The pain is present at all times.      Pain is improved by rest.  Pain is aggravated by stairs, squatting, weight bearing, and walking.    The patient has tried rest.        She notes continued medial joint pain.  No new injury.  No other complaints.  Notes come clikcing.      Knee Surgical History:  None    Past Medical, Social and Family History:  Past Medical History:   Diagnosis Date    Anemia, postpartum 2019    Delivery by  hysterectomy 10/14/2019    Disease of thyroid gland     History of diet-controlled gestational diabetes mellitus 2019    Obesity     Pap smear abnormality of cervix with LGSIL 2016    Varicella     Had as a child    Varicose vein of leg      Past Surgical History:   Procedure Laterality Date      SECTION      CYSTOSCOPY N/A 10/14/2019    Procedure: CYSTOSCOPY;  Surgeon: Chana Kam MD;  Location: [facility];  Service: Gynecology    DILATION AND EVACUATION      Molar    HYSTERECTOMY N/A 10/14/2019    Procedure: RAY, BILATERAL SALPINGECTOMY;  Surgeon: Chana Kam MD;  Location: [facility];  Service: Gynecology    LAPAROSCOPY      [place]  DELIVERY ONLY N/A 10/14/2019    Procedure:  SECTION () REPEAT;  Surgeon: Chana Kam MD;  Location: [facility];  Service: Gynecology     Allergies   Allergen Reactions    Penicillins Anaphylaxis     Anaphylaxis    Shrimp (Diagnostic) - Food Allergy     Venofer [Iron Sucrose] Itching     Current Outpatient Medications on File Prior to Visit   Medication Sig Dispense Refill    acetaminophen (TYLENOL) 650 mg CR tablet Take 1 tablet (650 mg total) by mouth every 8 (eight) hours as needed for mild pain or moderate pain 30 tablet 0    ibuprofen (MOTRIN) 600 mg tablet Take 1 tablet (600 mg total) by mouth every 6 (six) hours as needed for mild pain 30 tablet 0    loratadine (CLARITIN) 10 mg tablet Take 1 tablet (10 mg total) by mouth daily 30 tablet 0    acetaminophen (TYLENOL) 325 mg tablet Take 3 tablets (975 mg total) by mouth every 6 (six) hours as needed for mild pain (Patient not taking: Reported on 2024) 30 tablet 0    acetaminophen (TYLENOL) 500 mg tablet Take 1 tablet (500 mg total) by mouth every 6 (six) hours as needed for mild pain (Patient not taking: Reported on 2023) 30 tablet 0    bisacodyl 5 MG EC tablet TAKE 2 TABLETS BY MOUTH DAILY AS NEEDED FOR CONSTIPATION. (Patient not taking: No sig reported) 30 tablet 0    Cholecalciferol (Vitamin D) 50 MCG (2000 UT) tablet Take 1 tablet (2,000 Units total) by mouth daily (Patient not taking: Reported on 10/9/2024) 30 tablet 3    dexamethasone (DECADRON) 2 mg tablet Take 1 tablet (2 mg total) by mouth 2 (two) times a day with meals (Patient not taking: Reported on 10/9/2024) 3  tablet 0    Diclofenac Sodium (VOLTAREN) 1 % Apply 2 g topically 4 (four) times a day (Patient not taking: Reported on 11/20/2024) 100 g 0    docusate sodium (COLACE) 100 mg capsule Take 1 capsule (100 mg total) by mouth 2 (two) times a day (Patient not taking: Reported on 11/10/2022) 60 capsule 6    ergocalciferol (ERGOCALCIFEROL) 1.25 MG (59892 UT) capsule Take 1 capsule (50,000 Units total) by mouth once a week (Patient not taking: Reported on 10/9/2024) 8 capsule 0    fluticasone (FLONASE) 50 mcg/act nasal spray SPRAY 1 SPRAY INTO EACH NOSTRIL EVERY DAY (Patient not taking: No sig reported) 16 mL 3    folic acid (FOLVITE) 1 mg tablet TAKE 1 TABLET BY MOUTH EVERY DAY (Patient not taking: Reported on 11/20/2024) 90 tablet 1    Magnesium 250 MG TABS 1 pill a day (Patient not taking: Reported on 10/9/2024) 30 tablet 0    metFORMIN (GLUCOPHAGE) 1000 MG tablet Take 1 tablet (1,000 mg total) by mouth 2 (two) times a day with meals (Patient not taking: Reported on 11/20/2024) 90 tablet 2    methimazole (TAPAZOLE) 5 mg tablet Take 1 tablet (5 mg total) by mouth daily (Patient not taking: Reported on 11/20/2024) 90 tablet 2    methocarbamol (ROBAXIN) 500 mg tablet Take 1 tablet (500 mg total) by mouth 2 (two) times a day (Patient not taking: Reported on 11/20/2024) 20 tablet 0    naproxen (EC NAPROSYN) 500 MG EC tablet Take 1 tablet (500 mg total) by mouth 2 (two) times a day with meals 10 tablet 0    naproxen (Naprosyn) 500 mg tablet Take 1 tablet (500 mg total) by mouth 2 (two) times a day with meals (Patient not taking: Reported on 11/20/2024) 30 tablet 0    ondansetron (ZOFRAN-ODT) 4 mg disintegrating tablet Take 1 tablet (4 mg total) by mouth every 6 (six) hours as needed for nausea or vomiting (Patient not taking: Reported on 11/20/2024) 10 tablet 0    prochlorperazine (COMPAZINE) 10 mg tablet Take 1 tab up to BID prn migraine or nausea, ideally no more than 3 days per week (Patient not taking: Reported on 9/5/2023)  20 tablet 4    pseudoephedrine (SUDAFED) 30 mg tablet Take 1 tablet (30 mg total) by mouth every 4 (four) hours as needed for congestion (Patient not taking: Reported on 11/20/2024) 30 tablet 0    Riboflavin (Vitamin B-2) 100 MG TABS 2 pills a day (Patient not taking: Reported on 10/9/2024) 60 tablet 3    Rimegepant Sulfate (Nurtec) 75 MG TBDP Take 1 pill at the onset of headache (Patient not taking: Reported on 4/25/2023) 15 tablet 0    Symbicort 160-4.5 MCG/ACT inhaler INHALE 2 PUFFS BY MOUTH 2 TIMES A DAY RINSE MOUTH AFTER USE. (Patient not taking: No sig reported) 6 g 1    topiramate (TOPAMAX) 25 mg tablet 1 tab PO QHS for 1 week, increase as tolerated to 1 tab BID for 1 week, then 1 tab QAM and 2 tabs QHS for 1 week and finish at 2 tabs BID. (Patient not taking: Reported on 10/9/2024) 120 tablet 4    vitamin B-12 (VITAMIN B-12) 1,000 mcg tablet TAKE 1 TABLET BY MOUTH EVERY DAY (Patient not taking: Reported on 11/20/2024) 90 tablet 1     No current facility-administered medications on file prior to visit.     Social History     Socioeconomic History    Marital status: /Civil Union     Spouse name: Not on file    Number of children: 4    Years of education: Not on file    Highest education level: Not on file   Occupational History    Not on file   Tobacco Use    Smoking status: Never    Smokeless tobacco: Never   Vaping Use    Vaping status: Never Used   Substance and Sexual Activity    Alcohol use: Yes     Comment: occ.    Drug use: Never    Sexual activity: Not Currently     Partners: Male     Birth control/protection: None   Other Topics Concern    Not on file   Social History Narrative    Always wears seat belts ; caffeine use     Social Drivers of Health     Financial Resource Strain: Not on file   Food Insecurity: Not on file   Transportation Needs: Not on file   Physical Activity: Not on file   Stress: Not on file   Social Connections: Not on file   Intimate Partner Violence: Not on file   Housing  "Stability: Not on file         I have reviewed the past medical, surgical, social and family history, medications and allergies as documented in the EMR.    Review of systems: ROS is negative other than that noted in the HPI.  Constitutional: Negative for fatigue and fever.   HENT: Negative for sore throat.    Respiratory: Negative for shortness of breath.    Cardiovascular: Negative for chest pain.   Gastrointestinal: Negative for abdominal pain.   Endocrine: Negative for cold intolerance and heat intolerance.   Genitourinary: Negative for flank pain.   Musculoskeletal: Negative for back pain.   Skin: Negative for rash.   Allergic/Immunologic: Negative for immunocompromised state.   Neurological: Negative for dizziness.   Psychiatric/Behavioral: Negative for agitation.      Physical Exam:    Blood pressure 109/74, pulse 80, height 5' 6\" (1.676 m), weight 108 kg (238 lb), last menstrual period 03/23/2019, not currently breastfeeding.    General/Constitutional: NAD, well developed, well nourished  HENT: Normocephalic, atraumatic  CV: Intact distal pulses, regular rate  Resp: No respiratory distress or labored breathing  GI: Soft and non-tender   Lymphatic: No lymphadenopathy palpated  Neuro: Alert and Oriented x 3, no focal deficits  Psych: Normal mood, normal affect, normal judgement, normal behavior  Skin: Warm, dry, no rashes, no erythema      Knee Exam (focused):                RIGHT LEFT   ROM:   0-130 0-130   Palpation: Effusion minimal negative     MJL tenderness Positive Negative     LJL tenderness Positive Negative   Meniscus: Brian Not tested Not tested    Apley's Compression Negative Negative   Instability: Varus stable stable     Valgus stable stable   Special Tests: Lachman Negative Negative     Posterior drawer Negative Negative     Anterior drawer Negative Negative     Pivot shift not tested not tested     Dial not tested not tested   Patella: Palpation no tenderness no tenderness     Mobility 1/4 " 1/4     Apprehension Negative Negative   Other: Single leg 1/4 squat not tested not tested      LE NV Exam: +2 DP/PT pulses bilaterally  Sensation intact to light touch L2-S1 bilaterally     Bilateral hip ROM demonstrates no pain actively or passively    No calf tenderness to palpation bilaterally    Knee Imaging    X-rays of the bilateral knee were reviewed, which demonstrate no fracture or dislocation. No OCD lesions. No osteophytosis and joint spaces are preserved.  I have reviewed the radiology report and do not currently have a radiology reading from [facility], but will check the result once the reading is performed.      Scribe Attestation      I,:   am acting as a scribe while in the presence of the attending physician.:       I,:   personally performed the services described in this documentation    as scribed in my presence.:

## 2024-11-27 ENCOUNTER — HOSPITAL ENCOUNTER (OUTPATIENT)
Dept: MRI IMAGING | Facility: HOSPITAL | Age: 34
Discharge: HOME | End: 2024-11-27
Attending: ORTHOPAEDIC SURGERY
Payer: COMMERCIAL

## 2024-11-27 DIAGNOSIS — M25.561 RIGHT KNEE PAIN, UNSPECIFIED CHRONICITY: ICD-10-CM

## 2024-11-27 PROCEDURE — 73721 MRI JNT OF LWR EXTRE W/O DYE: CPT

## 2025-01-02 ENCOUNTER — OFFICE VISIT (OUTPATIENT)
Age: 35
End: 2025-01-02
Payer: COMMERCIAL

## 2025-01-02 ENCOUNTER — APPOINTMENT (OUTPATIENT)
Age: 35
End: 2025-01-02
Payer: COMMERCIAL

## 2025-01-02 VITALS
HEART RATE: 74 BPM | HEIGHT: 64 IN | DIASTOLIC BLOOD PRESSURE: 70 MMHG | TEMPERATURE: 98.2 F | WEIGHT: 238 LBS | SYSTOLIC BLOOD PRESSURE: 112 MMHG | BODY MASS INDEX: 40.63 KG/M2 | OXYGEN SATURATION: 98 %

## 2025-01-02 DIAGNOSIS — Z00.00 ANNUAL PHYSICAL EXAM: Primary | ICD-10-CM

## 2025-01-02 DIAGNOSIS — E05.90 HYPERTHYROIDISM: ICD-10-CM

## 2025-01-02 DIAGNOSIS — R73.9 HYPERGLYCEMIA: ICD-10-CM

## 2025-01-02 DIAGNOSIS — Z02.1 PHYSICAL EXAM, PRE-EMPLOYMENT: ICD-10-CM

## 2025-01-02 DIAGNOSIS — Z13.220 LIPID SCREENING: ICD-10-CM

## 2025-01-02 LAB
ALBUMIN SERPL BCG-MCNC: 4.1 G/DL (ref 3.5–5)
ALP SERPL-CCNC: 86 U/L (ref 34–104)
ALT SERPL W P-5'-P-CCNC: 15 U/L (ref 7–52)
ANION GAP SERPL CALCULATED.3IONS-SCNC: 7 MMOL/L (ref 4–13)
AST SERPL W P-5'-P-CCNC: 16 U/L (ref 13–39)
BACTERIA UR QL AUTO: ABNORMAL /HPF
BASOPHILS # BLD AUTO: 0.04 THOUSANDS/ΜL (ref 0–0.1)
BASOPHILS NFR BLD AUTO: 0 % (ref 0–1)
BILIRUB SERPL-MCNC: 0.48 MG/DL (ref 0.2–1)
BILIRUB UR QL STRIP: NEGATIVE
BUN SERPL-MCNC: 17 MG/DL (ref 5–25)
CALCIUM SERPL-MCNC: 9.3 MG/DL (ref 8.4–10.2)
CHLORIDE SERPL-SCNC: 106 MMOL/L (ref 96–108)
CHOLEST SERPL-MCNC: 158 MG/DL (ref ?–200)
CLARITY UR: CLEAR
CO2 SERPL-SCNC: 25 MMOL/L (ref 21–32)
COLOR UR: ABNORMAL
CREAT SERPL-MCNC: 0.69 MG/DL (ref 0.6–1.3)
EOSINOPHIL # BLD AUTO: 0.18 THOUSAND/ΜL (ref 0–0.61)
EOSINOPHIL NFR BLD AUTO: 2 % (ref 0–6)
ERYTHROCYTE [DISTWIDTH] IN BLOOD BY AUTOMATED COUNT: 15.4 % (ref 11.6–15.1)
EST. AVERAGE GLUCOSE BLD GHB EST-MCNC: 131 MG/DL
GFR SERPL CREATININE-BSD FRML MDRD: 113 ML/MIN/1.73SQ M
GLUCOSE P FAST SERPL-MCNC: 90 MG/DL (ref 65–99)
GLUCOSE UR STRIP-MCNC: NEGATIVE MG/DL
HBA1C MFR BLD: 6.2 %
HCT VFR BLD AUTO: 38.8 % (ref 34.8–46.1)
HDLC SERPL-MCNC: 39 MG/DL
HGB BLD-MCNC: 11.8 G/DL (ref 11.5–15.4)
HGB UR QL STRIP.AUTO: NEGATIVE
IMM GRANULOCYTES # BLD AUTO: 0.08 THOUSAND/UL (ref 0–0.2)
IMM GRANULOCYTES NFR BLD AUTO: 1 % (ref 0–2)
KETONES UR STRIP-MCNC: NEGATIVE MG/DL
LDLC SERPL CALC-MCNC: 92 MG/DL (ref 0–100)
LEUKOCYTE ESTERASE UR QL STRIP: NEGATIVE
LYMPHOCYTES # BLD AUTO: 3.27 THOUSANDS/ΜL (ref 0.6–4.47)
LYMPHOCYTES NFR BLD AUTO: 29 % (ref 14–44)
MCH RBC QN AUTO: 25.1 PG (ref 26.8–34.3)
MCHC RBC AUTO-ENTMCNC: 30.4 G/DL (ref 31.4–37.4)
MCV RBC AUTO: 83 FL (ref 82–98)
MONOCYTES # BLD AUTO: 0.81 THOUSAND/ΜL (ref 0.17–1.22)
MONOCYTES NFR BLD AUTO: 7 % (ref 4–12)
NEUTROPHILS # BLD AUTO: 6.75 THOUSANDS/ΜL (ref 1.85–7.62)
NEUTS SEG NFR BLD AUTO: 61 % (ref 43–75)
NITRITE UR QL STRIP: NEGATIVE
NON-SQ EPI CELLS URNS QL MICRO: ABNORMAL /HPF
NONHDLC SERPL-MCNC: 119 MG/DL
NRBC BLD AUTO-RTO: 0 /100 WBCS
PH UR STRIP.AUTO: 6.5 [PH]
PLATELET # BLD AUTO: 470 THOUSANDS/UL (ref 149–390)
PMV BLD AUTO: 10.4 FL (ref 8.9–12.7)
POTASSIUM SERPL-SCNC: 4.7 MMOL/L (ref 3.5–5.3)
PROT SERPL-MCNC: 7.6 G/DL (ref 6.4–8.4)
PROT UR STRIP-MCNC: ABNORMAL MG/DL
RBC # BLD AUTO: 4.7 MILLION/UL (ref 3.81–5.12)
RBC #/AREA URNS AUTO: ABNORMAL /HPF
SODIUM SERPL-SCNC: 138 MMOL/L (ref 135–147)
SP GR UR STRIP.AUTO: 1.02 (ref 1–1.03)
TRIGL SERPL-MCNC: 135 MG/DL (ref ?–150)
TSH SERPL DL<=0.05 MIU/L-ACNC: 1.1 UIU/ML (ref 0.45–4.5)
UROBILINOGEN UR STRIP-ACNC: 2 MG/DL
WBC # BLD AUTO: 11.13 THOUSAND/UL (ref 4.31–10.16)
WBC #/AREA URNS AUTO: ABNORMAL /HPF

## 2025-01-02 PROCEDURE — 84443 ASSAY THYROID STIM HORMONE: CPT | Performed by: INTERNAL MEDICINE

## 2025-01-02 PROCEDURE — 99395 PREV VISIT EST AGE 18-39: CPT | Performed by: INTERNAL MEDICINE

## 2025-01-02 PROCEDURE — 83036 HEMOGLOBIN GLYCOSYLATED A1C: CPT

## 2025-01-02 PROCEDURE — 80053 COMPREHEN METABOLIC PANEL: CPT | Performed by: INTERNAL MEDICINE

## 2025-01-02 PROCEDURE — 80061 LIPID PANEL: CPT | Performed by: INTERNAL MEDICINE

## 2025-01-02 PROCEDURE — 86480 TB TEST CELL IMMUN MEASURE: CPT

## 2025-01-02 PROCEDURE — 36415 COLL VENOUS BLD VENIPUNCTURE: CPT | Performed by: INTERNAL MEDICINE

## 2025-01-02 PROCEDURE — 85025 COMPLETE CBC W/AUTO DIFF WBC: CPT | Performed by: INTERNAL MEDICINE

## 2025-01-02 PROCEDURE — 81001 URINALYSIS AUTO W/SCOPE: CPT

## 2025-01-02 NOTE — ASSESSMENT & PLAN NOTE
Patient follow-up with endocrinology.  Lab studies ordered  Orders:    TSH, 3rd generation with Free T4 reflex

## 2025-01-02 NOTE — PATIENT INSTRUCTIONS
"Patient Education     Routine physical for adults   The Basics   Written by the doctors and editors at [facility]   What is a physical? -- A physical is a routine visit, or \"check-up,\" with your doctor. You might also hear it called a \"wellness visit\" or \"preventive visit.\"  During each visit, the doctor will:   Ask about your physical and mental health   Ask about your habits, behaviors, and lifestyle   Do an exam   Give you vaccines if needed   Talk to you about any medicines you take   Give advice about your health   Answer your questions  Getting regular check-ups is an important part of taking care of your health. It can help your doctor find and treat any problems you have. But it's also important for preventing health problems.  A routine physical is different from a \"sick visit.\" A sick visit is when you see a doctor because of a health concern or problem. Since physicals are scheduled ahead of time, you can think about what you want to ask the doctor.  How often should I get a physical? -- It depends on your age and health. In general, for people age 21 years and older:   If you are younger than 50 years, you might be able to get a physical every 3 years.   If you are 50 years or older, your doctor might recommend a physical every year.  If you have an ongoing health condition, like diabetes or high blood pressure, your doctor will probably want to see you more often.  What happens during a physical? -- In general, each visit will include:   Physical exam - The doctor or nurse will check your height, weight, heart rate, and blood pressure. They will also look at your eyes and ears. They will ask about how you are feeling and whether you have any symptoms that bother you.   Medicines - It's a good idea to bring a list of all the medicines you take to each doctor visit. Your doctor will talk to you about your medicines and answer any questions. Tell them if you are having any side effects that bother you. You " "should also tell them if you are having trouble paying for any of your medicines.   Habits and behaviors - This includes:   Your diet   Your exercise habits   Whether you smoke, drink alcohol, or use drugs   Whether you are sexually active   Whether you feel safe at home  Your doctor will talk to you about things you can do to improve your health and lower your risk of health problems. They will also offer help and support. For example, if you want to quit smoking, they can give you advice and might prescribe medicines. If you want to improve your diet or get more physical activity, they can help you with this, too.   Lab tests, if needed - The tests you get will depend on your age and situation. For example, your doctor might want to check your:   Cholesterol   Blood sugar   Iron level   Vaccines - The recommended vaccines will depend on your age, health, and what vaccines you already had. Vaccines are very important because they can prevent certain serious or deadly infections.   Discussion of screening - \"Screening\" means checking for diseases or other health problems before they cause symptoms. Your doctor can recommend screening based on your age, risk, and preferences. This might include tests to check for:   Cancer, such as breast, prostate, cervical, ovarian, colorectal, prostate, lung, or skin cancer   Sexually transmitted infections, such as chlamydia and gonorrhea   Mental health conditions like depression and anxiety  Your doctor will talk to you about the different types of screening tests. They can help you decide which screenings to have. They can also explain what the results might mean.   Answering questions - The physical is a good time to ask the doctor or nurse questions about your health. If needed, they can refer you to other doctors or specialists, too.  Adults older than 65 years often need other care, too. As you get older, your doctor will talk to you about:   How to prevent falling at " home   Hearing or vision tests   Memory testing   How to take your medicines safely   Making sure that you have the help and support you need at home  All topics are updated as new evidence becomes available and our peer review process is complete.  This topic retrieved from [employer] on: May 02, 2024.  Topic 926014 Version 1.0  Release: 32.4.3 - C32.122  © 2024 UpToDate, Inc[employer] and/or its affiliates. All rights reserved.  Consumer Information Use and Disclaimer   Disclaimer: This generalized information is a limited summary of diagnosis, treatment, and/or medication information. It is not meant to be comprehensive and should be used as a tool to help the user understand and/or assess potential diagnostic and treatment options. It does NOT include all information about conditions, treatments, medications, side effects, or risks that may apply to a specific patient. It is not intended to be medical advice or a substitute for the medical advice, diagnosis, or treatment of a health care provider based on the health care provider's examination and assessment of a patient's specific and unique circumstances. Patients must speak with a health care provider for complete information about their health, medical questions, and treatment options, including any risks or benefits regarding use of medications. This information does not endorse any treatments or medications as safe, effective, or approved for treating a specific patient. UpToDate, Inc. and its affiliates disclaim any warranty or liability relating to this information or the use thereof.The use of this information is governed by the Terms of Use, available at https://www.[employer]uwer.com/en/know/clinical-effectiveness-terms. 2024© UpToDate, Inc. and its affiliates and/or licensors. All rights reserved.  Copyright   © 2024 Up[employer]Date, Inc. and/or its affiliates. All rights reserved.

## 2025-01-02 NOTE — PROGRESS NOTES
Adult Annual Physical  Name: Shauna Rubio      : 1990      MRN: [id number]  Encounter Provider: Nicole Cantu MD  Encounter Date: 2025   Encounter department: [facility] PRIMARY CARE    Assessment & Plan  Annual physical exam  Due for lab studies.  Encouraged her to follow-up with her endocrinologist.       Physical exam, pre-employment    Orders:    Quantiferon-TB Gold Plus, 1 tube; Future    Hyperglycemia    Orders:    CBC and differential    Comprehensive metabolic panel    Hemoglobin A1C; Future    Urinalysis with microscopic; Future    Hyperthyroidism  Patient follow-up with endocrinology.  Lab studies ordered  Orders:    TSH, 3rd generation with Free T4 reflex    Lipid screening    Orders:    Lipid panel      Immunizations and preventive care screenings were discussed with patient today. Appropriate education was printed on patient's after visit summary.    Counseling:  See below  Patient with hyperthyroidism is here for annual physical.  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  She had taken off to allow herself to get better from her knee and her hand issues.  She has a form to be filled out which requires TB testing.  Blood work will be ordered is overdue.  I also notified her that she has missed her last endocrinology appointment and a letter was sent out to her.  Patient has not been taking her medication and would contact endocrinology directly.  Labs will be ordered.  She is also not using her CPAP machine.  She complains of migraine headache.  A CTA  was negative.  She was prescribed Nurtec and does have the prescription.       History of Present Illness     Adult Annual Physical:  Patient presents for annual physical.     Diet and Physical Activity:  - Diet/Nutrition:. Regular  - Exercise: no formal exercise.    Depression Screening:  - PHQ-2 Score: 0  - PHQ-9 Score: 7    General Health:  - Sleep:. Is not using CPAP machine  - Hearing: normal  hearing bilateral ears.  - Vision: no vision problems.  - Dental: brushes teeth twice daily.    /GYN Health:  - Follows with GYN: no.     Review of Systems  Past Medical History   Past Medical History:   Diagnosis Date    Anemia, postpartum 2019    Delivery by  hysterectomy 10/14/2019    Disease of thyroid gland     History of diet-controlled gestational diabetes mellitus 2019    Obesity     Pap smear abnormality of cervix with LGSIL 2016    Varicella     Had as a child    Varicose vein of leg      Past Surgical History:   Procedure Laterality Date     SECTION      CYSTOSCOPY N/A 10/14/2019    Procedure: CYSTOSCOPY;  Surgeon: Chana Kam MD;  Location: [facility];  Service: Gynecology    DILATION AND EVACUATION      [place]    HYSTERECTOMY N/A 10/14/2019    Procedure: RAY, BILATERAL SALPINGECTOMY;  Surgeon: Chana Kam MD;  Location: [facility];  Service: Gynecology    LAPAROSCOPY      [place]  DELIVERY ONLY N/A 10/14/2019    Procedure:  SECTION () REPEAT;  Surgeon: Chana Kam MD;  Location: [facility];  Service: Gynecology     Family History   Problem Relation Age of Onset    Hypertension Mother     Lupus Mother     Pulmonary embolism Mother     Anuerysm Mother     No Known Problems Father     Asthma Sister     No Known Problems Daughter     No Known Problems Daughter     Other Maternal Grandmother         Pre-diabetic    No Known Problems Brother     Heart murmur Brother     No Known Problems Son     No Known Problems Son     Asthma Son     No Known Problems Maternal Aunt     Breast cancer Paternal Aunt         age dx unknown    Thyroid disease unspecified Family         Cousin      reports that she has never smoked. She has never used smokeless tobacco. She reports current alcohol use. She reports that she does not use drugs.  Current Outpatient Medications on File Prior to Visit   Medication Sig Dispense Refill    acetaminophen (TYLENOL) 650 mg CR  tablet Take 1 tablet (650 mg total) by mouth every 8 (eight) hours as needed for mild pain or moderate pain 30 tablet 0    ibuprofen (MOTRIN) 600 mg tablet Take 1 tablet (600 mg total) by mouth every 6 (six) hours as needed for mild pain 30 tablet 0    loratadine (CLARITIN) 10 mg tablet Take 1 tablet (10 mg total) by mouth daily 30 tablet 0    acetaminophen (TYLENOL) 325 mg tablet Take 3 tablets (975 mg total) by mouth every 6 (six) hours as needed for mild pain (Patient not taking: Reported on 10/9/2024) 30 tablet 0    acetaminophen (TYLENOL) 500 mg tablet Take 1 tablet (500 mg total) by mouth every 6 (six) hours as needed for mild pain (Patient not taking: Reported on 1/5/2023) 30 tablet 0    bisacodyl 5 MG EC tablet TAKE 2 TABLETS BY MOUTH DAILY AS NEEDED FOR CONSTIPATION. (Patient not taking: No sig reported) 30 tablet 0    Cholecalciferol (Vitamin D) 50 MCG (2000 UT) tablet Take 1 tablet (2,000 Units total) by mouth daily (Patient not taking: Reported on 10/9/2024) 30 tablet 3    dexamethasone (DECADRON) 2 mg tablet Take 1 tablet (2 mg total) by mouth 2 (two) times a day with meals (Patient not taking: Reported on 10/9/2024) 3 tablet 0    Diclofenac Sodium (VOLTAREN) 1 % Apply 2 g topically 4 (four) times a day (Patient not taking: Reported on 9/5/2023) 100 g 0    docusate sodium (COLACE) 100 mg capsule Take 1 capsule (100 mg total) by mouth 2 (two) times a day (Patient not taking: Reported on 11/10/2022) 60 capsule 6    ergocalciferol (ERGOCALCIFEROL) 1.25 MG (45552 UT) capsule Take 1 capsule (50,000 Units total) by mouth once a week (Patient not taking: Reported on 10/9/2024) 8 capsule 0    fluticasone (FLONASE) 50 mcg/act nasal spray SPRAY 1 SPRAY INTO EACH NOSTRIL EVERY DAY (Patient not taking: No sig reported) 16 mL 3    folic acid (FOLVITE) 1 mg tablet TAKE 1 TABLET BY MOUTH EVERY DAY (Patient not taking: Reported on 10/9/2024) 90 tablet 1    Magnesium 250 MG TABS 1 pill a day (Patient not taking:  Reported on 10/9/2024) 30 tablet 0    metFORMIN (GLUCOPHAGE) 1000 MG tablet Take 1 tablet (1,000 mg total) by mouth 2 (two) times a day with meals (Patient not taking: Reported on 10/9/2024) 90 tablet 2    methimazole (TAPAZOLE) 5 mg tablet Take 1 tablet (5 mg total) by mouth daily (Patient not taking: Reported on 10/9/2024) 90 tablet 2    methocarbamol (ROBAXIN) 500 mg tablet Take 1 tablet (500 mg total) by mouth 2 (two) times a day (Patient not taking: Reported on 10/9/2024) 20 tablet 0    naproxen (EC NAPROSYN) 500 MG EC tablet Take 1 tablet (500 mg total) by mouth 2 (two) times a day with meals 10 tablet 0    naproxen (Naprosyn) 500 mg tablet Take 1 tablet (500 mg total) by mouth 2 (two) times a day with meals (Patient not taking: Reported on 10/9/2024) 30 tablet 0    ondansetron (ZOFRAN-ODT) 4 mg disintegrating tablet Take 1 tablet (4 mg total) by mouth every 6 (six) hours as needed for nausea or vomiting (Patient not taking: Reported on 10/9/2024) 10 tablet 0    prochlorperazine (COMPAZINE) 10 mg tablet Take 1 tab up to BID prn migraine or nausea, ideally no more than 3 days per week (Patient not taking: Reported on 9/5/2023) 20 tablet 4    pseudoephedrine (SUDAFED) 30 mg tablet Take 1 tablet (30 mg total) by mouth every 4 (four) hours as needed for congestion (Patient not taking: Reported on 10/9/2024) 30 tablet 0    Riboflavin (Vitamin B-2) 100 MG TABS 2 pills a day (Patient not taking: Reported on 10/9/2024) 60 tablet 3    Rimegepant Sulfate (Nurtec) 75 MG TBDP Take 1 pill at the onset of headache (Patient not taking: Reported on 4/25/2023) 15 tablet 0    Symbicort 160-4.5 MCG/ACT inhaler INHALE 2 PUFFS BY MOUTH 2 TIMES A DAY RINSE MOUTH AFTER USE. (Patient not taking: No sig reported) 6 g 1    topiramate (TOPAMAX) 25 mg tablet 1 tab PO QHS for 1 week, increase as tolerated to 1 tab BID for 1 week, then 1 tab QAM and 2 tabs QHS for 1 week and finish at 2 tabs BID. (Patient not taking: Reported on 10/9/2024)  120 tablet 4    vitamin B-12 (VITAMIN B-12) 1,000 mcg tablet TAKE 1 TABLET BY MOUTH EVERY DAY (Patient not taking: Reported on 10/9/2024) 90 tablet 1     No current facility-administered medications on file prior to visit.     Allergies   Allergen Reactions    Penicillins Anaphylaxis     Anaphylaxis    Shrimp (Diagnostic) - Food Allergy     Shrimp Extract Allergy Skin Test - Food Allergy Anaphylaxis    Venofer [Iron Sucrose] Itching      Current Outpatient Medications on File Prior to Visit   Medication Sig Dispense Refill    acetaminophen (TYLENOL) 650 mg CR tablet Take 1 tablet (650 mg total) by mouth every 8 (eight) hours as needed for mild pain or moderate pain 30 tablet 0    ibuprofen (MOTRIN) 600 mg tablet Take 1 tablet (600 mg total) by mouth every 6 (six) hours as needed for mild pain 30 tablet 0    loratadine (CLARITIN) 10 mg tablet Take 1 tablet (10 mg total) by mouth daily 30 tablet 0    acetaminophen (TYLENOL) 325 mg tablet Take 3 tablets (975 mg total) by mouth every 6 (six) hours as needed for mild pain (Patient not taking: Reported on 10/9/2024) 30 tablet 0    acetaminophen (TYLENOL) 500 mg tablet Take 1 tablet (500 mg total) by mouth every 6 (six) hours as needed for mild pain (Patient not taking: Reported on 1/5/2023) 30 tablet 0    bisacodyl 5 MG EC tablet TAKE 2 TABLETS BY MOUTH DAILY AS NEEDED FOR CONSTIPATION. (Patient not taking: No sig reported) 30 tablet 0    Cholecalciferol (Vitamin D) 50 MCG (2000 UT) tablet Take 1 tablet (2,000 Units total) by mouth daily (Patient not taking: Reported on 10/9/2024) 30 tablet 3    dexamethasone (DECADRON) 2 mg tablet Take 1 tablet (2 mg total) by mouth 2 (two) times a day with meals (Patient not taking: Reported on 10/9/2024) 3 tablet 0    Diclofenac Sodium (VOLTAREN) 1 % Apply 2 g topically 4 (four) times a day (Patient not taking: Reported on 9/5/2023) 100 g 0    docusate sodium (COLACE) 100 mg capsule Take 1 capsule (100 mg total) by mouth 2 (two) times  a day (Patient not taking: Reported on 11/10/2022) 60 capsule 6    ergocalciferol (ERGOCALCIFEROL) 1.25 MG (46302 UT) capsule Take 1 capsule (50,000 Units total) by mouth once a week (Patient not taking: Reported on 10/9/2024) 8 capsule 0    fluticasone (FLONASE) 50 mcg/act nasal spray SPRAY 1 SPRAY INTO EACH NOSTRIL EVERY DAY (Patient not taking: No sig reported) 16 mL 3    folic acid (FOLVITE) 1 mg tablet TAKE 1 TABLET BY MOUTH EVERY DAY (Patient not taking: Reported on 10/9/2024) 90 tablet 1    Magnesium 250 MG TABS 1 pill a day (Patient not taking: Reported on 10/9/2024) 30 tablet 0    metFORMIN (GLUCOPHAGE) 1000 MG tablet Take 1 tablet (1,000 mg total) by mouth 2 (two) times a day with meals (Patient not taking: Reported on 10/9/2024) 90 tablet 2    methimazole (TAPAZOLE) 5 mg tablet Take 1 tablet (5 mg total) by mouth daily (Patient not taking: Reported on 10/9/2024) 90 tablet 2    methocarbamol (ROBAXIN) 500 mg tablet Take 1 tablet (500 mg total) by mouth 2 (two) times a day (Patient not taking: Reported on 10/9/2024) 20 tablet 0    naproxen (EC NAPROSYN) 500 MG EC tablet Take 1 tablet (500 mg total) by mouth 2 (two) times a day with meals 10 tablet 0    naproxen (Naprosyn) 500 mg tablet Take 1 tablet (500 mg total) by mouth 2 (two) times a day with meals (Patient not taking: Reported on 10/9/2024) 30 tablet 0    ondansetron (ZOFRAN-ODT) 4 mg disintegrating tablet Take 1 tablet (4 mg total) by mouth every 6 (six) hours as needed for nausea or vomiting (Patient not taking: Reported on 10/9/2024) 10 tablet 0    prochlorperazine (COMPAZINE) 10 mg tablet Take 1 tab up to BID prn migraine or nausea, ideally no more than 3 days per week (Patient not taking: Reported on 9/5/2023) 20 tablet 4    pseudoephedrine (SUDAFED) 30 mg tablet Take 1 tablet (30 mg total) by mouth every 4 (four) hours as needed for congestion (Patient not taking: Reported on 10/9/2024) 30 tablet 0    Riboflavin (Vitamin B-2) 100 MG TABS 2 pills  "a day (Patient not taking: Reported on 10/9/2024) 60 tablet 3    Rimegepant Sulfate (Nurtec) 75 MG TBDP Take 1 pill at the onset of headache (Patient not taking: Reported on 4/25/2023) 15 tablet 0    Symbicort 160-4.5 MCG/ACT inhaler INHALE 2 PUFFS BY MOUTH 2 TIMES A DAY RINSE MOUTH AFTER USE. (Patient not taking: No sig reported) 6 g 1    topiramate (TOPAMAX) 25 mg tablet 1 tab PO QHS for 1 week, increase as tolerated to 1 tab BID for 1 week, then 1 tab QAM and 2 tabs QHS for 1 week and finish at 2 tabs BID. (Patient not taking: Reported on 10/9/2024) 120 tablet 4    vitamin B-12 (VITAMIN B-12) 1,000 mcg tablet TAKE 1 TABLET BY MOUTH EVERY DAY (Patient not taking: Reported on 10/9/2024) 90 tablet 1     No current facility-administered medications on file prior to visit.      Social History     Tobacco Use    Smoking status: Never    Smokeless tobacco: Never   Vaping Use    Vaping status: Never Used   Substance and Sexual Activity    Alcohol use: Yes     Comment: occ.    Drug use: Never    Sexual activity: Not Currently     Partners: Male     Birth control/protection: None       Objective   /70 (BP Location: Left arm, Patient Position: Sitting, Cuff Size: Standard)   Pulse 74   Temp 98.2 °F (36.8 °C) (Temporal)   Ht 5' 4\" (1.626 m)   Wt 108 kg (238 lb)   LMP 03/23/2019 (Within Days)   SpO2 98%   BMI 40.85 kg/m²     Physical Exam  Constitutional:       General: She is not in acute distress.     Appearance: She is well-developed.   HENT:      Head: Normocephalic.      Mouth/Throat:      Pharynx: No oropharyngeal exudate.   Eyes:      General: No scleral icterus.     Conjunctiva/sclera: Conjunctivae normal.   Neck:      Thyroid: No thyromegaly.      Vascular: No carotid bruit.      Comments: Diffuse nodular thyromegaly.  No tenderness  Cardiovascular:      Rate and Rhythm: Normal rate and regular rhythm.      Heart sounds: Normal heart sounds. No murmur heard.  Pulmonary:      Effort: Pulmonary effort is " normal. No respiratory distress.      Breath sounds: Normal breath sounds. No wheezing or rales.   Abdominal:      General: Bowel sounds are normal. There is no distension.      Palpations: Abdomen is soft.      Tenderness: There is no abdominal tenderness. There is no guarding or rebound.   Musculoskeletal:      Right lower leg: No edema.      Left lower leg: No edema.   Lymphadenopathy:      Cervical: No cervical adenopathy.      Upper Body:      Right upper body: No supraclavicular or axillary adenopathy.      Left upper body: No supraclavicular or axillary adenopathy.   Skin:     General: Skin is warm.   Neurological:      Mental Status: She is alert and oriented to person, place, and time.      Cranial Nerves: No cranial nerve deficit.      Deep Tendon Reflexes: Reflexes are normal and symmetric.   Psychiatric:         Behavior: Behavior normal.         Thought Content: Thought content normal.         Judgment: Judgment normal.

## 2025-01-03 ENCOUNTER — RESULTS FOLLOW-UP (OUTPATIENT)
Age: 35
End: 2025-01-03

## 2025-01-03 ENCOUNTER — TELEPHONE (OUTPATIENT)
Age: 35
End: 2025-01-03

## 2025-01-03 DIAGNOSIS — D75.839 THROMBOCYTOSIS: Primary | ICD-10-CM

## 2025-01-03 LAB
GAMMA INTERFERON BACKGROUND BLD IA-ACNC: 0.03 IU/ML
M TB IFN-G BLD-IMP: NEGATIVE
M TB IFN-G CD4+ BCKGRND COR BLD-ACNC: 0 IU/ML
M TB IFN-G CD4+ BCKGRND COR BLD-ACNC: 0 IU/ML
MITOGEN IGNF BCKGRD COR BLD-ACNC: 9.97 IU/ML

## 2025-01-03 NOTE — RESULT ENCOUNTER NOTE
WBC count is high again along with platelet counts.  I will place an order for abdominal ultrasound.  Please follow-up after the ultrasound

## 2025-01-03 NOTE — TELEPHONE ENCOUNTER
Patient called about lab results. States that she did not understand what was explained to her on previous call. Explained the function of platelets and where they are made in the body. Explained the abd US was ordered because the spleen is where platelets are made and stored. Patient stated understanding. Told her that someone would be calling her to scheduled the US.

## 2025-01-03 NOTE — TELEPHONE ENCOUNTER
----- Message from Nicole Cantu MD sent at 1/3/2025  4:30 PM EST -----  WBC count is high again along with platelet counts.  I will place an order for abdominal ultrasound.  Please follow-up after the ultrasound

## 2025-01-13 ENCOUNTER — TELEPHONE (OUTPATIENT)
Age: 35
End: 2025-01-13

## 2025-01-13 NOTE — TELEPHONE ENCOUNTER
Pt in office asking if a letter can be written that can excuse her from work when she is experiencing dizziness/ being off balance. Please advise.

## 2025-01-17 ENCOUNTER — OFFICE VISIT (OUTPATIENT)
Age: 35
End: 2025-01-17
Payer: COMMERCIAL

## 2025-01-17 VITALS
HEART RATE: 83 BPM | BODY MASS INDEX: 39.82 KG/M2 | HEIGHT: 65 IN | WEIGHT: 239 LBS | SYSTOLIC BLOOD PRESSURE: 108 MMHG | TEMPERATURE: 98.3 F | OXYGEN SATURATION: 98 % | DIASTOLIC BLOOD PRESSURE: 68 MMHG

## 2025-01-17 DIAGNOSIS — R11.2 NAUSEA AND VOMITING, UNSPECIFIED VOMITING TYPE: ICD-10-CM

## 2025-01-17 DIAGNOSIS — D75.839 THROMBOCYTOSIS: ICD-10-CM

## 2025-01-17 DIAGNOSIS — R42 VERTIGO: Primary | ICD-10-CM

## 2025-01-17 PROCEDURE — 99214 OFFICE O/P EST MOD 30 MIN: CPT | Performed by: INTERNAL MEDICINE

## 2025-01-17 RX ORDER — MECLIZINE HCL 12.5 MG 12.5 MG/1
25 TABLET ORAL EVERY 8 HOURS PRN
Qty: 30 TABLET | Refills: 0 | Status: SHIPPED | OUTPATIENT
Start: 2025-01-17

## 2025-01-17 NOTE — PROGRESS NOTES
"Name: Shauna Rubio      : 1990      MRN: [id number]  Encounter Provider: Nicole Cantu MD  Encounter Date: 2025   Encounter department: [facility] PRIMARY CARE  :  Assessment & Plan  Vertigo    Orders:    MRI brain IAC wo contrast; Future    Ambulatory Referral to Physical Therapy; Future    meclizine (ANTIVERT) 12.5 MG tablet; Take 2 tablets (25 mg total) by mouth every 8 (eight) hours as needed for dizziness    Nausea and vomiting, unspecified vomiting type    Orders:    MRI brain IAC wo contrast; Future    Thrombocytosis  Abdominal ultrasound ordered patient will follow-up for throat  Orders:    CBC and differential    Iron Panel (Includes Ferritin, Iron Sat%, Iron, and TIBC)           History of Present Illness     HPI  Patient is here to follow-up on recent lab studies as well as to discuss vertigo.  #1 blood work showed thrombocytosis with leukocytosis.  Patient does not recall having any upper respiratory GI  issues.  Patient had had thrombocytosis in the past.  I had ordered abdominal ultrasound which is  patient is encouraged to schedule as soon as possible.  #2 patient complains of significant vertigo that comes of 2 weeks out of 4 months and is associated with nausea and vomiting headaches.  We discussed possibility of migraine.  She states her mom has similar symptoms.  No head trauma.  No vision changes.  Denies any tinnitus or hearing deficit.      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    Review of Systems    Objective   /68 (BP Location: Left arm, Patient Position: Sitting, Cuff Size: Standard)   Pulse 83   Temp 98.3 °F (36.8 °C) (Temporal)   Ht 5' 5\" (1.651 m)   Wt 108 kg (239 lb)   LMP 2019 (Within Days)   SpO2 98%   BMI 39.77 kg/m²      Physical Exam  HENT:      Right Ear: Tympanic membrane normal.      Left Ear: Tympanic membrane normal.   Eyes:      " Extraocular Movements: Extraocular movements intact.      Conjunctiva/sclera: Conjunctivae normal.   Neurological:      General: No focal deficit present.      Mental Status: She is alert and oriented to person, place, and time.      Cranial Nerves: No cranial nerve deficit.      Motor: No weakness.      Gait: Gait normal.

## 2025-01-22 ENCOUNTER — TELEPHONE (OUTPATIENT)
Age: 35
End: 2025-01-22

## 2025-01-22 NOTE — TELEPHONE ENCOUNTER
Caller: Patient     Doctor: n/a    Reason for call: called for PT, gave phone number for [facility].    Call back#: [phone]

## 2025-01-23 ENCOUNTER — HOSPITAL ENCOUNTER (OUTPATIENT)
Dept: ULTRASOUND IMAGING | Facility: HOSPITAL | Age: 35
Discharge: HOME | End: 2025-01-23
Payer: COMMERCIAL

## 2025-01-23 DIAGNOSIS — D75.839 THROMBOCYTOSIS: ICD-10-CM

## 2025-01-23 PROCEDURE — 76700 US EXAM ABDOM COMPLETE: CPT

## 2025-01-24 DIAGNOSIS — Z00.6 ENCOUNTER FOR EXAMINATION FOR NORMAL COMPARISON OR CONTROL IN CLINICAL RESEARCH PROGRAM: ICD-10-CM

## 2025-01-27 ENCOUNTER — RESULTS FOLLOW-UP (OUTPATIENT)
Age: 35
End: 2025-01-27

## 2025-01-27 NOTE — RESULT ENCOUNTER NOTE
Liver is slightly enlarged but spleen is good.  Likely fatty liver disease.  Will discuss further next appointment

## 2025-02-14 ENCOUNTER — TELEPHONE (OUTPATIENT)
Age: 35
End: 2025-02-14

## 2025-02-14 NOTE — TELEPHONE ENCOUNTER
Patient called in regards to just having a bowl movement for the first time today and when she went to wipe there was blood and tiny clots that looked gooey. Patient stated that this the first that this had happened and would like providers advise in regards to what should she do. Patient stated that she did not push while going and her stool was loose.

## 2025-03-12 ENCOUNTER — OFFICE VISIT (OUTPATIENT)
Age: 35
End: 2025-03-12
Payer: COMMERCIAL

## 2025-03-12 VITALS
BODY MASS INDEX: 40.15 KG/M2 | DIASTOLIC BLOOD PRESSURE: 74 MMHG | HEART RATE: 72 BPM | HEIGHT: 65 IN | OXYGEN SATURATION: 98 % | TEMPERATURE: 98.4 F | WEIGHT: 241 LBS | SYSTOLIC BLOOD PRESSURE: 106 MMHG

## 2025-03-12 DIAGNOSIS — J06.9 UPPER RESPIRATORY TRACT INFECTION, UNSPECIFIED TYPE: ICD-10-CM

## 2025-03-12 DIAGNOSIS — J02.9 PHARYNGITIS, UNSPECIFIED ETIOLOGY: Primary | ICD-10-CM

## 2025-03-12 PROCEDURE — 99213 OFFICE O/P EST LOW 20 MIN: CPT | Performed by: INTERNAL MEDICINE

## 2025-03-12 PROCEDURE — 87636 SARSCOV2 & INF A&B AMP PRB: CPT | Performed by: INTERNAL MEDICINE

## 2025-03-12 PROCEDURE — 87147 CULTURE TYPE IMMUNOLOGIC: CPT | Performed by: INTERNAL MEDICINE

## 2025-03-12 PROCEDURE — 87070 CULTURE OTHR SPECIMN AEROBIC: CPT | Performed by: INTERNAL MEDICINE

## 2025-03-12 NOTE — PROGRESS NOTES
"Name: Shauna Rubio      : 1990      MRN: [id number]  Encounter Provider: Nicole Cantu MD  Encounter Date: 3/12/2025   Encounter department: [facility] PRIMARY CARE  :  Assessment & Plan  Pharyngitis, unspecified etiology  Patient will be checked for strep culture will be sent out.  Supportive care  Orders:    Throat culture    Upper respiratory tract infection, unspecified type    Orders:    COVID/FLU           History of Present Illness   HPI patient is here for a sick visit complaining of sore throat that started yesterday.  Patient has been having chills.  She reports that client also has sore throat and cough.  Her daughter is sick with a cold with cough but has not been diagnosed with anything particular.  Unfortunately she did not get COVID vaccination or recent flu vaccine.    Review of Systems    Objective   /74 (BP Location: Left arm, Patient Position: Sitting, Cuff Size: Standard)   Pulse 72   Temp 98.4 °F (36.9 °C) (Temporal)   Ht 5' 5\" (1.651 m)   Wt 109 kg (241 lb)   LMP 2019 (Within Days)   SpO2 98%   BMI 40.10 kg/m²      Physical Exam  HENT:      Mouth/Throat:      Mouth: No oral lesions.      Pharynx: Posterior oropharyngeal erythema present. No oropharyngeal exudate or uvula swelling.      Tonsils: No tonsillar exudate.   Neurological:      Mental Status: She is alert.         "

## 2025-03-13 ENCOUNTER — TELEPHONE (OUTPATIENT)
Age: 35
End: 2025-03-13

## 2025-03-13 DIAGNOSIS — J02.9 PHARYNGITIS, UNSPECIFIED ETIOLOGY: Primary | ICD-10-CM

## 2025-03-13 RX ORDER — DOXYCYCLINE HYCLATE 100 MG
100 TABLET ORAL 2 TIMES DAILY
Qty: 14 TABLET | Refills: 0 | Status: SHIPPED | OUTPATIENT
Start: 2025-03-13 | End: 2025-03-20

## 2025-03-13 NOTE — TELEPHONE ENCOUNTER
Patient called to see if anything had been called in to her pharmacy for throat pain, made her aware nothing as of now, she stated she can not swallow anything not even soup and is asking if something can be called in . Please advise and return patient call .

## 2025-03-14 ENCOUNTER — TELEPHONE (OUTPATIENT)
Age: 35
End: 2025-03-14

## 2025-03-14 LAB — BACTERIA THROAT CULT: ABNORMAL

## 2025-03-14 NOTE — TELEPHONE ENCOUNTER
Patient called the office requesting for an work note extension due to step throat. As per pt she is not ready to go back to work today. Pt request to be out until Monday 3/17/2025. Please review and adivse

## 2025-03-28 ENCOUNTER — OFFICE VISIT (OUTPATIENT)
Age: 35
End: 2025-03-28
Payer: COMMERCIAL

## 2025-03-28 VITALS
OXYGEN SATURATION: 98 % | DIASTOLIC BLOOD PRESSURE: 68 MMHG | TEMPERATURE: 98.3 F | HEIGHT: 65 IN | SYSTOLIC BLOOD PRESSURE: 110 MMHG | WEIGHT: 234 LBS | HEART RATE: 92 BPM | BODY MASS INDEX: 38.99 KG/M2

## 2025-03-28 DIAGNOSIS — R42 LIGHTHEADED: Primary | ICD-10-CM

## 2025-03-28 DIAGNOSIS — G47.33 OSA (OBSTRUCTIVE SLEEP APNEA): ICD-10-CM

## 2025-03-28 PROCEDURE — 99214 OFFICE O/P EST MOD 30 MIN: CPT | Performed by: PHYSICIAN ASSISTANT

## 2025-03-28 NOTE — PROGRESS NOTES
Name: Shauna Rubio      : 1990      MRN: [id number]  Encounter Provider: Susan Delacruz PA-C  Encounter Date: 3/28/2025   Encounter department: [facility] PRIMARY CARE  :  Assessment & Plan  Lightheaded  -I did review her records and I did notice that Dr. Cantu sent a prescription for meclizine to her pharmacy in January but the patient states that she never picked up the prescription.  She states that she will stop for it and give it a try  -I noticed that she has been losing weight because of a reduced appetite.  I did suggest starting famotidine 20 mg daily but she prefers to hold at this time.  - I did advise her that Dr. Cantu ordered labs that have not been completed yet which the patient states that she will do next week  - I did recommend she follow-up with Dr. Cantu after the blood work and after trying the meclizine over the next 1 to 2 weeks       RIC (obstructive sleep apnea)  -I did advise her that uncontrolled sleep apnea can make her feel not well during the day.  - I did put an order in the system for sleep medicine and I did advise her that it may take her several months to get an appointment unless she travels outside this area possibly  - I did discuss the cardiac complications for uncontrolled sleep apnea  Orders:    Ambulatory Referral to Sleep Medicine; Future    M*Modal software was used to dictate this note. It may contain errors with dictating incorrect words/spelling. Please contact provider directly for any questions.          History of Present Illness   Patient presents today for an acute visit because she still has persistent lightheadedness/fogginess throughout the day.  She states that her grandmother told her it might be vertigo and as I was looking through her chart I did notice that Dr. Cantu did send meclizine to the pharmacy on .  She never filled it.  She states that it has been several years since she is utilize CPAP for sleep apnea.  She has not  "seen a sleep specialist in a while.  She did not realize that there were other lab orders in the system for her to complete ordered by Dr. Cantu in January.      Review of Systems   Constitutional:  Negative for chills and fever.   HENT:  Negative for sore throat (Recently treated for strep with doxycycline and symptoms resolved.).    Neurological:  Positive for light-headedness.       Objective   /68 (BP Location: Left arm, Patient Position: Sitting, Cuff Size: Large)   Pulse 92   Temp 98.3 °F (36.8 °C) (Temporal)   Ht 5' 5\" (1.651 m)   Wt 106 kg (234 lb)   LMP 03/23/2019 (Within Days)   SpO2 98%   BMI 38.94 kg/m²      Physical Exam  Vitals reviewed.   Constitutional:       General: She is not in acute distress.     Appearance: Normal appearance. She is not ill-appearing, toxic-appearing or diaphoretic.   Cardiovascular:      Rate and Rhythm: Normal rate and regular rhythm.      Heart sounds: Normal heart sounds. No murmur heard.  Pulmonary:      Effort: Pulmonary effort is normal. No respiratory distress.      Breath sounds: Normal breath sounds. No wheezing, rhonchi or rales.   Musculoskeletal:      Cervical back: Neck supple.   Neurological:      General: No focal deficit present.      Mental Status: She is alert.   Psychiatric:         Mood and Affect: Mood normal.         "

## 2025-03-28 NOTE — ASSESSMENT & PLAN NOTE
-I did advise her that uncontrolled sleep apnea can make her feel not well during the day.  - I did put an order in the system for sleep medicine and I did advise her that it may take her several months to get an appointment unless she travels outside this area possibly  - I did discuss the cardiac complications for uncontrolled sleep apnea  Orders:    Ambulatory Referral to Sleep Medicine; Future

## 2025-04-08 ENCOUNTER — OFFICE VISIT (OUTPATIENT)
Dept: OBGYN CLINIC | Facility: CLINIC | Age: 35
End: 2025-04-08
Payer: COMMERCIAL

## 2025-04-08 VITALS — HEIGHT: 65 IN | WEIGHT: 234 LBS | BODY MASS INDEX: 38.99 KG/M2

## 2025-04-08 DIAGNOSIS — M77.8 LEFT WRIST TENDINITIS: Primary | ICD-10-CM

## 2025-04-08 PROCEDURE — 99203 OFFICE O/P NEW LOW 30 MIN: CPT | Performed by: SURGERY

## 2025-04-08 NOTE — Clinical Note
April 8, 2025     Patient: Shauna Rubio   YOB: 1990   Date of Visit: 4/8/2025       To Whom It May Concern:    Shauna uRbio was seen in my clinic on 4/8/2025 at 2:00 pm. Please excuse Shauna for her absence from work on this day to make the appointment.    If you have any questions or concerns, please don't hesitate to call.         Sincerely,         Narciso Leavitt MD        CC: No Recipients

## 2025-04-08 NOTE — PROGRESS NOTES
ORTHOPAEDIC HAND, WRIST, AND ELBOW OFFICE  VISIT       ASSESSMENT/PLAN:      34 y.o. year old female who presents with    Assessment & Plan  Left wrist tendinitis  MRI report read and discussed with patient: findings consistent with trauma without structural injuries of significance  Discussed return to using splint at night  NSAIDs 5-7 days canhelp decrease pain  Activities as tolerated            The patient verbalized understanding of exam findings and treatment plan. We engaged in the shared decision-making process and treatment options were discussed at length with the patient. Surgical and conservative management discussed today along with risks and benefits.        Follow Up:  Return in about 4 weeks (around 2025) for Recheck.    To Do Next Visit:  Re-evaluation of current issue    ____________________________________________________________________________________________________________________________________________      CHIEF COMPLAINT:  Chief Complaint   Patient presents with    Left Wrist - Pain, Swelling       SUBJECTIVE:  Shauna Rubio is a 34 y.o. 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 She states she was seen at [facility] who provided a splint and ordered an MRI. She states she wore the brace for a bit but was unsure how long to wear it so she stopped awhile ago.  She continues to have pain  with lifitng things like her daughter and driving a vehicle  She denies numbness    I have personally reviewed all the relevant PMH, PSH, SH, FH, Medications and allergies      PAST MEDICAL HISTORY:  Past Medical History:   Diagnosis Date    Anemia, postpartum 2019    Delivery by  hysterectomy 10/14/2019    Disease of thyroid gland     History of diet-controlled gestational diabetes mellitus 2019    Obesity     Pap smear abnormality of cervix with LGSIL  2016    Varicella     Had as a child    Varicose vein of leg        PAST SURGICAL HISTORY:  Past Surgical History:   Procedure Laterality Date     SECTION      CYSTOSCOPY N/A 10/14/2019    Procedure: CYSTOSCOPY;  Surgeon: Chana Kam MD;  Location: [facility];  Service: Gynecology    DILATION AND EVACUATION      Molar    HYSTERECTOMY N/A 10/14/2019    Procedure: RAY, BILATERAL SALPINGECTOMY;  Surgeon: Chana Kam MD;  Location: [facility];  Service: Gynecology    LAPAROSCOPY      DE  DELIVERY ONLY N/A 10/14/2019    Procedure:  SECTION () REPEAT;  Surgeon: Chana Kam MD;  Location: [facility];  Service: Gynecology       FAMILY HISTORY:  Family History   Problem Relation Age of Onset    Hypertension Mother     Lupus Mother     Pulmonary embolism Mother     Anuerysm Mother     No Known Problems Father     Asthma Sister     No Known Problems Daughter     No Known Problems Daughter     Other Maternal Grandmother         Pre-diabetic    No Known Problems Brother     Heart murmur Brother     No Known Problems Son     No Known Problems Son     Asthma Son     No Known Problems Maternal Aunt     Breast cancer Paternal Aunt         age dx unknown    Thyroid disease unspecified Family         Cousin       SOCIAL HISTORY:  Social History     Tobacco Use    Smoking status: Never    Smokeless tobacco: Never   Vaping Use    Vaping status: Never Used   Substance Use Topics    Alcohol use: Yes     Comment: occ.    Drug use: Never       MEDICATIONS:    Current Outpatient Medications:     acetaminophen (TYLENOL) 650 mg CR tablet, Take 1 tablet (650 mg total) by mouth every 8 (eight) hours as needed for mild pain or moderate pain, Disp: 30 tablet, Rfl: 0    ibuprofen (MOTRIN) 600 mg tablet, Take 1 tablet (600 mg total) by mouth every 6 (six) hours as needed for mild pain, Disp: 30 tablet, Rfl: 0    loratadine (CLARITIN) 10 mg tablet, Take 1 tablet (10 mg total) by mouth daily, Disp: 30  tablet, Rfl: 0    meclizine (ANTIVERT) 12.5 MG tablet, Take 2 tablets (25 mg total) by mouth every 8 (eight) hours as needed for dizziness, Disp: 30 tablet, Rfl: 0    methimazole (TAPAZOLE) 5 mg tablet, Take 1 tablet (5 mg total) by mouth daily, Disp: 90 tablet, Rfl: 2    methocarbamol (ROBAXIN) 500 mg tablet, Take 1 tablet (500 mg total) by mouth 2 (two) times a day, Disp: 20 tablet, Rfl: 0    naproxen (Naprosyn) 500 mg tablet, Take 1 tablet (500 mg total) by mouth 2 (two) times a day with meals, Disp: 30 tablet, Rfl: 0    acetaminophen (TYLENOL) 325 mg tablet, Take 3 tablets (975 mg total) by mouth every 6 (six) hours as needed for mild pain (Patient not taking: Reported on 10/9/2024), Disp: 30 tablet, Rfl: 0    acetaminophen (TYLENOL) 500 mg tablet, Take 1 tablet (500 mg total) by mouth every 6 (six) hours as needed for mild pain (Patient not taking: Reported on 1/5/2023), Disp: 30 tablet, Rfl: 0    bisacodyl 5 MG EC tablet, TAKE 2 TABLETS BY MOUTH DAILY AS NEEDED FOR CONSTIPATION. (Patient not taking: No sig reported), Disp: 30 tablet, Rfl: 0    Cholecalciferol (Vitamin D) 50 MCG (2000 UT) tablet, Take 1 tablet (2,000 Units total) by mouth daily (Patient not taking: Reported on 10/9/2024), Disp: 30 tablet, Rfl: 3    dexamethasone (DECADRON) 2 mg tablet, Take 1 tablet (2 mg total) by mouth 2 (two) times a day with meals (Patient not taking: Reported on 10/9/2024), Disp: 3 tablet, Rfl: 0    Diclofenac Sodium (VOLTAREN) 1 %, Apply 2 g topically 4 (four) times a day (Patient not taking: Reported on 9/5/2023), Disp: 100 g, Rfl: 0    docusate sodium (COLACE) 100 mg capsule, Take 1 capsule (100 mg total) by mouth 2 (two) times a day (Patient not taking: Reported on 11/10/2022), Disp: 60 capsule, Rfl: 6    ergocalciferol (ERGOCALCIFEROL) 1.25 MG (03036 UT) capsule, Take 1 capsule (50,000 Units total) by mouth once a week (Patient not taking: Reported on 10/9/2024), Disp: 8 capsule, Rfl: 0    fluticasone (FLONASE) 50  mcg/act nasal spray, SPRAY 1 SPRAY INTO EACH NOSTRIL EVERY DAY (Patient not taking: No sig reported), Disp: 16 mL, Rfl: 3    folic acid (FOLVITE) 1 mg tablet, TAKE 1 TABLET BY MOUTH EVERY DAY (Patient not taking: Reported on 10/9/2024), Disp: 90 tablet, Rfl: 1    Magnesium 250 MG TABS, 1 pill a day (Patient not taking: Reported on 10/9/2024), Disp: 30 tablet, Rfl: 0    metFORMIN (GLUCOPHAGE) 1000 MG tablet, Take 1 tablet (1,000 mg total) by mouth 2 (two) times a day with meals (Patient not taking: Reported on 10/9/2024), Disp: 90 tablet, Rfl: 2    naproxen (EC NAPROSYN) 500 MG EC tablet, Take 1 tablet (500 mg total) by mouth 2 (two) times a day with meals, Disp: 10 tablet, Rfl: 0    ondansetron (ZOFRAN-ODT) 4 mg disintegrating tablet, Take 1 tablet (4 mg total) by mouth every 6 (six) hours as needed for nausea or vomiting (Patient not taking: Reported on 10/9/2024), Disp: 10 tablet, Rfl: 0    prochlorperazine (COMPAZINE) 10 mg tablet, Take 1 tab up to BID prn migraine or nausea, ideally no more than 3 days per week (Patient not taking: Reported on 9/5/2023), Disp: 20 tablet, Rfl: 4    pseudoephedrine (SUDAFED) 30 mg tablet, Take 1 tablet (30 mg total) by mouth every 4 (four) hours as needed for congestion (Patient not taking: Reported on 10/9/2024), Disp: 30 tablet, Rfl: 0    Riboflavin (Vitamin B-2) 100 MG TABS, 2 pills a day (Patient not taking: Reported on 10/9/2024), Disp: 60 tablet, Rfl: 3    Rimegepant Sulfate (Nurtec) 75 MG TBDP, Take 1 pill at the onset of headache (Patient not taking: Reported on 4/25/2023), Disp: 15 tablet, Rfl: 0    Symbicort 160-4.5 MCG/ACT inhaler, INHALE 2 PUFFS BY MOUTH 2 TIMES A DAY RINSE MOUTH AFTER USE. (Patient not taking: No sig reported), Disp: 6 g, Rfl: 1    topiramate (TOPAMAX) 25 mg tablet, 1 tab PO QHS for 1 week, increase as tolerated to 1 tab BID for 1 week, then 1 tab QAM and 2 tabs QHS for 1 week and finish at 2 tabs BID. (Patient not taking: Reported on 10/9/2024),  Disp: 120 tablet, Rfl: 4    vitamin B-12 (VITAMIN B-12) 1,000 mcg tablet, TAKE 1 TABLET BY MOUTH EVERY DAY (Patient not taking: Reported on 10/9/2024), Disp: 90 tablet, Rfl: 1    ALLERGIES:  Allergies   Allergen Reactions    Penicillins Anaphylaxis     Anaphylaxis    Shrimp (Diagnostic) - Food Allergy     Shrimp Extract Allergy Skin Test - Food Allergy Anaphylaxis    Venofer [Iron Sucrose] Itching           REVIEW OF SYSTEMS:  Review of Systems   Constitutional:  Negative for chills and fever.   HENT:  Negative for ear pain and sore throat.    Eyes:  Negative for pain and visual disturbance.   Respiratory:  Negative for cough and shortness of breath.    Cardiovascular:  Negative for chest pain and palpitations.   Gastrointestinal:  Negative for abdominal pain and vomiting.   Genitourinary:  Negative for dysuria and hematuria.   Musculoskeletal:  Negative for arthralgias and back pain.   Skin:  Negative for color change and rash.   Neurological:  Negative for seizures and syncope.   All other systems reviewed and are negative.      VITALS:  There were no vitals filed for this visit.    LABS:  HgA1c:   Lab Results   Component Value Date    HGBA1C 6.2 (H) 01/02/2025     BMP:   Lab Results   Component Value Date    GLUCOSE 159 (H) 10/14/2019    CALCIUM 9.3 01/02/2025    K 4.7 01/02/2025    CO2 25 01/02/2025     01/02/2025    BUN 17 01/02/2025    CREATININE 0.69 01/02/2025       _____________________________________________________  PHYSICAL EXAMINATION:  General: well developed and well nourished, alert, oriented times 3, and appears comfortable  Psychiatric: Normal  HEENT: Normocephalic, Atraumatic Trachea Midline, No torticollis  Pulmonary: No audible wheezing or respiratory distress   Abdomen/GI: Non tender, non distended   Cardiovascular: No pitting edema, 2+ radial pulse   Skin: No masses, erythema, lacerations, fluctation, ulcerations  Neurovascular: Sensation Intact to the Median, Ulnar, Radial Nerve, Motor  Intact to the Median, Ulnar, Radial Nerve, and Pulses Intact  Musculoskeletal: Normal, except as noted in detailed exam and in HPI.      MUSCULOSKELETAL EXAMINATION:  Right hand:  SILT  Composite fist      Left hand:  SILT  Composite fist  TTP at mid volar wrist along finger flexors  Non tender FCU FCR    Nodule middle finger A1 pulley and proxima with some tenderness    ___________________________________________________  STUDIES REVIEWED:    MRI report read    PROCEDURES PERFORMED:  Procedures  No Procedures performed today    _____________________________________________________      Scribe Attestation      I,:  Hilario Alan am acting as a scribe while in the presence of the attending physician.:       I,:  Narciso Leavitt MD personally performed the services described in this documentation    as scribed in my presence.:

## 2025-04-08 NOTE — LETTER
April 8, 2025     Patient: Shauna Rubio  YOB: 1990  Date of Visit: 4/8/2025      To Whom it May Concern:    Shauna Rubio is under my professional care. Shauna was seen in my office on 4/8/2025.    If you have any questions or concerns, please don't hesitate to call.         Sincerely,          Narciso Leavitt MD

## 2025-04-19 ENCOUNTER — HOSPITAL ENCOUNTER (EMERGENCY)
Facility: HOSPITAL | Age: 35
Discharge: HOME | End: 2025-04-19
Attending: EMERGENCY MEDICINE
Payer: COMMERCIAL

## 2025-04-19 ENCOUNTER — APPOINTMENT (EMERGENCY)
Dept: NON INVASIVE DIAGNOSTICS | Facility: HOSPITAL | Age: 35
End: 2025-04-19
Payer: COMMERCIAL

## 2025-04-19 VITALS
OXYGEN SATURATION: 97 % | TEMPERATURE: 98.3 F | BODY MASS INDEX: 39.29 KG/M2 | RESPIRATION RATE: 18 BRPM | SYSTOLIC BLOOD PRESSURE: 121 MMHG | DIASTOLIC BLOOD PRESSURE: 91 MMHG | WEIGHT: 236.11 LBS | HEART RATE: 89 BPM

## 2025-04-19 DIAGNOSIS — M79.604 RIGHT LEG PAIN: Primary | ICD-10-CM

## 2025-04-19 PROCEDURE — 99284 EMERGENCY DEPT VISIT MOD MDM: CPT | Performed by: EMERGENCY MEDICINE

## 2025-04-19 PROCEDURE — 93971 EXTREMITY STUDY: CPT | Performed by: SURGERY

## 2025-04-19 PROCEDURE — 99283 EMERGENCY DEPT VISIT LOW MDM: CPT

## 2025-04-19 PROCEDURE — 93971 EXTREMITY STUDY: CPT

## 2025-04-19 NOTE — Clinical Note
Shauna Rubio was seen and treated in our emergency department on 4/19/2025.                Diagnosis:     Shauna  .    She may return on this date:          If you have any questions or concerns, please don't hesitate to call.      Maryuri Varghese MD    ______________________________           _______________          _______________  Hospital Representative                              Date                                Time

## 2025-04-19 NOTE — DISCHARGE INSTRUCTIONS
The ultrasound of your right leg reveals no evidence of a deep vein thrombosis.  You may take Tylenol and/ibuprofen for pain if needed.  We anticipate that the discomfort you are experiencing your legs will resolve over the next several days and is more likely to be due to being on your feet a lot and possible overuse/strain.  Please follow-up with your primary care physician for reevaluation of symptoms.

## 2025-04-19 NOTE — ED PROVIDER NOTES
Time reflects when diagnosis was documented in both MDM as applicable and the Disposition within this note       Time User Action Codes Description Comment    2025  2:15 PM Maryuri Varghese Add [M79.604] Right leg pain           ED Disposition       ED Disposition   Discharge    Condition   Stable    Date/Time   Sat 2025  2:15 PM    Comment   Shauna Rubio discharge to home/self care.                   Assessment & Plan       Medical Decision Making  34-year-old female presenting with right leg pain, swelling, and a bruise.  Vital signs reviewed, afebrile, not tachycardic, not hypoxic.  Differential diagnosis includes musculoskeletal pain, pain due to ecchymosis, with other etiologies such as DVT considered.  Given leg pain and history of DVT in patient's family, plan for venous ultrasound.  Venous duplex reveals no evidence of DVT.  Recommend Tylenol and/or NSAIDs for right leg pain and follow-up with primary care physician.  Suspect that the pain is musculoskeletal in nature.    Problems Addressed:  Right leg pain: acute illness or injury             Medications - No data to display    ED Risk Strat Scores                    No data recorded                            History of Present Illness       Chief Complaint   Patient presents with    Leg Pain     States that she had a vein pop In her left calf about 3 days ago. Denies any injury to area. States she has had the same happen in the past. States that she has a bruise to the area       Past Medical History:   Diagnosis Date    Anemia, postpartum 2019    Delivery by  hysterectomy 10/14/2019    Disease of thyroid gland     History of diet-controlled gestational diabetes mellitus 2019    Obesity     Pap smear abnormality of cervix with LGSIL 2016    Varicella     Had as a child    Varicose vein of leg       Past Surgical History:   Procedure Laterality Date     SECTION      CYSTOSCOPY N/A 10/14/2019    Procedure:  CYSTOSCOPY;  Surgeon: Chana Kam MD;  Location: [facility];  Service: Gynecology    DILATION AND EVACUATION      [place]    HYSTERECTOMY N/A 10/14/2019    Procedure: RAY, BILATERAL SALPINGECTOMY;  Surgeon: Chana Kam MD;  Location: [facility];  Service: Gynecology    LAPAROSCOPY      CA  DELIVERY ONLY N/A 10/14/2019    Procedure:  SECTION () REPEAT;  Surgeon: Chana Kam MD;  Location: [facility];  Service: Gynecology      Family History   Problem Relation Age of Onset    Hypertension Mother     Lupus Mother     Pulmonary embolism Mother     Anuerysm Mother     No Known Problems Father     Asthma Sister     No Known Problems Daughter     No Known Problems Daughter     Other Maternal Grandmother         Pre-diabetic    No Known Problems Brother     Heart murmur Brother     No Known Problems Son     No Known Problems Son     Asthma Son     No Known Problems Maternal Aunt     Breast cancer Paternal Aunt         age dx unknown    Thyroid disease unspecified Family         Cousin      Social History     Tobacco Use    Smoking status: Never    Smokeless tobacco: Never   Vaping Use    Vaping status: Never Used   Substance Use Topics    Alcohol use: Yes     Comment: occ.    Drug use: Never      E-Cigarette/Vaping    E-Cigarette Use Never User       E-Cigarette/Vaping Substances    Nicotine No     THC No     CBD No     Flavoring No     Other No     Unknown No       I have reviewed and agree with the history as documented.     34-year-old female with past medical history of Graves' disease, bipolar disorder, presenting with right leg pain.  Patient reports history of varicose veins and has had some varicose veins pop.  She found a bruise on the back of her right calf earlier today.  She has had intermittent leg swelling.  Today, patient's right leg is hurting.  No personal history of DVT, however, patient is mother has had DVTs in the past.        Review of Systems   Respiratory:  Negative for  shortness of breath.    Cardiovascular:  Positive for leg swelling. Negative for chest pain.   All other systems reviewed and are negative.          Objective       ED Triage Vitals [04/19/25 1237]   Temperature Pulse Blood Pressure Respirations SpO2 Patient Position - Orthostatic VS   98.3 °F (36.8 °C) 89 121/91 18 97 % Sitting      Temp Source Heart Rate Source BP Location FiO2 (%) Pain Score    Oral Monitor Left arm -- --      Vitals      Date and Time Temp Pulse SpO2 Resp BP Pain Score FACES Pain Rating User   04/19/25 1237 98.3 °F (36.8 °C) 89 97 % 18 121/91 -- -- FK            Physical Exam  ED Triage Vitals [04/19/25 1237]   Temperature Pulse Respirations Blood Pressure SpO2   98.3 °F (36.8 °C) 89 18 121/91 97 %      Temp Source Heart Rate Source Patient Position - Orthostatic VS BP Location FiO2 (%)   Oral Monitor Sitting Left arm --      Pain Score       --           Vital signs and nursing notes reviewed    CONSTITUTIONAL: female appearing stated age resting in bed, in no acute distress  HEENT: atraumatic, normocephalic. Sclera anicteric, conjunctiva are not injected. Moist oral mucosa  CARDIOVASCULAR/CHEST: RRR, no M/R/G. 2+ radial pulses  PULMONARY: Breathing comfortably on RA. Breath sounds are equal and clear to auscultation  ABDOMEN: non-distended. BS present, normoactive. Non-tender  MSK: There is a 6 cm circular ecchymosis to right posterior calf without underlying cord.  Right calf is essentially the same and diameter as left.  There is tenderness along anterior aspect of the leg and not much pain on palpation palpation over the posterior calf.  Patient is able to flex and extend right ankle without difficulty.    NEURO: Awake, alert, and oriented x 3. Face symmetric. Moves all extremities spontaneously. No focal neurologic deficits  SKIN: Warm, appears well-perfused  MENTAL STATUS: Normal affect      Results Reviewed       None            VAS VENOUS DUPLEX -LOWER LIMB UNILATERAL   Final  Interpretation by Dez Anderson MD ( 559)   [facility] REPORT  CLINICAL:  Indications:  Patient presents with right lower extremity pain x 1 day.        CONCLUSION:  Impression:  RIGHT LOWER LIMB  No evidence of acute or chronic deep vein thrombosis .  No evidence of superficial thrombophlebitis noted.  Doppler evaluation shows a normal response to augmentation maneuvers.  Popliteal, posterior tibial and anterior tibial arterial Doppler waveforms are  triphasic.     LEFT LOWER LIMB LIMITED  Evaluation shows no evidence of thrombus in the common femoral vein.  Doppler evaluation shows a normal response to augmentation maneuvers.     Technical findings were given to Maryuri Varghese MD     SIGNATURE:  Electronically Signed by: DEZ ANDERSON MD, [facility] on 2025 10:34:35 PM       Procedures    ED Medication and Procedure Management   Prior to Admission Medications   Prescriptions Last Dose Informant Patient Reported? Taking?   Cholecalciferol (Vitamin D) 50 MCG (2000) tablet  Self No No   Sig: Take 1 tablet (2,000 Units total) by mouth daily   Patient not taking: Reported on 10/9/2024   Diclofenac Sodium (VOLTAREN) 1 %   No No   Sig: Apply 2 g topically 4 (four) times a day   Patient not taking: Reported on 2023   Magnesium 250 MG TABS  Self No No   Si pill a day   Patient not taking: Reported on 10/9/2024   Riboflavin (Vitamin B-2) 100 MG TABS  Self No No   Si pills a day   Patient not taking: Reported on 10/9/2024   Rimegepant Sulfate (Nurtec) 75 MG TBDP  Self No No   Sig: Take 1 pill at the onset of headache   Patient not taking: Reported on 2023   Symbicort 160-4.5 MCG/ACT inhaler  Self No No   Sig: INHALE 2 PUFFS BY MOUTH 2 TIMES A DAY RINSE MOUTH AFTER USE.   Patient not taking: No sig reported   acetaminophen (TYLENOL) 325 mg tablet   No No   Sig: Take 3 tablets (975 mg total) by mouth every 6 (six) hours as needed for mild pain   Patient not taking: Reported on  10/9/2024   acetaminophen (TYLENOL) 500 mg tablet  Self No No   Sig: Take 1 tablet (500 mg total) by mouth every 6 (six) hours as needed for mild pain   Patient not taking: Reported on 1/5/2023   acetaminophen (TYLENOL) 650 mg CR tablet  Self No No   Sig: Take 1 tablet (650 mg total) by mouth every 8 (eight) hours as needed for mild pain or moderate pain   bisacodyl 5 MG EC tablet  Self No No   Sig: TAKE 2 TABLETS BY MOUTH DAILY AS NEEDED FOR CONSTIPATION.   Patient not taking: No sig reported   dexamethasone (DECADRON) 2 mg tablet  Self No No   Sig: Take 1 tablet (2 mg total) by mouth 2 (two) times a day with meals   Patient not taking: Reported on 10/9/2024   docusate sodium (COLACE) 100 mg capsule  Self No No   Sig: Take 1 capsule (100 mg total) by mouth 2 (two) times a day   Patient not taking: Reported on 11/10/2022   ergocalciferol (ERGOCALCIFEROL) 1.25 MG (06458 UT) capsule  Self No No   Sig: Take 1 capsule (50,000 Units total) by mouth once a week   Patient not taking: Reported on 10/9/2024   fluticasone (FLONASE) 50 mcg/act nasal spray  Self No No   Sig: SPRAY 1 SPRAY INTO EACH NOSTRIL EVERY DAY   Patient not taking: No sig reported   folic acid (FOLVITE) 1 mg tablet   No No   Sig: TAKE 1 TABLET BY MOUTH EVERY DAY   Patient not taking: Reported on 10/9/2024   ibuprofen (MOTRIN) 600 mg tablet   No No   Sig: Take 1 tablet (600 mg total) by mouth every 6 (six) hours as needed for mild pain   loratadine (CLARITIN) 10 mg tablet  Self No No   Sig: Take 1 tablet (10 mg total) by mouth daily   meclizine (ANTIVERT) 12.5 MG tablet   No No   Sig: Take 2 tablets (25 mg total) by mouth every 8 (eight) hours as needed for dizziness   metFORMIN (GLUCOPHAGE) 1000 MG tablet   No No   Sig: Take 1 tablet (1,000 mg total) by mouth 2 (two) times a day with meals   Patient not taking: Reported on 10/9/2024   methimazole (TAPAZOLE) 5 mg tablet   No No   Sig: Take 1 tablet (5 mg total) by mouth daily   methocarbamol (ROBAXIN) 500  mg tablet   No No   Sig: Take 1 tablet (500 mg total) by mouth 2 (two) times a day   naproxen (EC NAPROSYN) 500 MG EC tablet   No No   Sig: Take 1 tablet (500 mg total) by mouth 2 (two) times a day with meals   naproxen (Naprosyn) 500 mg tablet   No No   Sig: Take 1 tablet (500 mg total) by mouth 2 (two) times a day with meals   ondansetron (ZOFRAN-ODT) 4 mg disintegrating tablet   No No   Sig: Take 1 tablet (4 mg total) by mouth every 6 (six) hours as needed for nausea or vomiting   Patient not taking: Reported on 10/9/2024   prochlorperazine (COMPAZINE) 10 mg tablet  Self No No   Sig: Take 1 tab up to BID prn migraine or nausea, ideally no more than 3 days per week   Patient not taking: Reported on 2023   pseudoephedrine (SUDAFED) 30 mg tablet   No No   Sig: Take 1 tablet (30 mg total) by mouth every 4 (four) hours as needed for congestion   Patient not taking: Reported on 10/9/2024   topiramate (TOPAMAX) 25 mg tablet  Self No No   Si tab PO QHS for 1 week, increase as tolerated to 1 tab BID for 1 week, then 1 tab QAM and 2 tabs QHS for 1 week and finish at 2 tabs BID.   Patient not taking: Reported on 10/9/2024   vitamin B-12 (VITAMIN B-12) 1,000 mcg tablet   No No   Sig: TAKE 1 TABLET BY MOUTH EVERY DAY   Patient not taking: Reported on 10/9/2024      Facility-Administered Medications: None     Discharge Medication List as of 2025  2:16 PM        CONTINUE these medications which have NOT CHANGED    Details   !! acetaminophen (TYLENOL) 325 mg tablet Take 3 tablets (975 mg total) by mouth every 6 (six) hours as needed for mild pain, Starting Tue 2023, Normal      !! acetaminophen (TYLENOL) 500 mg tablet Take 1 tablet (500 mg total) by mouth every 6 (six) hours as needed for mild pain, Starting Thu 11/3/2022, Normal      acetaminophen (TYLENOL) 650 mg CR tablet Take 1 tablet (650 mg total) by mouth every 8 (eight) hours as needed for mild pain or moderate pain, Starting Thu 9/10/2020, Print       bisacodyl 5 MG EC tablet TAKE 2 TABLETS BY MOUTH DAILY AS NEEDED FOR CONSTIPATION., Normal      Cholecalciferol (Vitamin D) 50 MCG (2000 UT) tablet Take 1 tablet (2,000 Units total) by mouth daily, Starting Thu 11/18/2021, Normal      dexamethasone (DECADRON) 2 mg tablet Take 1 tablet (2 mg total) by mouth 2 (two) times a day with meals, Starting Thu 3/30/2023, Normal      Diclofenac Sodium (VOLTAREN) 1 % Apply 2 g topically 4 (four) times a day, Starting Wed 7/5/2023, Normal      docusate sodium (COLACE) 100 mg capsule Take 1 capsule (100 mg total) by mouth 2 (two) times a day, Starting Tue 4/5/2022, Normal      ergocalciferol (ERGOCALCIFEROL) 1.25 MG (17917 UT) capsule Take 1 capsule (50,000 Units total) by mouth once a week, Starting Fri 1/13/2023, Normal      fluticasone (FLONASE) 50 mcg/act nasal spray SPRAY 1 SPRAY INTO EACH NOSTRIL EVERY DAY, Normal      folic acid (FOLVITE) 1 mg tablet TAKE 1 TABLET BY MOUTH EVERY DAY, Normal      ibuprofen (MOTRIN) 600 mg tablet Take 1 tablet (600 mg total) by mouth every 6 (six) hours as needed for mild pain, Starting Tue 9/5/2023, Normal      loratadine (CLARITIN) 10 mg tablet Take 1 tablet (10 mg total) by mouth daily, Starting Thu 11/3/2022, Normal      Magnesium 250 MG TABS 1 pill a day, Normal      meclizine (ANTIVERT) 12.5 MG tablet Take 2 tablets (25 mg total) by mouth every 8 (eight) hours as needed for dizziness, Starting Fri 1/17/2025, Normal      metFORMIN (GLUCOPHAGE) 1000 MG tablet Take 1 tablet (1,000 mg total) by mouth 2 (two) times a day with meals, Starting Tue 4/25/2023, Normal      methimazole (TAPAZOLE) 5 mg tablet Take 1 tablet (5 mg total) by mouth daily, Starting Tue 4/25/2023, Normal      methocarbamol (ROBAXIN) 500 mg tablet Take 1 tablet (500 mg total) by mouth 2 (two) times a day, Starting Tue 9/19/2023, Normal      naproxen (EC NAPROSYN) 500 MG EC tablet Take 1 tablet (500 mg total) by mouth 2 (two) times a day with meals, Starting Wed  7/5/2023, Until Thu 7/4/2024, Normal      naproxen (Naprosyn) 500 mg tablet Take 1 tablet (500 mg total) by mouth 2 (two) times a day with meals, Starting Tue 10/1/2024, Normal      ondansetron (ZOFRAN-ODT) 4 mg disintegrating tablet Take 1 tablet (4 mg total) by mouth every 6 (six) hours as needed for nausea or vomiting, Starting Tue 11/28/2023, Normal      prochlorperazine (COMPAZINE) 10 mg tablet Take 1 tab up to BID prn migraine or nausea, ideally no more than 3 days per week, Normal      pseudoephedrine (SUDAFED) 30 mg tablet Take 1 tablet (30 mg total) by mouth every 4 (four) hours as needed for congestion, Starting Tue 9/5/2023, Normal      Riboflavin (Vitamin B-2) 100 MG TABS 2 pills a day, Normal      Rimegepant Sulfate (Nurtec) 75 MG TBDP Take 1 pill at the onset of headache, Normal      Symbicort 160-4.5 MCG/ACT inhaler INHALE 2 PUFFS BY MOUTH 2 TIMES A DAY RINSE MOUTH AFTER USE., Normal      topiramate (TOPAMAX) 25 mg tablet 1 tab PO QHS for 1 week, increase as tolerated to 1 tab BID for 1 week, then 1 tab QAM and 2 tabs QHS for 1 week and finish at 2 tabs BID., Normal      vitamin B-12 (VITAMIN B-12) 1,000 mcg tablet TAKE 1 TABLET BY MOUTH EVERY DAY, Starting Mon 7/24/2023, Normal       !! - Potential duplicate medications found. Please discuss with provider.        No discharge procedures on file.  ED SEPSIS DOCUMENTATION   Time reflects when diagnosis was documented in both MDM as applicable and the Disposition within this note       Time User Action Codes Description Comment    4/19/2025  2:15 PM Maryuri Varghese Add [M79.604] Right leg pain                  Maryuri Varghese MD  04/19/25 [id number]       Maryuri Varghese MD  04/20/25 [id number]

## 2025-04-21 NOTE — PATIENT INSTRUCTIONS
What you can do to improve your sleep: (Sleep Hygiene) Basic rules for a good night's sleep  Create a regular sleep schedule  This will help you form a sleep routine  Keep a record of your sleep patterns, and any sleeping problems you have  Bring the record to follow-up visits with healthcare providers  Avoid prolonged use of light-emitting screens before bedtime or watching TV in bed  Avoid forcing sleep  Do not take naps  Naps could make it hard for you to fall asleep at bedtime  Deal with your worries before bedtime  Keep your bedroom cool, quiet, and dark  Turn on white noise, such as a fan, to help you relax  Do not use your bed for any activity that will keep you awake  Do not read, exercise, eat, or watch TV in your bedroom  Get up if you do not fall asleep within 20 minutes  Move to another room and do something relaxing until you become sleepy  Limit caffeine, alcohol, nicotine and food to earlier in the day  Only drink caffeine in the morning  Do not drink alcohol within 6 hours of bedtime  Do not eat a heavy meal right before you go to bed  Avoid smoking, especially in the evening  Exercise regularly  Daily exercise will help you sleep better  Do not exercise within 4 hours of bedtime  Stimulus control therapy rules  1  Go to bed only when sleepy  2  Do not watch television, read, eat, or worry while in bed  Use bed only for sleep and sex  3  Get out of bed if unable to fall asleep within 20 minutes and go to another room  Return to bed only when sleepy  Repeat this step as many times as necessary throughout the night  4  Set an alarm clock to wake up at a fixed time each morning, including weekends  5  Do not take a nap during the day  Data from: [address] [place] [address], [address] Nonpharmacologic treatments of insomnia  J Clin Psychiatry 4116; 53:37  Go to AASM website for more information: Sleepeducation  org  Recommended Reading: Book by authors [address]   No More sleepless nights    What is RIC?   Obstructive sleep apnea is a common and serious sleep disorder that causes you to stop breathing during sleep  The airway repeatedly becomes blocked, limiting the amount of air that reaches your lungs  When this happens, you may snore loudly or making choking noises as you try to breathe  Your brain and body becomes oxygen deprived and you may wake up  This may happen a few times a night, or in more severe cases, several hundred times a night  Sleep apnea can make you wake up in the morning feeling tired or unrefreshed even though you have had a full night of sleep  During the day, you may feel fatigued, have difficulty concentrating or you may even unintentionally fall asleep  This is because your body is waking up numerous times throughout the night, even though you might not be conscious of each awakening  The lack of oxygen your body receives can have negative long-term consequences for your health  This includes:  High blood pressure  Heart disease  Irregular heart rhythms  Stroke  Pre-diabetes and diabetes  Depression  Testing  An objective evaluation of your sleep may be needed before your board certified sleep physician can make a diagnosis  Options include:   In-lab overnight sleep study  This type of sleep study requires you to stay overnight at a sleep center, in a bed that may resemble a hotel room  You will sleep with sensors hooked up to various parts of your body  These sensors record your brain waves, heartbeat, breathing and movement  An overnight sleep study also provides your doctor with the most complete information about your sleep  Learn more about an overnight sleep study  Home sleep apnea test  Some patients with high risk factors for obstructive sleep apnea and no other medical disorders may be candidates for a home sleep apnea test  The testing equipment differs in that it is less complicated than what is used in an overnight sleep study   As such, does not give all the data an in-lab will and if negative, may not mean you do not have the problem  Treatment for sleep apnea includes using a continuous positive airway pressure (CPAP) machine to keep your airway open during sleep  A mask is placed over your nose and mouth, or just your nose  The mask is hooked to the CPAP machine that blows a gentle stream of air into the mask when you breathe  This helps keep your airway open so you can breathe more regularly  Extra oxygen may be given to you through the machine  You may be given a mouth device  It looks like a mouth guard or dental retainer and stops your tongue and mouth tissues from blocking your throat while you sleep  Surgery may be needed to remove extra tissues that block your mouth, throat, or nose  Manage sleep apnea:   Do not smoke  Nicotine and other chemicals in cigarettes and cigars can cause lung damage  Ask your healthcare provider for information if you currently smoke and need help to quit  E-cigarettes or smokeless tobacco still contain nicotine  Talk to your healthcare provider before you use these products  Do not drink alcohol or take sedative medicine before you go to sleep  Alcohol and sedatives can relax the muscles and tissues around your throat  This can block the airflow to your lungs  Maintain a healthy weight  Excess tissue around your throat may restrict your breathing  Ask your healthcare provider for information if you need to lose weight  Sleep on your side or use pillows designed to prevent sleep apnea  This prevents your tongue or other tissues from blocking your throat  You can also raise the head of your bed  Driving Safety  Refrain from driving when drowsy  Follow up with your healthcare provider as directed: Write down your questions so you remember to ask them during your visits  Go to AASM website for more information: Sleepeducation  org  What is RIC?    Obstructive sleep apnea is a common and serious sleep disorder that causes you to stop breathing during sleep  The airway repeatedly becomes blocked, limiting the amount of air that reaches your lungs  When this happens, you may snore loudly or making choking noises as you try to breathe  Your brain and body becomes oxygen deprived and you may wake up  This may happen a few times a night, or in more severe cases, several hundred times a night  Sleep apnea can make you wake up in the morning feeling tired or unrefreshed even though you have had a full night of sleep  During the day, you may feel fatigued, have difficulty concentrating or you may even unintentionally fall asleep  This is because your body is waking up numerous times throughout the night, even though you might not be conscious of each awakening  The lack of oxygen your body receives can have negative long-term consequences for your health  This includes:  High blood pressure  Heart disease  Irregular heart rhythms  Stroke  Pre-diabetes and diabetes  Depression  Testing  An objective evaluation of your sleep may be needed before your board certified sleep physician can make a diagnosis  Options include:   In-lab overnight sleep study  This type of sleep study requires you to stay overnight at a sleep center, in a bed that may resemble a hotel room  You will sleep with sensors hooked up to various parts of your body  These sensors record your brain waves, heartbeat, breathing and movement  An overnight sleep study also provides your doctor with the most complete information about your sleep  Learn more about an overnight sleep study  Home sleep apnea test  Some patients with high risk factors for obstructive sleep apnea and no other medical disorders may be candidates for a home sleep apnea test  The testing equipment differs in that it is less complicated than what is used in an overnight sleep study  As such, does not give all the data an in-lab will and if negative, may not mean you do not have the problem    Treatment for sleep apnea includes using a continuous positive airway pressure (CPAP) machine to keep your airway open during sleep  A mask is placed over your nose and mouth, or just your nose  The mask is hooked to the CPAP machine that blows a gentle stream of air into the mask when you breathe  This helps keep your airway open so you can breathe more regularly  Extra oxygen may be given to you through the machine  You may be given a mouth device  It looks like a mouth guard or dental retainer and stops your tongue and mouth tissues from blocking your throat while you sleep  Surgery may be needed to remove extra tissues that block your mouth, throat, or nose  Manage sleep apnea:   Do not smoke  Nicotine and other chemicals in cigarettes and cigars can cause lung damage  Ask your healthcare provider for information if you currently smoke and need help to quit  E-cigarettes or smokeless tobacco still contain nicotine  Talk to your healthcare provider before you use these products  Do not drink alcohol or take sedative medicine before you go to sleep  Alcohol and sedatives can relax the muscles and tissues around your throat  This can block the airflow to your lungs  Maintain a healthy weight  Excess tissue around your throat may restrict your breathing  Ask your healthcare provider for information if you need to lose weight  Sleep on your side or use pillows designed to prevent sleep apnea  This prevents your tongue or other tissues from blocking your throat  You can also raise the head of your bed  Driving Safety  Refrain from driving when drowsy  Follow up with your healthcare provider as directed: Write down your questions so you remember to ask them during your visits  Go to AAS website for more information: Sleepeducation  org  Nursing Support:  When: Monday through Friday 7A-5PM except holidays  Where: Our direct line is [phone]  If you are having a true emergency please call 911    In the event that the line is busy or it is after hours please leave a voice message and we will return your call  Please speak clearly, leaving your full name, birth date, best number to reach you and the reason for your call  Medication refills: We will need the name of the medication, the dosage, the ordering provider, whether you get a 30 or 90 day refill, and the pharmacy name and address  Medications will be ordered by the provider only  Nurses cannot call in prescriptions  Please allow 7 days for medication refills  Physician requested updates: If your provider requested that you call with an update after starting medication, please be ready to provide us the medication and dosage, what time you take your medication, the time you attempt to fall asleep, time you fall asleep, when you wake up, and what time you get out of bed  Sleep Study Results: We will contact you with sleep study results and/or next steps after the physician has reviewed your testing  [Negative] : Gastrointestinal

## 2025-05-05 ENCOUNTER — RA CDI HCC (OUTPATIENT)
Dept: OTHER | Facility: HOSPITAL | Age: 35
End: 2025-05-05

## 2025-05-05 NOTE — PROGRESS NOTES
[facility] audit    F31.9  HCC coding opportunities          Chart Reviewed number of suggestions sent to Provider: 1     Patients Insurance        Commercial Insurance: [employer]        Statement Selected

## 2025-05-06 ENCOUNTER — OFFICE VISIT (OUTPATIENT)
Dept: OBGYN CLINIC | Facility: CLINIC | Age: 35
End: 2025-05-06
Payer: COMMERCIAL

## 2025-05-06 ENCOUNTER — OFFICE VISIT (OUTPATIENT)
Age: 35
End: 2025-05-06
Payer: COMMERCIAL

## 2025-05-06 VITALS
SYSTOLIC BLOOD PRESSURE: 110 MMHG | WEIGHT: 238 LBS | BODY MASS INDEX: 40.63 KG/M2 | HEIGHT: 64 IN | HEART RATE: 93 BPM | DIASTOLIC BLOOD PRESSURE: 70 MMHG | TEMPERATURE: 98.3 F | OXYGEN SATURATION: 98 %

## 2025-05-06 VITALS — HEIGHT: 65 IN | WEIGHT: 236 LBS | BODY MASS INDEX: 39.32 KG/M2

## 2025-05-06 DIAGNOSIS — G47.33 OSA (OBSTRUCTIVE SLEEP APNEA): ICD-10-CM

## 2025-05-06 DIAGNOSIS — M77.8 LEFT WRIST TENDINITIS: ICD-10-CM

## 2025-05-06 DIAGNOSIS — M25.532 PAIN IN LEFT WRIST: Primary | ICD-10-CM

## 2025-05-06 DIAGNOSIS — M79.661 RIGHT CALF PAIN: ICD-10-CM

## 2025-05-06 PROCEDURE — 99213 OFFICE O/P EST LOW 20 MIN: CPT | Performed by: SURGERY

## 2025-05-06 PROCEDURE — 20550 NJX 1 TENDON SHEATH/LIGAMENT: CPT | Performed by: PHYSICIAN ASSISTANT

## 2025-05-06 PROCEDURE — 99214 OFFICE O/P EST MOD 30 MIN: CPT | Performed by: INTERNAL MEDICINE

## 2025-05-06 RX ORDER — BETAMETHASONE SODIUM PHOSPHATE AND BETAMETHASONE ACETATE 3; 3 MG/ML; MG/ML
6 INJECTION, SUSPENSION INTRA-ARTICULAR; INTRALESIONAL; INTRAMUSCULAR; SOFT TISSUE
Status: COMPLETED | OUTPATIENT
Start: 2025-05-06 | End: 2025-05-06

## 2025-05-06 RX ORDER — LIDOCAINE HYDROCHLORIDE 10 MG/ML
3 INJECTION, SOLUTION INFILTRATION; PERINEURAL
Status: COMPLETED | OUTPATIENT
Start: 2025-05-06 | End: 2025-05-06

## 2025-05-06 RX ADMIN — LIDOCAINE HYDROCHLORIDE 3 ML: 10 INJECTION, SOLUTION INFILTRATION; PERINEURAL at 09:45

## 2025-05-06 RX ADMIN — BETAMETHASONE SODIUM PHOSPHATE AND BETAMETHASONE ACETATE 6 MG: 3; 3 INJECTION, SUSPENSION INTRA-ARTICULAR; INTRALESIONAL; INTRAMUSCULAR; SOFT TISSUE at 09:45

## 2025-05-06 NOTE — ASSESSMENT & PLAN NOTE
Orders:    Semaglutide-Weight Management (WEGOVY) 0.25 MG/0.5ML; Inject 0.5 mL (0.25 mg total) under the skin once a week

## 2025-05-06 NOTE — PROGRESS NOTES
Name: Shauna Rubio      : 1990      MRN: [id number]  Encounter Provider: Narciso Leavitt MD  Encounter Date: 2025   Encounter department: [facility] [place]      Assessment & Plan  Pain in left wrist  Left FCR steroid injection provided today as this is her most symptomatic area.  She tolerated well.  Can trial off nighttime bracing as it is causing more discomfort than relief.  Activities as tolerated.  Follow-up 4-6 weeks for re-evaluation.  Consider 2nd dorsal extensor compartment steroid injection at that time if still symptomatic.              History of Present Illness   Patient is a 34 y.o. female here for follow-up.  Last visit she was advised on night time bracing and NSAIDs to reduce inflammation and pain.  Today she states overall she is feels about the same as last visit.  She has been wearing the brace at nighttime and as needed during the day and feels like it is uncomfortable and causes numbness and tingling.  Pain is mainly volar wrist and to a lesser extent, the dorsal wrist.  No other new symptoms reported today.  She does not take NSAIDs consistently as she does not like to take medications.  Patient is Right Handed.      Review of Systems A 10 point ROS was performed; negative except as noted above.     Objective     Imaging  No new imaging today     Physical Exam    General appearance:  NAD   Cardiac:  Regular rate  Lungs:  Unlabored breathing  Abdomen:  Non-distended    Orthopedic Exam:    Left wrist    Inspection: No open wounds or erythema.  No ecchymosis or swelling.    Palpation: Tenderness to palpation FCR, and to a lesser extent the second dorsal extensor compartment.    Range-of-motion:  Normal wrist ROM, full composite fist.  Discomfort with wrist flexion and extension, no discomfort with wrist rotation.    Strength: 5/5 wrist flexion extension,     Sensation:  ILT m/r/u nerve distributions.    Special Tests:  Palpable radial pulse  UE warm  and well perfused.  Negative Finkelstein's test    Hand/upper extremity injection: L wrist  Universal Protocol:  Consent: Verbal consent obtained.  Risks and benefits: risks, benefits and alternatives were discussed  Consent given by: patient  Patient identity confirmed: verbally with patient  Supporting Documentation  Indications: pain and tendon swelling   Procedure Details  Condition:FCR tunnel syndrome Site: L wrist (L FCR)   Preparation: Patient was prepped and draped in the usual sterile fashion  Needle size: 22 G  Ultrasound guidance: no  Approach: volar  Medications administered: 3 mL lidocaine 1 %; 6 mg betamethasone acetate-betamethasone sodium phosphate 6 (3-3) mg/mL  Patient tolerance: patient tolerated the procedure well with no immediate complications  Dressing:  Sterile dressing applied               Mc Alejandre PA-C

## 2025-05-06 NOTE — PROGRESS NOTES
Name: Shauna Rubio      : 1990      MRN: [id number]  Encounter Provider: Nicole Cantu MD  Encounter Date: 2025   Encounter department: [facility]  :  Assessment & Plan  BMI 40.0-44.9, adult (HCC)    Orders:    Semaglutide-Weight Management (WEGOVY) 0.25 MG/0.5ML; Inject 0.5 mL (0.25 mg total) under the skin once a week    RIC (obstructive sleep apnea)    Orders:    Semaglutide-Weight Management (WEGOVY) 0.25 MG/0.5ML; Inject 0.5 mL (0.25 mg total) under the skin once a week    Right calf pain  Resolved.              History of Present Illness   HPI  Patient is here to follow-up on recent ER visit with right calf pain.  She was concerned about a bruise on the back of her calf worried about DVT she went to the ER.  Venous Doppler was negative.  She does try to wear compression stocking which has not been effective.  She is unable to lose weight and is concerned about and asked for GLP-1 agonist.  She does not have a history of thyroid cancer or pancreatitis.  Negative family history of men 1.  We discussed mechanism of action and side effects of the medication.  Prior Authorization Clinical Questions for Weight Management Pharmacotherapy    1. Does the patient have a contrainidcation to medication prescribed for weight management?: No  2. Does the patient have a diagnosis of obesity, confirmed by a BMI greater than or equal to 30 kg/m^2?: Yes  3. Does the patient have a BMI of greater than or equal to 27 kg/m^2 with at least one weight-related comorbidity/risk factor/complication (e.g. diabetes, dyslipidemia, coronary artery disease)?: Yes  4. Weight-related co-morbidities/risk factors: metabolic syndrome, dyslipidemia, obstructive sleep apnea (RIC), depression  5. WEGOVY CVA Indication: Does patient have established documented cardiovascular disease (history of a prior heart attack (myocardial infarction), stroke, or symptomatic peripheral arterial disease (PAD)?: No  6. ZEPBOUND  "RIC Indication: Does patient have documented RIC diagnosed via sleep study (insurance will require copy of sleep study results for approval)?: No  7. Has the patient been on a weight loss regimen of low-calorie diet, increased physical activity, and lifestyle modifications for a minimum of 6 months?: Yes  8. Has the patient completed a comprehensive weight loss program (ie, Weight Watchers, Noom, Bariatrics, other maritza on phone)? If so, what?: No  9. Does the patient have a history of type 2 diabetes?: No     Baseline weight (in pounds): 238 lbs        Review of Systems    Objective   /70 (BP Location: Left arm, Patient Position: Sitting, Cuff Size: Large)   Pulse 93   Temp 98.3 °F (36.8 °C) (Temporal)   Ht 5' 4\" (1.626 m)   Wt 108 kg (238 lb)   LMP 03/23/2019 (Within Days)   SpO2 98%   BMI 40.85 kg/m²      Physical Exam    "

## 2025-05-06 NOTE — ASSESSMENT & PLAN NOTE
Left FCR steroid injection provided today as this is her most symptomatic area.  She tolerated well.  Can trial off nighttime bracing as it is causing more discomfort than relief.  Activities as tolerated.  Follow-up 4-6 weeks for re-evaluation.  Consider 2nd dorsal extensor compartment steroid injection at that time if still symptomatic.

## 2025-05-07 ENCOUNTER — TELEPHONE (OUTPATIENT)
Age: 35
End: 2025-05-07

## 2025-05-07 NOTE — TELEPHONE ENCOUNTER
PA for WEGOVY 0.25MG/0.5ML SUBMITTED to [employer]    via    [x]CMM-KEY: [id number]  []Surescripts-Case ID #   []Availity-Auth ID # NDC #   []Faxed to plan   []Other website   []Phone call Case ID #     [x]PA sent as URGENT    All office notes, labs and other pertaining documents and studies sent. Clinical questions answered. Awaiting determination from insurance company.     Turnaround time for your insurance to make a decision on your Prior Authorization can take 7-21 business days.

## 2025-05-07 NOTE — TELEPHONE ENCOUNTER
PA for WEGOVY ALL STRENGTHS APPROVED     Date(s) approved 05/07/2025-11/07/2025       Pharmacy advised by    [x]Fax  []Phone call  []Secure Chat    Approval letter scanned into [employer] Yes

## 2025-07-01 DIAGNOSIS — G47.33 OSA (OBSTRUCTIVE SLEEP APNEA): ICD-10-CM

## (undated) DEVICE — MEDI-VAC YANK SUCT HNDL W/TPRD BULBOUS TIP: Brand: CARDINAL HEALTH

## (undated) DEVICE — TUBING SUCTION 5MM X 12 FT

## (undated) DEVICE — GLOVE INDICATOR PI UNDERGLOVE SZ 8 BLUE

## (undated) DEVICE — CHLORAPREP HI-LITE 26ML ORANGE

## (undated) DEVICE — SUT VICRYL 0 CTX 36 IN J978H

## (undated) DEVICE — BULB SYRINGE,IRRIGATION WITH PROTECTIVE CAP: Brand: DOVER

## (undated) DEVICE — CYSTO TUBING SINGLE IRRIGATION

## (undated) DEVICE — DRAPE TOWEL: Brand: CONVERTORS

## (undated) DEVICE — 1820 FOAM BLOCK NEEDLE COUNTER: Brand: DEVON

## (undated) DEVICE — ADHESIVE SKIN HIGH VISCOSITY EXOFIN 1ML

## (undated) DEVICE — INTENDED FOR TISSUE SEPARATION, AND OTHER PROCEDURES THAT REQUIRE A SHARP SURGICAL BLADE TO PUNCTURE OR CUT.: Brand: BARD-PARKER SAFETY BLADES SIZE 10, STERILE

## (undated) DEVICE — ABG MICROSTICKS SAFETY

## (undated) DEVICE — ENSEAL 20 CM SHAFT, LARGE JAW: Brand: ENSEAL X1

## (undated) DEVICE — LIGHT GLOVE GREEN

## (undated) DEVICE — TRAY FOLEY 16FR URIMETER SILICONE SURESTEP

## (undated) DEVICE — GAUZE SPONGES,16 PLY: Brand: CURITY

## (undated) DEVICE — EXTRA LARGE, DISPOSABLE C-SECTION PROTECTOR - RETRACTOR: Brand: ALEXIS ® O C-SECTION PROTECTOR - RETRACTOR

## (undated) DEVICE — GLOVE SRG LF STRL BGL SKNSNS 7.5 PF

## (undated) DEVICE — PENCIL ELECTROSURG E-Z CLEAN -0035H

## (undated) DEVICE — MAYO STAND COVER: Brand: CONVERTORS

## (undated) DEVICE — SUT PDS II 1 XLH 96 IN LOOPED Z881G

## (undated) DEVICE — PROXIMATE PLUS MD MULTI-DIRECTIONAL RELEASE SKIN STAPLERS CONTAINS 35 STAINLESS STEEL STAPLES APPROXIMATE CLOSED DIMENSIONS: 6.9MM X 3.9MM WIDE: Brand: PROXIMATE

## (undated) DEVICE — SUT VICRYL 0 CT-1 27 IN J260H

## (undated) DEVICE — ABDOMINAL PAD: Brand: DERMACEA

## (undated) DEVICE — TELFA NON-ADHERENT ABSORBENT DRESSING: Brand: TELFA

## (undated) DEVICE — PACK C-SECTION PBDS

## (undated) DEVICE — SUT PDS II 0 CT-1 27 IN Z340H